# Patient Record
Sex: FEMALE | Race: WHITE | HISPANIC OR LATINO | Employment: OTHER | URBAN - METROPOLITAN AREA
[De-identification: names, ages, dates, MRNs, and addresses within clinical notes are randomized per-mention and may not be internally consistent; named-entity substitution may affect disease eponyms.]

---

## 2017-03-22 ENCOUNTER — ALLSCRIPTS OFFICE VISIT (OUTPATIENT)
Dept: OTHER | Facility: OTHER | Age: 67
End: 2017-03-22

## 2017-04-03 ENCOUNTER — ALLSCRIPTS OFFICE VISIT (OUTPATIENT)
Dept: OTHER | Facility: OTHER | Age: 67
End: 2017-04-03

## 2017-05-18 ENCOUNTER — ALLSCRIPTS OFFICE VISIT (OUTPATIENT)
Dept: OTHER | Facility: OTHER | Age: 67
End: 2017-05-18

## 2017-05-18 DIAGNOSIS — E03.9 HYPOTHYROIDISM: ICD-10-CM

## 2017-05-18 DIAGNOSIS — E78.5 HYPERLIPIDEMIA: ICD-10-CM

## 2017-05-18 DIAGNOSIS — I10 ESSENTIAL (PRIMARY) HYPERTENSION: ICD-10-CM

## 2017-05-31 ENCOUNTER — LAB CONVERSION - ENCOUNTER (OUTPATIENT)
Dept: OTHER | Facility: OTHER | Age: 67
End: 2017-05-31

## 2017-05-31 LAB
A/G RATIO (HISTORICAL): 1.4 (CALC) (ref 1–2.5)
ALBUMIN SERPL BCP-MCNC: 3.9 G/DL (ref 3.6–5.1)
ALP SERPL-CCNC: 75 U/L (ref 33–130)
ALT SERPL W P-5'-P-CCNC: 8 U/L (ref 6–29)
AST SERPL W P-5'-P-CCNC: 15 U/L (ref 10–35)
BILIRUB SERPL-MCNC: 0.4 MG/DL (ref 0.2–1.2)
BUN SERPL-MCNC: 13 MG/DL (ref 7–25)
BUN/CREA RATIO (HISTORICAL): ABNORMAL (CALC) (ref 6–22)
CALCIUM SERPL-MCNC: 9.1 MG/DL (ref 8.6–10.4)
CHLORIDE SERPL-SCNC: 103 MMOL/L (ref 98–110)
CHOLEST SERPL-MCNC: 168 MG/DL (ref 125–200)
CHOLEST/HDLC SERPL: 3.1 (CALC)
CO2 SERPL-SCNC: 29 MMOL/L (ref 20–31)
CREAT SERPL-MCNC: 0.55 MG/DL (ref 0.5–0.99)
EGFR AFRICAN AMERICAN (HISTORICAL): 113 ML/MIN/1.73M2
EGFR-AMERICAN CALC (HISTORICAL): 98 ML/MIN/1.73M2
GAMMA GLOBULIN (HISTORICAL): 2.7 G/DL (CALC) (ref 1.9–3.7)
GLUCOSE (HISTORICAL): 106 MG/DL (ref 65–99)
HDLC SERPL-MCNC: 55 MG/DL
LDL CHOLESTEROL (HISTORICAL): 88 MG/DL (CALC)
NON-HDL-CHOL (CHOL-HDL) (HISTORICAL): 113 MG/DL (CALC)
POTASSIUM SERPL-SCNC: 4.3 MMOL/L (ref 3.5–5.3)
SODIUM SERPL-SCNC: 139 MMOL/L (ref 135–146)
TOTAL PROTEIN (HISTORICAL): 6.6 G/DL (ref 6.1–8.1)
TRIGL SERPL-MCNC: 124 MG/DL

## 2017-06-01 ENCOUNTER — LAB CONVERSION - ENCOUNTER (OUTPATIENT)
Dept: OTHER | Facility: OTHER | Age: 67
End: 2017-06-01

## 2017-06-01 LAB
BASOPHILS # BLD AUTO: 0.5 %
BASOPHILS # BLD AUTO: 41 CELLS/UL (ref 0–200)
DEPRECATED RDW RBC AUTO: 15.7 % (ref 11–15)
EOSINOPHIL # BLD AUTO: 1 %
EOSINOPHIL # BLD AUTO: 81 CELLS/UL (ref 15–500)
HCT VFR BLD AUTO: 41.9 % (ref 35–45)
HGB BLD-MCNC: 13.3 G/DL (ref 11.7–15.5)
LYMPHOCYTES # BLD AUTO: 2867 CELLS/UL (ref 850–3900)
LYMPHOCYTES # BLD AUTO: 35.4 %
MCH RBC QN AUTO: 27.9 PG (ref 27–33)
MCHC RBC AUTO-ENTMCNC: 31.9 G/DL (ref 32–36)
MCV RBC AUTO: 87.5 FL (ref 80–100)
MONOCYTES # BLD AUTO: 616 CELLS/UL (ref 200–950)
MONOCYTES (HISTORICAL): 7.6 %
NEUTROPHILS # BLD AUTO: 4496 CELLS/UL (ref 1500–7800)
NEUTROPHILS # BLD AUTO: 55.5 %
PLATELET # BLD AUTO: 326 THOUSAND/UL (ref 140–400)
PMV BLD AUTO: 9.3 FL (ref 7.5–12.5)
RBC # BLD AUTO: 4.79 MILLION/UL (ref 3.8–5.1)
TSH SERPL DL<=0.05 MIU/L-ACNC: 0.3 MIU/L (ref 0.4–4.5)
WBC # BLD AUTO: 8.1 THOUSAND/UL (ref 3.8–10.8)

## 2017-07-20 ENCOUNTER — HOSPITAL ENCOUNTER (OUTPATIENT)
Dept: RADIOLOGY | Facility: HOSPITAL | Age: 67
Discharge: HOME | End: 2017-07-20
Attending: FAMILY MEDICINE
Payer: COMMERCIAL

## 2017-07-20 DIAGNOSIS — Z12.31 ENCOUNTER FOR SCREENING MAMMOGRAM FOR MALIGNANT NEOPLASM OF BREAST: ICD-10-CM

## 2017-07-20 PROCEDURE — G0202 SCR MAMMO BI INCL CAD: HCPCS

## 2017-07-21 ENCOUNTER — GENERIC CONVERSION - ENCOUNTER (OUTPATIENT)
Dept: OTHER | Facility: OTHER | Age: 67
End: 2017-07-21

## 2017-08-31 ENCOUNTER — ALLSCRIPTS OFFICE VISIT (OUTPATIENT)
Dept: OTHER | Facility: OTHER | Age: 67
End: 2017-08-31

## 2017-09-27 ENCOUNTER — ALLSCRIPTS OFFICE VISIT (OUTPATIENT)
Dept: OTHER | Facility: OTHER | Age: 67
End: 2017-09-27

## 2017-10-28 ENCOUNTER — LAB CONVERSION - ENCOUNTER (OUTPATIENT)
Dept: OTHER | Facility: OTHER | Age: 67
End: 2017-10-28

## 2017-10-28 LAB — TSH SERPL DL<=0.05 MIU/L-ACNC: 2.51 MIU/L (ref 0.4–4.5)

## 2017-11-02 ENCOUNTER — GENERIC CONVERSION - ENCOUNTER (OUTPATIENT)
Dept: OTHER | Facility: OTHER | Age: 67
End: 2017-11-02

## 2018-01-12 VITALS
HEART RATE: 88 BPM | SYSTOLIC BLOOD PRESSURE: 130 MMHG | HEIGHT: 56 IN | BODY MASS INDEX: 34.3 KG/M2 | RESPIRATION RATE: 20 BRPM | DIASTOLIC BLOOD PRESSURE: 88 MMHG | TEMPERATURE: 97.4 F | WEIGHT: 152.5 LBS

## 2018-01-12 VITALS
HEIGHT: 56 IN | RESPIRATION RATE: 20 BRPM | DIASTOLIC BLOOD PRESSURE: 84 MMHG | WEIGHT: 152.25 LBS | SYSTOLIC BLOOD PRESSURE: 136 MMHG | TEMPERATURE: 97.9 F | HEART RATE: 78 BPM | BODY MASS INDEX: 34.25 KG/M2

## 2018-01-13 VITALS
RESPIRATION RATE: 18 BRPM | HEIGHT: 56 IN | SYSTOLIC BLOOD PRESSURE: 140 MMHG | DIASTOLIC BLOOD PRESSURE: 82 MMHG | BODY MASS INDEX: 35.54 KG/M2 | WEIGHT: 158 LBS | TEMPERATURE: 96.5 F | HEART RATE: 76 BPM

## 2018-01-13 NOTE — RESULT NOTES
Verified Results  (Q) TSH, 3RD GENERATION [id number] 08:46AM Jarocho Lee   REPORT COMMENT:  FASTING:YES     Test Name Result Flag Reference   TSH 0 30 mIU/L L 0 40-4 50

## 2018-01-14 NOTE — RESULT NOTES
Verified Results  (1) VITAMIN B12 [id number] 11:05AM Itz Beena Order Number: [id number]  TW Order Number: [id number] Order Number: [id number] Order Number: [id number] Order Number: [id number]     Test Name Result Flag Reference   VITAMIN B12 605 pg/mL  100-900     (1) FOLATE [id number] 11:05AM Itz Beena Order Number: [id number]  TW Order Number: [id number] Order Number: [id number] Order Number: [id number] Order Number: [id number]     Test Name Result Flag Reference   FOLATE 17 8 ng/mL H 3 1-17 5     (1) BASIC METABOLIC PROFILE [id number] 11:05AM Itz Hargrove Order Number: [id number]  TW Order Number: [id number] Order Number: [id number] Order Number: [id number] Order Number: [id number]     Test Name Result Flag Reference   GLUCOSE,RANDM 94 mg/dL     If the patient is fasting, the ADA then defines impaired fasting glucose as > 100 mg/dL and diabetes as > or equal to 123 mg/dL  SODIUM 140 mmol/L  136-145   POTASSIUM 3 6 mmol/L  3 5-5 3   CHLORIDE 106 mmol/L  100-108   CARBON DIOXIDE 30 mmol/L  21-32   ANION GAP (CALC) 4 mmol/L  4-13   BLOOD UREA NITROGEN 17 mg/dL  5-25   CREATININE 0 62 mg/dL  0 60-1 30   Standardized to IDMS reference method   CALCIUM 8 9 mg/dL  8 3-10 1   eGFR Non-African American      >60 0 ml/min/1 73[address] [facility] Disease Education Program recommendations are as follows:  GFR calculation is accurate only with a steady state creatinine  Chronic Kidney disease less than 60 ml/min/1 73 sq  meters  Kidney failure less than 15 ml/min/1 73 sq  meters       (1) TSH WITH FT4 REFLEX 22Jun2016 11:05AM Itz Hargrove Order Number: [id number]  TW Order Number: [id number] Order Number: [id number] Order Number: [id number] Order Number: [id number] Test Name Result Flag Reference   TSH 0 360 uIU/mL  0 358-3 740   The recommended reference ranges for TSH during pregnancy are as follows:  First trimester 0 1 to 2 5 uIU/mL  Second trimester  0 2 to 3 0 uIU/mL  Third trimester 0 3 to 3 0 uIU/m

## 2018-01-14 NOTE — PROGRESS NOTES
Discussion/Summary  Social Work-Discussion Summary [facility]: Patient is being seen for an ongoing assessment/follow up  Patient is a breast cancer survivor for whom a gauntlet and arm sleeve has been ordered through [employer]  The bill was sent to this writer as patient cannot afford to pay for this DME  [employer] will pay for these items totaling $107 76 out of Fund A of that overall fund  A check requisition will be delivered to [facility] in the next few days        Signatures   Electronically signed by : Oj Spears; Feb 10 2016  3:20PM EST                       (Author)

## 2018-01-15 NOTE — RESULT NOTES
Verified Results  MAMMO SCREENING RIGHT W CAD [id number] 02:22PM Lasha Trotter Order Number: [id number]    - Patient Instructions: To schedule this appointment, please contact Central Scheduling at [phone]  Do not wear any perfume, powder, lotion or deodorant on breast or underarm area  Please bring your doctors order, referral (if needed) and insurance information with you on the day of the test  Failure to bring this information may result in this test being rescheduled  Arrive 15 minutes prior to your appointment time to register  On the day of your test, please bring any prior mammogram or breast studies with you that were not performed at a [facility]  Failure to bring prior exams may result in your test needing to be rescheduled  Test Name Result Flag Reference   MAMMO SCREENING RIGHT W CAD (Report)     Patient History:   Patient is postmenopausal and has history of breast cancer at age   40  Family history of breast cancer at age 67 in mother, breast    cancer at age [de-identified] in maternal aunt, colorectal cancer under age 48   in maternal uncle, colorectal cancer at age 80 in mother  Benign excisional biopsy of the right breast, 1997  Malignant    mastectomy of the left breast, 1996  Patient has never smoked  Patient's BMI is 31 4      Reason for exam: screening, asymptomatic  Mammo Screening Right W CAD: July 20, 2017 - Check In #: [de-identified]   CC, MLO, and XCCL view(s) were taken of the right breast      Technologist: RT Shyanne(ROWENA)(M)   Prior study comparison: July 15, 2016, mammo screening right W    CAD performed at [address] [place] [address]  May 26, 2015,    right breast DIGITAL UNILATERAL SCREENING MAMMOGRAM WITH CAD    performed at [address] [place] [address]  May 6, 2014, right    breast DIGITAL UNILATERAL SCREENING MAMMOGRAM WITH CAD performed    at [address] [place] [address]  There are scattered fibroglandular densities     The parenchymal pattern appears stable  No dominant soft tissue    mass or suspicious calcifications are noted  Stable nodule is    seen in the medial right breast  This is stable small group of    calcifications in the outer right breast  The skin and nipple    contours are within normal limits  No mammographic evidence of malignancy  No    significant changes when compared with prior studies  ACR BI-RADSï¾® Assessments: BiRad:2 - Benign     Recommendation:   Routine screening mammogram in 1 year  Analyzed by CAD     The patient is scheduled in a reminder system  8-10% of cancers will be missed on mammography  Management of a    palpable abnormality must be based on clinical grounds  Patients   will be notified of their results via letter from our facility  Accredited by [employer] of Radiology and FDA  Transcription Location:  [address]: [id number]     Risk Value(s):   Myriad Table: 4 7%, MRS : Based on personal and/or    family history, consideration of hereditary risk assessment may    be warranted  Signed by:   Sharyle Ben, MD   7/21/17       Plan  Breast cancer screening    · Digital Unilateral Screening Mammogram With CAD ; every 1 year;  Next [id number];  Status:Active

## 2018-01-16 NOTE — RESULT NOTES
Verified Results  (1) COMPREHENSIVE METABOLIC PANEL [id number] 62:59BN Nivia Salazar   REPORT COMMENT:  FASTING:YES     Test Name Result Flag Reference   GLUCOSE 92 mg/dL  65-99   Fasting reference interval   UREA NITROGEN (BUN) 17 mg/dL  7-25   CREATININE 0 64 mg/dL  0 50-0 99   For patients >52years of age, the reference limit  for Creatinine is approximately 13% higher for people  identified as -American  eGFR NON-AFR   AMERICAN 94 mL/min/1 73m2  > OR = 60   eGFR AFRICAN AMERICAN 109 mL/min/1 73m2  > OR = 60   BUN/CREATININE RATIO   6-31   NOT APPLICABLE (calc)   SODIUM 139 mmol/L  135-146   POTASSIUM 4 4 mmol/L  3 5-5 3   CHLORIDE 102 mmol/L     CARBON DIOXIDE 29 mmol/L  19-30   CALCIUM 9 4 mg/dL  8 6-10 4   PROTEIN, TOTAL 6 6 g/dL  6 1-8 1   ALBUMIN 4 0 g/dL  3 6-5 1   GLOBULIN 2 6 g/dL (calc)  1 9-3 7   ALBUMIN/GLOBULIN RATIO 1 5 (calc)  1 0-2 5   BILIRUBIN, TOTAL 0 4 mg/dL  0 2-1 2   ALKALINE PHOSPHATASE 62 U/L     AST 17 U/L  10-35   ALT 9 U/L  6-29     (1) CBC/PLT/DIFF [id number] 08:54AM Ariel Moore   REPORT COMMENT:  FASTING:YES     Test Name Result Flag Reference   WHITE BLOOD CELL COUNT 8 4 Thousand/uL  3 8-10 8   RED BLOOD CELL COUNT 4 85 Million/uL  3 80-5 10   HEMOGLOBIN 13 6 g/dL  11 7-15 5   HEMATOCRIT 43 5 %  35 0-45 0   MCV 89 5 fL  80 0-100 0   MCH 28 0 pg  27 0-33 0   MCHC 31 2 g/dL L 32 0-36 0   RDW 15 5 % H 11 0-15 0   PLATELET COUNT 149 Thousand/uL  140-400   MPV 9 1 fL  7 5-11 5   ABSOLUTE NEUTROPHILS 5090 cells/uL  5254-6303   ABSOLUTE LYMPHOCYTES 2621 cells/uL  850-3900   ABSOLUTE MONOCYTES 563 cells/uL  200-950   ABSOLUTE EOSINOPHILS 84 cells/uL     ABSOLUTE BASOPHILS 42 cells/uL  0-200   NEUTROPHILS 60 6 %     LYMPHOCYTES 31 2 %     MONOCYTES 6 7 %     EOSINOPHILS 1 0 %     BASOPHILS 0 5 %       (Q) LIPID PANEL WITH REFLEX TO DIRECT LDL [id number] 08:54JENNIFER Salazar     Test Name Result Flag Reference   CHOLESTEROL, TOTAL 174 mg/dL  125-200   HDL CHOLESTEROL 53 mg/dL  > OR = 46   TRIGLICERIDES 86 mg/dL  <813   LDL-CHOLESTEROL 104 mg/dL (calc)  <130   Desirable range <100 mg/dL for patients with CHD or  diabetes and <70 mg/dL for diabetic patients with  known heart disease  CHOL/HDLC RATIO 3 3 (calc)  < OR = 5 0   NON HDL CHOLESTEROL 121 mg/dL (calc)     Target for non-HDL cholesterol is 30 mg/dL higher than   LDL cholesterol target       (Q) TSH, 3RD GENERATION [id number] 08:54AM Kenny Chamorro   REPORT COMMENT:  FASTING:YES     Test Name Result Flag Reference   TSH 0 47 mIU/L  0 40-4 50

## 2018-01-17 NOTE — RESULT NOTES
Verified Results  (1) COMPREHENSIVE METABOLIC PANEL [id number] 58:55SW [employer]   REPORT COMMENT:  FASTING:YES     Test Name Result Flag Reference   GLUCOSE 105 mg/dL H 65-99   Fasting reference interval   UREA NITROGEN (BUN) 17 mg/dL  7-25   CREATININE 0 70 mg/dL  0 50-0 99   For patients >52years of age, the reference limit  for Creatinine is approximately 13% higher for people  identified as -American  eGFR NON-AFR   AMERICAN 91 mL/min/1 73m2  > OR = 60   eGFR AFRICAN AMERICAN 105 mL/min/1 73m2  > OR = 60   BUN/CREATININE RATIO   3-77   NOT APPLICABLE (calc)   SODIUM 139 mmol/L  135-146   POTASSIUM 4 3 mmol/L  3 5-5 3   CHLORIDE 102 mmol/L     CARBON DIOXIDE 30 mmol/L  20-31   CALCIUM 10 0 mg/dL  8 6-10 4   PROTEIN, TOTAL 7 0 g/dL  6 1-8 1   ALBUMIN 4 1 g/dL  3 6-5 1   GLOBULIN 2 9 g/dL (calc)  1 9-3 7   ALBUMIN/GLOBULIN RATIO 1 4 (calc)  1 0-2 5   BILIRUBIN, TOTAL 0 5 mg/dL  0 2-1 2   ALKALINE PHOSPHATASE 70 U/L     AST 21 U/L  10-35   ALT 14 U/L  6-29     (1) VITAMIN B12 24Oct2016 10:06AM [employer]   REPORT COMMENT:  FASTING:YES     Test Name Result Flag Reference   VITAMIN B12 689 pg/mL  200-1100     (1) FOLATE 24Oct2016 10:06AM Naaman Mast     Test Name Result Flag Reference   FOLATE, SERUM 14 2 ng/mL     Reference Range                             Low:           <3 4                             Borderline:    3 4-5 4                             Normal:        >5 4     (1) CBC/PLT/DIFF 24Oct2016 10:06AM [employer]   REPORT COMMENT:  FASTING:YES     Test Name Result Flag Reference   WHITE BLOOD CELL COUNT 8 8 Thousand/uL  3 8-10 8   RED BLOOD CELL COUNT 4 89 Million/uL  3 80-5 10   HEMOGLOBIN 13 9 g/dL  11 7-15 5   HEMATOCRIT 42 1 %  35 0-45 0   MCV 86 1 fL  80 0-100 0   MCH 28 5 pg  27 0-33 0   MCHC 33 1 g/dL  32 0-36 0   RDW 15 0 %  11 0-15 0   PLATELET COUNT 933 Thousand/uL  140-400   MPV 9 0 fL  7 5-11 5   ABSOLUTE NEUTROPHILS 5342 cells/uL  3842-4660   ABSOLUTE LYMPHOCYTES 2719 cells/uL  850-3900   ABSOLUTE MONOCYTES 607 cells/uL  200-950   ABSOLUTE EOSINOPHILS 97 cells/uL     ABSOLUTE BASOPHILS 35 cells/uL  0-200   NEUTROPHILS 60 7 %     LYMPHOCYTES 30 9 %     MONOCYTES 6 9 %     EOSINOPHILS 1 1 %     BASOPHILS 0 4 %

## 2018-01-18 ENCOUNTER — GENERIC CONVERSION - ENCOUNTER (OUTPATIENT)
Dept: OTHER | Facility: OTHER | Age: 68
End: 2018-01-18

## 2018-01-18 DIAGNOSIS — N63.0 MASS OF BREAST: ICD-10-CM

## 2018-01-22 VITALS
RESPIRATION RATE: 16 BRPM | BODY MASS INDEX: 36.78 KG/M2 | DIASTOLIC BLOOD PRESSURE: 80 MMHG | WEIGHT: 163.5 LBS | HEART RATE: 76 BPM | HEIGHT: 56 IN | SYSTOLIC BLOOD PRESSURE: 130 MMHG | TEMPERATURE: 96.9 F

## 2018-01-23 ENCOUNTER — GENERIC CONVERSION - ENCOUNTER (OUTPATIENT)
Dept: OTHER | Facility: OTHER | Age: 68
End: 2018-01-23

## 2018-01-24 VITALS
HEART RATE: 82 BPM | TEMPERATURE: 96.2 F | WEIGHT: 165 LBS | RESPIRATION RATE: 16 BRPM | DIASTOLIC BLOOD PRESSURE: 84 MMHG | HEIGHT: 56 IN | SYSTOLIC BLOOD PRESSURE: 126 MMHG | BODY MASS INDEX: 37.12 KG/M2

## 2018-01-24 NOTE — MISCELLANEOUS
Reason For Visit  Reason For Visit Free Text Note Form: Received request to contact pt  regarding obtaining a new home blood pressure monitor  Call to pt  and left message and received return call today  Contact number for [employer] [facility] [employer] [facility] given to pt  ([phone])  Informed pt  that they may provide her with any pharmacy needs including a blood pressure monitor  Pt  verbalizes understanding and states she is familiar with that process  She denies further needs at this time  Will continue to be available to provide support  Active Problems    1  Benign essential hypertension (401 1) (I10)   2  Breast lump or mass (611 72) (N63 0)   3  Cancer of breast, female (174 9) (C50 919)   4  Depression (311) (F32 9)   5  Hyperlipidemia (272 4) (E78 5)   6  Hypothyroidism (244 9) (E03 9)   7  Lymphedema (457 1) (I89 0)   8  Osteoarthritis (715 90) (M19 90)   9  Osteoporosis (733 00) (M81 0)   10  Peripheral neuropathy (356 9) (G62 9)   11  Primary insomnia (307 42) (F51 01)   12  Psoriasis (696 1) (L40 9)   13  Rapid fatigue of gait (781 2) (R26 89)    Current Meds   1  AmLODIPine Besylate 10 MG Oral Tablet; Take 1 tablet daily; Therapy: 79LEC4560 to (Arabella Donaldson)  Requested for: 03KVI6990; Last   Rx:02Nov2017 Ordered   2  Fluocinonide 0 05 % External Solution; APPLY SPARINGLY TO SCALP TWICE DAILY; Therapy: 82DSW9661 to (Evaluate:14Nov2017)  Requested for: 99FAS5673; Last   Rx:03Zgh0535 Ordered   3  Levothyroxine Sodium 50 MCG Oral Tablet; Take 1 tablet daily; Therapy: 63VMY2485 to (Arabella Donaldson)  Requested for: 33ZBW0824; Last   Rx:02Nov2017 Ordered   4  Lisinopril-Hydrochlorothiazide 20-12 5 MG Oral Tablet; take 1 tablet by mouth once daily; Therapy: 86JCJ5021 to (Arabella Donaldson)  Requested for: 56AUZ8794; Last   Rx:02Nov2017 Ordered   5  Mirtazapine 15 MG Oral Tablet; TAKE 1 TABLET AT BEDTIME;    Therapy: 04MTP0077 to (Arabella Anika)  Requested for: 40AID4316; Last   Rx:02Nov2017 Ordered   6  Mometasone Furoate 0 1 % External Cream;   Therapy: 89ZLT0930 to Recorded   7  Mometasone Furoate 0 1 % External Ointment; apply sparingly to affected area(s) twice   daily; Therapy: 84JVX1549 to (Evaluate:85Nve6662)  Requested for: 35VDM5238; Last   Rx:52Pur9590 Ordered   8  Otezla 30 MG Oral Tablet; Take one tablet twice a day; Therapy: (Recorded:03Apr2017) to Recorded   9  Simvastatin 20 MG Oral Tablet; Take 1 tablet by mouth at bedtime; Therapy: 06BBE0684 to (Evaluate:20Jqh5065)  Requested for: 77MJH7204; Last   Rx:02Nov2017 Ordered   10  TraMADol HCl - 50 MG Oral Tablet; TAKE 1 TABLET Twice daily PRN back pain; Therapy: 96PMM8457 to (Evaluate:16Apr2016); Last Rx:17Mar2016 Ordered   11  Zostavax 33815 UNT/0 65ML Subcutaneous Solution Reconstituted (Zostavax 09002    UNT/0 65ML Subcutaneous Solution Reconstituted); 0 65 mL SC X 1;     Therapy: 21EUB7129 to (Last Rx:03Oct2016) Ordered    Signatures   Electronically signed by : PATY Vergara; Jan 23 2018  1:16PM EST                       (Author)

## 2018-01-30 ENCOUNTER — HOSPITAL ENCOUNTER (OUTPATIENT)
Dept: ULTRASOUND IMAGING | Facility: CLINIC | Age: 68
Discharge: HOME | End: 2018-01-30
Payer: COMMERCIAL

## 2018-01-30 ENCOUNTER — HOSPITAL ENCOUNTER (OUTPATIENT)
Dept: MAMMOGRAPHY | Facility: CLINIC | Age: 68
Discharge: HOME | End: 2018-01-30
Payer: COMMERCIAL

## 2018-01-30 DIAGNOSIS — N63.0 MASS OF BREAST: ICD-10-CM

## 2018-01-30 PROCEDURE — 76642 ULTRASOUND BREAST LIMITED: CPT

## 2018-01-30 PROCEDURE — 77065 DX MAMMO INCL CAD UNI: CPT

## 2018-02-13 DIAGNOSIS — F51.01 PRIMARY INSOMNIA: Primary | ICD-10-CM

## 2018-02-13 RX ORDER — MIRTAZAPINE 15 MG/1
TABLET, FILM COATED ORAL
Qty: 90 TABLET | Refills: 1 | Status: SHIPPED | OUTPATIENT
Start: 2018-02-13 | End: 2018-05-30 | Stop reason: SDUPTHER

## 2018-03-28 ENCOUNTER — OFFICE VISIT (OUTPATIENT)
Dept: MULTI SPECIALTY CLINIC | Facility: CLINIC | Age: 68
End: 2018-03-28
Payer: COMMERCIAL

## 2018-03-28 DIAGNOSIS — L40.9 PSORIASIS: Primary | ICD-10-CM

## 2018-03-28 PROCEDURE — 99213 OFFICE O/P EST LOW 20 MIN: CPT | Performed by: SPECIALIST

## 2018-03-29 ENCOUNTER — OFFICE VISIT (OUTPATIENT)
Dept: FAMILY MEDICINE CLINIC | Facility: CLINIC | Age: 68
End: 2018-03-29
Payer: COMMERCIAL

## 2018-03-29 VITALS
TEMPERATURE: 97.1 F | HEIGHT: 57 IN | SYSTOLIC BLOOD PRESSURE: 140 MMHG | RESPIRATION RATE: 16 BRPM | HEART RATE: 76 BPM | BODY MASS INDEX: 36.85 KG/M2 | DIASTOLIC BLOOD PRESSURE: 90 MMHG | WEIGHT: 170.8 LBS

## 2018-03-29 DIAGNOSIS — I10 BENIGN ESSENTIAL HYPERTENSION: ICD-10-CM

## 2018-03-29 DIAGNOSIS — E03.9 ACQUIRED HYPOTHYROIDISM: Primary | ICD-10-CM

## 2018-03-29 DIAGNOSIS — E78.5 HYPERLIPIDEMIA, UNSPECIFIED HYPERLIPIDEMIA TYPE: ICD-10-CM

## 2018-03-29 PROCEDURE — 3725F SCREEN DEPRESSION PERFORMED: CPT | Performed by: FAMILY MEDICINE

## 2018-03-29 PROCEDURE — 99214 OFFICE O/P EST MOD 30 MIN: CPT | Performed by: FAMILY MEDICINE

## 2018-03-29 PROCEDURE — 1101F PT FALLS ASSESS-DOCD LE1/YR: CPT | Performed by: FAMILY MEDICINE

## 2018-03-29 RX ORDER — LISINOPRIL AND HYDROCHLOROTHIAZIDE 20; 12.5 MG/1; MG/1
TABLET ORAL
Refills: 0 | COMMUNITY
Start: 2018-02-20 | End: 2018-05-30 | Stop reason: SDUPTHER

## 2018-03-29 RX ORDER — APREMILAST 30 MG/1
30 TABLET, FILM COATED ORAL 2 TIMES DAILY
Refills: 0 | COMMUNITY
Start: 2018-03-19

## 2018-03-29 RX ORDER — ZOSTER VACCINE LIVE 19400 [PFU]/.65ML
INJECTION, POWDER, LYOPHILIZED, FOR SUSPENSION SUBCUTANEOUS
COMMUNITY
Start: 2016-10-03 | End: 2019-12-17

## 2018-03-29 RX ORDER — LEVOTHYROXINE SODIUM 0.05 MG/1
TABLET ORAL
Refills: 0 | COMMUNITY
Start: 2018-02-06 | End: 2018-05-30 | Stop reason: SDUPTHER

## 2018-03-29 RX ORDER — AMLODIPINE BESYLATE 10 MG/1
TABLET ORAL
Refills: 0 | COMMUNITY
Start: 2018-02-06 | End: 2018-05-30 | Stop reason: SDUPTHER

## 2018-03-29 RX ORDER — MOMETASONE FUROATE 1 MG/G
50 CREAM TOPICAL 2 TIMES DAILY
COMMUNITY
Start: 2017-07-14

## 2018-03-29 RX ORDER — SIMVASTATIN 20 MG
TABLET ORAL
Refills: 0 | COMMUNITY
Start: 2018-02-13 | End: 2018-05-30 | Stop reason: SDUPTHER

## 2018-03-29 RX ORDER — FLUOCINONIDE TOPICAL SOLUTION USP, 0.05% 0.5 MG/ML
SOLUTION TOPICAL
Refills: 0 | COMMUNITY
Start: 2018-03-05

## 2018-03-29 RX ORDER — TRAMADOL HYDROCHLORIDE 50 MG/1
1 TABLET ORAL 2 TIMES DAILY PRN
COMMUNITY
Start: 2016-03-17 | End: 2018-09-07 | Stop reason: ALTCHOICE

## 2018-03-29 NOTE — PROGRESS NOTES
Assessment/Plan:    1  HTN: Continue amlodipine, lisinopril/hctz 20/12 5 mg daily  Take medication every day at night  BP log  TLC  2  Dyslipidemia: TLC, simvastatin  3  Hypothyroidism: Continue current dose of levothyroxine  f/u 2 months with Dr Colletta Needle  Will get CBC, CMP, TSH  Subjective:      Patient ID: Amaris Harris is a 78 y o  female  1  HTN: No CP/SOB/KOROMA/HA/edema  Prescribed amlodipine, lisinopril, hctz; unsure if she is taking all of her medication  Home -150/70-90     2  Dyslipidemia: On simvastatin  No GI upset/myalgias  LDL 88 (2017)  3  Hypothyroidism: On levothyroxine; dose was recently reduced due to suppressed TSH  No sx of hyper-/hypothyroidism  TSH now 2  Did not get labs done yet  Current Outpatient Prescriptions:     amLODIPine (NORVASC) 10 mg tablet, , Disp: , Rfl: 0    fluocinonide (LIDEX) 0 05 % external solution, , Disp: , Rfl: 0    levothyroxine 50 mcg tablet, , Disp: , Rfl: 0    lisinopril-hydrochlorothiazide (PRINZIDE,ZESTORETIC) 20-12 5 MG per tablet, , Disp: , Rfl: 0    mirtazapine (REMERON) 15 mg tablet, take 1 tablet by mouth at bedtime, Disp: 90 tablet, Rfl: 1    mometasone (ELOCON) 0 1 % cream, Apply topically, Disp: , Rfl:     OTEZLA 30 MG TABS, , Disp: , Rfl: 0    simvastatin (ZOCOR) 20 mg tablet, , Disp: , Rfl: 0    traMADol (ULTRAM) 50 mg tablet, Take 1 tablet by mouth 2 (two) times a day as needed, Disp: , Rfl:     Zoster Vaccine Live (ZOSTAVAX) 22934 UNT/0 65ML SUSR, Inject under the skin, Disp: , Rfl:       Review of Systems   Respiratory: Negative for cough, chest tightness, shortness of breath and wheezing  Cardiovascular: Negative for chest pain, palpitations and leg swelling  Gastrointestinal: Negative for abdominal pain, constipation, diarrhea, nausea and vomiting     Neurological: Negative for dizziness, tremors, seizures, syncope, facial asymmetry, speech difficulty, weakness, light-headedness, numbness and headaches  Hematological: Does not bruise/bleed easily  Objective:      /90   Pulse 76   Temp (!) 97 1 °F (36 2 °C)   Resp 16   Ht 4' 8 8" (1 443 m)   Wt 77 5 kg (170 lb 12 8 oz)   BMI 37 22 kg/m²          Physical Exam   Constitutional: She is oriented to person, place, and time  She appears well-developed and well-nourished  HENT:   Head: Normocephalic  Neck: Normal range of motion  Neck supple  Cardiovascular: Normal rate, regular rhythm, normal heart sounds and intact distal pulses  Pulmonary/Chest: Effort normal and breath sounds normal    Musculoskeletal: Normal range of motion  Neurological: She is alert and oriented to person, place, and time  She has normal reflexes  Skin: Skin is warm and dry  Psychiatric: She has a normal mood and affect  Her behavior is normal  Judgment and thought content normal    Nursing note and vitals reviewed

## 2018-04-14 LAB
ALBUMIN SERPL-MCNC: 4.4 G/DL (ref 3.6–5.1)
ALBUMIN/GLOB SERPL: 1.7 (CALC) (ref 1–2.5)
ALP SERPL-CCNC: 75 U/L (ref 33–130)
ALT SERPL-CCNC: 9 U/L (ref 6–29)
AST SERPL-CCNC: 18 U/L (ref 10–35)
BASOPHILS # BLD AUTO: 48 CELLS/UL (ref 0–200)
BASOPHILS NFR BLD AUTO: 0.7 %
BILIRUB SERPL-MCNC: 0.4 MG/DL (ref 0.2–1.2)
BUN SERPL-MCNC: 13 MG/DL (ref 7–25)
BUN/CREAT SERPL: ABNORMAL (CALC) (ref 6–22)
CALCIUM SERPL-MCNC: 9.6 MG/DL (ref 8.6–10.4)
CHLORIDE SERPL-SCNC: 105 MMOL/L (ref 98–110)
CHOLEST SERPL-MCNC: 170 MG/DL
CHOLEST/HDLC SERPL: 2.9 (CALC)
CO2 SERPL-SCNC: 27 MMOL/L (ref 20–31)
CREAT SERPL-MCNC: 0.73 MG/DL (ref 0.5–0.99)
EOSINOPHIL # BLD AUTO: 68 CELLS/UL (ref 15–500)
EOSINOPHIL NFR BLD AUTO: 1 %
ERYTHROCYTE [DISTWIDTH] IN BLOOD BY AUTOMATED COUNT: 13.3 % (ref 11–15)
GLOBULIN SER CALC-MCNC: 2.6 G/DL (CALC) (ref 1.9–3.7)
GLUCOSE SERPL-MCNC: 110 MG/DL (ref 65–99)
HCT VFR BLD AUTO: 43.3 % (ref 35–45)
HDLC SERPL-MCNC: 59 MG/DL
HGB BLD-MCNC: 13.7 G/DL (ref 11.7–15.5)
LDLC SERPL CALC-MCNC: 92 MG/DL (CALC)
LYMPHOCYTES # BLD AUTO: 2468 CELLS/UL (ref 850–3900)
LYMPHOCYTES NFR BLD AUTO: 36.3 %
MCH RBC QN AUTO: 27.9 PG (ref 27–33)
MCHC RBC AUTO-ENTMCNC: 31.6 G/DL (ref 32–36)
MCV RBC AUTO: 88.2 FL (ref 80–100)
MONOCYTES # BLD AUTO: 592 CELLS/UL (ref 200–950)
MONOCYTES NFR BLD AUTO: 8.7 %
NEUTROPHILS # BLD AUTO: 3624 CELLS/UL (ref 1500–7800)
NEUTROPHILS NFR BLD AUTO: 53.3 %
NONHDLC SERPL-MCNC: 111 MG/DL (CALC)
PLATELET # BLD AUTO: 359 THOUSAND/UL (ref 140–400)
PMV BLD REES-ECKER: 10.5 FL (ref 7.5–12.5)
POTASSIUM SERPL-SCNC: 4.2 MMOL/L (ref 3.5–5.3)
PROT SERPL-MCNC: 7 G/DL (ref 6.1–8.1)
RBC # BLD AUTO: 4.91 MILLION/UL (ref 3.8–5.1)
SL AMB EGFR AFRICAN AMERICAN: 99 ML/MIN/1.73M2
SL AMB EGFR NON AFRICAN AMERICAN: 86 ML/MIN/1.73M2
SODIUM SERPL-SCNC: 141 MMOL/L (ref 135–146)
TRIGL SERPL-MCNC: 92 MG/DL
TSH SERPL-ACNC: 2.55 MIU/L (ref 0.4–4.5)
WBC # BLD AUTO: 6.8 THOUSAND/UL (ref 3.8–10.8)

## 2018-04-18 DIAGNOSIS — E78.5 HYPERLIPIDEMIA: ICD-10-CM

## 2018-04-18 DIAGNOSIS — E03.9 HYPOTHYROIDISM: ICD-10-CM

## 2018-04-18 DIAGNOSIS — I10 ESSENTIAL (PRIMARY) HYPERTENSION: ICD-10-CM

## 2018-05-30 ENCOUNTER — OFFICE VISIT (OUTPATIENT)
Dept: FAMILY MEDICINE CLINIC | Facility: CLINIC | Age: 68
End: 2018-05-30
Payer: COMMERCIAL

## 2018-05-30 VITALS
SYSTOLIC BLOOD PRESSURE: 120 MMHG | RESPIRATION RATE: 18 BRPM | BODY MASS INDEX: 36.22 KG/M2 | HEART RATE: 74 BPM | DIASTOLIC BLOOD PRESSURE: 70 MMHG | HEIGHT: 56 IN | WEIGHT: 161 LBS | TEMPERATURE: 98.4 F

## 2018-05-30 DIAGNOSIS — F51.01 PRIMARY INSOMNIA: ICD-10-CM

## 2018-05-30 DIAGNOSIS — I10 BENIGN ESSENTIAL HYPERTENSION: ICD-10-CM

## 2018-05-30 DIAGNOSIS — E78.5 HYPERLIPIDEMIA, UNSPECIFIED HYPERLIPIDEMIA TYPE: ICD-10-CM

## 2018-05-30 DIAGNOSIS — R73.01 IFG (IMPAIRED FASTING GLUCOSE): ICD-10-CM

## 2018-05-30 DIAGNOSIS — E03.9 ACQUIRED HYPOTHYROIDISM: Primary | ICD-10-CM

## 2018-05-30 DIAGNOSIS — Z23 NEED FOR TETANUS, DIPHTHERIA, AND ACELLULAR PERTUSSIS (TDAP) VACCINE: ICD-10-CM

## 2018-05-30 LAB — SL AMB POCT HEMOGLOBIN AIC: 6.2

## 2018-05-30 PROCEDURE — 99213 OFFICE O/P EST LOW 20 MIN: CPT | Performed by: FAMILY MEDICINE

## 2018-05-30 PROCEDURE — 83036 HEMOGLOBIN GLYCOSYLATED A1C: CPT | Performed by: FAMILY MEDICINE

## 2018-05-30 PROCEDURE — 3078F DIAST BP <80 MM HG: CPT | Performed by: FAMILY MEDICINE

## 2018-05-30 PROCEDURE — 1036F TOBACCO NON-USER: CPT | Performed by: FAMILY MEDICINE

## 2018-05-30 PROCEDURE — 3074F SYST BP LT 130 MM HG: CPT | Performed by: FAMILY MEDICINE

## 2018-05-30 PROCEDURE — 90715 TDAP VACCINE 7 YRS/> IM: CPT | Performed by: FAMILY MEDICINE

## 2018-05-30 PROCEDURE — 90471 IMMUNIZATION ADMIN: CPT | Performed by: FAMILY MEDICINE

## 2018-05-30 RX ORDER — MIRTAZAPINE 15 MG/1
15 TABLET, FILM COATED ORAL
Qty: 30 TABLET | Refills: 2 | Status: SHIPPED | OUTPATIENT
Start: 2018-05-30 | End: 2019-01-17 | Stop reason: SDUPTHER

## 2018-05-30 RX ORDER — LISINOPRIL AND HYDROCHLOROTHIAZIDE 20; 12.5 MG/1; MG/1
1 TABLET ORAL DAILY
Qty: 30 TABLET | Refills: 2 | Status: SHIPPED | OUTPATIENT
Start: 2018-05-30 | End: 2018-08-21 | Stop reason: SDUPTHER

## 2018-05-30 RX ORDER — AMLODIPINE BESYLATE 10 MG/1
10 TABLET ORAL DAILY
Qty: 30 TABLET | Refills: 2 | Status: SHIPPED | OUTPATIENT
Start: 2018-05-30 | End: 2018-08-21 | Stop reason: SDUPTHER

## 2018-05-30 RX ORDER — LEVOTHYROXINE SODIUM 0.05 MG/1
50 TABLET ORAL DAILY
Qty: 30 TABLET | Refills: 2 | Status: SHIPPED | OUTPATIENT
Start: 2018-05-30 | End: 2018-08-21 | Stop reason: SDUPTHER

## 2018-05-30 RX ORDER — SIMVASTATIN 20 MG
20 TABLET ORAL
Qty: 30 TABLET | Refills: 0 | Status: SHIPPED | OUTPATIENT
Start: 2018-05-30 | End: 2018-06-26 | Stop reason: SDUPTHER

## 2018-05-30 NOTE — ASSESSMENT & PLAN NOTE
Fasting glucose elevated at 110 on CMP 04/2018  Last A1C:  Lab Results   Component Value Date    HGBA1C 6 3 06/18/2016   POCT A1C obtained in office today 6 2%  Patient counseled on continued lifestyle modifications, including healthy diabetic diet, and exercise  RTC in 3 months

## 2018-05-30 NOTE — ASSESSMENT & PLAN NOTE
Currently on Simvastatin 20 mg tabs  Compliant, denies any msk or gi adverse affects  Continue current regimen  Refill provided  ASCVD risk score 7 6%   (Last lipid panel 05/2018 unchanged from 05/2017)  Lab Results   Component Value Date    CHOL 168 05/31/2017    CHOL 213 (H) 06/18/2016    CHOL 174 03/23/2016     Lab Results   Component Value Date    HDL 55 05/31/2017    HDL 53 03/23/2016    HDL 58 09/16/2015     Lab Results   Component Value Date    LDLCALC 88 09/30/2013     Lab Results   Component Value Date    TRIG 92 04/13/2018    TRIG 124 05/31/2017    TRIG 86 03/23/2016     No components found for: CHOLHDL

## 2018-05-30 NOTE — ASSESSMENT & PLAN NOTE
Last TSH wnl  Lab Results   Component Value Date    TSH 2 55 04/13/2018   Reports compliance  Denies adverse affects  Continue current regimen Levothryroxine 50 mcg daily,  Refill provided  RTC in 3 months

## 2018-05-30 NOTE — ASSESSMENT & PLAN NOTE
Well controlled  Continue current meds, amlodipine 10 mg daily, lisinopril-HCTZ (20-12 5) daily  BP Readings from Last 3 Encounters:   05/30/18 120/70   03/29/18 140/90   01/18/18 126/84   Last CMP wnl, including BUN/Cr  (with exception of fasting glucose 119 consistent with impaired fasting glucose)  RTC 3 months

## 2018-05-30 NOTE — PROGRESS NOTES
Assessment/Plan: Felecia Perez is a 80 y o  female with:   Problem List Items Addressed This Visit        Endocrine    Hypothyroidism - Primary     Last TSH wnl  Lab Results   Component Value Date    TSH 2 55 04/13/2018   Reports compliance  Denies adverse affects  Continue current regimen Levothryroxine 50 mcg daily,  Refill provided  RTC in 3 months  Relevant Medications    levothyroxine 50 mcg tablet    IFG (impaired fasting glucose)     Fasting glucose elevated at 110 on CMP 04/2018  Last A1C:  Lab Results   Component Value Date    HGBA1C 6 3 06/18/2016   POCT A1C obtained in office today 6 2%  Patient counseled on continued lifestyle modifications, including healthy diabetic diet, and exercise  RTC in 3 months  Relevant Orders    POCT hemoglobin A1c (Completed)       Cardiovascular and Mediastinum    Benign essential hypertension     Well controlled  Continue current meds, amlodipine 10 mg daily, lisinopril-HCTZ (20-12 5) daily  BP Readings from Last 3 Encounters:   05/30/18 120/70   03/29/18 140/90   01/18/18 126/84   Last CMP wnl, including BUN/Cr  (with exception of fasting glucose 119 consistent with impaired fasting glucose)  RTC 3 months  Relevant Medications    amLODIPine (NORVASC) 10 mg tablet    lisinopril-hydrochlorothiazide (PRINZIDE,ZESTORETIC) 20-12 5 MG per tablet       Other    Hyperlipidemia     Currently on Simvastatin 20 mg tabs  Compliant, denies any msk or gi adverse affects  Continue current regimen  Refill provided  ASCVD risk score 7 6%   (Last lipid panel 05/2018 unchanged from 05/2017)  Lab Results   Component Value Date    CHOL 168 05/31/2017    CHOL 213 (H) 06/18/2016    CHOL 174 03/23/2016     Lab Results   Component Value Date    HDL 55 05/31/2017    HDL 53 03/23/2016    HDL 58 09/16/2015     Lab Results   Component Value Date    LDLCALC 88 09/30/2013     Lab Results   Component Value Date    TRIG 92 04/13/2018    TRIG 124 05/31/2017    TRIG 86 03/23/2016     No components found for: CHOLHDL         Relevant Medications    simvastatin (ZOCOR) 20 mg tablet    Primary insomnia     Well controlled on Mirtazepine  15 mg PO HS  Continue current regimen  Refill provided  Relevant Medications    mirtazapine (REMERON) 15 mg tablet      Other Visit Diagnoses     Need for tetanus, diphtheria, and acellular pertussis (Tdap) vaccine        Relevant Orders    TDAP VACCINE GREATER THAN OR EQUAL TO 6YO IM (Completed)          Chief Complaint:  Chief Complaint   Patient presents with    Hypertension    Hypothyroidism       HPI:   Dwaine Landa is a 80 y o  female who presents today for follow up of labs tests, hypertension, insomnia, hypothyroidism, hyperlipidemia  She reports compliance with all meds and denies any adverse medication reactions, including  lightheadedness, headaches, chest pain, palpitations, sob, n/v, diarrhea, constipation, abdominal pain, any  complaints  Patient also has a history of breast cancer and denies any recent unintentional weight changes, and also has a history of impaired fasting glucose and denies any polyuria polydipsia, or recent changes in vision (uses glasses at baseline for reading), however reports intermittent numbness an tingling in right heel (currently not present)  No acute complaints        History:  Current Outpatient Prescriptions   Medication Sig Dispense Refill    amLODIPine (NORVASC) 10 mg tablet Take 1 tablet (10 mg total) by mouth daily 30 tablet 2    fluocinonide (LIDEX) 0 05 % external solution   0    levothyroxine 50 mcg tablet Take 1 tablet (50 mcg total) by mouth daily 30 tablet 2    lisinopril-hydrochlorothiazide (PRINZIDE,ZESTORETIC) 20-12 5 MG per tablet Take 1 tablet by mouth daily 30 tablet 2    mirtazapine (REMERON) 15 mg tablet Take 1 tablet (15 mg total) by mouth daily at bedtime 30 tablet 2    mometasone (ELOCON) 0 1 % cream Apply topically      OTEZLA 30 MG TABS   0    simvastatin (ZOCOR) 20 mg tablet Take 1 tablet (20 mg total) by mouth daily at bedtime 30 tablet 0    traMADol (ULTRAM) 50 mg tablet Take 1 tablet by mouth 2 (two) times a day as needed      Zoster Vaccine Live (ZOSTAVAX) 87836 UNT/0 65ML SUSR Inject under the skin       No current facility-administered medications for this visit  Past Medical History:   Diagnosis Date    Breast cancer ([facility] [address] )     Hemorrhoids      Social History   Substance Use Topics    Smoking status: Never Smoker    Smokeless tobacco: Never Used    Alcohol use No     Patient Active Problem List   Diagnosis    Benign essential hypertension    Depression    Hyperlipidemia    Hypothyroidism    Lymphedema    Primary insomnia    IFG (impaired fasting glucose)     Review of Systems  As Per HPI    Physical Exam:  /70   Pulse 74   Temp 98 4 °F (36 9 °C)   Resp 18   Ht 4' 8" (1 422 m)   Wt 73 kg (161 lb)   BMI 36 10 kg/m²   Physical Exam   Constitutional: She is oriented to person, place, and time  She appears well-developed  No distress  HENT:   Head: Normocephalic and atraumatic  Right Ear: External ear normal    Left Ear: External ear normal    Eyes: Conjunctivae and EOM are normal  Pupils are equal, round, and reactive to light  No scleral icterus  Neck: Neck supple  No JVD present  No thyromegaly present  Cardiovascular: Normal rate, regular rhythm, normal heart sounds and intact distal pulses  No murmur heard  Pulmonary/Chest: Effort normal and breath sounds normal  No respiratory distress  She has no wheezes  Abdominal: Soft  Normal appearance and bowel sounds are normal  She exhibits no distension  There is no tenderness  Musculoskeletal: She exhibits no edema or tenderness  Sensation and proprioception of bilateral feet and hand intact and symmetrical  Microfilament test negative  Neurological: She is alert and oriented to person, place, and time  Skin: Skin is warm and dry   She is not diaphoretic  Psychiatric: She has a normal mood and affect  Vitals reviewed        Future Appointments  Date Time Provider Gita Miracle   8/31/2018 9:30 AM Samina Pollock MD [facility]   9/26/2018 12:45 PM Teresa CHRISTINE [facility]

## 2018-06-26 DIAGNOSIS — E78.5 HYPERLIPIDEMIA, UNSPECIFIED HYPERLIPIDEMIA TYPE: ICD-10-CM

## 2018-06-26 RX ORDER — SIMVASTATIN 20 MG
TABLET ORAL
Qty: 30 TABLET | Refills: 0 | Status: SHIPPED | OUTPATIENT
Start: 2018-06-26 | End: 2018-07-24 | Stop reason: SDUPTHER

## 2018-07-24 DIAGNOSIS — E78.5 HYPERLIPIDEMIA, UNSPECIFIED HYPERLIPIDEMIA TYPE: ICD-10-CM

## 2018-07-24 RX ORDER — SIMVASTATIN 20 MG
TABLET ORAL
Qty: 30 TABLET | Refills: 0 | Status: SHIPPED | OUTPATIENT
Start: 2018-07-24 | End: 2018-08-21 | Stop reason: SDUPTHER

## 2018-08-21 DIAGNOSIS — I10 BENIGN ESSENTIAL HYPERTENSION: ICD-10-CM

## 2018-08-21 DIAGNOSIS — E78.5 HYPERLIPIDEMIA, UNSPECIFIED HYPERLIPIDEMIA TYPE: ICD-10-CM

## 2018-08-21 DIAGNOSIS — E03.9 ACQUIRED HYPOTHYROIDISM: ICD-10-CM

## 2018-08-21 RX ORDER — LEVOTHYROXINE SODIUM 0.05 MG/1
TABLET ORAL
Qty: 30 TABLET | Refills: 2 | Status: SHIPPED | OUTPATIENT
Start: 2018-08-21 | End: 2018-11-26 | Stop reason: SDUPTHER

## 2018-08-21 RX ORDER — SIMVASTATIN 20 MG
TABLET ORAL
Qty: 30 TABLET | Refills: 0 | Status: SHIPPED | OUTPATIENT
Start: 2018-08-21 | End: 2018-10-05 | Stop reason: SDUPTHER

## 2018-08-21 RX ORDER — AMLODIPINE BESYLATE 10 MG/1
TABLET ORAL
Qty: 30 TABLET | Refills: 2 | Status: SHIPPED | OUTPATIENT
Start: 2018-08-21 | End: 2018-11-26 | Stop reason: SDUPTHER

## 2018-08-21 RX ORDER — LISINOPRIL AND HYDROCHLOROTHIAZIDE 20; 12.5 MG/1; MG/1
TABLET ORAL
Qty: 30 TABLET | Refills: 2 | Status: SHIPPED | OUTPATIENT
Start: 2018-08-21 | End: 2018-11-26 | Stop reason: SDUPTHER

## 2018-09-07 ENCOUNTER — OFFICE VISIT (OUTPATIENT)
Dept: FAMILY MEDICINE CLINIC | Facility: CLINIC | Age: 68
End: 2018-09-07
Payer: COMMERCIAL

## 2018-09-07 VITALS
DIASTOLIC BLOOD PRESSURE: 80 MMHG | HEIGHT: 56 IN | RESPIRATION RATE: 16 BRPM | SYSTOLIC BLOOD PRESSURE: 130 MMHG | WEIGHT: 155.4 LBS | BODY MASS INDEX: 34.96 KG/M2 | HEART RATE: 78 BPM | TEMPERATURE: 96.9 F

## 2018-09-07 DIAGNOSIS — H02.845 EDEMA OF LEFT LOWER EYELID: ICD-10-CM

## 2018-09-07 DIAGNOSIS — M81.0 OSTEOPOROSIS, UNSPECIFIED OSTEOPOROSIS TYPE, UNSPECIFIED PATHOLOGICAL FRACTURE PRESENCE: Primary | ICD-10-CM

## 2018-09-07 PROCEDURE — 1160F RVW MEDS BY RX/DR IN RCRD: CPT | Performed by: FAMILY MEDICINE

## 2018-09-07 PROCEDURE — 3008F BODY MASS INDEX DOCD: CPT | Performed by: FAMILY MEDICINE

## 2018-09-07 PROCEDURE — 99213 OFFICE O/P EST LOW 20 MIN: CPT | Performed by: FAMILY MEDICINE

## 2018-09-07 NOTE — ASSESSMENT & PLAN NOTE
Last DXA 2013 with Tscore of -2 9  Patient initially on alendronate for Osteoporosis but discontinued by PCP more than 5 years ago  C/o of  Chronic low back pain progressively worsening, Afebrile on exam, non-specific point tenderness along lumbar spine, no deformities noted  Pain improves with Tylenol PRN at home  Will repeat DXA scan today, and consider restarting pharmacotherapy after based on results    RTC in 4 weeks for results review and Annual Physical

## 2018-09-07 NOTE — ASSESSMENT & PLAN NOTE
Patient c/o of b/l lower eyelid swelling, mildly tender on left that has improved in the last 2-3 days  Denies any changes in color, involvment of eye, changes in vision, fevers, chills or other associated s/s  She believes it could be due to eye glasses frame which she wears occasionally, denies trauma  Exam benign, except mild tenderness to palpation on R maxillary prominance, without overlying edema or erythema, no pain on EOM, conjunctiva wnl  Likely due to mild trauma on R cheek, and unrelated physiological edema b/l (no other s/s of fluid retention)  Will monitor for resolution, patient does not require analgesic, since pain not constant  Patient was counseled and strict return precautions provided    RTC PRN

## 2018-09-07 NOTE — PROGRESS NOTES
Assessment/Plan: Heidi Odonnell is a 80 y o  female with:   Problem List Items Addressed This Visit        Musculoskeletal and Integument    Osteoporosis - Primary     Last DXA 2013 with Tscore of -2 9  Patient initially on alendronate for Osteoporosis but discontinued by PCP more than 5 years ago  C/o of  Chronic low back pain progressively worsening, Afebrile on exam, non-specific point tenderness along lumbar spine, no deformities noted  Pain improves with Tylenol PRN at home  Will repeat DXA scan today, and consider restarting pharmacotherapy after based on results  RTC in 4 weeks for results review and Annual Physical         Relevant Orders    DXA bone density spine hip and pelvis       Other    Edema of left lower eyelid     Patient c/o of b/l lower eyelid swelling, mildly tender on left that has improved in the last 2-3 days  Denies any changes in color, involvment of eye, changes in vision, fevers, chills or other associated s/s  She believes it could be due to eye glasses frame which she wears occasionally, denies trauma  Exam benign, except mild tenderness to palpation on R maxillary prominance, without overlying edema or erythema, no pain on EOM, conjunctiva wnl  Likely due to mild trauma on R cheek, and unrelated physiological edema b/l (no other s/s of fluid retention)  Will monitor for resolution, patient does not require analgesic, since pain not constant  Patient was counseled and strict return precautions provided  RTC PRN             RTC in 4 weeks for Annual Physical Exam, follow up of DXA    Chief Complaint:  Chief Complaint   Patient presents with    Follow-up     HPI:   Heidi Odonnell is a 80 y o  female with PMH of osteoporosis and breast cancer who presents today for evaluation of progressively worsening chronic low back pain  Patient discontinued Alendronate per her previous PCP for unclear reason >5 years ago   Denies any recent trauma, neurological deficits, radiating pain, fevers, chills  Patient able to ambulate without walker at baseline and get exercise daily  Patient also c/o intermittent swelling of b/l lower eyelids with some tenderness on maxillary prominence of R cheek, which she believes may be related to frame of her eyeglasses which she wears intermittently during the day  Swelling improved last 2-3 days, denies skin changes, involvement of eye, changes in vision, sinus pain/pressure, rhinorrhea, cough, or systemic s/s  She reports compliance with all meds and denies any adverse medication reactions, including  lightheadedness, headaches, chest pain, palpitations, sob, n/v, diarrhea, constipation, abdominal pain, any  complaints  Denies any recent unintentional weight changes  Current Outpatient Prescriptions   Medication Sig Dispense Refill    amLODIPine (NORVASC) 10 mg tablet take 1 tablet by mouth once daily 30 tablet 2    fluocinonide (LIDEX) 0 05 % external solution   0    levothyroxine 50 mcg tablet take 1 tablet by mouth once daily 30 tablet 2    lisinopril-hydrochlorothiazide (PRINZIDE,ZESTORETIC) 20-12 5 MG per tablet take 1 tablet by mouth once daily 30 tablet 2    mirtazapine (REMERON) 15 mg tablet Take 1 tablet (15 mg total) by mouth daily at bedtime 30 tablet 2    mometasone (ELOCON) 0 1 % cream Apply topically      OTEZLA 30 MG TABS   0    simvastatin (ZOCOR) 20 mg tablet take 1 tablet by mouth at bedtime 30 tablet 0    Zoster Vaccine Live (ZOSTAVAX) 71669 UNT/0 65ML SUSR Inject under the skin       No current facility-administered medications for this visit        Past Medical History:   Diagnosis Date    Breast cancer ([facility] [address] )     Hemorrhoids      Social History   Substance Use Topics    Smoking status: Never Smoker    Smokeless tobacco: Never Used    Alcohol use No     Patient Active Problem List   Diagnosis    Benign essential hypertension    Depression    Hyperlipidemia    Hypothyroidism    Lymphedema    Primary insomnia  IFG (impaired fasting glucose)    Cancer of breast, female ([address] )    Osteoarthritis    Osteoporosis    Peripheral neuropathy    Psoriasis    Edema of left lower eyelid       ROS:  As Per HPI    Physical Exam:  /80   Pulse 78   Temp (!) 96 9 °F (36 1 °C)   Resp 16   Ht 4' 8" (1 422 m)   Wt 70 5 kg (155 lb 6 4 oz)   BMI 34 84 kg/m²   Physical Exam   Constitutional: She appears well-developed and well-nourished  No distress  HENT:   Head: Normocephalic and atraumatic  Right Ear: External ear normal    Left Ear: External ear normal    Nose: Nose normal    Mouth/Throat: Oropharynx is clear and moist  No oropharyngeal exudate  Eyes: Conjunctivae and EOM are normal  Pupils are equal, round, and reactive to light  Right eye exhibits no discharge  Left eye exhibits no discharge  No scleral icterus  Cardiovascular: Normal rate, regular rhythm and normal heart sounds  No murmur heard  Pulmonary/Chest: Effort normal and breath sounds normal  No respiratory distress  She has no wheezes  Abdominal: Soft  Normal appearance and bowel sounds are normal  There is no tenderness  Musculoskeletal: She exhibits no tenderness  Chronic impairment of gait  Strength/Sensation/DTR intact b/l LE  No sign of fluid retention   Lymphadenopathy:     She has no cervical adenopathy  Neurological: She is alert  Skin: She is not diaphoretic  Psychiatric: She has a normal mood and affect  PHQ9 score 7   Vitals reviewed          Future Appointments  Date Time Provider Gita Rausch   9/26/2018 12:45 PM [address] [facility]   10/5/2018 1:00 PM MD Toby Toussaint MD

## 2018-09-17 ENCOUNTER — TELEPHONE (OUTPATIENT)
Dept: INTERNAL MEDICINE CLINIC | Facility: CLINIC | Age: 68
End: 2018-09-17

## 2018-09-17 DIAGNOSIS — L40.9 PSORIASIS: Primary | ICD-10-CM

## 2018-09-27 DIAGNOSIS — E78.5 HYPERLIPIDEMIA, UNSPECIFIED HYPERLIPIDEMIA TYPE: ICD-10-CM

## 2018-09-27 RX ORDER — SIMVASTATIN 20 MG
TABLET ORAL
Qty: 30 TABLET | Refills: 0 | OUTPATIENT
Start: 2018-09-27

## 2018-09-29 DIAGNOSIS — E78.5 HYPERLIPIDEMIA, UNSPECIFIED HYPERLIPIDEMIA TYPE: ICD-10-CM

## 2018-09-30 RX ORDER — SIMVASTATIN 20 MG
TABLET ORAL
Qty: 30 TABLET | Refills: 0 | OUTPATIENT
Start: 2018-09-30

## 2018-09-30 NOTE — TELEPHONE ENCOUNTER
Discontinued at last visit, please inform pharmacy because this is a repeat request for a refill  Thank you!

## 2018-10-03 NOTE — TELEPHONE ENCOUNTER
Called [facility] told pharmacist that the pts Simvastatin was discontinued per Dr Fracisco Hood , she mentioned that they have been giving her emergency supplies of it , so now they will put that in the system

## 2018-10-05 ENCOUNTER — OFFICE VISIT (OUTPATIENT)
Dept: FAMILY MEDICINE CLINIC | Facility: CLINIC | Age: 68
End: 2018-10-05
Payer: COMMERCIAL

## 2018-10-05 VITALS
BODY MASS INDEX: 34.1 KG/M2 | HEART RATE: 78 BPM | DIASTOLIC BLOOD PRESSURE: 80 MMHG | HEIGHT: 56 IN | TEMPERATURE: 96.9 F | WEIGHT: 151.6 LBS | SYSTOLIC BLOOD PRESSURE: 120 MMHG | RESPIRATION RATE: 16 BRPM

## 2018-10-05 DIAGNOSIS — H02.845 EDEMA OF LEFT LOWER EYELID: Primary | ICD-10-CM

## 2018-10-05 DIAGNOSIS — M81.0 OSTEOPOROSIS, UNSPECIFIED OSTEOPOROSIS TYPE, UNSPECIFIED PATHOLOGICAL FRACTURE PRESENCE: ICD-10-CM

## 2018-10-05 DIAGNOSIS — E78.5 HYPERLIPIDEMIA, UNSPECIFIED HYPERLIPIDEMIA TYPE: ICD-10-CM

## 2018-10-05 DIAGNOSIS — M54.50 LOW BACK PAIN: ICD-10-CM

## 2018-10-05 PROCEDURE — 99213 OFFICE O/P EST LOW 20 MIN: CPT | Performed by: FAMILY MEDICINE

## 2018-10-05 PROCEDURE — 4040F PNEUMOC VAC/ADMIN/RCVD: CPT | Performed by: FAMILY MEDICINE

## 2018-10-05 RX ORDER — SIMVASTATIN 20 MG
20 TABLET ORAL
Qty: 90 TABLET | Refills: 2 | Status: SHIPPED | OUTPATIENT
Start: 2018-10-05 | End: 2019-07-11 | Stop reason: SDUPTHER

## 2018-10-05 NOTE — PROGRESS NOTES
Assessment/Plan: Jorge Vaz is a 80 y o  female with:   Problem List Items Addressed This Visit        Musculoskeletal and Integument    Osteoporosis     Patient states she called the scheduling department who told them they will call them back, but never received a call back  I called the scheduling department during office visit today, and helped facilitate making an appointment at [facility] for Tuesday Oct  30 th at 11:30am   She was amenable  Patient counseled on compliance with treatment regimen  Last DXA 2013 with Tscore of -2 9  Patient initially on alendronate for Osteoporosis but discontinued by PCP more than 5 years ago for unclear reasons  Will consider restarting therapy based on appropriate guidelines and DXA results at next visit  Other    Edema of left lower eyelid - Primary     Patient reports significant improvement of lower eyelid edema bilaterally, consistent with physical exam, and denies any pain  Likely etiology was related to wearing large frame glasses, no facial edema or other s/s of fluid retention noted  No other associated s/s  Will continue to monitor  Low back pain     Low back pain at baseline, no changes since previous exam  No acute worsening  Physical exam also unchanged  No acute concerns for fractures  Responsive to NSAIDs  Continue current regimen  Return precautions provided  Including taking steps to minimize risk of fall, including gradual standing up from sitting position or laysing position  Always using walker, and avoid ambulating by holding onto furniture etc   RTC PRN               Patient expresses understanding and agrees with plan of care  Chief Complaint:  Chief Complaint   Patient presents with    Physical Exam       HPI:   Jorge Vaz is a 80 y o  female with PMH of osteoporosis and breast cancer who presents today for follow up of chronic low back pain     Patient discontinued Alendronate per her previous PCP for unclear reason >5 years ago  Denies any recent trauma, neurological deficits, radiating pain, fevers, chills  Patient able to ambulate without walker at baseline and get exercise daily  She reports compliance with all meds and denies any adverse medication reactions, including  lightheadedness, headaches, chest pain, palpitations, sob, n/v, diarrhea, constipation, abdominal pain, any  complaints  Denies any recent unintentional weight changes  She denies recent falls, but does report occasional stumbling when she first wake up if she gets up too soon  Current Outpatient Prescriptions   Medication Sig Dispense Refill    amLODIPine (NORVASC) 10 mg tablet take 1 tablet by mouth once daily 30 tablet 2    fluocinonide (LIDEX) 0 05 % external solution   0    levothyroxine 50 mcg tablet take 1 tablet by mouth once daily 30 tablet 2    lisinopril-hydrochlorothiazide (PRINZIDE,ZESTORETIC) 20-12 5 MG per tablet take 1 tablet by mouth once daily 30 tablet 2    mirtazapine (REMERON) 15 mg tablet Take 1 tablet (15 mg total) by mouth daily at bedtime 30 tablet 2    mometasone (ELOCON) 0 1 % cream Apply topically      OTEZLA 30 MG TABS   0    simvastatin (ZOCOR) 20 mg tablet take 1 tablet by mouth at bedtime 30 tablet 0    Zoster Vaccine Live (ZOSTAVAX) 82701 UNT/0 65ML SUSR Inject under the skin       No current facility-administered medications for this visit        Past Medical History:   Diagnosis Date    Breast cancer ([place] [address] )     Hemorrhoids      Social History   Substance Use Topics    Smoking status: Never Smoker    Smokeless tobacco: Never Used    Alcohol use No     Patient Active Problem List   Diagnosis    Benign essential hypertension    Depression    Hyperlipidemia    Hypothyroidism    Lymphedema    Primary insomnia    IFG (impaired fasting glucose)    [facility] )    Osteoarthritis    Osteoporosis    Peripheral neuropathy    Psoriasis    Edema of left lower eyelid    Low back pain       ROS:  As Per HPI    Physical Exam:  /80   Pulse 78   Temp (!) 96 9 °F (36 1 °C)   Resp 16   Ht 4' 8" (1 422 m)   Wt 68 8 kg (151 lb 9 6 oz)   BMI 33 99 kg/m²   Physical Exam   Constitutional: She appears well-developed and well-nourished  No distress  HENT:   Head: Normocephalic and atraumatic  Cardiovascular: Normal rate and regular rhythm  Murmur heard  Pulmonary/Chest: Effort normal and breath sounds normal  No respiratory distress  She has no wheezes  Abdominal: Soft  Normal appearance and bowel sounds are normal  There is no tenderness  Musculoskeletal: She exhibits no edema or tenderness  No point tenderness or deformities noted along lumbosacral spine  Patient passes TUG test without walker  Neurological: She is alert  Skin: She is not diaphoretic  Psychiatric: Thought content normal    Vitals reviewed      Future Appointments  Date Time Provider Gita Rausch   10/30/2018 11:30 AM [facility]   4/10/2019 1:45 PM Jocelynn Ledbetter MD

## 2018-10-05 NOTE — ASSESSMENT & PLAN NOTE
Patient reports significant improvement of lower eyelid edema bilaterally, consistent with physical exam, and denies any pain  Likely etiology was related to wearing large frame glasses, no facial edema or other s/s of fluid retention noted  No other associated s/s  Will continue to monitor

## 2018-10-05 NOTE — ASSESSMENT & PLAN NOTE
Low back pain at baseline, no changes since previous exam  No acute worsening  Physical exam also unchanged  No acute concerns for fractures  Responsive to NSAIDs  Continue current regimen  Return precautions provided  Including taking steps to minimize risk of fall, including gradual standing up from sitting position or laysing position   Always using walker, and avoid ambulating by holding onto furniture etc   RTC PRN

## 2018-10-05 NOTE — ASSESSMENT & PLAN NOTE
Patient states she called the scheduling department who told them they will call them back, but never received a call back  I called the scheduling department during office visit today, and helped facilitate making an appointment at [place] [address] for Tuesday Oct  30 th at 11:30am   She was amenable  Patient counseled on compliance with treatment regimen  Last DXA 2013 with Tscore of -2 9  Patient initially on alendronate for Osteoporosis but discontinued by PCP more than 5 years ago for unclear reasons  Will consider restarting therapy based on appropriate guidelines and DXA results at next visit

## 2018-10-30 ENCOUNTER — HOSPITAL ENCOUNTER (OUTPATIENT)
Dept: RADIOLOGY | Age: 68
Discharge: HOME | End: 2018-10-30
Payer: COMMERCIAL

## 2018-10-30 DIAGNOSIS — M81.0 OSTEOPOROSIS, UNSPECIFIED OSTEOPOROSIS TYPE, UNSPECIFIED PATHOLOGICAL FRACTURE PRESENCE: ICD-10-CM

## 2018-10-30 PROCEDURE — 77080 DXA BONE DENSITY AXIAL: CPT

## 2018-11-26 DIAGNOSIS — I10 BENIGN ESSENTIAL HYPERTENSION: ICD-10-CM

## 2018-11-26 DIAGNOSIS — E03.9 ACQUIRED HYPOTHYROIDISM: ICD-10-CM

## 2018-11-26 RX ORDER — LISINOPRIL AND HYDROCHLOROTHIAZIDE 20; 12.5 MG/1; MG/1
TABLET ORAL
Qty: 30 TABLET | Refills: 2 | Status: SHIPPED | OUTPATIENT
Start: 2018-11-26 | End: 2019-02-25 | Stop reason: SDUPTHER

## 2018-11-26 RX ORDER — AMLODIPINE BESYLATE 10 MG/1
TABLET ORAL
Qty: 30 TABLET | Refills: 2 | Status: SHIPPED | OUTPATIENT
Start: 2018-11-26 | End: 2019-05-07 | Stop reason: SDUPTHER

## 2018-11-26 RX ORDER — LEVOTHYROXINE SODIUM 0.05 MG/1
TABLET ORAL
Qty: 30 TABLET | Refills: 2 | Status: SHIPPED | OUTPATIENT
Start: 2018-11-26 | End: 2019-02-25 | Stop reason: SDUPTHER

## 2019-01-07 ENCOUNTER — OFFICE VISIT (OUTPATIENT)
Dept: FAMILY MEDICINE CLINIC | Facility: CLINIC | Age: 69
End: 2019-01-07

## 2019-01-07 ENCOUNTER — TRANSCRIBE ORDERS (OUTPATIENT)
Dept: ADMINISTRATIVE | Facility: HOSPITAL | Age: 69
End: 2019-01-07

## 2019-01-07 VITALS
BODY MASS INDEX: 34.06 KG/M2 | HEIGHT: 56 IN | DIASTOLIC BLOOD PRESSURE: 85 MMHG | SYSTOLIC BLOOD PRESSURE: 145 MMHG | RESPIRATION RATE: 16 BRPM | HEART RATE: 80 BPM | WEIGHT: 151.4 LBS | TEMPERATURE: 97.8 F

## 2019-01-07 DIAGNOSIS — Z12.39 SCREENING BREAST EXAMINATION: Primary | ICD-10-CM

## 2019-01-07 DIAGNOSIS — M81.0 AGE-RELATED OSTEOPOROSIS WITHOUT CURRENT PATHOLOGICAL FRACTURE: Primary | ICD-10-CM

## 2019-01-07 DIAGNOSIS — Z12.39 SCREENING FOR BREAST CANCER: ICD-10-CM

## 2019-01-07 PROCEDURE — 99213 OFFICE O/P EST LOW 20 MIN: CPT | Performed by: FAMILY MEDICINE

## 2019-01-07 RX ORDER — B-COMPLEX WITH VITAMIN C
2 TABLET ORAL
Qty: 120 TABLET | Refills: 3 | Status: SHIPPED | OUTPATIENT
Start: 2019-01-07 | End: 2020-09-22 | Stop reason: SDUPTHER

## 2019-01-07 RX ORDER — ALENDRONATE SODIUM 10 MG/1
10 TABLET ORAL
Qty: 90 TABLET | Refills: 3 | Status: CANCELLED | OUTPATIENT
Start: 2019-01-07

## 2019-01-07 NOTE — PROGRESS NOTES
Assessment/Plan: Stu Medrano is a 80 y o  female with:   Problem List Items Addressed This Visit        Musculoskeletal and Integument    Age-related osteoporosis without current pathological fracture - Primary     Previously osteoporotic, but based on last DXA 10/30/2018 patient has gain significant central BMD (T-score of -2 4) now in Osteopenic range  DXA next due in 2 year  No history of pathological fractures  Previously on Alendronate daily (stopped more than 10 years ago after 5  Years) will monitor off Alendronate for now  Advised Ca-VitD daily at maintenance, Check Vit D levels now  Patient counseled on safety/fall prevention  RTC PRN         Relevant Medications    calcium carbonate-vitamin D (OSCAL-D) 500 mg-200 units per tablet    Other Relevant Orders    Vitamin D 25 hydroxy       Other    Screening for breast cancer     Last Mammogram was 01/2018  Mammogram ordered today  Relevant Orders    Mammo screening bilateral w 3d & cad        -Patient continues to refuse flu vaccine since bad reaction previously  Chief Complaint:  Chief Complaint   Patient presents with    Follow-up     HPI:   Stu Medrano is a 80 y o  Female PMH of osteoporosis and breast cancer who presents today for follow up of Osteoporosis  Patient reports she's been at baseline state of health sine last visit  Pain is well controlled  Continues with healthy diet and exercise "almost daily" mainly walking ROM and stretching  Patient able to ambulate without walker at baseline and get exercise daily  She reports compliance with all meds and denies any adverse medication reactions  Denies any recent unintentional weight changes, reports good appetite, and good sleep, stable mood        History:   Current Outpatient Prescriptions   Medication Sig Dispense Refill    amLODIPine (NORVASC) 10 mg tablet take 1 tablet by mouth once daily 30 tablet 2    calcium carbonate-vitamin D (OSCAL-D) 500 mg-200 units per tablet Take 2 tablets by mouth daily with breakfast 120 tablet 3    fluocinonide (LIDEX) 0 05 % external solution   0    levothyroxine 50 mcg tablet take 1 tablet by mouth once daily 30 tablet 2    lisinopril-hydrochlorothiazide (PRINZIDE,ZESTORETIC) 20-12 5 MG per tablet take 1 tablet by mouth once daily 30 tablet 2    mirtazapine (REMERON) 15 mg tablet Take 1 tablet (15 mg total) by mouth daily at bedtime 30 tablet 2    mometasone (ELOCON) 0 1 % cream Apply topically      OTEZLA 30 MG TABS   0    simvastatin (ZOCOR) 20 mg tablet Take 1 tablet (20 mg total) by mouth daily at bedtime for 90 days 90 tablet 2    Zoster Vaccine Live (ZOSTAVAX) 43887 UNT/0 65ML SUSR Inject under the skin       No current facility-administered medications for this visit  Past Medical History:   Diagnosis Date    Breast cancer ([place] [address] )     Hemorrhoids      Social History   Substance Use Topics    Smoking status: Never Smoker    Smokeless tobacco: Never Used    Alcohol use No     Patient Active Problem List   Diagnosis    Benign essential hypertension    Depression    Hyperlipidemia    Hypothyroidism    Lymphedema    Primary insomnia    IFG (impaired fasting glucose)    [facility] of breast, female [facility] )    Osteoarthritis    Age-related osteoporosis without current pathological fracture    Peripheral neuropathy    Psoriasis    Edema of left lower eyelid    Low back pain    Screening for breast cancer       ROS:  As Per HPI    Physical Exam:  /85 (BP Location: Right arm)   Pulse 80   Temp 97 8 °F (36 6 °C)   Resp 16   Ht 4' 8" (1 422 m)   Wt 68 7 kg (151 lb 6 4 oz)   BMI 33 94 kg/m²   Physical Exam   Constitutional: She is oriented to person, place, and time  She appears well-developed and well-nourished  No distress  HENT:   Head: Normocephalic and atraumatic  Eyes: Conjunctivae are normal    Neck: No thyromegaly present  Cardiovascular: Normal rate and regular rhythm  Murmur heard    2/6 murmur   Pulmonary/Chest: Effort normal and breath sounds normal  No respiratory distress  She has no wheezes  Abdominal: Soft  Normal appearance and bowel sounds are normal  There is no tenderness  Musculoskeletal: She exhibits no edema  Neurological: She is alert and oriented to person, place, and time  Skin: She is not diaphoretic  Psychiatric: She has a normal mood and affect  Vitals reviewed        Future Appointments  Date Time Provider Gita Rausch   4/10/2019 1:45 PM [facility]   4/12/2019 10:40 AM Carlee Bray MD [facility] Salvador Santamaria MD

## 2019-01-07 NOTE — ASSESSMENT & PLAN NOTE
Previously osteoporotic, but based on last DXA 10/30/2018 patient has gain significant central BMD (T-score of -2 4) now in Osteopenic range  DXA next due in 2 year  No history of pathological fractures  Previously on Alendronate daily (stopped more than 10 years ago after 5  Years) will monitor off Alendronate for now  Advised Ca-VitD daily at maintenance, Check Vit D levels now  Patient counseled on safety/fall prevention    RTC PRN

## 2019-01-11 DIAGNOSIS — F51.01 PRIMARY INSOMNIA: ICD-10-CM

## 2019-01-15 RX ORDER — MIRTAZAPINE 15 MG/1
TABLET, FILM COATED ORAL
Qty: 90 TABLET | Refills: 1 | OUTPATIENT
Start: 2019-01-15

## 2019-01-17 RX ORDER — MIRTAZAPINE 15 MG/1
15 TABLET, FILM COATED ORAL
Qty: 30 TABLET | Refills: 2 | Status: SHIPPED | OUTPATIENT
Start: 2019-01-17 | End: 2019-04-12 | Stop reason: SDUPTHER

## 2019-01-22 ENCOUNTER — HOSPITAL ENCOUNTER (OUTPATIENT)
Dept: RADIOLOGY | Age: 69
Discharge: HOME | End: 2019-01-22
Payer: COMMERCIAL

## 2019-01-22 VITALS — HEIGHT: 56 IN | WEIGHT: 151 LBS | BODY MASS INDEX: 33.97 KG/M2

## 2019-01-22 DIAGNOSIS — Z12.39 SCREENING BREAST EXAMINATION: ICD-10-CM

## 2019-01-22 PROCEDURE — 77063 BREAST TOMOSYNTHESIS BI: CPT

## 2019-01-22 PROCEDURE — 77067 SCR MAMMO BI INCL CAD: CPT

## 2019-01-24 LAB — 25(OH)D3 SERPL-MCNC: 21 NG/ML (ref 30–100)

## 2019-02-25 DIAGNOSIS — I10 BENIGN ESSENTIAL HYPERTENSION: ICD-10-CM

## 2019-02-25 DIAGNOSIS — E03.9 ACQUIRED HYPOTHYROIDISM: ICD-10-CM

## 2019-02-25 RX ORDER — LISINOPRIL AND HYDROCHLOROTHIAZIDE 20; 12.5 MG/1; MG/1
TABLET ORAL
Qty: 90 TABLET | Refills: 1 | Status: SHIPPED | OUTPATIENT
Start: 2019-02-25 | End: 2019-09-10 | Stop reason: SDUPTHER

## 2019-02-25 RX ORDER — LEVOTHYROXINE SODIUM 0.05 MG/1
TABLET ORAL
Qty: 90 TABLET | Refills: 1 | Status: SHIPPED | OUTPATIENT
Start: 2019-02-25 | End: 2019-09-30 | Stop reason: SDUPTHER

## 2019-04-12 ENCOUNTER — OFFICE VISIT (OUTPATIENT)
Dept: FAMILY MEDICINE CLINIC | Facility: CLINIC | Age: 69
End: 2019-04-12

## 2019-04-12 VITALS
DIASTOLIC BLOOD PRESSURE: 80 MMHG | RESPIRATION RATE: 16 BRPM | HEART RATE: 80 BPM | WEIGHT: 145.6 LBS | TEMPERATURE: 96.6 F | SYSTOLIC BLOOD PRESSURE: 122 MMHG | HEIGHT: 56 IN | BODY MASS INDEX: 32.75 KG/M2

## 2019-04-12 DIAGNOSIS — I10 BENIGN ESSENTIAL HYPERTENSION: Primary | ICD-10-CM

## 2019-04-12 DIAGNOSIS — M81.0 AGE-RELATED OSTEOPOROSIS WITHOUT CURRENT PATHOLOGICAL FRACTURE: ICD-10-CM

## 2019-04-12 DIAGNOSIS — E78.5 HYPERLIPIDEMIA, UNSPECIFIED HYPERLIPIDEMIA TYPE: ICD-10-CM

## 2019-04-12 DIAGNOSIS — F51.01 PRIMARY INSOMNIA: ICD-10-CM

## 2019-04-12 DIAGNOSIS — E03.9 ACQUIRED HYPOTHYROIDISM: ICD-10-CM

## 2019-04-12 PROCEDURE — 99213 OFFICE O/P EST LOW 20 MIN: CPT | Performed by: FAMILY MEDICINE

## 2019-04-12 PROCEDURE — 3074F SYST BP LT 130 MM HG: CPT | Performed by: FAMILY MEDICINE

## 2019-04-12 PROCEDURE — 3079F DIAST BP 80-89 MM HG: CPT | Performed by: FAMILY MEDICINE

## 2019-04-12 RX ORDER — MIRTAZAPINE 15 MG/1
15 TABLET, FILM COATED ORAL
Qty: 30 TABLET | Refills: 2 | Status: ON HOLD | OUTPATIENT
Start: 2019-04-12 | End: 2022-04-08 | Stop reason: SDUPTHER

## 2019-05-07 DIAGNOSIS — I10 BENIGN ESSENTIAL HYPERTENSION: ICD-10-CM

## 2019-05-07 RX ORDER — AMLODIPINE BESYLATE 10 MG/1
TABLET ORAL
Qty: 30 TABLET | Refills: 2 | Status: SHIPPED | OUTPATIENT
Start: 2019-05-07 | End: 2019-08-12 | Stop reason: SDUPTHER

## 2019-05-18 LAB
BUN SERPL-MCNC: 17 MG/DL (ref 7–25)
BUN/CREAT SERPL: ABNORMAL (CALC) (ref 6–22)
CALCIUM SERPL-MCNC: 9.5 MG/DL (ref 8.6–10.4)
CHLORIDE SERPL-SCNC: 102 MMOL/L (ref 98–110)
CHOLEST SERPL-MCNC: 184 MG/DL
CHOLEST/HDLC SERPL: 3.5 (CALC)
CO2 SERPL-SCNC: 27 MMOL/L (ref 20–32)
CREAT SERPL-MCNC: 0.61 MG/DL (ref 0.5–0.99)
GLUCOSE SERPL-MCNC: 110 MG/DL (ref 65–99)
HDLC SERPL-MCNC: 53 MG/DL
LDLC SERPL CALC-MCNC: 109 MG/DL (CALC)
NONHDLC SERPL-MCNC: 131 MG/DL (CALC)
POTASSIUM SERPL-SCNC: 4.4 MMOL/L (ref 3.5–5.3)
SL AMB EGFR AFRICAN AMERICAN: 108 ML/MIN/1.73M2
SL AMB EGFR NON AFRICAN AMERICAN: 93 ML/MIN/1.73M2
SODIUM SERPL-SCNC: 138 MMOL/L (ref 135–146)
TRIGL SERPL-MCNC: 111 MG/DL
TSH SERPL-ACNC: 2.92 MIU/L (ref 0.4–4.5)

## 2019-05-24 ENCOUNTER — OFFICE VISIT (OUTPATIENT)
Dept: FAMILY MEDICINE CLINIC | Facility: CLINIC | Age: 69
End: 2019-05-24

## 2019-05-24 VITALS
HEIGHT: 56 IN | SYSTOLIC BLOOD PRESSURE: 138 MMHG | DIASTOLIC BLOOD PRESSURE: 80 MMHG | WEIGHT: 143 LBS | TEMPERATURE: 98.4 F | HEART RATE: 78 BPM | BODY MASS INDEX: 32.17 KG/M2 | RESPIRATION RATE: 18 BRPM

## 2019-05-24 DIAGNOSIS — E13.22 OTHER SPECIFIED DIABETES MELLITUS WITH STAGE 2 CHRONIC KIDNEY DISEASE, WITHOUT LONG-TERM CURRENT USE OF INSULIN (HCC): ICD-10-CM

## 2019-05-24 DIAGNOSIS — N18.2 OTHER SPECIFIED DIABETES MELLITUS WITH STAGE 2 CHRONIC KIDNEY DISEASE, WITHOUT LONG-TERM CURRENT USE OF INSULIN (HCC): ICD-10-CM

## 2019-05-24 DIAGNOSIS — Z00.00 MEDICARE ANNUAL WELLNESS VISIT, SUBSEQUENT: Primary | ICD-10-CM

## 2019-05-24 LAB — SL AMB POCT HEMOGLOBIN AIC: 6.1 (ref ?–6.5)

## 2019-05-24 PROCEDURE — 3725F SCREEN DEPRESSION PERFORMED: CPT | Performed by: FAMILY MEDICINE

## 2019-05-24 PROCEDURE — 1125F AMNT PAIN NOTED PAIN PRSNT: CPT | Performed by: FAMILY MEDICINE

## 2019-05-24 PROCEDURE — G0439 PPPS, SUBSEQ VISIT: HCPCS | Performed by: FAMILY MEDICINE

## 2019-05-24 PROCEDURE — 1160F RVW MEDS BY RX/DR IN RCRD: CPT | Performed by: FAMILY MEDICINE

## 2019-05-24 PROCEDURE — 1036F TOBACCO NON-USER: CPT | Performed by: FAMILY MEDICINE

## 2019-05-24 PROCEDURE — 83036 HEMOGLOBIN GLYCOSYLATED A1C: CPT | Performed by: FAMILY MEDICINE

## 2019-05-24 PROCEDURE — 1101F PT FALLS ASSESS-DOCD LE1/YR: CPT | Performed by: FAMILY MEDICINE

## 2019-05-24 PROCEDURE — 1170F FXNL STATUS ASSESSED: CPT | Performed by: FAMILY MEDICINE

## 2019-07-11 DIAGNOSIS — E78.5 HYPERLIPIDEMIA, UNSPECIFIED HYPERLIPIDEMIA TYPE: ICD-10-CM

## 2019-07-11 RX ORDER — SIMVASTATIN 20 MG
TABLET ORAL
Qty: 90 TABLET | Refills: 3 | Status: SHIPPED | OUTPATIENT
Start: 2019-07-11 | End: 2020-07-08

## 2019-08-12 DIAGNOSIS — I10 BENIGN ESSENTIAL HYPERTENSION: ICD-10-CM

## 2019-08-12 RX ORDER — AMLODIPINE BESYLATE 10 MG/1
TABLET ORAL
Qty: 30 TABLET | Refills: 2 | Status: SHIPPED | OUTPATIENT
Start: 2019-08-12 | End: 2019-11-08 | Stop reason: SDUPTHER

## 2019-08-23 ENCOUNTER — OFFICE VISIT (OUTPATIENT)
Dept: FAMILY MEDICINE CLINIC | Facility: CLINIC | Age: 69
End: 2019-08-23

## 2019-08-23 VITALS
HEIGHT: 57 IN | DIASTOLIC BLOOD PRESSURE: 80 MMHG | SYSTOLIC BLOOD PRESSURE: 144 MMHG | HEART RATE: 78 BPM | WEIGHT: 138 LBS | TEMPERATURE: 97.8 F | BODY MASS INDEX: 29.77 KG/M2 | RESPIRATION RATE: 16 BRPM

## 2019-08-23 DIAGNOSIS — I10 BENIGN ESSENTIAL HYPERTENSION: Primary | ICD-10-CM

## 2019-08-23 PROCEDURE — 99213 OFFICE O/P EST LOW 20 MIN: CPT | Performed by: FAMILY MEDICINE

## 2019-08-23 RX ORDER — CLINDAMYCIN PHOSPHATE 10 UG/ML
LOTION TOPICAL
Refills: 0 | COMMUNITY
Start: 2019-07-22

## 2019-08-23 NOTE — ASSESSMENT & PLAN NOTE
BP Readings from Last 3 Encounters:   08/23/19 144/80   05/24/19 138/80   04/12/19 122/80     Well controlled on Amlodipine 10 mg PO daily and Lisinopril-HCTZ 20-12 5 1 tab daily  However patient reports recent Ambulatory logs have been 160-180/low 100's  Patient advised to continue current regimen and bring BP cuff for calibration at next visit  Patient counseled on continued healthy lifestyle, diet and exercise, and compliance with meds (since she occasionally skips medications)  Return/ER precautions reviewed    Patient states she is going to [facility] and can RTC end of Sept/Oct 2019

## 2019-08-23 NOTE — PROGRESS NOTES
Assessment/Plan: Hood Rose is a 77 y o  female with:   Problem List Items Addressed This Visit        Cardiovascular and Mediastinum    Benign essential hypertension - Primary     BP Readings from Last 3 Encounters:   08/23/19 144/80   05/24/19 138/80   04/12/19 122/80     Well controlled on Amlodipine 10 mg PO daily and Lisinopril-HCTZ 20-12 5 1 tab daily  However patient reports recent Ambulatory logs have been 160-180/low 100's  Patient advised to continue current regimen and bring BP cuff for calibration at next visit  Patient counseled on continued healthy lifestyle, diet and exercise, and compliance with meds (since she occasionally skips medications)  Return/ER precautions reviewed  Patient states she is going to [facility] and can RTC end of Sept/Oct 2019             - Forms for insurance completed  Patient is unsure what needs to be done with forms  Instructions on form do not require it to be faxed or mailed to anyone specific  Forms placed in my triage bin will address with [facility] in 1-2 weeks  Chief Complaint:  Chief Complaint   Patient presents with    Hypertension     follow up     HPI:   Hood Rose is a 77 y o  female is here for evaluation of HTN - Compliant with Amlodipine 10 mg PO daily, lisinopril/HCTZ 20/12 5 1 tab daily, diet and exercise  Patient reports 6 pounds weight loss  Home BP logs available  Patient denies headaches, dizziness, lightheadedness, chest pain, palpitations, sob, nausea, calf pain or lower extremity edema      History:  Current Outpatient Medications   Medication Sig Dispense Refill    amLODIPine (NORVASC) 10 mg tablet take 1 tablet by mouth once daily 30 tablet 2    calcium carbonate-vitamin D (OSCAL-D) 500 mg-200 units per tablet Take 2 tablets by mouth daily with breakfast 120 tablet 3    fluocinonide (LIDEX) 0 05 % external solution   0    levothyroxine 50 mcg tablet take 1 tablet by mouth once daily 90 tablet 1    lisinopril-hydrochlorothiazide (PRINZIDE,ZESTORETIC) 20-12 5 MG per tablet take 1 tablet by mouth once daily 90 tablet 1    mirtazapine (REMERON) 15 mg tablet Take 1 tablet (15 mg total) by mouth daily at bedtime 30 tablet 2    mometasone (ELOCON) 0 1 % cream Apply 50 application topically 2 (two) times a day       OTEZLA 30 MG TABS 30 mg 2 (two) times a day   0    simvastatin (ZOCOR) 20 mg tablet take 1 tablet by mouth at bedtime 90 tablet 3    Zoster Vaccine Live (ZOSTAVAX) 82444 UNT/0 65ML SUSR Inject under the skin      clindamycin (CLEOCIN T) 1 % lotion   0     No current facility-administered medications for this visit  PMH reviewed  ROS:  As Per HPI    Physical Exam:  /80   Pulse 78   Temp 97 8 °F (36 6 °C)   Resp 16   Ht 4' 8 8" (1 443 m)   Wt 62 6 kg (138 lb)   BMI 30 07 kg/m²   Physical Exam   Constitutional: No distress  HENT:   Head: Normocephalic and atraumatic  Right Ear: External ear normal    Left Ear: External ear normal    Nose: Nose normal    Mouth/Throat: No oropharyngeal exudate  Eyes: Pupils are equal, round, and reactive to light  Conjunctivae are normal  Right eye exhibits no discharge  Left eye exhibits no discharge  No scleral icterus  Neck: Neck supple  No JVD present  No thyromegaly present  Cardiovascular: Normal rate, regular rhythm and normal heart sounds  Exam reveals no gallop and no friction rub  No murmur heard  Pulmonary/Chest: Effort normal and breath sounds normal  No respiratory distress  She has no wheezes  She has no rales  Abdominal: Soft  Normal appearance and bowel sounds are normal  There is no tenderness  Musculoskeletal: She exhibits no edema  Neurological: She is alert  She exhibits normal muscle tone  Coordination normal    Skin: Skin is dry  Capillary refill takes less than 2 seconds  She is not diaphoretic  Psychiatric: She has a normal mood and affect  Thought content normal    Vitals reviewed          Future Appointments   Date Time Provider Gita Rausch   10/9/2019  1:15 PM Jaquan Rivera MD

## 2019-09-10 DIAGNOSIS — I10 BENIGN ESSENTIAL HYPERTENSION: ICD-10-CM

## 2019-09-10 RX ORDER — LISINOPRIL AND HYDROCHLOROTHIAZIDE 20; 12.5 MG/1; MG/1
TABLET ORAL
Qty: 90 TABLET | Refills: 1 | Status: SHIPPED | OUTPATIENT
Start: 2019-09-10 | End: 2020-06-22 | Stop reason: SDUPTHER

## 2019-09-13 ENCOUNTER — TELEPHONE (OUTPATIENT)
Dept: OTHER | Facility: HOSPITAL | Age: 69
End: 2019-09-13

## 2019-09-24 ENCOUNTER — OFFICE VISIT (OUTPATIENT)
Dept: FAMILY MEDICINE CLINIC | Facility: CLINIC | Age: 69
End: 2019-09-24

## 2019-09-24 VITALS
TEMPERATURE: 99.2 F | HEART RATE: 78 BPM | RESPIRATION RATE: 16 BRPM | SYSTOLIC BLOOD PRESSURE: 130 MMHG | HEIGHT: 57 IN | BODY MASS INDEX: 29.08 KG/M2 | DIASTOLIC BLOOD PRESSURE: 90 MMHG | WEIGHT: 134.8 LBS

## 2019-09-24 DIAGNOSIS — I10 BENIGN ESSENTIAL HYPERTENSION: Primary | ICD-10-CM

## 2019-09-24 DIAGNOSIS — J02.9 SORE THROAT: ICD-10-CM

## 2019-09-24 PROCEDURE — 99213 OFFICE O/P EST LOW 20 MIN: CPT | Performed by: FAMILY MEDICINE

## 2019-09-24 PROCEDURE — 3008F BODY MASS INDEX DOCD: CPT | Performed by: FAMILY MEDICINE

## 2019-09-24 RX ORDER — AMOXICILLIN AND CLAVULANATE POTASSIUM 875; 125 MG/1; MG/1
1 TABLET, FILM COATED ORAL EVERY 12 HOURS SCHEDULED
Qty: 14 TABLET | Refills: 0 | Status: SHIPPED | OUTPATIENT
Start: 2019-09-24 | End: 2019-10-01

## 2019-09-24 NOTE — ASSESSMENT & PLAN NOTE
Centor score 1  Likely viral URI, patient given printed Rx for Abx and advised to start taking Abx if no improvement within the next 4-5 days or worsening of symptoms  Patient counseled  Supportive symptomatic therapy reviewed    -Start taking Mucinex DM 1200mg twice a day  -Drink at least 10 glasses of water per day  -Use intranasal saline  -Honey has been shown to help with coughs  -You can use Tylenol as needed for fevers  -Practice good hygiene and cover your mouth if you cough  -Call us back if you have new or worsening fevers and other symptoms

## 2019-09-24 NOTE — ASSESSMENT & PLAN NOTE
BP Readings from Last 3 Encounters:   09/24/19 130/90   08/23/19 144/80   05/24/19 138/80     Adequately controlled on Amlodipine 10 mg PO daily and Lisinopril-HCTZ 20-12 5 1 tab daily  Due to elevated home BP logs (BP [id number]/low 100's) patient's brought BP cuff for calibration here today  Electronic BP cuff "SmartHeart" was checked against our mannual BP cuffs, home cuff SBP readings are up to 175 mmgHg compared to normotensive range on our clinic BP cuffs  Patient advised to return/replace BP cuff  Patient counseled on continued healthy lifestyle, diet and exercise, and compliance with meds  Continue current regimen  Return precautions reviewed

## 2019-09-24 NOTE — PROGRESS NOTES
Assessment/Plan: Renato Saleem is a 77 y o  female with:    Problem List Items Addressed This Visit        Cardiovascular and Mediastinum    Benign essential hypertension - Primary     BP Readings from Last 3 Encounters:   09/24/19 130/90   08/23/19 144/80   05/24/19 138/80     Adequately controlled on Amlodipine 10 mg PO daily and Lisinopril-HCTZ 20-12 5 1 tab daily  Due to elevated home BP logs (BP [id number]/low 100's) patient's brought BP cuff for calibration here today  Electronic BP cuff "SmartHeart" was checked against our mannual BP cuffs, home cuff SBP readings are up to 175 mmgHg compared to normotensive range on our clinic BP cuffs  Patient advised to return/replace BP cuff  Patient counseled on continued healthy lifestyle, diet and exercise, and compliance with meds  Continue current regimen  Return precautions reviewed  Other    Sore throat     4 days of URI s/s  Centor score 1  Likely viral URI, patient given printed Rx for Abx and advised to start taking Abx if no improvement within the next 4-5 days or worsening of symptoms  Patient counseled  Supportive symptomatic therapy reviewed  -Start taking Mucinex DM 1200mg twice a day  -Drink at least 10 glasses of water per day  -Use intranasal saline  -Honey has been shown to help with coughs  -You can use Tylenol as needed for fevers  -Practice good hygiene and cover your mouth if you cough  -Call us back if you have new or worsening fevers and other symptoms           Relevant Medications    amoxicillin-clavulanate (AUGMENTIN) 875-125 mg per tablet          Chief Complaint:  Chief Complaint   Patient presents with    Cough    Hypertension       HPI:   Renato Saleem is a 77 y o  female is here for evaluation of HTN - Compliant with Amlodipine 10 mg PO daily, Lisinopril/HCTZ 20/12 5 1 tab daily, diet and exercise   Patient denies headaches, dizziness, lightheadedness, chest pain, palpitations, sob, nausea, calf pain or lower extremity edema  Sore throat:  Sx of sinus pressure, nasal congestion, and itchy throat began on sat night (9/21) when pt got off the plane after returning from a week-long trip to [facility]  On Sunday, pt reports her throat felt sore and cough began  Characterizes cough as dry with associated HA and diffuse CP after prolonged coughing  Cough is increasing in frequency since onset and wakes her up at night  Last night, patient experienced subjective fever with chills  Pt has tried tramaine tea, Robitussin, and alkaseltzer starting Sunday, which has brought her moderate symptomatic relief  Denies sick contacts or recent illness  Denies N/V/D, hemoptysis, and abdominal pain  PMH is negative for asthma, COPD, and smoking  History:  Current Outpatient Medications   Medication Sig Dispense Refill    amLODIPine (NORVASC) 10 mg tablet take 1 tablet by mouth once daily 30 tablet 2    calcium carbonate-vitamin D (OSCAL-D) 500 mg-200 units per tablet Take 2 tablets by mouth daily with breakfast 120 tablet 3    clindamycin (CLEOCIN T) 1 % lotion   0    fluocinonide (LIDEX) 0 05 % external solution   0    levothyroxine 50 mcg tablet take 1 tablet by mouth once daily 90 tablet 1    lisinopril-hydrochlorothiazide (PRINZIDE,ZESTORETIC) 20-12 5 MG per tablet take 1 tablet by mouth once daily 90 tablet 1    mirtazapine (REMERON) 15 mg tablet Take 1 tablet (15 mg total) by mouth daily at bedtime 30 tablet 2    mometasone (ELOCON) 0 1 % cream Apply 50 application topically 2 (two) times a day       OTEZLA 30 MG TABS 30 mg 2 (two) times a day   0    simvastatin (ZOCOR) 20 mg tablet take 1 tablet by mouth at bedtime 90 tablet 3    Zoster Vaccine Live (ZOSTAVAX) 12180 UNT/0 65ML SUSR Inject under the skin      amoxicillin-clavulanate (AUGMENTIN) 875-125 mg per tablet Take 1 tablet by mouth every 12 (twelve) hours for 7 days 14 tablet 0     No current facility-administered medications for this visit  PMH reviewed  ROS:  As Per HPI    Physical Exam:  /90   Pulse 78   Temp 99 2 °F (37 3 °C)   Resp 16   Ht 4' 8 7" (1 44 m)   Wt 61 1 kg (134 lb 12 8 oz)   BMI 29 48 kg/m²   Physical Exam   Constitutional: No distress  HENT:   Head: Normocephalic and atraumatic  Right Ear: External ear normal    Left Ear: External ear normal    Negative sinus tenderness to palpation  +Tonsillar erythema; no swelling or exudates  Mild bilateral conjunctival injection  TM normal bilaterally   Eyes: Right eye exhibits no discharge  Left eye exhibits no discharge  Neck: Normal range of motion  Neck supple  Cardiovascular: Normal rate, regular rhythm and normal heart sounds  Exam reveals no gallop and no friction rub  No murmur heard  Pulmonary/Chest: Effort normal and breath sounds normal  No stridor  No respiratory distress  She has no wheezes  She has no rales  Negative for egophony   Abdominal: Soft  Normal appearance and bowel sounds are normal  There is no tenderness  There is no guarding  Lymphadenopathy:     She has cervical adenopathy (tender)  Neurological: She is alert  She exhibits normal muscle tone  Coordination normal    Skin: Skin is warm and dry  She is not diaphoretic  Psychiatric: She has a normal mood and affect  Thought content normal    Vitals reviewed          Future Appointments   Date Time Provider Gita Rausch   10/9/2019  1:15  [facility] Syd Peratla, MS-3

## 2019-09-27 ENCOUNTER — TELEPHONE (OUTPATIENT)
Dept: FAMILY MEDICINE CLINIC | Facility: CLINIC | Age: 69
End: 2019-09-27

## 2019-09-27 NOTE — TELEPHONE ENCOUNTER
[facility] form, requesting additional information  In your bin for review

## 2019-09-30 DIAGNOSIS — E03.9 ACQUIRED HYPOTHYROIDISM: ICD-10-CM

## 2019-09-30 RX ORDER — LEVOTHYROXINE SODIUM 0.05 MG/1
TABLET ORAL
Qty: 90 TABLET | Refills: 1 | Status: SHIPPED | OUTPATIENT
Start: 2019-09-30 | End: 2020-04-10 | Stop reason: SDUPTHER

## 2019-10-04 ENCOUNTER — TRANSCRIBE ORDERS (OUTPATIENT)
Dept: INTERNAL MEDICINE CLINIC | Facility: CLINIC | Age: 69
End: 2019-10-04

## 2019-10-04 DIAGNOSIS — L40.9 PSORIASIS, UNSPECIFIED: Primary | ICD-10-CM

## 2019-11-08 DIAGNOSIS — I10 BENIGN ESSENTIAL HYPERTENSION: ICD-10-CM

## 2019-11-08 RX ORDER — AMLODIPINE BESYLATE 10 MG/1
TABLET ORAL
Qty: 30 TABLET | Refills: 8 | Status: SHIPPED | OUTPATIENT
Start: 2019-11-08 | End: 2020-08-05

## 2019-12-17 ENCOUNTER — OFFICE VISIT (OUTPATIENT)
Dept: FAMILY MEDICINE CLINIC | Facility: CLINIC | Age: 69
End: 2019-12-17

## 2019-12-17 VITALS
DIASTOLIC BLOOD PRESSURE: 62 MMHG | HEART RATE: 76 BPM | SYSTOLIC BLOOD PRESSURE: 132 MMHG | HEIGHT: 56 IN | BODY MASS INDEX: 30.23 KG/M2 | WEIGHT: 134.4 LBS | TEMPERATURE: 97.6 F | RESPIRATION RATE: 22 BRPM

## 2019-12-17 DIAGNOSIS — I10 BENIGN ESSENTIAL HYPERTENSION: Primary | ICD-10-CM

## 2019-12-17 DIAGNOSIS — E78.5 HYPERLIPIDEMIA, UNSPECIFIED HYPERLIPIDEMIA TYPE: ICD-10-CM

## 2019-12-17 DIAGNOSIS — F51.01 PRIMARY INSOMNIA: ICD-10-CM

## 2019-12-17 DIAGNOSIS — R73.01 IFG (IMPAIRED FASTING GLUCOSE): ICD-10-CM

## 2019-12-17 DIAGNOSIS — E03.9 HYPOTHYROIDISM, UNSPECIFIED TYPE: ICD-10-CM

## 2019-12-17 PROBLEM — J02.9 SORE THROAT: Status: RESOLVED | Noted: 2019-09-24 | Resolved: 2019-12-17

## 2019-12-17 LAB — SL AMB POCT HEMOGLOBIN AIC: 6 (ref ?–6.5)

## 2019-12-17 PROCEDURE — 1160F RVW MEDS BY RX/DR IN RCRD: CPT | Performed by: FAMILY MEDICINE

## 2019-12-17 PROCEDURE — 3008F BODY MASS INDEX DOCD: CPT | Performed by: FAMILY MEDICINE

## 2019-12-17 PROCEDURE — 3044F HG A1C LEVEL LT 7.0%: CPT | Performed by: FAMILY MEDICINE

## 2019-12-17 PROCEDURE — 99213 OFFICE O/P EST LOW 20 MIN: CPT | Performed by: FAMILY MEDICINE

## 2019-12-17 PROCEDURE — 3078F DIAST BP <80 MM HG: CPT | Performed by: FAMILY MEDICINE

## 2019-12-17 PROCEDURE — 1036F TOBACCO NON-USER: CPT | Performed by: FAMILY MEDICINE

## 2019-12-17 PROCEDURE — 3075F SYST BP GE 130 - 139MM HG: CPT | Performed by: FAMILY MEDICINE

## 2019-12-17 PROCEDURE — 83036 HEMOGLOBIN GLYCOSYLATED A1C: CPT | Performed by: FAMILY MEDICINE

## 2019-12-17 NOTE — PROGRESS NOTES
Assessment/Plan: Toby Nichols is a 77 y o  female with:     Hypertension:  BP Readings from Last 3 Encounters:   12/17/19 132/62   09/24/19 130/90   08/23/19 144/80     Asymptomatic  Blood pressure at goal of <150/90  Continue current regimen of Amlodipine 10 mg PO daily, Lisinopril-HCTZ 20-12 5 1 tab PO daily  -Medication refilled per patient request at this visit: None  -Discussed importance of effective BP control to avoid complications including Cardiovascular disease, Cerebrovascular disease, Nephropathy   -Reviewed s/s of hypotension & severe HTN with patient   -Encouraged adherence to treatment regimen, including medications, stress reduction, limiting salt & caffeine intake  -Encouraged diet (DASH), exercise 150 min/week and weight loss  -DASH diet reviewed  -HTN check list completed  RTC 6 months    Other problems addressed during this visit:       Endocrine    Hypothyroidism     Lab Results   Component Value Date    [id number] 2 51 10/27/2017    TSH 2 92 05/17/2019     Well controlled  Continue Levothyroxine 50 mcg PO daily         IFG (impaired fasting glucose)     Lab Results   Component Value Date    HGBA1C 6 1 05/24/2019     [id number] in office today 6 0%  Diet controlled  Patient counseled on continued lifestyle modification diet and exercise  Will obtain Micr  Alb/Cr ratio with next wet of labs early 2020 at next visit  RTC 6 months  Hyperlipidemia     Last ASCVD risk score 7 6% based on Lipid panel 05/2018  Patient counseled on continued lifestyle modification diet and exercise  Continue Simvastatin 20 mg PO qHS         Primary insomnia     Well controlled on Mirtazepine 15 mg PO HS  Continue current regimen              -Counseling regarding flu vaccine provided  Patient declines because " I don't like the flushot  Since I stopped taking it for a few years I haven't been sick"      Chief Complaint:  Chief Complaint   Patient presents with    Hypertension     Follow up HPI:   Renee Pelaez is a 77 y o  female who is here for     HTN - Adherent with meds as prescribed, diet and exercise  Patient denies headaches, dizziness, chest pain, lightheadedness, palpitations, or lower extremity edema  IFG - Adherent with diabetic diet  Denies hyper or hypoglycemic symptoms, numbness/tingling of extremities, recent changes in vision  Patient follows with Ophthalmology for chronic vision problems  Hypothyroidism - Adherent with meds  Denies any adverse effects  Hyperlipidemia - Adherent with Zocor 20 mg PO daily  Denies any adverse effects  Insomnia - Adherent with Remeron 15 mg PO qHS  States it's helping her sleep well  Denies adverse effects (has intentionally lost 4 pounds in the past 6 months)  History:  Current Outpatient Medications   Medication Sig Dispense Refill    amLODIPine (NORVASC) 10 mg tablet take 1 tablet by mouth once daily 30 tablet 8    calcium carbonate-vitamin D (OSCAL-D) 500 mg-200 units per tablet Take 2 tablets by mouth daily with breakfast 120 tablet 3    clindamycin (CLEOCIN T) 1 % lotion   0    fluocinonide (LIDEX) 0 05 % external solution   0    levothyroxine 50 mcg tablet take 1 tablet by mouth once daily 90 tablet 1    lisinopril-hydrochlorothiazide (PRINZIDE,ZESTORETIC) 20-12 5 MG per tablet take 1 tablet by mouth once daily 90 tablet 1    mirtazapine (REMERON) 15 mg tablet Take 1 tablet (15 mg total) by mouth daily at bedtime 30 tablet 2    mometasone (ELOCON) 0 1 % cream Apply 50 application topically 2 (two) times a day       OTEZLA 30 MG TABS 30 mg 2 (two) times a day   0    simvastatin (ZOCOR) 20 mg tablet take 1 tablet by mouth at bedtime 90 tablet 3     No current facility-administered medications for this visit  PMH reviewed      ROS:  As Per HPI    Physical Exam:  /62 (BP Location: Right arm, Patient Position: Sitting, Cuff Size: Large)   Pulse 76   Temp 97 6 °F (36 4 °C) (Tympanic)   Resp 22   Ht 4' 8 3" (1 43 m)   Wt 61 kg (134 lb 6 4 oz)   BMI 29 81 kg/m²   Physical Exam   Constitutional: No distress  HENT:   Head: Normocephalic and atraumatic  Right Ear: External ear normal    Left Ear: External ear normal    Nose: Nose normal    Eyes: Conjunctivae are normal  Right eye exhibits no discharge  Left eye exhibits no discharge  Neck: Neck supple  Cardiovascular: Normal rate, regular rhythm and normal heart sounds  No murmur heard  Pulmonary/Chest: Effort normal and breath sounds normal  No respiratory distress  She has no wheezes  Abdominal: Soft  Normal appearance and bowel sounds are normal  There is no tenderness  Musculoskeletal: She exhibits no edema or tenderness  Neurological: She is alert  She exhibits normal muscle tone  Coordination normal    Skin: Skin is warm and dry  She is not diaphoretic  Psychiatric: She has a normal mood and affect  Thought content normal    Vitals reviewed          Future Appointments   Date Time Provider Department Center   4/22/2020  1:15 PM Kellen Orellana MD

## 2019-12-17 NOTE — ASSESSMENT & PLAN NOTE
Lab Results   Component Value Date    [id number] 2 51 10/27/2017    TSH 2 92 05/17/2019     Well controlled    Continue Levothyroxine 50 mcg PO daily

## 2019-12-17 NOTE — ASSESSMENT & PLAN NOTE
Hypertension:  BP Readings from Last 3 Encounters:   12/17/19 132/62   09/24/19 130/90   08/23/19 144/80     Asymptomatic  Blood pressure at goal of <150/90  Continue current regimen of Amlodipine 10 mg PO daily, Lisinopril-HCTZ 20-12 5 1 tab PO daily  -Medication refilled per patient request at this visit: None  -Discussed importance of effective BP control to avoid complications including Cardiovascular disease, Cerebrovascular disease, Nephropathy   -Reviewed s/s of hypotension & severe HTN with patient   -Encouraged adherence to treatment regimen, including medications, stress reduction, limiting salt & caffeine intake  -Encouraged diet (DASH), exercise 150 min/week and weight loss    -DASH diet reviewed  -HTN check list completed  RTC 6 months

## 2019-12-17 NOTE — ASSESSMENT & PLAN NOTE
Last ASCVD risk score 7 6% based on Lipid panel 05/2018  Patient counseled on continued lifestyle modification diet and exercise    Continue Simvastatin 20 mg PO qHS

## 2019-12-17 NOTE — ASSESSMENT & PLAN NOTE
Lab Results   Component Value Date    HGBA1C 6 1 05/24/2019     [id number] in office today **  Diet controlled  Patient counseled on continued lifestyle modification diet and exercise  Will obtain Micr  Alb/Cr ratio with next wet of labs early 2020 at next visit  RTC 6 months

## 2020-04-10 ENCOUNTER — TELEPHONE (OUTPATIENT)
Dept: INTERNAL MEDICINE CLINIC | Facility: CLINIC | Age: 70
End: 2020-04-10

## 2020-04-10 DIAGNOSIS — E03.9 ACQUIRED HYPOTHYROIDISM: ICD-10-CM

## 2020-04-10 RX ORDER — LEVOTHYROXINE SODIUM 0.05 MG/1
50 TABLET ORAL DAILY
Qty: 90 TABLET | Refills: 1 | Status: SHIPPED | OUTPATIENT
Start: 2020-04-10 | End: 2020-10-09 | Stop reason: SDUPTHER

## 2020-06-22 ENCOUNTER — OFFICE VISIT (OUTPATIENT)
Dept: FAMILY MEDICINE CLINIC | Facility: CLINIC | Age: 70
End: 2020-06-22

## 2020-06-22 VITALS
RESPIRATION RATE: 14 BRPM | WEIGHT: 140 LBS | HEIGHT: 56 IN | SYSTOLIC BLOOD PRESSURE: 150 MMHG | BODY MASS INDEX: 31.49 KG/M2 | TEMPERATURE: 99.4 F | HEART RATE: 78 BPM | DIASTOLIC BLOOD PRESSURE: 80 MMHG

## 2020-06-22 DIAGNOSIS — M81.0 AGE-RELATED OSTEOPOROSIS WITHOUT CURRENT PATHOLOGICAL FRACTURE: ICD-10-CM

## 2020-06-22 DIAGNOSIS — E03.9 HYPOTHYROIDISM, UNSPECIFIED TYPE: ICD-10-CM

## 2020-06-22 DIAGNOSIS — Z12.39 SCREENING FOR BREAST CANCER: ICD-10-CM

## 2020-06-22 DIAGNOSIS — I10 BENIGN ESSENTIAL HYPERTENSION: Primary | ICD-10-CM

## 2020-06-22 DIAGNOSIS — R73.01 IFG (IMPAIRED FASTING GLUCOSE): ICD-10-CM

## 2020-06-22 DIAGNOSIS — Z85.3 HX OF BREAST CANCER: ICD-10-CM

## 2020-06-22 DIAGNOSIS — M81.0 OSTEOPOROSIS, UNSPECIFIED OSTEOPOROSIS TYPE, UNSPECIFIED PATHOLOGICAL FRACTURE PRESENCE: ICD-10-CM

## 2020-06-22 DIAGNOSIS — Z11.59 ENCOUNTER FOR HEPATITIS C SCREENING TEST FOR LOW RISK PATIENT: ICD-10-CM

## 2020-06-22 PROCEDURE — 4040F PNEUMOC VAC/ADMIN/RCVD: CPT | Performed by: FAMILY MEDICINE

## 2020-06-22 PROCEDURE — 4010F ACE/ARB THERAPY RXD/TAKEN: CPT | Performed by: FAMILY MEDICINE

## 2020-06-22 PROCEDURE — 3079F DIAST BP 80-89 MM HG: CPT | Performed by: FAMILY MEDICINE

## 2020-06-22 PROCEDURE — 3288F FALL RISK ASSESSMENT DOCD: CPT | Performed by: FAMILY MEDICINE

## 2020-06-22 PROCEDURE — 1036F TOBACCO NON-USER: CPT | Performed by: FAMILY MEDICINE

## 2020-06-22 PROCEDURE — 1101F PT FALLS ASSESS-DOCD LE1/YR: CPT | Performed by: FAMILY MEDICINE

## 2020-06-22 PROCEDURE — 3008F BODY MASS INDEX DOCD: CPT | Performed by: FAMILY MEDICINE

## 2020-06-22 PROCEDURE — 3077F SYST BP >= 140 MM HG: CPT | Performed by: FAMILY MEDICINE

## 2020-06-22 PROCEDURE — 1160F RVW MEDS BY RX/DR IN RCRD: CPT | Performed by: FAMILY MEDICINE

## 2020-06-22 PROCEDURE — 99213 OFFICE O/P EST LOW 20 MIN: CPT | Performed by: FAMILY MEDICINE

## 2020-06-22 RX ORDER — LISINOPRIL AND HYDROCHLOROTHIAZIDE 20; 12.5 MG/1; MG/1
1 TABLET ORAL DAILY
Qty: 90 TABLET | Refills: 1 | Status: SHIPPED | OUTPATIENT
Start: 2020-06-22 | End: 2020-12-22 | Stop reason: SDUPTHER

## 2020-07-07 LAB
25(OH)D3 SERPL-MCNC: 21 NG/ML (ref 30–100)
BUN SERPL-MCNC: 13 MG/DL (ref 7–25)
BUN/CREAT SERPL: ABNORMAL (CALC) (ref 6–22)
CALCIUM SERPL-MCNC: 9.4 MG/DL (ref 8.6–10.4)
CHLORIDE SERPL-SCNC: 105 MMOL/L (ref 98–110)
CO2 SERPL-SCNC: 28 MMOL/L (ref 20–32)
CREAT SERPL-MCNC: 0.58 MG/DL (ref 0.5–0.99)
GLUCOSE SERPL-MCNC: 110 MG/DL (ref 65–99)
POTASSIUM SERPL-SCNC: 4 MMOL/L (ref 3.5–5.3)
SL AMB EGFR AFRICAN AMERICAN: 109 ML/MIN/1.73M2
SL AMB EGFR NON AFRICAN AMERICAN: 94 ML/MIN/1.73M2
SODIUM SERPL-SCNC: 141 MMOL/L (ref 135–146)
TSH SERPL-ACNC: 2.6 MIU/L (ref 0.4–4.5)

## 2020-07-08 DIAGNOSIS — E78.5 HYPERLIPIDEMIA, UNSPECIFIED HYPERLIPIDEMIA TYPE: ICD-10-CM

## 2020-07-08 RX ORDER — SIMVASTATIN 20 MG
TABLET ORAL
Qty: 90 TABLET | Refills: 0 | Status: SHIPPED | OUTPATIENT
Start: 2020-07-08 | End: 2020-10-09 | Stop reason: SDUPTHER

## 2020-07-15 ENCOUNTER — TELEPHONE (OUTPATIENT)
Dept: INTERNAL MEDICINE CLINIC | Facility: CLINIC | Age: 70
End: 2020-07-15

## 2020-07-15 NOTE — TELEPHONE ENCOUNTER
Name of medication, dose, quantity and frequency  otezla  30mg    Number of refills left:0    Amount of medication left:0    Pharmacy verified and updated [facility] [address]     Additional information:  patient of lizzette

## 2020-07-17 NOTE — TELEPHONE ENCOUNTER
Dr Ca is not back until August   As per [facility] she can attempt to try and get in touch with his office to verify if he can refill medication

## 2020-08-05 DIAGNOSIS — I10 BENIGN ESSENTIAL HYPERTENSION: ICD-10-CM

## 2020-08-05 RX ORDER — AMLODIPINE BESYLATE 10 MG/1
TABLET ORAL
Qty: 30 TABLET | Refills: 2 | Status: SHIPPED | OUTPATIENT
Start: 2020-08-05 | End: 2020-11-17

## 2020-08-11 ENCOUNTER — HOSPITAL ENCOUNTER (OUTPATIENT)
Dept: MAMMOGRAPHY | Facility: CLINIC | Age: 70
Discharge: HOME | End: 2020-08-11
Payer: COMMERCIAL

## 2020-08-11 VITALS — WEIGHT: 137 LBS | BODY MASS INDEX: 30.82 KG/M2 | HEIGHT: 56 IN

## 2020-08-11 DIAGNOSIS — Z12.31 VISIT FOR SCREENING MAMMOGRAM: ICD-10-CM

## 2020-08-11 PROCEDURE — 77067 SCR MAMMO BI INCL CAD: CPT

## 2020-08-11 PROCEDURE — 77063 BREAST TOMOSYNTHESIS BI: CPT

## 2020-08-13 NOTE — TELEPHONE ENCOUNTER
Patient seen in office 8/12/2020 with Dr Willie Castano and he provided her with additional refills

## 2020-09-17 NOTE — PROGRESS NOTES
BMI Counseling: Body mass index is 31 41 kg/m²  The BMI is above normal  Nutrition recommendations include reducing portion sizes, decreasing overall calorie intake, 3-5 servings of fruits/vegetables daily and reducing fast food intake  Exercise recommendations include moderate aerobic physical activity for 150 minutes/week  Assessment/Plan:    Age-related osteoporosis without current pathological fracture  Patient's last DEXA scan in 10/2018 showed osteopenia  T-score was - 2 4  Which is right on the border of osteopenia and osteoporosis  She does have a history of osteoporosis from previous study than the one  in 2018  S he has no history of falls or pathological fractures  She admits noncompliance with vitamin-D and calcium supplementation  Since she has not been taking her vitamins is possible that be borderline osteo porosis has not progressed to osteoporosis  Encouraged daily vitamin D and calcium supplementation at this time  Will hold off on repeat DEXA scan   As it will not   If sheis compliant with her supplementation consider repeating DEXA scan in 12 months  Refills of Oscal sent to pharmacy  Chronic bilateral low back pain without sciatica  Acute on chronic bilateral low back pain without sciatica  Denies any falls or trauma  She does have osteoporosis based on DEXA scan in 2018 and has not been taking her vitamin-D or calcium  Counseled patient on the risks of fracture if she is not cooperative with osteoporosis treatment  Physical exam unremarkable she has full range of motion is no spinal tenderness  No concerns for fracture at this time  Refilled vitamin-D and calcium for her and recommended Tylenol and over-the-counter Lidoderm patch for pain  Offered physical therapy however patient declined  Demonstrated stretches during our visit also encouraged weight-bearing exercises           Problem List Items Addressed This Visit        Musculoskeletal and Integument Age-related osteoporosis without current pathological fracture     Patient's last DEXA scan in 10/2018 showed osteopenia  T-score was - 2 4  Which is right on the border of osteopenia and osteoporosis  She does have a history of osteoporosis from previous study than the one  in 2018  S he has no history of falls or pathological fractures  She admits noncompliance with vitamin-D and calcium supplementation  Since she has not been taking her vitamins is possible that be borderline osteo porosis has not progressed to osteoporosis  Encouraged daily vitamin D and calcium supplementation at this time  Will hold off on repeat DEXA scan   As it will not   If sheis compliant with her supplementation consider repeating DEXA scan in 12 months  Refills of Oscal sent to pharmacy  Relevant Medications    calcium carbonate-vitamin D (OSCAL-D) 500 mg-200 units per tablet       Other    Chronic bilateral low back pain without sciatica - Primary     Acute on chronic bilateral low back pain without sciatica  Denies any falls or trauma  She does have osteoporosis based on DEXA scan in 2018 and has not been taking her vitamin-D or calcium  Counseled patient on the risks of fracture if she is not cooperative with osteoporosis treatment  Physical exam unremarkable she has full range of motion is no spinal tenderness  No concerns for fracture at this time  Refilled vitamin-D and calcium for her and recommended Tylenol and over-the-counter Lidoderm patch for pain  Offered physical therapy however patient declined  Demonstrated stretches during our visit also encouraged weight-bearing exercises  Other Visit Diagnoses     Encounter for hepatitis C screening test for low risk patient        Relevant Orders    Hepatitis C antibody            Subjective:      Patient ID: Monica Fagan is a 72 y o  female  Ben Harris is a 67-year-old presents to clinic for back pain     Her back pain started but years ago and comes and goes  She has significant history for osteoporosis which improved to osteo tenia based on DEXA scan in 2018  She does not take any vitamin-D or calcium  Pain is dull and achy located on both sides of her lower back  Tylenol makes her feel little better  She denies any radiation down the legs no weakness bladder bowel incontinence fevers saddle anesthesia or trouble walking  The following portions of the patient's history were reviewed and updated as appropriate: allergies, current medications, past family history, past medical history, past social history, past surgical history and problem list     Review of Systems      Objective:      /80 (BP Location: Left arm, Patient Position: Sitting, Cuff Size: Standard)   Pulse 76   Temp (!) 97 3 °F (36 3 °C) (Tympanic)   Resp 14   Ht 4' 7 98" (1 422 m)   Wt 63 5 kg (140 lb)   BMI 31 41 kg/m²          Physical Exam  Vitals signs and nursing note reviewed  Constitutional:       Appearance: Normal appearance  Eyes:      Extraocular Movements: Extraocular movements intact  Conjunctiva/sclera: Conjunctivae normal    Neck:      Musculoskeletal: Normal range of motion and neck supple  No neck rigidity  Cardiovascular:      Rate and Rhythm: Normal rate and regular rhythm  Pulmonary:      Effort: Pulmonary effort is normal       Breath sounds: Normal breath sounds  Musculoskeletal:      Comments: Full range of motion of back  Straight leg test negative  No spinal tenderness  Vertebra smooth no signs of breakage or misalignment/step-off   Skin:     Capillary Refill: Capillary refill takes less than 2 seconds  Neurological:      General: No focal deficit present  Mental Status: She is alert and oriented to person, place, and time     Psychiatric:         Mood and Affect: Mood normal

## 2020-09-22 ENCOUNTER — OFFICE VISIT (OUTPATIENT)
Dept: FAMILY MEDICINE CLINIC | Facility: CLINIC | Age: 70
End: 2020-09-22

## 2020-09-22 VITALS
BODY MASS INDEX: 31.49 KG/M2 | RESPIRATION RATE: 14 BRPM | WEIGHT: 140 LBS | HEART RATE: 76 BPM | DIASTOLIC BLOOD PRESSURE: 80 MMHG | SYSTOLIC BLOOD PRESSURE: 130 MMHG | HEIGHT: 56 IN | TEMPERATURE: 97.3 F

## 2020-09-22 DIAGNOSIS — M54.50 CHRONIC BILATERAL LOW BACK PAIN WITHOUT SCIATICA: Primary | ICD-10-CM

## 2020-09-22 DIAGNOSIS — G89.29 CHRONIC BILATERAL LOW BACK PAIN WITHOUT SCIATICA: Primary | ICD-10-CM

## 2020-09-22 DIAGNOSIS — M81.0 AGE-RELATED OSTEOPOROSIS WITHOUT CURRENT PATHOLOGICAL FRACTURE: ICD-10-CM

## 2020-09-22 DIAGNOSIS — Z11.59 ENCOUNTER FOR HEPATITIS C SCREENING TEST FOR LOW RISK PATIENT: ICD-10-CM

## 2020-09-22 PROCEDURE — 99213 OFFICE O/P EST LOW 20 MIN: CPT | Performed by: FAMILY MEDICINE

## 2020-09-22 RX ORDER — B-COMPLEX WITH VITAMIN C
2 TABLET ORAL
Qty: 120 TABLET | Refills: 3 | Status: SHIPPED | OUTPATIENT
Start: 2020-09-22

## 2020-09-22 NOTE — ASSESSMENT & PLAN NOTE
Acute on chronic bilateral low back pain without sciatica  Denies any falls or trauma  She does have osteoporosis based on DEXA scan in 2018 and has not been taking her vitamin-D or calcium  Counseled patient on the risks of fracture if she is not cooperative with osteoporosis treatment  Physical exam unremarkable she has full range of motion is no spinal tenderness  No concerns for fracture at this time  Refilled vitamin-D and calcium for her and recommended Tylenol and over-the-counter Lidoderm patch for pain  Offered physical therapy however patient declined  Demonstrated stretches during our visit also encouraged weight-bearing exercises

## 2020-09-22 NOTE — ASSESSMENT & PLAN NOTE
Patient's last DEXA scan in 10/2018 showed osteopenia  T-score was - 2 4  Which is right on the border of osteopenia and osteoporosis  She does have a history of osteoporosis from previous study than the one  in 2018  S he has no history of falls or pathological fractures  She admits noncompliance with vitamin-D and calcium supplementation  Since she has not been taking her vitamins is possible that be borderline osteo porosis has not progressed to osteoporosis  Encouraged daily vitamin D and calcium supplementation at this time  Will hold off on repeat DEXA scan   As it will not   If sheis compliant with her supplementation consider repeating DEXA scan in 12 months  Refills of Oscal sent to pharmacy

## 2020-10-08 DIAGNOSIS — E78.5 HYPERLIPIDEMIA, UNSPECIFIED HYPERLIPIDEMIA TYPE: ICD-10-CM

## 2020-10-08 DIAGNOSIS — E03.9 ACQUIRED HYPOTHYROIDISM: ICD-10-CM

## 2020-10-09 RX ORDER — SIMVASTATIN 20 MG
TABLET ORAL
Qty: 90 TABLET | Refills: 0 | Status: SHIPPED | OUTPATIENT
Start: 2020-10-09 | End: 2021-01-16

## 2020-10-09 RX ORDER — LEVOTHYROXINE SODIUM 0.05 MG/1
TABLET ORAL
Qty: 90 TABLET | Refills: 1 | Status: SHIPPED | OUTPATIENT
Start: 2020-10-09 | End: 2021-03-16

## 2020-11-16 DIAGNOSIS — I10 BENIGN ESSENTIAL HYPERTENSION: ICD-10-CM

## 2020-11-17 RX ORDER — AMLODIPINE BESYLATE 10 MG/1
TABLET ORAL
Qty: 30 TABLET | Refills: 2 | Status: SHIPPED | OUTPATIENT
Start: 2020-11-17 | End: 2021-02-20

## 2020-12-22 ENCOUNTER — OFFICE VISIT (OUTPATIENT)
Dept: FAMILY MEDICINE CLINIC | Facility: CLINIC | Age: 70
End: 2020-12-22

## 2020-12-22 VITALS
TEMPERATURE: 96.6 F | SYSTOLIC BLOOD PRESSURE: 130 MMHG | OXYGEN SATURATION: 99 % | BODY MASS INDEX: 31.18 KG/M2 | HEIGHT: 56 IN | HEART RATE: 82 BPM | DIASTOLIC BLOOD PRESSURE: 82 MMHG | RESPIRATION RATE: 18 BRPM | WEIGHT: 138.6 LBS

## 2020-12-22 DIAGNOSIS — Z00.00 MEDICARE ANNUAL WELLNESS VISIT, SUBSEQUENT: Primary | ICD-10-CM

## 2020-12-22 DIAGNOSIS — Z13.220 SCREENING FOR HYPERLIPIDEMIA: ICD-10-CM

## 2020-12-22 DIAGNOSIS — Z13.1 SCREENING FOR DIABETES MELLITUS: ICD-10-CM

## 2020-12-22 DIAGNOSIS — I10 BENIGN ESSENTIAL HYPERTENSION: ICD-10-CM

## 2020-12-22 DIAGNOSIS — Z23 ENCOUNTER FOR IMMUNIZATION: ICD-10-CM

## 2020-12-22 PROCEDURE — 90662 IIV NO PRSV INCREASED AG IM: CPT

## 2020-12-22 PROCEDURE — 99397 PER PM REEVAL EST PAT 65+ YR: CPT | Performed by: FAMILY MEDICINE

## 2020-12-22 PROCEDURE — G0008 ADMIN INFLUENZA VIRUS VAC: HCPCS

## 2020-12-22 RX ORDER — LISINOPRIL AND HYDROCHLOROTHIAZIDE 20; 12.5 MG/1; MG/1
1 TABLET ORAL DAILY
Qty: 90 TABLET | Refills: 1 | Status: SHIPPED | OUTPATIENT
Start: 2020-12-22 | End: 2021-05-04 | Stop reason: SDUPTHER

## 2020-12-29 LAB
ALBUMIN SERPL-MCNC: 4.2 G/DL (ref 3.6–5.1)
ALBUMIN/GLOB SERPL: 1.6 (CALC) (ref 1–2.5)
ALP SERPL-CCNC: 66 U/L (ref 37–153)
ALT SERPL-CCNC: 8 U/L (ref 6–29)
AST SERPL-CCNC: 16 U/L (ref 10–35)
BILIRUB SERPL-MCNC: 0.4 MG/DL (ref 0.2–1.2)
BUN SERPL-MCNC: 17 MG/DL (ref 7–25)
BUN/CREAT SERPL: ABNORMAL (CALC) (ref 6–22)
CALCIUM SERPL-MCNC: 9.7 MG/DL (ref 8.6–10.4)
CHLORIDE SERPL-SCNC: 104 MMOL/L (ref 98–110)
CHOLEST SERPL-MCNC: 187 MG/DL
CHOLEST/HDLC SERPL: 3.3 (CALC)
CO2 SERPL-SCNC: 30 MMOL/L (ref 20–32)
CREAT SERPL-MCNC: 0.59 MG/DL (ref 0.5–0.99)
GLOBULIN SER CALC-MCNC: 2.7 G/DL (CALC) (ref 1.9–3.7)
GLUCOSE SERPL-MCNC: 104 MG/DL (ref 65–99)
HDLC SERPL-MCNC: 57 MG/DL
LDLC SERPL CALC-MCNC: 108 MG/DL (CALC)
NONHDLC SERPL-MCNC: 130 MG/DL (CALC)
POTASSIUM SERPL-SCNC: 4.3 MMOL/L (ref 3.5–5.3)
PROT SERPL-MCNC: 6.9 G/DL (ref 6.1–8.1)
SL AMB EGFR AFRICAN AMERICAN: 108 ML/MIN/1.73M2
SL AMB EGFR NON AFRICAN AMERICAN: 94 ML/MIN/1.73M2
SODIUM SERPL-SCNC: 141 MMOL/L (ref 135–146)
TRIGL SERPL-MCNC: 117 MG/DL

## 2021-01-12 ENCOUNTER — ANESTHESIA EVENT (OUTPATIENT)
Dept: GASTROENTEROLOGY | Facility: HOSPITAL | Age: 71
End: 2021-01-12

## 2021-01-12 PROBLEM — E66.9 OBESITY (BMI 30.0-34.9): Status: ACTIVE | Noted: 2021-01-12

## 2021-01-12 PROBLEM — E66.811 OBESITY (BMI 30.0-34.9): Status: ACTIVE | Noted: 2021-01-12

## 2021-01-12 RX ORDER — SODIUM CHLORIDE 9 MG/ML
100 INJECTION, SOLUTION INTRAVENOUS CONTINUOUS
Status: CANCELLED | OUTPATIENT
Start: 2021-01-12

## 2021-01-13 ENCOUNTER — ANESTHESIA (OUTPATIENT)
Dept: GASTROENTEROLOGY | Facility: HOSPITAL | Age: 71
End: 2021-01-13

## 2021-01-13 ENCOUNTER — HOSPITAL ENCOUNTER (OUTPATIENT)
Dept: GASTROENTEROLOGY | Facility: HOSPITAL | Age: 71
Setting detail: OUTPATIENT SURGERY
Discharge: HOME | End: 2021-01-13
Attending: INTERNAL MEDICINE | Admitting: INTERNAL MEDICINE
Payer: COMMERCIAL

## 2021-01-13 VITALS
OXYGEN SATURATION: 97 % | WEIGHT: 138 LBS | BODY MASS INDEX: 31.05 KG/M2 | DIASTOLIC BLOOD PRESSURE: 56 MMHG | TEMPERATURE: 97.2 F | SYSTOLIC BLOOD PRESSURE: 129 MMHG | HEIGHT: 56 IN | RESPIRATION RATE: 16 BRPM | HEART RATE: 55 BPM

## 2021-01-13 VITALS — HEART RATE: 62 BPM

## 2021-01-13 DIAGNOSIS — Z12.11 ENCOUNTER FOR SCREENING FOR MALIGNANT NEOPLASM OF COLON: ICD-10-CM

## 2021-01-13 PROCEDURE — 88305 TISSUE EXAM BY PATHOLOGIST: CPT | Performed by: PATHOLOGY

## 2021-01-13 RX ORDER — LIDOCAINE HYDROCHLORIDE 10 MG/ML
INJECTION, SOLUTION EPIDURAL; INFILTRATION; INTRACAUDAL; PERINEURAL AS NEEDED
Status: DISCONTINUED | OUTPATIENT
Start: 2021-01-13 | End: 2021-01-13

## 2021-01-13 RX ORDER — SODIUM CHLORIDE 9 MG/ML
INJECTION, SOLUTION INTRAVENOUS CONTINUOUS PRN
Status: DISCONTINUED | OUTPATIENT
Start: 2021-01-13 | End: 2021-01-13

## 2021-01-13 RX ORDER — PROPOFOL 10 MG/ML
INJECTION, EMULSION INTRAVENOUS AS NEEDED
Status: DISCONTINUED | OUTPATIENT
Start: 2021-01-13 | End: 2021-01-13

## 2021-01-13 RX ADMIN — PROPOFOL 100 MG: 10 INJECTION, EMULSION INTRAVENOUS at 13:51

## 2021-01-13 RX ADMIN — LIDOCAINE HYDROCHLORIDE 50 MG: 10 INJECTION, SOLUTION EPIDURAL; INFILTRATION; INTRACAUDAL; PERINEURAL at 13:51

## 2021-01-13 RX ADMIN — PROPOFOL 50 MG: 10 INJECTION, EMULSION INTRAVENOUS at 14:04

## 2021-01-13 RX ADMIN — PROPOFOL 50 MG: 10 INJECTION, EMULSION INTRAVENOUS at 13:53

## 2021-01-13 RX ADMIN — PROPOFOL 50 MG: 10 INJECTION, EMULSION INTRAVENOUS at 14:15

## 2021-01-13 RX ADMIN — PROPOFOL 50 MG: 10 INJECTION, EMULSION INTRAVENOUS at 13:57

## 2021-01-13 RX ADMIN — SODIUM CHLORIDE: 9 INJECTION, SOLUTION INTRAVENOUS at 13:47

## 2021-01-13 NOTE — ANESTHESIA POSTPROCEDURE EVALUATION
Post-Op Assessment Note    CV Status:  Stable  Pain Score: 0    Pain management: adequate     Mental Status:  Arousable   Hydration Status:  Stable   PONV Controlled:  None   Airway Patency:  Patent      Post Op Vitals Reviewed: Yes      Staff: Anesthesiologist, CRNA         No complications documented      /55 (01/13/21 1424)    Temp      Pulse 65 (01/13/21 1424)   Resp 16 (01/13/21 1424)    SpO2 100 % (01/13/21 1424)

## 2021-01-13 NOTE — ANESTHESIA PREPROCEDURE EVALUATION
Procedure:  COLONOSCOPY    Relevant Problems   ANESTHESIA (within normal limits)   (-) History of anesthesia complications      CARDIO   (+) Benign essential hypertension   (+) Hyperlipidemia      ENDO   (+) Hypothyroidism      GI/HEPATIC (within normal limits)      /RENAL (within normal limits)      GYN   (+) Cancer of breast, female ([facility] [address] ) (s/p left mastectomy)      HEMATOLOGY (within normal limits)      MUSCULOSKELETAL   (+) Chronic bilateral low back pain without sciatica   (+) Osteoarthritis      NEURO/PSYCH (within normal limits)      PULMONARY (within normal limits)      Other   (+) IFG (impaired fasting glucose)   (+) Obesity (BMI 30 0-34 9)   (+) Peripheral neuropathy   (+) Psoriasis        Physical Exam    Airway    Mallampati score: I  TM Distance: >3 FB  Neck ROM: full     Dental   lower dentures and upper dentures,     Cardiovascular  Rhythm: regular, Rate: normal, Cardiovascular exam normal    Pulmonary  Pulmonary exam normal Breath sounds clear to auscultation,     Other Findings        Anesthesia Plan  ASA Score- 2     Anesthesia Type- IV sedation with anesthesia with ASA Monitors  Additional Monitors:   Airway Plan:           Plan Factors-    Chart reviewed  Imaging results reviewed  Existing labs reviewed  Patient summary reviewed  Patient is not a current smoker  Patient did not smoke on day of surgery  Induction- intravenous  Postoperative Plan-     Informed Consent- Anesthetic plan and risks discussed with patient  I personally reviewed this patient with the CRNA  Discussed and agreed on the Anesthesia Plan with the CRNA             NPO verified  NKDA  Patient took levothyroxine and lisinopril-HCTZ this AM, 1/13/21  Plan:  IV sedation/MAC; GA backup    Benefits and risks of sedation were discussed with the patient including possibility of recall under sedation and the potential for conversion to general anesthesia if necessary  All questions were answered    Anesthesia consent was obtained from the patient

## 2021-01-13 NOTE — H&P
History and Physical - [facility]  Bubba Treadwell 72 y o  female MRN: [id number]                  HPI: Bubba Treadwell is a 72y o  year old female who presents for screening colonoscopy  REVIEW OF SYSTEMS: Per the HPI, and otherwise unremarkable      Historical Information   Past Medical History:   Diagnosis Date    Breast cancer ([address] )      lt mastectomy    Disease of thyroid gland     Hemorrhoids     Hyperlipidemia     Hypertension     Psoriasis      Past Surgical History:   Procedure Laterality Date    BREAST CYST EXCISION Right 1997     SECTION      COLONOSCOPY      Complete    MASTECTOMY Left     Onset: 1996    SALPINGOOPHORECTOMY Bilateral     age 40    TOTAL ABDOMINAL HYSTERECTOMY      age 40     Social History   Social History     Substance and Sexual Activity   Alcohol Use No     Social History     Substance and Sexual Activity   Drug Use No     Social History     Tobacco Use   Smoking Status Never Smoker   Smokeless Tobacco Never Used     Family History   Problem Relation Age of Onset    Arthritis Mother     Diabetes Mother     Breast cancer Mother 67    Colon cancer Mother     Hypertension Mother     Diabetes Father     Glaucoma Father     Prostate cancer Father     Asthma Brother     Hypertension Brother     Colon cancer Maternal Uncle 80    Breast cancer Maternal Aunt [de-identified]    No Known Problems Maternal Grandmother     No Known Problems Paternal Grandmother     No Known Problems Maternal Aunt     No Known Problems Maternal Aunt     No Known Problems Paternal Aunt     No Known Problems Paternal Aunt        Meds/Allergies       Current Outpatient Medications:     amLODIPine (NORVASC) 10 mg tablet    calcium carbonate-vitamin D (OSCAL-D) 500 mg-200 units per tablet    levothyroxine 50 mcg tablet    lisinopril-hydrochlorothiazide (PRINZIDE,ZESTORETIC) 20-12 5 MG per tablet    mirtazapine (REMERON) 15 mg tablet   simvastatin (ZOCOR) 20 mg tablet    clindamycin (CLEOCIN T) 1 % lotion    fluocinonide (LIDEX) 0 05 % external solution    mometasone (ELOCON) 0 1 % cream    OTEZLA 30 MG TABS    No Known Allergies    Objective     /70   Pulse 68   Temp 98 6 °F (37 °C) (Tympanic)   Resp 18   Ht 4' 8" (1 422 m)   Wt 62 6 kg (138 lb)   SpO2 99%   BMI 30 94 kg/m²       PHYSICAL EXAM    Gen: NAD  CV: RRR  CHEST: Clear  ABD: soft, NT/ND  EXT: no edema      ASSESSMENT/PLAN:  This is a 72y o  year old female here for colonoscopy, and she is stable and optimized for her procedure

## 2021-01-15 DIAGNOSIS — E78.5 HYPERLIPIDEMIA, UNSPECIFIED HYPERLIPIDEMIA TYPE: ICD-10-CM

## 2021-01-16 RX ORDER — SIMVASTATIN 20 MG
TABLET ORAL
Qty: 90 TABLET | Refills: 0 | Status: SHIPPED | OUTPATIENT
Start: 2021-01-16 | End: 2021-03-16

## 2021-02-08 ENCOUNTER — TELEPHONE (OUTPATIENT)
Dept: FAMILY MEDICINE CLINIC | Facility: CLINIC | Age: 71
End: 2021-02-08

## 2021-02-08 DIAGNOSIS — L40.9 PSORIASIS: Primary | ICD-10-CM

## 2021-02-08 NOTE — TELEPHONE ENCOUNTER
Pt has appt at [facility] on Wednesday 2/10 needs updated order put into Epic

## 2021-02-18 DIAGNOSIS — I10 BENIGN ESSENTIAL HYPERTENSION: ICD-10-CM

## 2021-02-20 RX ORDER — AMLODIPINE BESYLATE 10 MG/1
TABLET ORAL
Qty: 30 TABLET | Refills: 2 | Status: SHIPPED | OUTPATIENT
Start: 2021-02-20 | End: 2021-05-24

## 2021-03-16 DIAGNOSIS — E03.9 ACQUIRED HYPOTHYROIDISM: ICD-10-CM

## 2021-03-16 DIAGNOSIS — E78.5 HYPERLIPIDEMIA, UNSPECIFIED HYPERLIPIDEMIA TYPE: ICD-10-CM

## 2021-03-16 RX ORDER — LEVOTHYROXINE SODIUM 0.05 MG/1
TABLET ORAL
Qty: 90 TABLET | Refills: 1 | Status: SHIPPED | OUTPATIENT
Start: 2021-03-16 | End: 2021-11-01 | Stop reason: SDUPTHER

## 2021-03-16 RX ORDER — SIMVASTATIN 20 MG
TABLET ORAL
Qty: 90 TABLET | Refills: 0 | Status: SHIPPED | OUTPATIENT
Start: 2021-03-16 | End: 2021-07-25

## 2021-04-21 DIAGNOSIS — I10 BENIGN ESSENTIAL HYPERTENSION: ICD-10-CM

## 2021-05-04 RX ORDER — LISINOPRIL AND HYDROCHLOROTHIAZIDE 20; 12.5 MG/1; MG/1
TABLET ORAL
Qty: 90 TABLET | Refills: 1 | Status: SHIPPED | OUTPATIENT
Start: 2021-05-04 | End: 2022-01-20

## 2021-05-24 DIAGNOSIS — I10 BENIGN ESSENTIAL HYPERTENSION: ICD-10-CM

## 2021-05-24 RX ORDER — AMLODIPINE BESYLATE 10 MG/1
TABLET ORAL
Qty: 30 TABLET | Refills: 2 | Status: SHIPPED | OUTPATIENT
Start: 2021-05-24 | End: 2021-08-24 | Stop reason: SDUPTHER

## 2021-06-22 ENCOUNTER — OFFICE VISIT (OUTPATIENT)
Dept: FAMILY MEDICINE CLINIC | Facility: CLINIC | Age: 71
End: 2021-06-22

## 2021-06-22 VITALS
WEIGHT: 142.4 LBS | DIASTOLIC BLOOD PRESSURE: 80 MMHG | BODY MASS INDEX: 32.03 KG/M2 | HEIGHT: 56 IN | TEMPERATURE: 97.9 F | SYSTOLIC BLOOD PRESSURE: 132 MMHG | HEART RATE: 68 BPM | RESPIRATION RATE: 16 BRPM

## 2021-06-22 DIAGNOSIS — Z00.00 MEDICARE ANNUAL WELLNESS VISIT, SUBSEQUENT: Primary | ICD-10-CM

## 2021-06-22 DIAGNOSIS — Z12.31 ENCOUNTER FOR SCREENING MAMMOGRAM FOR MALIGNANT NEOPLASM OF BREAST: ICD-10-CM

## 2021-06-22 DIAGNOSIS — K63.5 POLYP OF COLON, UNSPECIFIED PART OF COLON, UNSPECIFIED TYPE: ICD-10-CM

## 2021-06-22 PROCEDURE — 3725F SCREEN DEPRESSION PERFORMED: CPT | Performed by: FAMILY MEDICINE

## 2021-06-22 PROCEDURE — 1170F FXNL STATUS ASSESSED: CPT | Performed by: FAMILY MEDICINE

## 2021-06-22 PROCEDURE — G0439 PPPS, SUBSEQ VISIT: HCPCS | Performed by: FAMILY MEDICINE

## 2021-06-22 PROCEDURE — 1125F AMNT PAIN NOTED PAIN PRSNT: CPT | Performed by: FAMILY MEDICINE

## 2021-06-22 PROCEDURE — 1036F TOBACCO NON-USER: CPT | Performed by: FAMILY MEDICINE

## 2021-06-22 PROCEDURE — 3288F FALL RISK ASSESSMENT DOCD: CPT | Performed by: FAMILY MEDICINE

## 2021-06-22 PROCEDURE — 3008F BODY MASS INDEX DOCD: CPT | Performed by: FAMILY MEDICINE

## 2021-06-22 PROCEDURE — 1160F RVW MEDS BY RX/DR IN RCRD: CPT | Performed by: FAMILY MEDICINE

## 2021-06-22 NOTE — PATIENT INSTRUCTIONS
Medicare Preventive Visit Patient Instructions  Thank you for completing your Welcome to Medicare Visit or Medicare Annual Wellness Visit today  Your next wellness visit will be due in one year (6/23/2022)  The screening/preventive services that you may require over the next 5-10 years are detailed below  Some tests may not apply to you based off risk factors and/or age  Screening tests ordered at today's visit but not completed yet may show as past due  Also, please note that scanned in results may not display below  Preventive Screenings:  Service Recommendations Previous Testing/Comments   Colorectal Cancer Screening  * Colonoscopy    * Fecal Occult Blood Test (FOBT)/Fecal Immunochemical Test (FIT)  * Fecal DNA/Cologuard Test  * Flexible Sigmoidoscopy Age: 54-65 years old   Colonoscopy: every 10 years (may be performed more frequently if at higher risk)  OR  FOBT/FIT: every 1 year  OR  Cologuard: every 3 years  OR  Sigmoidoscopy: every 5 years  Screening may be recommended earlier than age 48 if at higher risk for colorectal cancer  Also, an individualized decision between you and your healthcare provider will decide whether screening between the ages of 74-80 would be appropriate  Colonoscopy: 01/13/2021  FOBT/FIT: Not on file  Cologuard: Not on file  Sigmoidoscopy: Not on file    Screening Current     Breast Cancer Screening Age: 36 years old  Frequency: every 1-2 years  Not required if history of left and right mastectomy Mammogram: 08/11/2020    History Breast Cancer   Cervical Cancer Screening Between the ages of 21-29, pap smear recommended once every 3 years  Between the ages of 33-67, can perform pap smear with HPV co-testing every 5 years     Recommendations may differ for women with a history of total hysterectomy, cervical cancer, or abnormal pap smears in past  Pap Smear: Not on file    Screening Not Indicated   Hepatitis C Screening Once for adults born between 1945 and 1965  More frequently in patients at high risk for Hepatitis C Hep C Antibody: Not on file        Diabetes Screening 1-2 times per year if you're at risk for diabetes or have pre-diabetes Fasting glucose: No results in last 5 years   A1C: 6 0    Screening Current   Cholesterol Screening Once every 5 years if you don't have a lipid disorder  May order more often based on risk factors  Lipid panel: 12/28/2020    Screening Not Indicated  History Lipid Disorder     Other Preventive Screenings Covered by Medicare:  1  Abdominal Aortic Aneurysm (AAA) Screening: covered once if your at risk  You're considered to be at risk if you have a family history of AAA  2  Lung Cancer Screening: covers low dose CT scan once per year if you meet all of the following conditions: (1) Age 50-69; (2) No signs or symptoms of lung cancer; (3) Current smoker or have quit smoking within the last 15 years; (4) You have a tobacco smoking history of at least 30 pack years (packs per day multiplied by number of years you smoked); (5) You get a written order from a healthcare provider  3  Glaucoma Screening: covered annually if you're considered high risk: (1) You have diabetes OR (2) Family history of glaucoma OR (3)  aged 48 and older OR (3)  American aged 72 and older  3  Osteoporosis Screening: covered every 2 years if you meet one of the following conditions: (1) You're estrogen deficient and at risk for osteoporosis based off medical history and other findings; (2) Have a vertebral abnormality; (3) On glucocorticoid therapy for more than 3 months; (4) Have primary hyperparathyroidism; (5) On osteoporosis medications and need to assess response to drug therapy  · Last bone density test (DXA Scan): 10/30/2018  5  HIV Screening: covered annually if you're between the age of 12-76  Also covered annually if you are younger than 13 and older than 72 with risk factors for HIV infection   For pregnant patients, it is covered up to 3 times per pregnancy  Immunizations:  Immunization Recommendations   Influenza Vaccine Annual influenza vaccination during flu season is recommended for all persons aged >= 6 months who do not have contraindications   Pneumococcal Vaccine (Prevnar and Pneumovax)  * Prevnar = PCV13  * Pneumovax = PPSV23   Adults 25-60 years old: 1-3 doses may be recommended based on certain risk factors  Adults 72 years old: Prevnar (PCV13) vaccine recommended followed by Pneumovax (PPSV23) vaccine  If already received PPSV23 since turning 65, then PCV13 recommended at least one year after PPSV23 dose  Hepatitis B Vaccine 3 dose series if at intermediate or high risk (ex: diabetes, end stage renal disease, liver disease)   Tetanus (Td) Vaccine - COST NOT COVERED BY MEDICARE PART B Following completion of primary series, a booster dose should be given every 10 years to maintain immunity against tetanus  Td may also be given as tetanus wound prophylaxis  Tdap Vaccine - COST NOT COVERED BY MEDICARE PART B Recommended at least once for all adults  For pregnant patients, recommended with each pregnancy  Shingles Vaccine (Shingrix) - COST NOT COVERED BY MEDICARE PART B  2 shot series recommended in those aged 48 and above     Health Maintenance Due:      Topic Date Due    Hepatitis C Screening  Never done    MAMMOGRAM  08/11/2021    Colorectal Cancer Screening  01/13/2024     Immunizations Due:      Topic Date Due    COVID-19 Vaccine (1) Never done     Advance Directives   What are advance directives? Advance directives are legal documents that state your wishes and plans for medical care  These plans are made ahead of time in case you lose your ability to make decisions for yourself  Advance directives can apply to any medical decision, such as the treatments you want, and if you want to donate organs  What are the types of advance directives?   There are many types of advance directives, and each state has rules about how to use them  You may choose a combination of any of the following:  · Living will: This is a written record of the treatment you want  You can also choose which treatments you do not want, which to limit, and which to stop at a certain time  This includes surgery, medicine, IV fluid, and tube feedings  · Durable power of  for healthcare [facility]): This is a written record that states who you want to make healthcare choices for you when you are unable to make them for yourself  This person, called a proxy, is usually a family member or a friend  You may choose more than 1 proxy  · Do not resuscitate (DNR) order:  A DNR order is used in case your heart stops beating or you stop breathing  It is a request not to have certain forms of treatment, such as CPR  A DNR order may be included in other types of advance directives  · Medical directive: This covers the care that you want if you are in a coma, near death, or unable to make decisions for yourself  You can list the treatments you want for each condition  Treatment may include pain medicine, surgery, blood transfusions, dialysis, IV or tube feedings, and a ventilator (breathing machine)  · Values history: This document has questions about your views, beliefs, and how you feel and think about life  This information can help others choose the care that you would choose  Why are advance directives important? An advance directive helps you control your care  Although spoken wishes may be used, it is better to have your wishes written down  Spoken wishes can be misunderstood, or not followed  Treatments may be given even if you do not want them  An advance directive may make it easier for your family to make difficult choices about your care  Fall Prevention    Fall prevention  includes ways to make your home and other areas safer  It also includes ways you can move more carefully to prevent a fall   Health conditions that cause changes in your blood pressure, vision, or muscle strength and coordination may increase your risk for falls  Medicines may also increase your risk for falls if they make you dizzy, weak, or sleepy  Fall prevention tips:   · Stand or sit up slowly  · Use assistive devices as directed  · Wear shoes that fit well and have soles that   · Wear a personal alarm  · Stay active  · Manage your medical conditions  Home Safety Tips:  · Add items to prevent falls in the bathroom  · Keep paths clear  · Install bright lights in your home  · Keep items you use often on shelves within reach  · Paint or place reflective tape on the edges of your stairs  Weight Management   Why it is important to manage your weight:  Being overweight increases your risk of health conditions such as heart disease, high blood pressure, type 2 diabetes, and certain types of cancer  It can also increase your risk for osteoarthritis, sleep apnea, and other respiratory problems  Aim for a slow, steady weight loss  Even a small amount of weight loss can lower your risk of health problems  How to lose weight safely:  A safe and healthy way to lose weight is to eat fewer calories and get regular exercise  You can lose up about 1 pound a week by decreasing the number of calories you eat by 500 calories each day  Healthy meal plan for weight management:  A healthy meal plan includes a variety of foods, contains fewer calories, and helps you stay healthy  A healthy meal plan includes the following:  · Eat whole-grain foods more often  A healthy meal plan should contain fiber  Fiber is the part of grains, fruits, and vegetables that is not broken down by your body  Whole-grain foods are healthy and provide extra fiber in your diet  Some examples of whole-grain foods are whole-wheat breads and pastas, oatmeal, brown rice, and bulgur  · Eat a variety of vegetables every day    Include dark, leafy greens such as spinach, kale, jenniffer greens, and mustard greens  Eat yellow and orange vegetables such as carrots, sweet potatoes, and winter squash  · Eat a variety of fruits every day  Choose fresh or canned fruit (canned in its own juice or light syrup) instead of juice  Fruit juice has very little or no fiber  · Eat low-fat dairy foods  Drink fat-free (skim) milk or 1% milk  Eat fat-free yogurt and low-fat cottage cheese  Try low-fat cheeses such as mozzarella and other reduced-fat cheeses  · Choose meat and other protein foods that are low in fat  Choose beans or other legumes such as split peas or lentils  Choose fish, skinless poultry (chicken or turkey), or lean cuts of red meat (beef or pork)  Before you cook meat or poultry, cut off any visible fat  · Use less fat and oil  Try baking foods instead of frying them  Add less fat, such as margarine, sour cream, regular salad dressing and mayonnaise to foods  Eat fewer high-fat foods  Some examples of high-fat foods include french fries, doughnuts, ice cream, and cakes  · Eat fewer sweets  Limit foods and drinks that are high in sugar  This includes candy, cookies, regular soda, and sweetened drinks  Exercise:  Exercise at least 30 minutes per day on most days of the week  Some examples of exercise include walking, biking, dancing, and swimming  You can also fit in more physical activity by taking the stairs instead of the elevator or parking farther away from stores  Ask your healthcare provider about the best exercise plan for you  © Copyright [address] 2018 Information is for End User's use only and may not be sold, redistributed or otherwise used for commercial purposes   All illustrations and images included in CareNotes® are the copyrighted property of [employer]  or [address] [place] [address] [facility]

## 2021-06-22 NOTE — PROGRESS NOTES
Assessment and Plan:     Problem List Items Addressed This Visit        Digestive    Polyp of colon     Last colonoscopy 2021 by GI   Multiple large Polyps found  Needs colonoscopy in 3 years  2024                 Other    Medicare annual wellness visit, subsequent - Primary   Breast cancer screening - Mammogram ordered today      BMI Counseling: Body mass index is 31 93 kg/m²  The BMI is above normal  Nutrition recommendations include decreasing portion sizes, encouraging healthy choices of fruits and vegetables, decreasing fast food intake, consuming healthier snacks, limiting drinks that contain sugar, moderation in carbohydrate intake, increasing intake of lean protein, reducing intake of saturated and trans fat and reducing intake of cholesterol  Exercise recommendations include moderate physical activity 150 minutes/week  No pharmacotherapy was ordered  Falls Plan of Care: balance, strength, and gait training instructions were provided  Preventive health issues were discussed with patient, and age appropriate screening tests were ordered as noted in patient's After Visit Summary  Personalized health advice and appropriate referrals for health education or preventive services given if needed, as noted in patient's After Visit Summary       History of Present Illness:     Patient presents for Medicare Annual Wellness visit    Patient Care Team:  Leann Ch MD as PCP - General (Family Medicine)  Melody Bowles as PCP - [address] [place], [place] [id number] [facility]  MD Army Bruce Cabrera MD     Problem List:     Patient Active Problem List   Diagnosis    Benign essential hypertension    Hyperlipidemia    Hypothyroidism    Lymphedema    Primary insomnia    IFG (impaired fasting glucose)    Cancer of breast, female ([address] )    Osteoarthritis    Age-related osteoporosis without current pathological fracture    Peripheral neuropathy    Psoriasis    Edema of left lower eyelid    Chronic bilateral low back pain without sciatica    Medicare annual wellness visit, subsequent    Obesity (BMI 30 0-34  9)    Polyp of colon      Past Medical and Surgical History:     Past Medical History:   Diagnosis Date    Breast cancer ([address] )      lt mastectomy    Disease of thyroid gland     Hemorrhoids     Hyperlipidemia     Hypertension     Psoriasis      Past Surgical History:   Procedure Laterality Date    BREAST CYST EXCISION Right 1997     SECTION      COLONOSCOPY      Complete    MASTECTOMY Left     Onset: 1996    SALPINGOOPHORECTOMY Bilateral     age 40    TOTAL ABDOMINAL HYSTERECTOMY      age 40      Family History:     Family History   Problem Relation Age of Onset    Arthritis Mother     Diabetes Mother    [employer] Breast cancer Mother 67    Colon cancer Mother     Hypertension Mother     Diabetes Father     Glaucoma Father     Prostate cancer Father     Asthma Brother     Hypertension Brother     Colon cancer Maternal Uncle 80    Breast cancer Maternal Aunt [de-identified]    No Known Problems Maternal Grandmother     No Known Problems Paternal Grandmother     No Known Problems Maternal Aunt     No Known Problems Maternal Aunt     No Known Problems Paternal Aunt     No Known Problems Paternal Aunt       Social History:     Social History     Socioeconomic History    Marital status:      Spouse name: None    Number of children: None    Years of education: None    Highest education level: None   Occupational History    None   Tobacco Use    Smoking status: Never Smoker    Smokeless tobacco: Never Used   Vaping Use    Vaping Use: Never used   Substance and Sexual Activity    Alcohol use: No    Drug use: No    Sexual activity: Not Currently     Partners: Male   Other Topics Concern    None   Social History Narrative    None     Social Determinants of Health     Financial Resource Strain: Medium Risk    Difficulty of Paying Living Expenses: Somewhat hard   Food Insecurity: No Food Insecurity    Worried About Running Out of Food in the Last Year: Never true    Ran Out of Food in the Last Year: Never true   Transportation Needs: No Transportation Needs    Lack of Transportation (Medical): No    Lack of Transportation (Non-Medical): No   Physical Activity:     Days of Exercise per Week:     Minutes of Exercise per Session:    Stress:     Feeling of Stress :    Social Connections:     Frequency of Communication with Friends and Family:     Frequency of Social Gatherings with Friends and Family:     Attends [employer] witness Services:     Active Member of Clubs or Organizations:     Attends Club or Organization Meetings:     Marital Status:    Intimate Partner Violence:     Fear of Current or Ex-Partner:     Emotionally Abused:     Physically Abused:     Sexually Abused:       Medications and Allergies:     Current Outpatient Medications   Medication Sig Dispense Refill    amLODIPine (NORVASC) 10 mg tablet take 1 tablet by mouth once daily 30 tablet 2    calcium carbonate-vitamin D (OSCAL-D) 500 mg-200 units per tablet Take 2 tablets by mouth daily with breakfast 120 tablet 3    clindamycin (CLEOCIN T) 1 % lotion   0    fluocinonide (LIDEX) 0 05 % external solution   0    levothyroxine 50 mcg tablet take 1 tablet by mouth once daily 90 tablet 1    lisinopril-hydrochlorothiazide (PRINZIDE,ZESTORETIC) 20-12 5 MG per tablet take 1 tablet by mouth once daily 90 tablet 1    mirtazapine (REMERON) 15 mg tablet Take 1 tablet (15 mg total) by mouth daily at bedtime 30 tablet 2    mometasone (ELOCON) 0 1 % cream Apply 50 application topically 2 (two) times a day       OTEZLA 30 MG TABS 30 mg 2 (two) times a day   0    simvastatin (ZOCOR) 20 mg tablet take 1 tablet by mouth at bedtime 90 tablet 0     No current facility-administered medications for this visit       No Known Allergies   Immunizations:     Immunization History   Administered Date(s) Administered   [place] Mckeon Influenza, high dose seasonal 0 7 mL 12/22/2020    Influenza, seasonal, injectable 10/27/2011, 10/25/2012, 09/26/2013, 12/14/2016    Pneumococcal Conjugate 13-Valent 10/03/2016    Pneumococcal Polysaccharide PPV23 10/27/2011    Tdap 05/30/2018      Health Maintenance:         Topic Date Due    Hepatitis C Screening  Never done    MAMMOGRAM  08/11/2021    Colorectal Cancer Screening  01/13/2024         Topic Date Due    COVID-19 Vaccine (1) Never done      Medicare Health Risk Assessment:     /80 (BP Location: Right arm, Patient Position: Sitting, Cuff Size: Large)   Pulse 68   Temp 97 9 °F (36 6 °C) (Temporal)   Resp 16   Ht 4' 8" (1 422 m)   Wt 64 6 kg (142 lb 6 4 oz)   BMI 31 93 kg/m²      Suhail Poe is here for her Subsequent Wellness visit  Last Medicare Wellness visit information reviewed, patient interviewed, no change since last AWV  Health Risk Assessment:   Patient rates overall health as good  Patient feels that their physical health rating is much better  Patient is satisfied with their life  Eyesight was rated as slightly worse  Hearing was rated as same  Patient feels that their emotional and mental health rating is same  Patients states they are sometimes angry  Patient states they are sometimes unusually tired/fatigued  Pain experienced in the last 7 days has been some  Patient's pain rating has been 8/10  Patient states that she has experienced weight loss or gain in last 6 months  Depression Screening:   PHQ-2 Score: 1      Fall Risk Screening: In the past year, patient has experienced: history of falling in past year    Number of falls: 2 or more  Injured during fall?: No    Feels unsteady when standing or walking?: Yes    Worried about falling?: Yes      Urinary Incontinence Screening:   Patient has not leaked urine accidently in the last six months  Home Safety:  Patient does not have trouble with stairs inside or outside of their home   Patient has working smoke alarms and has working carbon monoxide detector  Home safety hazards include: none  Nutrition:   Current diet is Regular  Medications:   Patient is not currently taking any over-the-counter supplements  Patient is able to manage medications  Activities of Daily Living (ADLs)/Instrumental Activities of Daily Living (IADLs):   Walk and transfer into and out of bed and chair?: Yes  Dress and groom yourself?: Yes    Bathe or shower yourself?: Yes    Feed yourself? Yes  Do your laundry/housekeeping?: Yes  Manage your money, pay your bills and track your expenses?: Yes  Make your own meals?: Yes    Do your own shopping?: Yes    Previous Hospitalizations:   Any hospitalizations or ED visits within the last 12 months?: No      Advance Care Planning:   Living will: No    Durable POA for healthcare: Yes    Advanced directive: No    Advanced directive counseling given: No    Five wishes given: No    End of Life Decisions reviewed with patient: Yes      Cognitive Screening:   Provider or family/friend/caregiver concerned regarding cognition?: No    PREVENTIVE SCREENINGS      Cardiovascular Screening:    General: Screening Not Indicated and History Lipid Disorder      Diabetes Screening:     General: Screening Current      Colorectal Cancer Screening:     General: Screening Current      Breast Cancer Screening:     General: History Breast Cancer    Due for: Mammogram        Cervical Cancer Screening:    General: Screening Not Indicated      Osteoporosis Screening:    General: Screening Not Indicated and History Osteoporosis      Abdominal Aortic Aneurysm (AAA) Screening:        General: Screening Not Indicated      Lung Cancer Screening:     General: Screening Not Indicated    Screening, Brief Intervention, and Referral to Treatment (SBIRT)    Screening      Single Item Drug Screening:  How often have you used an illegal drug (including marijuana) or a prescription medication for non-medical reasons in the past year? never    Single Item Drug Screen Score: 0  Interpretation: Negative screen for possible drug use disorder    Other Counseling Topics:   Car/seat belt/driving safety, skin self-exam, sunscreen and calcium and vitamin D intake and regular weightbearing exercise  2 falls this year  No significant  Injury  offered PT but patient declines         Mary Kirkland MD

## 2021-07-20 DIAGNOSIS — E78.5 HYPERLIPIDEMIA, UNSPECIFIED HYPERLIPIDEMIA TYPE: ICD-10-CM

## 2021-07-25 RX ORDER — SIMVASTATIN 20 MG
TABLET ORAL
Qty: 90 TABLET | Refills: 0 | Status: SHIPPED | OUTPATIENT
Start: 2021-07-25 | End: 2021-10-26

## 2021-09-24 ENCOUNTER — OFFICE VISIT (OUTPATIENT)
Dept: FAMILY MEDICINE CLINIC | Facility: CLINIC | Age: 71
End: 2021-09-24

## 2021-09-24 VITALS
WEIGHT: 143.2 LBS | DIASTOLIC BLOOD PRESSURE: 80 MMHG | HEART RATE: 72 BPM | HEIGHT: 56 IN | SYSTOLIC BLOOD PRESSURE: 142 MMHG | BODY MASS INDEX: 32.21 KG/M2 | RESPIRATION RATE: 14 BRPM | TEMPERATURE: 97.6 F

## 2021-09-24 DIAGNOSIS — Z23 ENCOUNTER FOR IMMUNIZATION: ICD-10-CM

## 2021-09-24 DIAGNOSIS — B37.2 YEAST INFECTION OF THE SKIN: Primary | ICD-10-CM

## 2021-09-24 PROCEDURE — 90662 IIV NO PRSV INCREASED AG IM: CPT | Performed by: FAMILY MEDICINE

## 2021-09-24 PROCEDURE — 1036F TOBACCO NON-USER: CPT | Performed by: FAMILY MEDICINE

## 2021-09-24 PROCEDURE — 1160F RVW MEDS BY RX/DR IN RCRD: CPT | Performed by: FAMILY MEDICINE

## 2021-09-24 PROCEDURE — 99213 OFFICE O/P EST LOW 20 MIN: CPT | Performed by: FAMILY MEDICINE

## 2021-09-24 PROCEDURE — G0008 ADMIN INFLUENZA VIRUS VAC: HCPCS | Performed by: FAMILY MEDICINE

## 2021-09-24 RX ORDER — NYSTATIN 100000 [USP'U]/G
POWDER TOPICAL 4 TIMES DAILY
Qty: 30 G | Refills: 1 | Status: SHIPPED | OUTPATIENT
Start: 2021-09-24

## 2021-09-24 NOTE — ASSESSMENT & PLAN NOTE
One-month history of yeast infection underneath right breast   Beefy red appearance with satellite lesions  Erythematous  -patient has not tried any creams or treatment for it  She states it is mildly itchy   -prescribed nystatin powder to be used 4 times a day for 1 week  -follow-up in 1 week, picture taken during this visit to see progression

## 2021-09-24 NOTE — PROGRESS NOTES
Assessment/Plan:    Encounter for immunization  Patient received high-dose flu shot today due to being 80years old  Yeast infection of the skin  One-month history of yeast infection underneath right breast   Beefy red appearance with satellite lesions  Erythematous  -patient has not tried any creams or treatment for it  She states it is mildly itchy   -prescribed nystatin powder to be used 4 times a day for 1 week  -follow-up in 1 week, picture taken during this visit to see progression  Problem List Items Addressed This Visit        Musculoskeletal and Integument    Yeast infection of the skin - Primary     One-month history of yeast infection underneath right breast   Beefy red appearance with satellite lesions  Erythematous  -patient has not tried any creams or treatment for it  She states it is mildly itchy   -prescribed nystatin powder to be used 4 times a day for 1 week  -follow-up in 1 week, picture taken during this visit to see progression  Relevant Medications    nystatin (MYCOSTATIN) powder       Other    Encounter for immunization     Patient received high-dose flu shot today due to being 80years old  Relevant Orders    influenza vaccine, high-dose, PF 0 7 mL (FLUZONE HIGH-DOSE) (Completed)            Subjective:      Patient ID: Lora Oates is a 80 y o  female  Patient is a 28-year-old woman presenting to the clinic for rash she has been having for the past month  Patient states this rash appeared out of nowhere and mentions some itching with it  She states it has gotten worse since initial onset  It is localized underneath her right breast   She does not have a left breast due to history of breast cancer and eventual mastectomy  She denies any sick contacts, recent illnesses or travel        The following portions of the patient's history were reviewed and updated as appropriate:   She  has a past medical history of Breast cancer ([address] ), Disease of thyroid gland, Hemorrhoids, Hyperlipidemia, Hypertension, and Psoriasis  She   Patient Active Problem List    Diagnosis Date Noted    Encounter for immunization 2021    Yeast infection of the skin 2021    Polyp of colon 2021    Obesity (BMI 30 0-34 9) 2021    Medicare annual wellness visit, subsequent 2019    Chronic bilateral low back pain without sciatica 10/05/2018    Edema of left lower eyelid 2018    IFG (impaired fasting glucose) 2018    Primary insomnia 10/03/2016    Peripheral neuropathy 2016    Lymphedema 10/01/2015    Cancer of breast, female [facility] ) 2013    Hyperlipidemia 2013    Osteoarthritis 2012    Age-related osteoporosis without current pathological fracture 2012    Hypothyroidism 10/25/2012    Psoriasis 2012    Benign essential hypertension 2012     She  has a past surgical history that includes  section; Colonoscopy; Salpingoophorectomy (Bilateral); Total abdominal hysterectomy; Breast cyst excision (Right, ); and Mastectomy (Left)  Her family history includes Arthritis in her mother; Asthma in her brother; Breast cancer (age of onset: 67) in her mother; Breast cancer (age of onset: [de-identified]) in her maternal aunt; Colon cancer in her mother; Colon cancer (age of onset: 80) in her maternal uncle; Diabetes in her father and mother; Glaucoma in her father; Hypertension in her brother and mother; No Known Problems in her maternal aunt, maternal aunt, maternal grandmother, paternal aunt, paternal aunt, and paternal grandmother; Prostate cancer in her father  She  reports that she has never smoked  She has never used smokeless tobacco  She reports that she does not drink alcohol and does not use drugs    Current Outpatient Medications   Medication Sig Dispense Refill    amLODIPine (NORVASC) 10 mg tablet Take 1 tablet (10 mg total) by mouth daily 30 tablet 3    calcium carbonate-vitamin D (OSCAL-D) 500 mg-200 units per tablet Take 2 tablets by mouth daily with breakfast 120 tablet 3    clindamycin (CLEOCIN T) 1 % lotion   0    fluocinonide (LIDEX) 0 05 % external solution   0    levothyroxine 50 mcg tablet take 1 tablet by mouth once daily 90 tablet 1    lisinopril-hydrochlorothiazide (PRINZIDE,ZESTORETIC) 20-12 5 MG per tablet take 1 tablet by mouth once daily 90 tablet 1    mirtazapine (REMERON) 15 mg tablet Take 1 tablet (15 mg total) by mouth daily at bedtime 30 tablet 2    mometasone (ELOCON) 0 1 % cream Apply 50 application topically 2 (two) times a day       OTEZLA 30 MG TABS 30 mg 2 (two) times a day   0    simvastatin (ZOCOR) 20 mg tablet take 1 tablet by mouth at bedtime 90 tablet 0    nystatin (MYCOSTATIN) powder Apply topically 4 (four) times a day 30 g 1     No current facility-administered medications for this visit  Current Outpatient Medications on File Prior to Visit   Medication Sig    amLODIPine (NORVASC) 10 mg tablet Take 1 tablet (10 mg total) by mouth daily    calcium carbonate-vitamin D (OSCAL-D) 500 mg-200 units per tablet Take 2 tablets by mouth daily with breakfast    clindamycin (CLEOCIN T) 1 % lotion     fluocinonide (LIDEX) 0 05 % external solution     levothyroxine 50 mcg tablet take 1 tablet by mouth once daily    lisinopril-hydrochlorothiazide (PRINZIDE,ZESTORETIC) 20-12 5 MG per tablet take 1 tablet by mouth once daily    mirtazapine (REMERON) 15 mg tablet Take 1 tablet (15 mg total) by mouth daily at bedtime    mometasone (ELOCON) 0 1 % cream Apply 50 application topically 2 (two) times a day     OTEZLA 30 MG TABS 30 mg 2 (two) times a day     simvastatin (ZOCOR) 20 mg tablet take 1 tablet by mouth at bedtime    [DISCONTINUED] lisinopril-hydrochlorothiazide (PRINZIDE,ZESTORETIC) 20-12 5 MG per tablet Take 1 tablet by mouth daily     No current facility-administered medications on file prior to visit  She has No Known Allergies       Review of Systems   Constitutional: Negative for chills and fever  HENT: Negative for ear pain and sore throat  Eyes: Negative for pain and visual disturbance  Respiratory: Negative for cough and shortness of breath  Cardiovascular: Negative for chest pain and palpitations  Gastrointestinal: Negative for abdominal pain and vomiting  Genitourinary: Negative for dysuria and hematuria  Musculoskeletal: Negative for arthralgias and back pain  Skin: Positive for rash  Negative for color change  Neurological: Negative for seizures and syncope  All other systems reviewed and are negative  Objective:      /80 (BP Location: Right arm, Patient Position: Sitting, Cuff Size: Large)   Pulse 72   Temp 97 6 °F (36 4 °C) (Temporal)   Resp 14   Ht 4' 8" (1 422 m)   Wt 65 kg (143 lb 3 2 oz)   BMI 32 10 kg/m²          Physical Exam  Vitals reviewed  Constitutional:       General: She is not in acute distress  Appearance: Normal appearance  She is obese  She is not ill-appearing or diaphoretic  HENT:      Head: Normocephalic and atraumatic  Nose: Nose normal  No congestion or rhinorrhea  Mouth/Throat:      Mouth: Mucous membranes are moist       Pharynx: Oropharynx is clear  No oropharyngeal exudate  Eyes:      General: No scleral icterus  Conjunctiva/sclera: Conjunctivae normal       Pupils: Pupils are equal, round, and reactive to light  Cardiovascular:      Rate and Rhythm: Normal rate and regular rhythm  Pulses: Normal pulses  Heart sounds: No murmur heard  Pulmonary:      Effort: Pulmonary effort is normal       Breath sounds: Normal breath sounds  No wheezing  Chest:      Chest wall: No tenderness  Abdominal:      General: Bowel sounds are normal       Palpations: Abdomen is soft  There is no mass  Tenderness: There is no abdominal tenderness  Musculoskeletal:         General: Normal range of motion  Cervical back: Normal range of motion  Skin:     General: Skin is warm  Capillary Refill: Capillary refill takes less than 2 seconds  Findings: Erythema and rash present  No bruising  Comments: Erythematous rash underneath left breast   Beefy red appearance  Some satellite lesions around the area  See picture below  Neurological:      Mental Status: She is alert and oriented to person, place, and time     Psychiatric:         Mood and Affect: Mood normal          Behavior: Behavior normal              Ector Saldana DO  Family Medicine Resident, PGY2  1:50 PM

## 2021-09-29 ENCOUNTER — OFFICE VISIT (OUTPATIENT)
Dept: FAMILY MEDICINE CLINIC | Facility: CLINIC | Age: 71
End: 2021-09-29

## 2021-09-29 VITALS
SYSTOLIC BLOOD PRESSURE: 130 MMHG | BODY MASS INDEX: 32.39 KG/M2 | RESPIRATION RATE: 18 BRPM | OXYGEN SATURATION: 98 % | HEART RATE: 87 BPM | TEMPERATURE: 98.4 F | HEIGHT: 56 IN | DIASTOLIC BLOOD PRESSURE: 70 MMHG | WEIGHT: 144 LBS

## 2021-09-29 DIAGNOSIS — B37.2 YEAST INFECTION OF THE SKIN: Primary | ICD-10-CM

## 2021-09-29 DIAGNOSIS — R07.89 MUSCULAR CHEST PAIN: ICD-10-CM

## 2021-09-29 PROCEDURE — 99214 OFFICE O/P EST MOD 30 MIN: CPT | Performed by: FAMILY MEDICINE

## 2021-09-29 PROCEDURE — 1160F RVW MEDS BY RX/DR IN RCRD: CPT | Performed by: FAMILY MEDICINE

## 2021-09-29 PROCEDURE — 3008F BODY MASS INDEX DOCD: CPT | Performed by: FAMILY MEDICINE

## 2021-09-29 RX ORDER — LIDOCAINE HYDROCHLORIDE 20 MG/ML
JELLY TOPICAL AS NEEDED
Qty: 30 ML | Refills: 0 | Status: SHIPPED | OUTPATIENT
Start: 2021-09-29

## 2021-09-29 NOTE — PROGRESS NOTES
Assessment/Plan:    Yeast infection of the skin  - ongoing for 1 month, started on nystatin last week, pt here for recheck and rash has improved but still present, see media for picture  - continue nysatin powder 4 x daily for the next 2 weeks  - f/u 2 weeks with Dr Laura Sol    Muscular chest pain  - based on pt's report of sxs, her new numbness/tingling and tenderness to touch over L chest area appears more neuropathic vs muscular in nature, will monitor  - pt has a hx of well controlled HTN, currently on amlodopine 10 mg and lisinopril-HCTZ 20/12 5 mg and HLD, currently on simvastatin 20 mg -no previous cardiac hx;  low suspicion of it being cardiac related at this time  - rx lidocaine 2% ointment for symptom relief  - asked patient to continue to monitor sxs and if it worsens to follow up here or present immediately to ED  - f/u in 2 weeks to reassess rash and these sxs with Dr Laura Sol         Problem List Items Addressed This Visit        Musculoskeletal and Integument    Yeast infection of the skin - Primary     - ongoing for 1 month, started on nystatin last week, pt here for recheck and rash has improved but still present, see media for picture  - continue nysatin powder 4 x daily for the next 2 weeks  - f/u 2 weeks with Dr Laura Sol         Relevant Medications    lidocaine (XYLOCAINE) 2 % topical gel       Other    Muscular chest pain     - based on pt's report of sxs, her new numbness/tingling and tenderness to touch over L chest area appears more neuropathic vs muscular in nature, will monitor  - pt has a hx of well controlled HTN, currently on amlodopine 10 mg and lisinopril-HCTZ 20/12 5 mg and HLD, currently on simvastatin 20 mg -no previous cardiac hx;  low suspicion of it being cardiac related at this time  - rx lidocaine 2% ointment for symptom relief  - asked patient to continue to monitor sxs and if it worsens to follow up here or present immediately to ED  - f/u in 2 weeks to reassess rash and these sxs with Dr Blackwell Antis         Relevant Medications    lidocaine (XYLOCAINE) 2 % topical gel            Subjective:      Patient ID: Estelle Oppenheim is a 80 y o  female  81 y/o F presenting for follow-up evaluation of yeast infection underneath the right breast  Patient was started on nystatin powder 4 times daily last week  She has noticed improvement but states that is still a little bit itchy  Denies any pain to the area  Patient also complains of numbness and a pin pricking sensation on the left side of her chest inferior to the incision from her previous mastectomy that began about 1 week ago  States that she can have this pain when she moves or L arm and the area is tender to touch  She also states that she feels like there is increased fluid collection in the area but unsure  Denies CP, SOB or palpitations  Denies any pain traveling from chest down her arm or up her neck  Pt has no significant cardiac history  The following portions of the patient's history were reviewed and updated as appropriate: allergies, current medications, past family history, past medical history, past social history, past surgical history and problem list     Review of Systems   Respiratory: Negative for cough and shortness of breath  Cardiovascular: Negative for chest pain and palpitations  Gastrointestinal: Negative for abdominal pain, nausea and vomiting  Skin: Positive for rash (under R breast)  Neurological: Positive for numbness (L side of chest)  All other systems reviewed and are negative  Objective:      /70 (BP Location: Left arm, Patient Position: Sitting, Cuff Size: Standard)   Pulse 87   Temp 98 4 °F (36 9 °C) (Temporal)   Resp 18   Ht 4' 8" (1 422 m)   Wt 65 3 kg (144 lb)   SpO2 98%   BMI 32 28 kg/m²          Physical Exam  Vitals reviewed  Constitutional:       General: She is not in acute distress  Appearance: Normal appearance     HENT:      Head: Normocephalic and atraumatic  Right Ear: External ear normal       Left Ear: External ear normal       Nose: Nose normal       Mouth/Throat:      Pharynx: Oropharynx is clear  Eyes:      Conjunctiva/sclera: Conjunctivae normal    Cardiovascular:      Rate and Rhythm: Normal rate  Pulses: Normal pulses  Pulmonary:      Effort: Pulmonary effort is normal    Musculoskeletal:      Cervical back: Neck supple  Comments: TTP over incision of L masectomy and directly inferior and lateral to area   Skin:     General: Skin is warm  Capillary Refill: Capillary refill takes less than 2 seconds  Findings: Rash (under R breast; erythematous but improved from before; see picture below) present  Neurological:      Mental Status: She is alert and oriented to person, place, and time  Psychiatric:         Mood and Affect: Mood normal          Behavior: Behavior normal          Thought Content:  Thought content normal          Judgment: Judgment normal

## 2021-09-29 NOTE — ASSESSMENT & PLAN NOTE
- based on pt's report of sxs, her new numbness/tingling and tenderness to touch over L chest area appears more neuropathic vs muscular in nature, will monitor  - pt has a hx of well controlled HTN, currently on amlodopine 10 mg and lisinopril-HCTZ 20/12 5 mg and HLD, currently on simvastatin 20 mg -no previous cardiac hx;  low suspicion of it being cardiac related at this time  - rx lidocaine 2% ointment for symptom relief  - asked patient to continue to monitor sxs and if it worsens to follow up here or present immediately to ED  - f/u in 2 weeks to reassess rash and these sxs with Dr Jaramillo Din

## 2021-09-29 NOTE — ASSESSMENT & PLAN NOTE
- ongoing for 1 month, started on nystatin last week, pt here for recheck and rash has improved but still present, see media for picture  - continue nysatin powder 4 x daily for the next 2 weeks  - f/u 2 weeks with Dr Dannielle Grande

## 2021-10-12 ENCOUNTER — PATIENT OUTREACH (OUTPATIENT)
Dept: FAMILY MEDICINE CLINIC | Facility: CLINIC | Age: 71
End: 2021-10-12

## 2021-10-12 DIAGNOSIS — Z76.89 ENCOUNTER FOR SOCIAL WORK INTERVENTION: Primary | ICD-10-CM

## 2021-10-16 ENCOUNTER — HOSPITAL ENCOUNTER (OUTPATIENT)
Dept: MAMMOGRAPHY | Facility: CLINIC | Age: 71
Discharge: HOME | End: 2021-10-16
Payer: COMMERCIAL

## 2021-10-16 VITALS — BODY MASS INDEX: 32.39 KG/M2 | HEIGHT: 56 IN | WEIGHT: 144 LBS

## 2021-10-16 DIAGNOSIS — Z12.31 ENCOUNTER FOR SCREENING MAMMOGRAM FOR MALIGNANT NEOPLASM OF BREAST: ICD-10-CM

## 2021-10-16 PROCEDURE — 77063 BREAST TOMOSYNTHESIS BI: CPT

## 2021-10-16 PROCEDURE — 77067 SCR MAMMO BI INCL CAD: CPT

## 2021-10-18 ENCOUNTER — OFFICE VISIT (OUTPATIENT)
Dept: FAMILY MEDICINE CLINIC | Facility: CLINIC | Age: 71
End: 2021-10-18

## 2021-10-18 VITALS
HEART RATE: 65 BPM | OXYGEN SATURATION: 99 % | TEMPERATURE: 99.4 F | DIASTOLIC BLOOD PRESSURE: 80 MMHG | SYSTOLIC BLOOD PRESSURE: 140 MMHG | BODY MASS INDEX: 31.85 KG/M2 | WEIGHT: 141.6 LBS | RESPIRATION RATE: 18 BRPM | HEIGHT: 56 IN

## 2021-10-18 DIAGNOSIS — R07.89 MUSCULAR CHEST PAIN: Primary | ICD-10-CM

## 2021-10-18 DIAGNOSIS — B37.2 YEAST INFECTION OF THE SKIN: ICD-10-CM

## 2021-10-18 PROCEDURE — 99213 OFFICE O/P EST LOW 20 MIN: CPT | Performed by: FAMILY MEDICINE

## 2021-10-18 PROCEDURE — 3008F BODY MASS INDEX DOCD: CPT | Performed by: FAMILY MEDICINE

## 2021-10-25 DIAGNOSIS — E78.5 HYPERLIPIDEMIA, UNSPECIFIED HYPERLIPIDEMIA TYPE: ICD-10-CM

## 2021-10-26 RX ORDER — SIMVASTATIN 20 MG
TABLET ORAL
Qty: 90 TABLET | Refills: 0 | Status: SHIPPED | OUTPATIENT
Start: 2021-10-26 | End: 2021-12-28

## 2021-11-01 DIAGNOSIS — E03.9 ACQUIRED HYPOTHYROIDISM: ICD-10-CM

## 2021-11-01 RX ORDER — LEVOTHYROXINE SODIUM 0.05 MG/1
50 TABLET ORAL DAILY
Qty: 90 TABLET | Refills: 1 | Status: ON HOLD | OUTPATIENT
Start: 2021-11-01 | End: 2022-04-21

## 2021-11-08 ENCOUNTER — EVALUATION (OUTPATIENT)
Dept: PHYSICAL THERAPY | Facility: CLINIC | Age: 71
End: 2021-11-08
Payer: COMMERCIAL

## 2021-11-08 DIAGNOSIS — M54.12 CERVICAL RADICULOPATHY: Primary | ICD-10-CM

## 2021-11-08 DIAGNOSIS — R07.89 MUSCULAR CHEST PAIN: ICD-10-CM

## 2021-11-08 PROCEDURE — 97110 THERAPEUTIC EXERCISES: CPT | Performed by: PHYSICAL THERAPIST

## 2021-11-08 PROCEDURE — 97161 PT EVAL LOW COMPLEX 20 MIN: CPT | Performed by: PHYSICAL THERAPIST

## 2021-11-08 PROCEDURE — 97140 MANUAL THERAPY 1/> REGIONS: CPT | Performed by: PHYSICAL THERAPIST

## 2021-11-08 PROCEDURE — 97112 NEUROMUSCULAR REEDUCATION: CPT | Performed by: PHYSICAL THERAPIST

## 2021-11-16 ENCOUNTER — OFFICE VISIT (OUTPATIENT)
Dept: PHYSICAL THERAPY | Facility: CLINIC | Age: 71
End: 2021-11-16
Payer: COMMERCIAL

## 2021-11-16 DIAGNOSIS — M54.12 CERVICAL RADICULOPATHY: Primary | ICD-10-CM

## 2021-11-16 DIAGNOSIS — R07.89 MUSCULAR CHEST PAIN: ICD-10-CM

## 2021-11-16 PROCEDURE — 97140 MANUAL THERAPY 1/> REGIONS: CPT | Performed by: PHYSICAL THERAPIST

## 2021-11-16 PROCEDURE — 97112 NEUROMUSCULAR REEDUCATION: CPT

## 2021-11-16 PROCEDURE — 97110 THERAPEUTIC EXERCISES: CPT

## 2021-11-19 ENCOUNTER — OFFICE VISIT (OUTPATIENT)
Dept: PHYSICAL THERAPY | Facility: CLINIC | Age: 71
End: 2021-11-19
Payer: COMMERCIAL

## 2021-11-19 DIAGNOSIS — R07.89 MUSCULAR CHEST PAIN: ICD-10-CM

## 2021-11-19 DIAGNOSIS — M54.12 CERVICAL RADICULOPATHY: Primary | ICD-10-CM

## 2021-11-19 PROCEDURE — 97110 THERAPEUTIC EXERCISES: CPT

## 2021-11-24 ENCOUNTER — OFFICE VISIT (OUTPATIENT)
Dept: PHYSICAL THERAPY | Facility: CLINIC | Age: 71
End: 2021-11-24
Payer: COMMERCIAL

## 2021-11-24 DIAGNOSIS — R07.89 MUSCULAR CHEST PAIN: ICD-10-CM

## 2021-11-24 DIAGNOSIS — M54.12 CERVICAL RADICULOPATHY: Primary | ICD-10-CM

## 2021-11-24 PROCEDURE — 97110 THERAPEUTIC EXERCISES: CPT

## 2021-11-26 ENCOUNTER — OFFICE VISIT (OUTPATIENT)
Dept: PHYSICAL THERAPY | Facility: CLINIC | Age: 71
End: 2021-11-26
Payer: COMMERCIAL

## 2021-11-26 DIAGNOSIS — M54.12 CERVICAL RADICULOPATHY: Primary | ICD-10-CM

## 2021-11-26 DIAGNOSIS — R07.89 MUSCULAR CHEST PAIN: ICD-10-CM

## 2021-11-26 PROCEDURE — 97110 THERAPEUTIC EXERCISES: CPT | Performed by: PHYSICAL THERAPIST

## 2021-11-26 PROCEDURE — 97140 MANUAL THERAPY 1/> REGIONS: CPT | Performed by: PHYSICAL THERAPIST

## 2021-11-29 ENCOUNTER — OFFICE VISIT (OUTPATIENT)
Dept: PHYSICAL THERAPY | Facility: CLINIC | Age: 71
End: 2021-11-29
Payer: COMMERCIAL

## 2021-11-29 DIAGNOSIS — R07.89 MUSCULAR CHEST PAIN: ICD-10-CM

## 2021-11-29 DIAGNOSIS — M54.12 CERVICAL RADICULOPATHY: Primary | ICD-10-CM

## 2021-11-29 PROCEDURE — 97110 THERAPEUTIC EXERCISES: CPT

## 2021-11-29 PROCEDURE — 97112 NEUROMUSCULAR REEDUCATION: CPT

## 2021-12-01 ENCOUNTER — OFFICE VISIT (OUTPATIENT)
Dept: PHYSICAL THERAPY | Facility: CLINIC | Age: 71
End: 2021-12-01
Payer: COMMERCIAL

## 2021-12-01 DIAGNOSIS — M54.12 CERVICAL RADICULOPATHY: Primary | ICD-10-CM

## 2021-12-01 DIAGNOSIS — R07.89 MUSCULAR CHEST PAIN: ICD-10-CM

## 2021-12-01 PROCEDURE — 97110 THERAPEUTIC EXERCISES: CPT

## 2021-12-01 PROCEDURE — 97112 NEUROMUSCULAR REEDUCATION: CPT

## 2021-12-06 ENCOUNTER — EVALUATION (OUTPATIENT)
Dept: PHYSICAL THERAPY | Facility: CLINIC | Age: 71
End: 2021-12-06
Payer: COMMERCIAL

## 2021-12-06 DIAGNOSIS — R07.89 MUSCULAR CHEST PAIN: ICD-10-CM

## 2021-12-06 DIAGNOSIS — M54.12 CERVICAL RADICULOPATHY: Primary | ICD-10-CM

## 2021-12-06 PROCEDURE — 97110 THERAPEUTIC EXERCISES: CPT

## 2021-12-06 PROCEDURE — 97112 NEUROMUSCULAR REEDUCATION: CPT

## 2021-12-08 ENCOUNTER — OFFICE VISIT (OUTPATIENT)
Dept: PHYSICAL THERAPY | Facility: CLINIC | Age: 71
End: 2021-12-08
Payer: COMMERCIAL

## 2021-12-08 DIAGNOSIS — R07.89 MUSCULAR CHEST PAIN: ICD-10-CM

## 2021-12-08 DIAGNOSIS — M54.12 CERVICAL RADICULOPATHY: Primary | ICD-10-CM

## 2021-12-08 PROCEDURE — 97110 THERAPEUTIC EXERCISES: CPT

## 2021-12-08 PROCEDURE — 97112 NEUROMUSCULAR REEDUCATION: CPT

## 2021-12-13 ENCOUNTER — EVALUATION (OUTPATIENT)
Dept: PHYSICAL THERAPY | Facility: CLINIC | Age: 71
End: 2021-12-13
Payer: COMMERCIAL

## 2021-12-13 DIAGNOSIS — M54.12 CERVICAL RADICULOPATHY: Primary | ICD-10-CM

## 2021-12-13 DIAGNOSIS — R07.89 MUSCULAR CHEST PAIN: ICD-10-CM

## 2021-12-13 PROCEDURE — 97140 MANUAL THERAPY 1/> REGIONS: CPT | Performed by: PHYSICAL THERAPIST

## 2021-12-13 PROCEDURE — 97110 THERAPEUTIC EXERCISES: CPT | Performed by: PHYSICAL THERAPIST

## 2021-12-13 PROCEDURE — 97112 NEUROMUSCULAR REEDUCATION: CPT | Performed by: PHYSICAL THERAPIST

## 2021-12-15 ENCOUNTER — OFFICE VISIT (OUTPATIENT)
Dept: PHYSICAL THERAPY | Facility: CLINIC | Age: 71
End: 2021-12-15
Payer: COMMERCIAL

## 2021-12-15 DIAGNOSIS — R07.89 MUSCULAR CHEST PAIN: ICD-10-CM

## 2021-12-15 DIAGNOSIS — M54.12 CERVICAL RADICULOPATHY: Primary | ICD-10-CM

## 2021-12-15 PROCEDURE — 97112 NEUROMUSCULAR REEDUCATION: CPT

## 2021-12-15 PROCEDURE — 97110 THERAPEUTIC EXERCISES: CPT

## 2021-12-20 ENCOUNTER — OFFICE VISIT (OUTPATIENT)
Dept: PHYSICAL THERAPY | Facility: CLINIC | Age: 71
End: 2021-12-20
Payer: COMMERCIAL

## 2021-12-20 DIAGNOSIS — R07.89 MUSCULAR CHEST PAIN: ICD-10-CM

## 2021-12-20 DIAGNOSIS — I10 BENIGN ESSENTIAL HYPERTENSION: ICD-10-CM

## 2021-12-20 DIAGNOSIS — M54.12 CERVICAL RADICULOPATHY: Primary | ICD-10-CM

## 2021-12-20 PROCEDURE — 97112 NEUROMUSCULAR REEDUCATION: CPT

## 2021-12-20 PROCEDURE — 97110 THERAPEUTIC EXERCISES: CPT

## 2021-12-20 RX ORDER — AMLODIPINE BESYLATE 10 MG/1
TABLET ORAL
Qty: 30 TABLET | Refills: 3 | Status: SHIPPED | OUTPATIENT
Start: 2021-12-20 | End: 2022-01-20 | Stop reason: SDUPTHER

## 2021-12-22 ENCOUNTER — OFFICE VISIT (OUTPATIENT)
Dept: PHYSICAL THERAPY | Facility: CLINIC | Age: 71
End: 2021-12-22
Payer: COMMERCIAL

## 2021-12-22 DIAGNOSIS — R07.89 MUSCULAR CHEST PAIN: ICD-10-CM

## 2021-12-22 DIAGNOSIS — M54.12 CERVICAL RADICULOPATHY: Primary | ICD-10-CM

## 2021-12-22 PROCEDURE — 97140 MANUAL THERAPY 1/> REGIONS: CPT | Performed by: PHYSICAL THERAPIST

## 2021-12-22 PROCEDURE — 97112 NEUROMUSCULAR REEDUCATION: CPT | Performed by: PHYSICAL THERAPIST

## 2021-12-22 PROCEDURE — 97110 THERAPEUTIC EXERCISES: CPT | Performed by: PHYSICAL THERAPIST

## 2021-12-27 ENCOUNTER — EVALUATION (OUTPATIENT)
Dept: PHYSICAL THERAPY | Facility: CLINIC | Age: 71
End: 2021-12-27
Payer: COMMERCIAL

## 2021-12-27 DIAGNOSIS — R07.89 MUSCULAR CHEST PAIN: ICD-10-CM

## 2021-12-27 DIAGNOSIS — M54.12 CERVICAL RADICULOPATHY: Primary | ICD-10-CM

## 2021-12-27 PROCEDURE — 97140 MANUAL THERAPY 1/> REGIONS: CPT | Performed by: PHYSICAL THERAPIST

## 2021-12-27 PROCEDURE — 97112 NEUROMUSCULAR REEDUCATION: CPT | Performed by: PHYSICAL THERAPIST

## 2021-12-27 PROCEDURE — 97110 THERAPEUTIC EXERCISES: CPT | Performed by: PHYSICAL THERAPIST

## 2021-12-28 DIAGNOSIS — E78.5 HYPERLIPIDEMIA, UNSPECIFIED HYPERLIPIDEMIA TYPE: ICD-10-CM

## 2021-12-28 RX ORDER — SIMVASTATIN 20 MG
TABLET ORAL
Qty: 90 TABLET | Refills: 0 | Status: SHIPPED | OUTPATIENT
Start: 2021-12-28 | End: 2022-03-21 | Stop reason: SDUPTHER

## 2021-12-30 ENCOUNTER — OFFICE VISIT (OUTPATIENT)
Dept: PHYSICAL THERAPY | Facility: CLINIC | Age: 71
End: 2021-12-30
Payer: COMMERCIAL

## 2021-12-30 DIAGNOSIS — M54.12 CERVICAL RADICULOPATHY: Primary | ICD-10-CM

## 2021-12-30 DIAGNOSIS — R07.89 MUSCULAR CHEST PAIN: ICD-10-CM

## 2021-12-30 PROCEDURE — 97112 NEUROMUSCULAR REEDUCATION: CPT

## 2021-12-30 PROCEDURE — 97110 THERAPEUTIC EXERCISES: CPT

## 2022-01-06 ENCOUNTER — OFFICE VISIT (OUTPATIENT)
Dept: PHYSICAL THERAPY | Facility: CLINIC | Age: 72
End: 2022-01-06
Payer: COMMERCIAL

## 2022-01-06 DIAGNOSIS — R07.89 MUSCULAR CHEST PAIN: ICD-10-CM

## 2022-01-06 DIAGNOSIS — M54.12 CERVICAL RADICULOPATHY: Primary | ICD-10-CM

## 2022-01-06 PROCEDURE — 97112 NEUROMUSCULAR REEDUCATION: CPT

## 2022-01-06 PROCEDURE — 97110 THERAPEUTIC EXERCISES: CPT

## 2022-01-06 NOTE — PROGRESS NOTES
Daily Note     Today's date: 2022  Patient name: Martinez Rodgers  : 1950  MRN: [id number]  Referring provider: Judge Phillip DO  Dx:   Encounter Diagnosis     ICD-10-CM    1  Cervical radiculopathy  M54 12    2  Muscular chest pain  R07 89                   Subjective: patient stated that she's feeling ok today, she's getting a little better ea day  Objective: See treatment diary below      Assessment: Tolerated treatment well  neural tension noted with glides, diminishes with reps  She fatigues quickly with PRE, more so with OH tasks  Continues to respond well to manuals  Continued PT would be beneficial to improve function           Plan: Continue per plan of care  Precautions: Falls, Hypothyroidism, HTN, Osteoporosis, Hx of BC, L Mastectomy   Manuals 12/20 12/22 12/27 12/30 1/6  12/01 12/06 12/08 12/15   CS distract/retract/ext TS 8 min 8 min MASOOD RH  WA MASOOD TS TS                                          Neuro Re-Ed             Postural Ed   5 min 5 min          TB row / ext  GTB 2x10 GTB 3 x 10 Aayush 2 x 10 Blue 2x10  Blue 2x10  GTB 2x10 GTB  3x10  GTB 3x10 GTB 3x10   Supine retractions             SA punches 1# 2x10 2# 2 x 10 2 x 10 3# x 10 2x10 3# 2x10 3#  2x10 2x10 2x10 1# 2x10 1#                                          Ther Ex             CS retract 2x5 2 x 5 2x5 2x5 2x5  2x5 2x5 2x5 2x5   CS retract/Ext 2x5  2 x 5 2x5  2x5 2x5  2x5 2x5 2x5 2x5    CS retract/ext/Pt OP 2x5  2 x 5 2x5 2x5 2x5  2x5 2x5  2x5 2x5    progression?              L Ulnar N glide 1"x10  1" x 10 1"x10 1"x10  10x1"  1"x10 1"x10 1"x10 1"x10   Scaption  2# 2 x 10 3# x10 3# 10 3# 10  2x10  3x10  3x10  1# 2x10   Scap Add 10x10"  10" x 10 10x10" 10x10: 10x10"  10x10" 10x10"  10x10" 10x10"   Wall Corner ST L 20"x4 20" x 4 20"x4 20"x4 20"x4  20"x4 20"x4 20"x4 20"x4   Ther Activity             Cabinet lifts  1# 20  2# 2 x 10 3#  x 10 4# 10x  4# 10x   1# 10x 1# 12x 1# 15 1# 20                 Gait Training Modalities             HP to CS 10 min  10 min 10 min 10 min 10 min

## 2022-01-13 ENCOUNTER — OFFICE VISIT (OUTPATIENT)
Dept: PHYSICAL THERAPY | Facility: CLINIC | Age: 72
End: 2022-01-13
Payer: COMMERCIAL

## 2022-01-13 DIAGNOSIS — M54.12 CERVICAL RADICULOPATHY: Primary | ICD-10-CM

## 2022-01-13 DIAGNOSIS — R07.89 MUSCULAR CHEST PAIN: ICD-10-CM

## 2022-01-13 PROCEDURE — 97110 THERAPEUTIC EXERCISES: CPT

## 2022-01-13 PROCEDURE — 97112 NEUROMUSCULAR REEDUCATION: CPT

## 2022-01-13 NOTE — PROGRESS NOTES
Daily Note     Today's date: 2022  Patient name: Rhett Ambriz  : 1950  MRN: [id number]  Referring provider: Daivd Engel DO  Dx:   Encounter Diagnosis     ICD-10-CM    1  Cervical radiculopathy  M54 12    2  Muscular chest pain  R07 89        Start Time: 1400  Stop Time: 1435  Total time in clinic (min): 35 minutes    Subjective: Pt reports that she has no pain or radicular symptoms  She has been able to enjoy her sewing hobby without pain  Objective: See treatment diary below      Assessment: Tolerated treatment well  Pt challenged with resistance  She remains asymptomatic throughout session  Plan: Continue per plan of care  Precautions: Falls, Hypothyroidism, HTN, Osteoporosis, Hx of BC, L Mastectomy   Manuals 12/20 12/22 12/27 12/30 1/6 01/03  12/06 12/08 12/15   CS distract/retract/ext TS 8 min 8 min MASOOD RH TS  MASOOD TS TS                                          Neuro Re-Ed             Postural Ed   5 min 5 min          TB row / ext  GTB 2x10 GTB 3 x 10 Aayush 2 x 10 Blue 2x10  Blue 2x10 Blue 2x10   GTB  3x10  GTB 3x10 GTB 3x10   Supine retractions             SA punches 1# 2x10 2# 2 x 10 2 x 10 3# x 10 2x10 3# 2x10 3# 2x10 3#   2x10 2x10 1# 2x10 1#                                          Ther Ex             CS retract 2x5 2 x 5 2x5 2x5 2x5 2x5  2x5 2x5 2x5   CS retract/Ext 2x5  2 x 5 2x5  2x5 2x5 2x5  2x5 2x5 2x5    CS retract/ext/Pt OP 2x5  2 x 5 2x5 2x5 2x5 2x5  2x5  2x5 2x5    progression?              L Ulnar N glide 1"x10  1" x 10 1"x10 1"x10  10x1" 1"x10  1"x10 1"x10 1"x10   Scaption  2# 2 x 10 3# x10 3# 10 3# 10 3# 10  3x10  3x10  1# 2x10   Scap Add 10x10"  10" x 10 10x10" 10x10: 10x10" 10x10"  10x10"  10x10" 10x10"   Wall Corner ST L 20"x4 20" x 4 20"x4 20"x4 20"x4 20"x4  20"x4 20"x4 20"x4   Ther Activity             Cabinet lifts  1# 20  2# 2 x 10 3#  x 10 4# 10x  4# 10x  4# 10x   1# 12x 1# 15 1# 20                 Gait Training Modalities             HP to CS 10 min  10 min 10 min 10 min 10 min 10 min

## 2022-01-17 ENCOUNTER — APPOINTMENT (OUTPATIENT)
Dept: PHYSICAL THERAPY | Facility: CLINIC | Age: 72
End: 2022-01-17
Payer: COMMERCIAL

## 2022-01-19 ENCOUNTER — OFFICE VISIT (OUTPATIENT)
Dept: PHYSICAL THERAPY | Facility: CLINIC | Age: 72
End: 2022-01-19
Payer: COMMERCIAL

## 2022-01-19 DIAGNOSIS — M54.12 CERVICAL RADICULOPATHY: Primary | ICD-10-CM

## 2022-01-19 DIAGNOSIS — R07.89 MUSCULAR CHEST PAIN: ICD-10-CM

## 2022-01-19 PROCEDURE — 97140 MANUAL THERAPY 1/> REGIONS: CPT | Performed by: PHYSICAL THERAPIST

## 2022-01-19 PROCEDURE — 97112 NEUROMUSCULAR REEDUCATION: CPT | Performed by: PHYSICAL THERAPIST

## 2022-01-19 PROCEDURE — 97110 THERAPEUTIC EXERCISES: CPT | Performed by: PHYSICAL THERAPIST

## 2022-01-19 NOTE — PROGRESS NOTES
Daily Note     Today's date: 2022  Patient name: Monica Fagan  : 1950  MRN: [id number]  Referring provider: Susan Sutton DO  Dx:   Encounter Diagnosis     ICD-10-CM    1  Cervical radiculopathy  M54 12    2  Muscular chest pain  R07 89                   Subjective: Pt presents today stating that she has been feeling very well  She denies significant pain, she has been progressing with further autonomy with her HEP  Objective: See treatment diary below      Assessment:  Reviewed her HEP and maintenance of activities within her home to keep symptom presentation at what it is  Able to enhance resistance without pain, mild fatigue RE n v        Plan: Continue per plan of care  Precautions: Falls, Hypothyroidism, HTN, Osteoporosis, Hx of BC, L Mastectomy   Manuals 12/20 12/22 12/27 12/30 1/6 01/03 1/19 12/06 12/08 12/15   CS distract/retract/ext TS 8 min 8 min MASOOD RH TS 8 min MASOOD TS TS                                          Neuro Re-Ed             Postural Ed   5 min 5 min          TB row / ext  GTB 2x10 GTB 3 x 10 Aayush 2 x 10 Blue 2x10  Blue 2x10 Blue 2x10  Aayush 3 x 10 GTB  3x10  GTB 3x10 GTB 3x10   Supine retractions             SA punches 1# 2x10 2# 2 x 10 2 x 10 3# x 10 2x10 3# 2x10 3# 2x10 3#  2 x 10 3# 2x10 2x10 1# 2x10 1#                                          Ther Ex             CS retract 2x5 2 x 5 2x5 2x5 2x5 2x5 2 x 5 2x5 2x5 2x5   CS retract/Ext 2x5  2 x 5 2x5  2x5 2x5 2x5 2 x 5 2x5 2x5 2x5    CS retract/ext/Pt OP 2x5  2 x 5 2x5 2x5 2x5 2x5 2 x 5 2x5  2x5 2x5    progression?              L Ulnar N glide 1"x10  1" x 10 1"x10 1"x10  10x1" 1"x10 1" x 10 1"x10 1"x10 1"x10   Scaption  2# 2 x 10 3# x10 3# 10 3# 10 3# 10 3# 3 x 5 3x10  3x10  1# 2x10   Scap Add 10x10"  10" x 10 10x10" 10x10: 10x10" 10x10" 10" x 10 10x10"  10x10" 10x10"   Wall Corner ST L 20"x4 20" x 4 20"x4 20"x4 20"x4 20"x4 20" x 4 20"x4 20"x4 20"x4   Ther Activity             Cabinet lifts  1# 20  2# 2 x 10 3#  x 10 4# 10x  4# 10x  4# 10x  4# 10x 1# 12x 1# 15 1# 20                 Gait Training                                       Modalities             HP to CS 10 min  10 min 10 min 10 min 10 min 10 min 10 min

## 2022-01-20 ENCOUNTER — OFFICE VISIT (OUTPATIENT)
Dept: FAMILY MEDICINE CLINIC | Facility: CLINIC | Age: 72
End: 2022-01-20

## 2022-01-20 VITALS
SYSTOLIC BLOOD PRESSURE: 142 MMHG | OXYGEN SATURATION: 99 % | RESPIRATION RATE: 18 BRPM | BODY MASS INDEX: 33.43 KG/M2 | TEMPERATURE: 98.2 F | DIASTOLIC BLOOD PRESSURE: 86 MMHG | HEIGHT: 56 IN | HEART RATE: 76 BPM | WEIGHT: 148.6 LBS

## 2022-01-20 DIAGNOSIS — Z11.59 ENCOUNTER FOR HEPATITIS C SCREENING TEST FOR LOW RISK PATIENT: ICD-10-CM

## 2022-01-20 DIAGNOSIS — I10 BENIGN ESSENTIAL HYPERTENSION: Primary | ICD-10-CM

## 2022-01-20 PROCEDURE — 99213 OFFICE O/P EST LOW 20 MIN: CPT | Performed by: FAMILY MEDICINE

## 2022-01-20 RX ORDER — AMLODIPINE BESYLATE 10 MG/1
10 TABLET ORAL DAILY
Qty: 30 TABLET | Refills: 3 | Status: SHIPPED | OUTPATIENT
Start: 2022-01-20 | End: 2022-07-14 | Stop reason: SDUPTHER

## 2022-01-20 RX ORDER — LISINOPRIL AND HYDROCHLOROTHIAZIDE 25; 20 MG/1; MG/1
1 TABLET ORAL DAILY
Qty: 90 TABLET | Refills: 3 | Status: SHIPPED | OUTPATIENT
Start: 2022-01-20 | End: 2022-02-03

## 2022-01-20 NOTE — ASSESSMENT & PLAN NOTE
Uncontrolled  Blood pressure initially was 160/86 mmHg  Repeat blood pressure was 142/86 mmHg  Patient has been taking amlodipine 10 mg daily along with lisinopril/HCTZ 20-12 5 mg   -increased HCTZ component of the combination pill to 25 mg   -BMP and microalbumin/creatinine ratio was ordered to be reviewed prior to next visit  -patient is asymptomatic at this time, not complain of headaches, chest pain, flushing, nausea or lightheadedness  She states that she does have a history of hypertensive urgency/emergency which required hospitalization for better blood pressure control  -TSH was reviewed which was normal in the previous lab  -follow-up in 2 weeks after labs are done and blood pressure logs are taken 3 times a week  ED precautions discussed related to hypertensive emergency/urgency

## 2022-01-20 NOTE — PROGRESS NOTES
Assessment/Plan:    Benign essential hypertension  Uncontrolled  Blood pressure initially was 160/86 mmHg  Repeat blood pressure was 142/86 mmHg  Patient has been taking amlodipine 10 mg daily along with lisinopril/HCTZ 20-12 5 mg   -increased HCTZ component of the combination pill to 25 mg   -BMP and microalbumin/creatinine ratio was ordered to be reviewed prior to next visit  -patient is asymptomatic at this time, not complain of headaches, chest pain, flushing, nausea or lightheadedness  She states that she does have a history of hypertensive urgency/emergency which required hospitalization for better blood pressure control  -TSH was reviewed which was normal in the previous lab  -follow-up in 2 weeks after labs are done and blood pressure logs are taken 3 times a week  ED precautions discussed related to hypertensive emergency/urgency  Encounter for hepatitis C screening test for low risk patient  Ordered hep C antibody test for routine screening         Problem List Items Addressed This Visit        Cardiovascular and Mediastinum    Benign essential hypertension - Primary     Uncontrolled  Blood pressure initially was 160/86 mmHg  Repeat blood pressure was 142/86 mmHg  Patient has been taking amlodipine 10 mg daily along with lisinopril/HCTZ 20-12 5 mg   -increased HCTZ component of the combination pill to 25 mg   -BMP and microalbumin/creatinine ratio was ordered to be reviewed prior to next visit  -patient is asymptomatic at this time, not complain of headaches, chest pain, flushing, nausea or lightheadedness  She states that she does have a history of hypertensive urgency/emergency which required hospitalization for better blood pressure control  -TSH was reviewed which was normal in the previous lab  -follow-up in 2 weeks after labs are done and blood pressure logs are taken 3 times a week  ED precautions discussed related to hypertensive emergency/urgency           Relevant Medications amLODIPine (NORVASC) 10 mg tablet    lisinopril-hydrochlorothiazide (PRINZIDE,ZESTORETIC) 20-25 MG per tablet    Other Relevant Orders    Basic metabolic panel    Microalbumin / creatinine urine ratio       Other    Encounter for hepatitis C screening test for low risk patient     Ordered hep C antibody test for routine screening         Relevant Orders    Hepatitis C antibody            Subjective:      Patient ID: Margie Sullivan is a 80 y o  female  Hypertension  This is a chronic problem  The current episode started more than 1 year ago  The problem has been gradually worsening since onset  The problem is uncontrolled  Pertinent negatives include no anxiety, blurred vision, chest pain, headaches, malaise/fatigue, palpitations or shortness of breath  There are no associated agents to hypertension  Risk factors for coronary artery disease include sedentary lifestyle, dyslipidemia, family history and post-menopausal state  Past treatments include ACE inhibitors, diuretics, calcium channel blockers and lifestyle changes  The current treatment provides moderate improvement  Compliance problems include exercise and diet  There is no history of angina, kidney disease or CAD/MI  There is no history of chronic renal disease  Patient is a 73-year-old female with past medical history of hypertension on amlodipine, lisinopril/HCTZ combo  She is here for hypertension follow-up  No associated complaints  The following portions of the patient's history were reviewed and updated as appropriate:   She  has a past medical history of Breast cancer ([facility] [address] ) (1996), Disease of thyroid gland, Hemorrhoids, Hyperlipidemia, Hypertension, and Psoriasis    She   Patient Active Problem List    Diagnosis Date Noted    Encounter for hepatitis C screening test for low risk patient 01/20/2022    Muscular chest pain 09/29/2021    Encounter for immunization 09/24/2021    Yeast infection of the skin 09/24/2021    Polyp of colon 2021    Obesity (BMI 30 0-34 9) 2021    Medicare annual wellness visit, subsequent 2019    Chronic bilateral low back pain without sciatica 10/05/2018    Edema of left lower eyelid 2018    IFG (impaired fasting glucose) 2018    Primary insomnia 10/03/2016    Peripheral neuropathy 2016    Lymphedema 10/01/2015    Cancer of breast, female ([facility] [address] ) 2013    Hyperlipidemia 2013    Osteoarthritis 2012    Age-related osteoporosis without current pathological fracture 2012    Hypothyroidism 10/25/2012    Psoriasis 2012    Benign essential hypertension 2012     She  has a past surgical history that includes  section; Colonoscopy; Salpingoophorectomy (Bilateral); Total abdominal hysterectomy; Mastectomy (Left, ); Breast lumpectomy (Left, ); and Breast cyst excision (Right, )  Her family history includes Arthritis in her mother; Asthma in her brother; Breast cancer in her cousin; Breast cancer (age of onset: 67) in her mother; Breast cancer (age of onset: [de-identified]) in her maternal aunt; Colon cancer in her mother; Colon cancer (age of onset: 80) in her maternal uncle; Diabetes in her father and mother; Glaucoma in her father; Hypertension in her brother and mother; No Known Problems in her maternal aunt, maternal aunt, maternal grandmother, paternal aunt, paternal aunt, paternal grandfather, and paternal grandmother; Prostate cancer in her father  She  reports that she has never smoked  She has never used smokeless tobacco  She reports that she does not drink alcohol and does not use drugs    Current Outpatient Medications   Medication Sig Dispense Refill    amLODIPine (NORVASC) 10 mg tablet Take 1 tablet (10 mg total) by mouth daily 30 tablet 3    calcium carbonate-vitamin D (OSCAL-D) 500 mg-200 units per tablet Take 2 tablets by mouth daily with breakfast 120 tablet 3    clindamycin (CLEOCIN T) 1 % lotion   0    fluocinonide (LIDEX) 0 05 % external solution   0    levothyroxine 50 mcg tablet Take 1 tablet (50 mcg total) by mouth daily 90 tablet 1    lidocaine (XYLOCAINE) 2 % topical gel Apply topically as needed for mild pain 30 mL 0    lisinopril-hydrochlorothiazide (PRINZIDE,ZESTORETIC) 20-25 MG per tablet Take 1 tablet by mouth daily 90 tablet 3    mirtazapine (REMERON) 15 mg tablet Take 1 tablet (15 mg total) by mouth daily at bedtime 30 tablet 2    mometasone (ELOCON) 0 1 % cream Apply 50 application topically 2 (two) times a day       nystatin (MYCOSTATIN) powder Apply topically 4 (four) times a day 30 g 1    OTEZLA 30 MG TABS 30 mg 2 (two) times a day   0    simvastatin (ZOCOR) 20 mg tablet take 1 tablet by mouth at bedtime 90 tablet 0     No current facility-administered medications for this visit  Current Outpatient Medications on File Prior to Visit   Medication Sig    calcium carbonate-vitamin D (OSCAL-D) 500 mg-200 units per tablet Take 2 tablets by mouth daily with breakfast    clindamycin (CLEOCIN T) 1 % lotion     fluocinonide (LIDEX) 0 05 % external solution     levothyroxine 50 mcg tablet Take 1 tablet (50 mcg total) by mouth daily    lidocaine (XYLOCAINE) 2 % topical gel Apply topically as needed for mild pain    mirtazapine (REMERON) 15 mg tablet Take 1 tablet (15 mg total) by mouth daily at bedtime    mometasone (ELOCON) 0 1 % cream Apply 50 application topically 2 (two) times a day     nystatin (MYCOSTATIN) powder Apply topically 4 (four) times a day    OTEZLA 30 MG TABS 30 mg 2 (two) times a day     simvastatin (ZOCOR) 20 mg tablet take 1 tablet by mouth at bedtime    [DISCONTINUED] amLODIPine (NORVASC) 10 mg tablet take 1 tablet by mouth once daily    [DISCONTINUED] lisinopril-hydrochlorothiazide (PRINZIDE,ZESTORETIC) 20-12 5 MG per tablet take 1 tablet by mouth once daily     No current facility-administered medications on file prior to visit       She has No Known Allergies       Review of Systems   Constitutional: Negative for chills, fever and malaise/fatigue  HENT: Negative for ear pain and sore throat  Eyes: Negative for blurred vision, pain and visual disturbance  Respiratory: Negative for cough and shortness of breath  Cardiovascular: Negative for chest pain and palpitations  Gastrointestinal: Negative for abdominal pain and vomiting  Genitourinary: Negative for dysuria and hematuria  Musculoskeletal: Negative for arthralgias and back pain  Skin: Negative for color change and rash  Neurological: Negative for seizures, syncope and headaches  All other systems reviewed and are negative  Objective:      /86   Pulse 76   Temp 98 2 °F (36 8 °C) (Temporal)   Resp 18   Ht 4' 8" (1 422 m)   Wt 67 4 kg (148 lb 9 6 oz)   SpO2 99%   BMI 33 32 kg/m²          Physical Exam  Vitals reviewed  Constitutional:       General: She is not in acute distress  Appearance: Normal appearance  She is not ill-appearing or diaphoretic  HENT:      Head: Normocephalic and atraumatic  Nose: Nose normal  No congestion or rhinorrhea  Mouth/Throat:      Mouth: Mucous membranes are moist       Pharynx: Oropharynx is clear  No oropharyngeal exudate  Eyes:      General: No scleral icterus  Conjunctiva/sclera: Conjunctivae normal       Pupils: Pupils are equal, round, and reactive to light  Cardiovascular:      Rate and Rhythm: Normal rate and regular rhythm  Pulses: Normal pulses  Heart sounds: No murmur heard  Pulmonary:      Effort: Pulmonary effort is normal       Breath sounds: Normal breath sounds  No wheezing  Chest:      Chest wall: No tenderness  Abdominal:      General: Bowel sounds are normal       Palpations: Abdomen is soft  There is no mass  Tenderness: There is no abdominal tenderness  Musculoskeletal:         General: Normal range of motion  Cervical back: Normal range of motion        Right lower leg: No edema  Left lower leg: No edema  Skin:     General: Skin is warm  Capillary Refill: Capillary refill takes less than 2 seconds  Findings: No bruising or rash  Neurological:      Mental Status: She is alert and oriented to person, place, and time     Psychiatric:         Mood and Affect: Mood normal          Behavior: Behavior normal          Leonor Tadeo DO  Family Medicine Resident, PGY2  1:53 PM

## 2022-01-24 ENCOUNTER — EVALUATION (OUTPATIENT)
Dept: PHYSICAL THERAPY | Facility: CLINIC | Age: 72
End: 2022-01-24
Payer: COMMERCIAL

## 2022-01-24 DIAGNOSIS — R07.89 MUSCULAR CHEST PAIN: ICD-10-CM

## 2022-01-24 DIAGNOSIS — M54.12 CERVICAL RADICULOPATHY: Primary | ICD-10-CM

## 2022-01-24 PROCEDURE — 97140 MANUAL THERAPY 1/> REGIONS: CPT | Performed by: PHYSICAL THERAPIST

## 2022-01-24 PROCEDURE — 97110 THERAPEUTIC EXERCISES: CPT | Performed by: PHYSICAL THERAPIST

## 2022-01-24 PROCEDURE — 97112 NEUROMUSCULAR REEDUCATION: CPT | Performed by: PHYSICAL THERAPIST

## 2022-01-24 NOTE — PROGRESS NOTES
PT Evaluation     Today's date: 2022  Patient name: Lala Lora  : 1950  MRN: [id number]  Referring provider: Shavon Nguyen DO  Dx:   Encounter Diagnosis     ICD-10-CM    1  Cervical radiculopathy  M54 12    2  Muscular chest pain  R07 89                   Assessment  Assessment details: Pt at this time demonstrates excellent improvement in pain control, range, strength, flexibility as well as tolerance to activity  She demonstrates achievement of all goals sought out for her as well as by her and if she is to have a decline in any form she is welcome to return as needed  Thank you very much for this kind and motivated referral         Impairments: abnormal or restricted ROM, activity intolerance, impaired physical strength, lacks appropriate home exercise program, pain with function, poor posture  and poor body mechanics  Understanding of Dx/Px/POC: good   Prognosis: good    Goals  STG 4 Weeks:  Decrease pain at worst to 6 -met  Improve range to improve all planes to mod -met  Improve strength to DNF 20", shoulder to 4- -met  Independent with HEP -met  LTG 8 Weeks:  Decrease pain at worst to 3 -met  Improve range to improve all planes to min -met  Improve strength to DNF 30", shoulder to 4 -met      Able to perform all desired activities with minimal to nil symptom exacerbation  -met    Plan  Planned modality interventions: cryotherapy, TENS and thermotherapy: hydrocollator packs  Planned therapy interventions: abdominal trunk stabilization, joint mobilization, manual therapy, neuromuscular re-education, patient education, postural training, stretching, therapeutic activities, strengthening, therapeutic exercise, therapeutic training, body mechanics training, activity modification, flexibility, functional ROM exercises, gait training, graded activity, graded exercise, home exercise program and transfer training  Duration in weeks: 5  Treatment plan discussed with: patient        Subjective Evaluation    History of Present Illness  Date of onset: 2021  Mechanism of injury: Pt presents today stating that her shoulder is feeling well  She reports that she has not had pain in the last few days or any over a little while  She is not having any challenge with her sleeping ability  She states that she is performing a lot of sewing, cleaning, cooking, and is without challenge with her every day activities  Pt reports that at this time she is feeling safe to DC to HEP  Quality of life: good    Pain  Current pain ratin  At best pain ratin  At worst pain ratin  Quality: dull ache, needle-like and pressure  Relieving factors: change in position  Aggravating factors: lifting and overhead activity  Progression: worsening      Diagnostic Tests  No diagnostic tests performed        Objective     Active Range of Motion   Cervical/Thoracic Spine       Cervical  Subcranial protraction:  WFL   Subcranial retraction:  WFL   Flexion:  WFL  Extension:  WFL  Left lateral flexion:  WFL  Right lateral flexion:  WFL  Left rotation:  [facility]  Right rotation:  [facility]    Thoracic    Flexion:  Restriction level: minimal  Extension:  Restriction level: minimal  Left lateral flexion:  Restriction level: minimal  Right lateral flexion:  Restriction level: minimal    Additional Active Range of Motion Details  Forward head, rounded shoulders  B/L Sensation intact to light touch (C3,4,(5 same),6,7,8,T1,T2 ) L < R  Postural correction  L chest and L arm to Wrist    UE Screen Range AROM Flex 150, abd 145, ER/IR WFL  UE Screen Strength  L Flex 5 Abd 4 ER 4 IR 5  R Flex 5 Abd 4  ER 4 IR 5   L strong and painless R strong and painless  Repeated motion   Flex  Retraction  L arm and Chest  Same  Retract + Ext (max cues) L arm same, Neck worse  L arm same, neck better  L arm same, neck better  Pt OP same  Pt OP Same  PT OP supine L arm better  Better   (series of 3) better    CS range, and L UE use improved as did strength  (remains)   Ext  SB R  SB L  Joint Mobility  NP  Palpation NP  STs  DNF 30"  + (-) NLTT L median   Precautions: Falls, Hypothyroidism, HTN, Osteoporosis, Hx of BC, L Mastectomy 1995  Manuals 12/20 12/22 12/27 12/30 1/6 01/03 1/19 1/24 12/08 12/15   CS distract/retract/ext TS 8 min 8 min MASOOD RH TS 8 min 8 TS TS                                          Neuro Re-Ed             Postural Ed   5 min 5 min          TB row / ext  GTB 2x10 GTB 3 x 10 Aayush 2 x 10 Blue 2x10  Blue 2x10 Blue 2x10  Aayush 3 x 10 Blue  3x10  GTB 3x10 GTB 3x10   Supine retractions             SA punches 1# 2x10 2# 2 x 10 2 x 10 3# x 10 2x10 3# 2x10 3# 2x10 3#  2 x 10 3# 2x10 3# 2x10 1# 2x10 1#                                          Ther Ex             CS retract 2x5 2 x 5 2x5 2x5 2x5 2x5 2 x 5 2x5 2x5 2x5   CS retract/Ext 2x5  2 x 5 2x5  2x5 2x5 2x5 2 x 5 2x5 2x5 2x5    CS retract/ext/Pt OP 2x5  2 x 5 2x5 2x5 2x5 2x5 2 x 5 2x5  2x5 2x5    progression?              L Ulnar N glide 1"x10  1" x 10 1"x10 1"x10  10x1" 1"x10 1" x 10 1"x10 1"x10 1"x10   Scaption  2# 2 x 10 3# x10 3# 10 3# 10 3# 10 3# 3 x 5 3x10  3x10  1# 2x10   Scap Add 10x10"  10" x 10 10x10" 10x10: 10x10" 10x10" 10" x 10 10x10"  10x10" 10x10"   Wall Corner ST L 20"x4 20" x 4 20"x4 20"x4 20"x4 20"x4 20" x 4 20"x4 20"x4 20"x4   Ther Activity             Cabinet lifts  1# 20  2# 2 x 10 3#  x 10 4# 10x  4# 10x  4# 10x  4# 10x 4# 12x 1# 15 1# 20                 Gait Training                                       Modalities             HP to CS 10 min  10 min 10 min 10 min 10 min 10 min 10 min 10 min

## 2022-01-31 ENCOUNTER — APPOINTMENT (OUTPATIENT)
Dept: PHYSICAL THERAPY | Facility: CLINIC | Age: 72
End: 2022-01-31
Payer: COMMERCIAL

## 2022-02-01 LAB
ALBUMIN/CREAT UR: 11 MCG/MG CREAT
BUN SERPL-MCNC: 21 MG/DL (ref 7–25)
BUN/CREAT SERPL: ABNORMAL (CALC) (ref 6–22)
CALCIUM SERPL-MCNC: 9.5 MG/DL (ref 8.6–10.4)
CHLORIDE SERPL-SCNC: 103 MMOL/L (ref 98–110)
CO2 SERPL-SCNC: 28 MMOL/L (ref 20–32)
CREAT SERPL-MCNC: 0.66 MG/DL (ref 0.6–0.93)
CREAT UR-MCNC: 219 MG/DL (ref 20–275)
GLUCOSE SERPL-MCNC: 143 MG/DL (ref 65–99)
MICROALBUMIN UR-MCNC: 2.5 MG/DL
POTASSIUM SERPL-SCNC: 4.3 MMOL/L (ref 3.5–5.3)
SL AMB EGFR AFRICAN AMERICAN: 104 ML/MIN/1.73M2
SL AMB EGFR NON AFRICAN AMERICAN: 89 ML/MIN/1.73M2
SODIUM SERPL-SCNC: 140 MMOL/L (ref 135–146)

## 2022-02-03 ENCOUNTER — OFFICE VISIT (OUTPATIENT)
Dept: FAMILY MEDICINE CLINIC | Facility: CLINIC | Age: 72
End: 2022-02-03

## 2022-02-03 VITALS
OXYGEN SATURATION: 98 % | WEIGHT: 149.1 LBS | TEMPERATURE: 98.1 F | HEART RATE: 71 BPM | HEIGHT: 56 IN | BODY MASS INDEX: 33.54 KG/M2 | DIASTOLIC BLOOD PRESSURE: 98 MMHG | RESPIRATION RATE: 18 BRPM | SYSTOLIC BLOOD PRESSURE: 164 MMHG

## 2022-02-03 DIAGNOSIS — I10 BENIGN ESSENTIAL HYPERTENSION: Primary | ICD-10-CM

## 2022-02-03 DIAGNOSIS — R73.01 IFG (IMPAIRED FASTING GLUCOSE): ICD-10-CM

## 2022-02-03 PROBLEM — R73.9 HYPERGLYCEMIA: Status: ACTIVE | Noted: 2022-02-03

## 2022-02-03 PROCEDURE — 99213 OFFICE O/P EST LOW 20 MIN: CPT | Performed by: FAMILY MEDICINE

## 2022-02-03 RX ORDER — LISINOPRIL 20 MG/1
20 TABLET ORAL DAILY
Qty: 90 TABLET | Refills: 0 | Status: SHIPPED | OUTPATIENT
Start: 2022-02-03 | End: 2022-04-28

## 2022-02-03 RX ORDER — HYDROCHLOROTHIAZIDE 25 MG/1
25 TABLET ORAL DAILY
Qty: 90 TABLET | Refills: 0 | Status: SHIPPED | OUTPATIENT
Start: 2022-02-03 | End: 2022-04-28

## 2022-02-03 NOTE — PROGRESS NOTES
Assessment/Plan:    Problem List Items Addressed This Visit        Endocrine    IFG (impaired fasting glucose)     - BMP showed elevated fasting glucose of 146   - Patien'ts most recent HgbA1c was 6 1 from 12/2019, will consider rechecking A1c at next visit            Cardiovascular and Mediastinum    Benign essential hypertension - Primary     Uncontrolled  Blood pressure initially was 170/76 mmHg  Repeat blood pressure was 164/98 mmHg  Patient has been taking amlodipine 10 mg daily along with lisinopril/HCTZ 20-12 5 mg as she had a problem getting the increased dose from the pharmacy  - Advised to take lisinopril 20mg at night, HCTZ 25 mg and amlodipine 10mg in the morning  -BMP and microalbumin/creatinine ratio normal and reviewed with patient   -Patient is symptomatic at this time, endorses dizziness and R-sided blurry cvision beginning last night  Denies headaches, chest pain, flushing, nausea or lightheadedness  - She states that she does have a history of hypertensive urgency/emergency which required hospitalization for better blood pressure control  -TSH was reviewed which was normal in the previous lab  -follow-up in 2 weeks after labs are done and blood pressure logs are taken 3 times a week  ED precautions discussed related to hypertensive emergency/urgency  Relevant Medications    lisinopril (ZESTRIL) 20 mg tablet    hydrochlorothiazide (HYDRODIURIL) 25 mg tablet                  Subjective:      Patient ID: Stu Medrano is a 80 y o  female  Hypertension  This is a chronic problem  The current episode started more than 1 year ago  The problem is unchanged  The problem is uncontrolled  Associated symptoms include blurred vision  Pertinent negatives include no chest pain, headaches, palpitations, peripheral edema or shortness of breath  There are no associated agents to hypertension   Risk factors for coronary artery disease include sedentary lifestyle, dyslipidemia, family history and post-menopausal state  Compliance problems: Patient has not been taking the 20-25mg dose adjusted at last visit due to problems at pharmacy  She had been continuing to take the 20-12 5 mg  There is no history of kidney disease  Patient began having dizziness with blurry vision in right eye last night  Blurriness persistent this morning  Patient denies headache, n/v, chest pain, short of breath, changes in speech, weakness in limbs, falls  Her home blood pressure this morning was 129/58, has been in the 130/140s  Patient endorses compliance with taking her medications, but has been unable to  the adjusted dose of her medication  The following portions of the patient's history were reviewed and updated as appropriate: allergies, current medications, past family history, past medical history, past social history, past surgical history and problem list     Review of Systems   Constitutional: Negative for chills and fever  HENT: Negative for ear pain and sore throat  Eyes: Positive for blurred vision and visual disturbance  Negative for pain  Blurry vision in R eye since waking this morning   Respiratory: Negative for cough and shortness of breath  Cardiovascular: Negative for chest pain, palpitations and leg swelling  Gastrointestinal: Negative for abdominal pain and vomiting  Genitourinary: Negative for dysuria and hematuria  Musculoskeletal: Negative for arthralgias and back pain  Skin: Negative for color change and rash  Neurological: Negative for dizziness, seizures, syncope, speech difficulty and headaches  All other systems reviewed and are negative  Objective:      /98 (BP Location: Right arm, Patient Position: Sitting, Cuff Size: Standard)   Pulse 71   Temp 98 1 °F (36 7 °C) (Temporal)   Resp 18   Ht 4' 8" (1 422 m)   Wt 67 6 kg (149 lb 1 6 oz)   SpO2 98%   BMI 33 43 kg/m²          Physical Exam  Vitals reviewed     Constitutional: General: She is not in acute distress  Appearance: Normal appearance  She is obese  She is not ill-appearing or diaphoretic  HENT:      Head: Normocephalic and atraumatic  Nose: Nose normal  No congestion or rhinorrhea  Mouth/Throat:      Mouth: Mucous membranes are moist       Pharynx: Oropharynx is clear  No oropharyngeal exudate  Eyes:      General: No scleral icterus  Conjunctiva/sclera: Conjunctivae normal       Pupils: Pupils are equal, round, and reactive to light  Cardiovascular:      Rate and Rhythm: Normal rate and regular rhythm  Pulses: Normal pulses  Heart sounds: Normal heart sounds  No murmur heard  No friction rub  No gallop  Pulmonary:      Effort: Pulmonary effort is normal       Breath sounds: Normal breath sounds  No wheezing, rhonchi or rales  Chest:      Chest wall: No tenderness  Abdominal:      General: Bowel sounds are normal       Palpations: Abdomen is soft  There is no mass  Tenderness: There is no abdominal tenderness  Musculoskeletal:         General: No swelling or tenderness  Normal range of motion  Cervical back: Normal range of motion  Right lower leg: No edema  Left lower leg: No edema  Skin:     General: Skin is warm and dry  Capillary Refill: Capillary refill takes less than 2 seconds  Findings: No bruising or rash  Neurological:      Mental Status: She is alert and oriented to person, place, and time  Mental status is at baseline  Psychiatric:         Mood and Affect: Mood normal          Behavior: Behavior normal          Thought Content:  Thought content normal            Tom Miguel DO  Family Medicine Resident, PGY2  2:56 PM

## 2022-02-03 NOTE — ASSESSMENT & PLAN NOTE
- BMP showed elevated fasting glucose of 146   - Patien'ts most recent HgbA1c was 6 1 from 12/2019, will consider rechecking A1c at next visit

## 2022-02-03 NOTE — ASSESSMENT & PLAN NOTE
Uncontrolled  Blood pressure initially was 170/76 mmHg  Repeat blood pressure was 164/98 mmHg  Patient has been taking amlodipine 10 mg daily along with lisinopril/HCTZ 20-12 5 mg as she had a problem getting the increased dose from the pharmacy  - Advised to take lisinopril 20mg at night, HCTZ 25 mg and amlodipine 10mg in the morning  -BMP and microalbumin/creatinine ratio normal and reviewed with patient   -Patient is symptomatic at this time, endorses dizziness and R-sided blurry cvision beginning last night  Denies headaches, chest pain, flushing, nausea or lightheadedness  - She states that she does have a history of hypertensive urgency/emergency which required hospitalization for better blood pressure control  -TSH was reviewed which was normal in the previous lab  -follow-up in 2 weeks after labs are done and blood pressure logs are taken 3 times a week  ED precautions discussed related to hypertensive emergency/urgency

## 2022-02-18 NOTE — PROGRESS NOTES
PT Discharge    Today's date: 2022  Patient name: Stan Trevino  : 1950  MRN: [id number]  Referring provider: Steph Taylor DO  Dx:   Encounter Diagnosis     ICD-10-CM    1  Cervical radiculopathy  M54 12 PT plan of care cert/re-cert   2  Muscular chest pain  R07 89 PT plan of care cert/re-cert       Start Time: 1400  Stop Time: 1440  Total time in clinic (min): 40 minutes    Assessment/Plan  Pt has not been present since 2022  Pt's chart will be DC in compliance of facility policy as all Charts are DC within 30 days of last scheduled visit          Subjective    Objective

## 2022-02-21 ENCOUNTER — OFFICE VISIT (OUTPATIENT)
Dept: FAMILY MEDICINE CLINIC | Facility: CLINIC | Age: 72
End: 2022-02-21

## 2022-02-21 VITALS
OXYGEN SATURATION: 98 % | RESPIRATION RATE: 18 BRPM | SYSTOLIC BLOOD PRESSURE: 166 MMHG | HEART RATE: 72 BPM | BODY MASS INDEX: 34.73 KG/M2 | DIASTOLIC BLOOD PRESSURE: 82 MMHG | HEIGHT: 56 IN | WEIGHT: 154.4 LBS | TEMPERATURE: 98.3 F

## 2022-02-21 DIAGNOSIS — L40.9 PSORIASIS: ICD-10-CM

## 2022-02-21 DIAGNOSIS — I10 BENIGN ESSENTIAL HYPERTENSION: Primary | ICD-10-CM

## 2022-02-21 DIAGNOSIS — S13.9XXA ACUTE NECK SPRAIN, INITIAL ENCOUNTER: ICD-10-CM

## 2022-02-21 PROCEDURE — 99213 OFFICE O/P EST LOW 20 MIN: CPT | Performed by: FAMILY MEDICINE

## 2022-02-21 NOTE — ASSESSMENT & PLAN NOTE
1 day Hx of acute right sided cervical pain after awakening in morning  DDX includes: torticollis, acute cervical strain or muscle spasm    - continue stretches  - prescribed voltaren gel 1% to be used QID PRN  - f/u prn for this problem

## 2022-02-21 NOTE — ASSESSMENT & PLAN NOTE
Uncontrolled   Blood pressure initially was 166/82 mmHg  Patient has been taking amlodipine 10 mg daily along with lisinopril/HCTZ 20-12 5 mg as she had a problem getting the increased dose from the pharmacy  - Advised to take lisinopril 20mg at night, HCTZ 25 mg and amlodipine 10mg in the morning  -BMP and microalbumin/creatinine ratio normal and reviewed with patient   -Patient is asymptomatic at this time, endorses dizziness  Denies headaches, chest pain, flushing, nausea or lightheadedness     - She states that she does have a history of hypertensive urgency/emergency which required hospitalization for better blood pressure control  -TSH was reviewed which was normal in the previous lab  -Followup 3 months   ED precautions discussed related to hypertensive emergency/urgency

## 2022-02-21 NOTE — PROGRESS NOTES
Assessment/Plan:    Benign essential hypertension  Uncontrolled   Blood pressure initially was 166/82 mmHg  Patient has been taking amlodipine 10 mg daily along with lisinopril/HCTZ 20-12 5 mg as she had a problem getting the increased dose from the pharmacy  - Advised to take lisinopril 20mg at night, HCTZ 25 mg and amlodipine 10mg in the morning  -BMP and microalbumin/creatinine ratio normal and reviewed with patient   -Patient is asymptomatic at this time, endorses dizziness  Denies headaches, chest pain, flushing, nausea or lightheadedness     - She states that she does have a history of hypertensive urgency/emergency which required hospitalization for better blood pressure control  -TSH was reviewed which was normal in the previous lab  -Followup 3 months   ED precautions discussed related to hypertensive emergency/urgency  Psoriasis  Flareup  Controlled with otezla  -continue Otezala 30mg BID    Acute neck sprain, initial encounter  1 day Hx of acute right sided cervical pain after awakening in morning  DDX includes: torticollis, acute cervical strain or muscle spasm  - continue stretches  - prescribed voltaren gel 1% to be used QID PRN  - f/u prn for this problem         Problem List Items Addressed This Visit        Cardiovascular and Mediastinum    Benign essential hypertension - Primary     Uncontrolled   Blood pressure initially was 166/82 mmHg  Patient has been taking amlodipine 10 mg daily along with lisinopril/HCTZ 20-12 5 mg as she had a problem getting the increased dose from the pharmacy  - Advised to take lisinopril 20mg at night, HCTZ 25 mg and amlodipine 10mg in the morning  -BMP and microalbumin/creatinine ratio normal and reviewed with patient   -Patient is asymptomatic at this time, endorses dizziness   Denies headaches, chest pain, flushing, nausea or lightheadedness     - She states that she does have a history of hypertensive urgency/emergency which required hospitalization for better blood pressure control  -TSH was reviewed which was normal in the previous lab  -Followup 3 months   ED precautions discussed related to hypertensive emergency/urgency  Musculoskeletal and Integument    Psoriasis     Flareup  Controlled with otezla  -continue Otezala 30mg BID         Relevant Medications    Diclofenac Sodium (VOLTAREN) 1 %    Acute neck sprain, initial encounter     1 day Hx of acute right sided cervical pain after awakening in morning  DDX includes: torticollis, acute cervical strain or muscle spasm  - continue stretches  - prescribed voltaren gel 1% to be used QID PRN  - f/u prn for this problem         Relevant Medications    Diclofenac Sodium (VOLTAREN) 1 %            Subjective:      Patient ID: Mikala Solomon is a 80 y o  female  Patient is a 80year old  female here for 2 week follow-up regarding HTN  Better controlled, home systolics [id number], diastolics 78-35Y  Hypertension  This is a chronic problem  The current episode started more than 1 year ago  The problem has been gradually improving since onset  Associated symptoms include neck pain  Pertinent negatives include no anxiety, chest pain, palpitations, peripheral edema or shortness of breath  There are no associated agents to hypertension  Risk factors for coronary artery disease include sedentary lifestyle, dyslipidemia and family history  Past treatments include ACE inhibitors, calcium channel blockers, diuretics and lifestyle changes  The current treatment provides moderate improvement  Compliance problems include diet  There is no history of kidney disease, CAD/MI, heart failure or left ventricular hypertrophy  There is no history of chronic renal disease         The following portions of the patient's history were reviewed and updated as appropriate:   She  has a past medical history of Breast cancer ([facility] [address] ) (1996), Disease of thyroid gland, Hemorrhoids, Hyperlipidemia, Hypertension, and Psoriasis  She   Patient Active Problem List    Diagnosis Date Noted    Benign essential hypertension 2012    IFG (impaired fasting glucose) 2018    Acute neck sprain, initial encounter 2022    Hyperglycemia 2022    Encounter for hepatitis C screening test for low risk patient 2022    Muscular chest pain 2021    Encounter for immunization 2021    Yeast infection of the skin 2021    Polyp of colon 2021    Obesity (BMI 30 0-34 9) 2021    Medicare annual wellness visit, subsequent 2019    Chronic bilateral low back pain without sciatica 10/05/2018    Edema of left lower eyelid 2018    Primary insomnia 10/03/2016    Peripheral neuropathy 2016    Lymphedema 10/01/2015    [facility] of breast, female ([facility] [address] ) 2013    Hyperlipidemia 2013    Osteoarthritis 2012    Age-related osteoporosis without current pathological fracture 2012    Hypothyroidism 10/25/2012    Psoriasis 2012     She  has a past surgical history that includes  section; Colonoscopy; Salpingoophorectomy (Bilateral); Total abdominal hysterectomy; Mastectomy (Left, ); Breast lumpectomy (Left, ); and Breast cyst excision (Right, )  Her family history includes Arthritis in her mother; Asthma in her brother; Breast cancer in her cousin; Breast cancer (age of onset: 67) in her mother; Breast cancer (age of onset: [de-identified]) in her maternal aunt; Colon cancer in her mother; Colon cancer (age of onset: 80) in her maternal uncle; Diabetes in her father and mother; Glaucoma in her father; Hypertension in her brother and mother; No Known Problems in her maternal aunt, maternal aunt, maternal grandmother, paternal aunt, paternal aunt, paternal grandfather, and paternal grandmother; Prostate cancer in her father  She  reports that she has never smoked   She has never used smokeless tobacco  She reports that she does not drink alcohol and does not use drugs  Current Outpatient Medications   Medication Sig Dispense Refill    amLODIPine (NORVASC) 10 mg tablet Take 1 tablet (10 mg total) by mouth daily 30 tablet 3    calcium carbonate-vitamin D (OSCAL-D) 500 mg-200 units per tablet Take 2 tablets by mouth daily with breakfast 120 tablet 3    clindamycin (CLEOCIN T) 1 % lotion   0    fluocinonide (LIDEX) 0 05 % external solution   0    hydrochlorothiazide (HYDRODIURIL) 25 mg tablet Take 1 tablet (25 mg total) by mouth daily 90 tablet 0    levothyroxine 50 mcg tablet Take 1 tablet (50 mcg total) by mouth daily 90 tablet 1    lidocaine (XYLOCAINE) 2 % topical gel Apply topically as needed for mild pain 30 mL 0    lisinopril (ZESTRIL) 20 mg tablet Take 1 tablet (20 mg total) by mouth daily 90 tablet 0    mirtazapine (REMERON) 15 mg tablet Take 1 tablet (15 mg total) by mouth daily at bedtime 30 tablet 2    mometasone (ELOCON) 0 1 % cream Apply 50 application topically 2 (two) times a day       nystatin (MYCOSTATIN) powder Apply topically 4 (four) times a day 30 g 1    OTEZLA 30 MG TABS 30 mg 2 (two) times a day   0    simvastatin (ZOCOR) 20 mg tablet take 1 tablet by mouth at bedtime 90 tablet 0    Diclofenac Sodium (VOLTAREN) 1 % Apply 4 g topically 4 (four) times a day 100 g 0     No current facility-administered medications for this visit       Current Outpatient Medications on File Prior to Visit   Medication Sig    amLODIPine (NORVASC) 10 mg tablet Take 1 tablet (10 mg total) by mouth daily    calcium carbonate-vitamin D (OSCAL-D) 500 mg-200 units per tablet Take 2 tablets by mouth daily with breakfast    clindamycin (CLEOCIN T) 1 % lotion     fluocinonide (LIDEX) 0 05 % external solution     hydrochlorothiazide (HYDRODIURIL) 25 mg tablet Take 1 tablet (25 mg total) by mouth daily    levothyroxine 50 mcg tablet Take 1 tablet (50 mcg total) by mouth daily    lidocaine (XYLOCAINE) 2 % topical gel Apply topically as needed for mild pain    lisinopril (ZESTRIL) 20 mg tablet Take 1 tablet (20 mg total) by mouth daily    mirtazapine (REMERON) 15 mg tablet Take 1 tablet (15 mg total) by mouth daily at bedtime    mometasone (ELOCON) 0 1 % cream Apply 50 application topically 2 (two) times a day     nystatin (MYCOSTATIN) powder Apply topically 4 (four) times a day    OTEZLA 30 MG TABS 30 mg 2 (two) times a day     simvastatin (ZOCOR) 20 mg tablet take 1 tablet by mouth at bedtime     No current facility-administered medications on file prior to visit  She has No Known Allergies       Review of Systems   Constitutional: Negative for chills and fever  HENT: Negative for ear pain and sore throat  Eyes: Negative for pain and visual disturbance  Respiratory: Negative for cough and shortness of breath  Cardiovascular: Negative for chest pain and palpitations  Gastrointestinal: Negative for abdominal pain and vomiting  Genitourinary: Negative for dysuria and hematuria  Musculoskeletal: Positive for neck pain  Negative for arthralgias and back pain  Skin: Negative for color change and rash  Neurological: Negative for seizures and syncope  All other systems reviewed and are negative  Objective:      /82 (BP Location: Right arm, Patient Position: Sitting, Cuff Size: Standard)   Pulse 72   Temp 98 3 °F (36 8 °C) (Temporal)   Resp 18   Ht 4' 8" (1 422 m)   Wt 70 kg (154 lb 6 4 oz)   SpO2 98%   BMI 34 62 kg/m²          Physical Exam  Vitals reviewed  Constitutional:       General: She is not in acute distress  Appearance: Normal appearance  She is obese  She is not ill-appearing or diaphoretic  HENT:      Head: Normocephalic and atraumatic  Nose: Nose normal  No congestion or rhinorrhea  Mouth/Throat:      Mouth: Mucous membranes are moist       Pharynx: Oropharynx is clear  No oropharyngeal exudate  Eyes:      General: No scleral icterus       Conjunctiva/sclera: Conjunctivae normal  Pupils: Pupils are equal, round, and reactive to light  Cardiovascular:      Rate and Rhythm: Normal rate and regular rhythm  Pulses: Normal pulses  Heart sounds: No murmur heard  Pulmonary:      Effort: Pulmonary effort is normal       Breath sounds: Normal breath sounds  No wheezing  Chest:      Chest wall: No tenderness  Abdominal:      General: Bowel sounds are normal       Palpations: Abdomen is soft  There is no mass  Tenderness: There is no abdominal tenderness  Musculoskeletal:         General: No tenderness  Normal range of motion  Cervical back: Normal range of motion  Rigidity present  Right lower leg: No edema  Left lower leg: No edema  Skin:     General: Skin is warm  Capillary Refill: Capillary refill takes less than 2 seconds  Findings: No bruising or rash  Neurological:      Mental Status: She is alert and oriented to person, place, and time  Motor: No weakness        Gait: Gait normal    Psychiatric:         Mood and Affect: Mood normal          Behavior: Behavior normal          Pete Palafox DO  Family Medicine Resident, PGY2  3:29 PM

## 2022-03-21 DIAGNOSIS — E78.5 HYPERLIPIDEMIA, UNSPECIFIED HYPERLIPIDEMIA TYPE: ICD-10-CM

## 2022-03-21 RX ORDER — SIMVASTATIN 20 MG
20 TABLET ORAL
Qty: 90 TABLET | Refills: 1 | Status: SHIPPED | OUTPATIENT
Start: 2022-03-21

## 2022-04-06 ENCOUNTER — APPOINTMENT (EMERGENCY)
Dept: CT IMAGING | Facility: HOSPITAL | Age: 72
DRG: 917 | End: 2022-04-06
Payer: COMMERCIAL

## 2022-04-06 ENCOUNTER — HOSPITAL ENCOUNTER (INPATIENT)
Facility: HOSPITAL | Age: 72
LOS: 2 days | Discharge: HOME | DRG: 917 | End: 2022-04-08
Attending: EMERGENCY MEDICINE | Admitting: INTERNAL MEDICINE
Payer: COMMERCIAL

## 2022-04-06 DIAGNOSIS — R41.0 DISORIENTATION: Primary | ICD-10-CM

## 2022-04-06 DIAGNOSIS — R44.1 VISUAL HALLUCINATIONS: ICD-10-CM

## 2022-04-06 DIAGNOSIS — R41.82 ALTERED MENTAL STATUS: ICD-10-CM

## 2022-04-06 DIAGNOSIS — F51.01 PRIMARY INSOMNIA: ICD-10-CM

## 2022-04-06 LAB
ALBUMIN SERPL BCP-MCNC: 3.1 G/DL (ref 3.5–5)
ALP SERPL-CCNC: 77 U/L (ref 46–116)
ALT SERPL W P-5'-P-CCNC: 16 U/L (ref 12–78)
AMPHETAMINES SERPL QL SCN: NEGATIVE
ANION GAP SERPL CALCULATED.3IONS-SCNC: 8 MMOL/L (ref 4–13)
APAP SERPL-MCNC: <2 UG/ML (ref 10–20)
APTT PPP: 32 SECONDS (ref 23–37)
AST SERPL W P-5'-P-CCNC: 23 U/L (ref 5–45)
BACTERIA UR QL AUTO: ABNORMAL /HPF
BARBITURATES UR QL: NEGATIVE
BASOPHILS # BLD AUTO: 0.07 THOUSANDS/ΜL (ref 0–0.1)
BASOPHILS NFR BLD AUTO: 1 % (ref 0–1)
BENZODIAZ UR QL: NEGATIVE
BILIRUB SERPL-MCNC: 0.3 MG/DL (ref 0.2–1)
BILIRUB UR QL STRIP: NEGATIVE
BUN SERPL-MCNC: 26 MG/DL (ref 5–25)
CALCIUM ALBUM COR SERPL-MCNC: 9.1 MG/DL (ref 8.3–10.1)
CALCIUM SERPL-MCNC: 8.4 MG/DL (ref 8.3–10.1)
CHLORIDE SERPL-SCNC: 106 MMOL/L (ref 100–108)
CLARITY UR: CLEAR
CO2 SERPL-SCNC: 30 MMOL/L (ref 21–32)
COCAINE UR QL: NEGATIVE
COLOR UR: YELLOW
CREAT SERPL-MCNC: 0.64 MG/DL (ref 0.6–1.3)
EOSINOPHIL # BLD AUTO: 0.1 THOUSAND/ΜL (ref 0–0.61)
EOSINOPHIL NFR BLD AUTO: 1 % (ref 0–6)
ERYTHROCYTE [DISTWIDTH] IN BLOOD BY AUTOMATED COUNT: 14.3 % (ref 11.6–15.1)
ETHANOL SERPL-MCNC: <3 MG/DL (ref 0–3)
GFR SERPL CREATININE-BSD FRML MDRD: 90 ML/MIN/1.73SQ M
GLUCOSE SERPL-MCNC: 173 MG/DL (ref 65–140)
GLUCOSE UR STRIP-MCNC: NEGATIVE MG/DL
HCT VFR BLD AUTO: 44.3 % (ref 34.8–46.1)
HGB BLD-MCNC: 14.1 G/DL (ref 11.5–15.4)
HGB UR QL STRIP.AUTO: ABNORMAL
IMM GRANULOCYTES # BLD AUTO: 0.03 THOUSAND/UL (ref 0–0.2)
IMM GRANULOCYTES NFR BLD AUTO: 0 % (ref 0–2)
INR PPP: 1.1 (ref 0.84–1.19)
KETONES UR STRIP-MCNC: NEGATIVE MG/DL
LEUKOCYTE ESTERASE UR QL STRIP: NEGATIVE
LYMPHOCYTES # BLD AUTO: 3.1 THOUSANDS/ΜL (ref 0.6–4.47)
LYMPHOCYTES NFR BLD AUTO: 33 % (ref 14–44)
MCH RBC QN AUTO: 28.2 PG (ref 26.8–34.3)
MCHC RBC AUTO-ENTMCNC: 31.8 G/DL (ref 31.4–37.4)
MCV RBC AUTO: 89 FL (ref 82–98)
METHADONE UR QL: NEGATIVE
MONOCYTES # BLD AUTO: 0.9 THOUSAND/ΜL (ref 0.17–1.22)
MONOCYTES NFR BLD AUTO: 10 % (ref 4–12)
MUCOUS THREADS UR QL AUTO: ABNORMAL
NEUTROPHILS # BLD AUTO: 5.07 THOUSANDS/ΜL (ref 1.85–7.62)
NEUTS SEG NFR BLD AUTO: 55 % (ref 43–75)
NITRITE UR QL STRIP: NEGATIVE
NON-SQ EPI CELLS URNS QL MICRO: ABNORMAL /HPF
NRBC BLD AUTO-RTO: 0 /100 WBCS
OPIATES UR QL SCN: NEGATIVE
OXYCODONE+OXYMORPHONE UR QL SCN: NEGATIVE
PCP UR QL: NEGATIVE
PH UR STRIP.AUTO: 6 [PH] (ref 4.5–8)
PLATELET # BLD AUTO: 380 THOUSANDS/UL (ref 149–390)
PMV BLD AUTO: 10.5 FL (ref 8.9–12.7)
POTASSIUM SERPL-SCNC: 4.2 MMOL/L (ref 3.5–5.3)
PROT SERPL-MCNC: 6.4 G/DL (ref 6.4–8.2)
PROT UR STRIP-MCNC: NEGATIVE MG/DL
PROTHROMBIN TIME: 14.2 SECONDS (ref 11.6–14.5)
RBC # BLD AUTO: 5 MILLION/UL (ref 3.81–5.12)
RBC #/AREA URNS AUTO: ABNORMAL /HPF
SALICYLATES SERPL-MCNC: <3 MG/DL (ref 3–20)
SODIUM SERPL-SCNC: 144 MMOL/L (ref 136–145)
SP GR UR STRIP.AUTO: >=1.03 (ref 1–1.03)
THC UR QL: NEGATIVE
TSH SERPL DL<=0.05 MIU/L-ACNC: 2.65 UIU/ML (ref 0.45–4.5)
UROBILINOGEN UR QL STRIP.AUTO: 0.2 E.U./DL
WBC # BLD AUTO: 9.27 THOUSAND/UL (ref 4.31–10.16)
WBC #/AREA URNS AUTO: ABNORMAL /HPF

## 2022-04-06 PROCEDURE — 80179 DRUG ASSAY SALICYLATE: CPT | Performed by: EMERGENCY MEDICINE

## 2022-04-06 PROCEDURE — 80307 DRUG TEST PRSMV CHEM ANLYZR: CPT | Performed by: EMERGENCY MEDICINE

## 2022-04-06 PROCEDURE — 70450 CT HEAD/BRAIN W/O DYE: CPT

## 2022-04-06 PROCEDURE — 81001 URINALYSIS AUTO W/SCOPE: CPT

## 2022-04-06 PROCEDURE — 99285 EMERGENCY DEPT VISIT HI MDM: CPT | Performed by: EMERGENCY MEDICINE

## 2022-04-06 PROCEDURE — 85610 PROTHROMBIN TIME: CPT | Performed by: EMERGENCY MEDICINE

## 2022-04-06 PROCEDURE — 93005 ELECTROCARDIOGRAM TRACING: CPT

## 2022-04-06 PROCEDURE — 36415 COLL VENOUS BLD VENIPUNCTURE: CPT | Performed by: EMERGENCY MEDICINE

## 2022-04-06 PROCEDURE — 84443 ASSAY THYROID STIM HORMONE: CPT | Performed by: EMERGENCY MEDICINE

## 2022-04-06 PROCEDURE — 99285 EMERGENCY DEPT VISIT HI MDM: CPT

## 2022-04-06 PROCEDURE — 82077 ASSAY SPEC XCP UR&BREATH IA: CPT | Performed by: EMERGENCY MEDICINE

## 2022-04-06 PROCEDURE — 80143 DRUG ASSAY ACETAMINOPHEN: CPT | Performed by: EMERGENCY MEDICINE

## 2022-04-06 PROCEDURE — 85730 THROMBOPLASTIN TIME PARTIAL: CPT | Performed by: EMERGENCY MEDICINE

## 2022-04-06 PROCEDURE — 85025 COMPLETE CBC W/AUTO DIFF WBC: CPT | Performed by: EMERGENCY MEDICINE

## 2022-04-06 PROCEDURE — G1004 CDSM NDSC: HCPCS

## 2022-04-06 PROCEDURE — 80053 COMPREHEN METABOLIC PANEL: CPT | Performed by: EMERGENCY MEDICINE

## 2022-04-06 RX ORDER — HALOPERIDOL 1 MG/1
2 TABLET ORAL ONCE
Status: COMPLETED | OUTPATIENT
Start: 2022-04-06 | End: 2022-04-06

## 2022-04-06 RX ADMIN — HALOPERIDOL 2 MG: 1 TABLET ORAL at 22:11

## 2022-04-06 NOTE — ED PROVIDER NOTES
History  Chief Complaint   Patient presents with    Altered Mental Status     per pt caretaker, pt has been confused which is not like her  per caretaker, earlier today pt was normal, but neighbor reported that pt has been confuse for about four days  denies hx of dementia  pt caretaker reports pt taking about 4 pills, does not know of what       History provided by:  Patient and caregiver   used: No     81-year-old female brought in by caretaker for evaluation of mental status change possibly over the last week  Tonight she was having visual hallucinations noting that she is seeing angels in the form of her  parents  Caretaker reports that they told her that her daughter was going to have twins so the patient when out and bought stuffed bears, also bought a large suitcase with plans to travel to [facility] to see her daughter whom she has not seen in years  Daughter not currently pregnant  Caretaker also witnessed the patient take 10 tablets of an unknown medication  Was not a suicide attempt  Apparently she thought it was something that she just needed to take  Patient takes medications for psoriasis, hypertension  Will need EKG, labs, frequent vital signs  Patient has been disoriented to time, place  Plan CT head, UA  Prior to Admission Medications   Prescriptions Last Dose Informant Patient Reported? Taking?    Diclofenac Sodium (VOLTAREN) 1 % Unknown at Unknown time Self No No   Sig: Apply 4 g topically 4 (four) times a day   OTEZLA 30 MG TABS  Self Yes No   Si mg 2 (two) times a day    amLODIPine (NORVASC) 10 mg tablet 2022 at Unknown time Self No Yes   Sig: Take 1 tablet (10 mg total) by mouth daily   calcium carbonate-vitamin D (OSCAL-D) 500 mg-200 units per tablet 2022 at Unknown time Self No Yes   Sig: Take 2 tablets by mouth daily with breakfast   clindamycin (CLEOCIN T) 1 % lotion Unknown at Unknown time Self Yes No   fluocinonide (LIDEX) 0 05 % external solution Unknown at Unknown time Self Yes No   lidocaine (XYLOCAINE) 2 % topical gel Unknown at Unknown time Self No No   Sig: Apply topically as needed for mild pain   mirtazapine (REMERON) 15 mg tablet  Self No No   Sig: Take 1 tablet (15 mg total) by mouth daily at bedtime   mometasone (ELOCON) 0 1 % cream  Self Yes No   Sig: Apply 50 application topically 2 (two) times a day    nystatin (MYCOSTATIN) powder  Self No No   Sig: Apply topically 4 (four) times a day   simvastatin (ZOCOR) 20 mg tablet  Self No No   Sig: Take 1 tablet (20 mg total) by mouth daily at bedtime      Facility-Administered Medications: None       Past Medical History:   Diagnosis Date    Breast cancer [facility]) 1996 lt mastectomy    Disease of thyroid gland     Hemorrhoids     Hyperlipidemia     Hypertension     Psoriasis        Past Surgical History:   Procedure Laterality Date    BREAST CYST EXCISION Right     benign    BREAST LUMPECTOMY Left      SECTION      COLONOSCOPY      Complete    MASTECTOMY Left     Onset: 1996    SALPINGOOPHORECTOMY Bilateral     age 40    TOTAL ABDOMINAL HYSTERECTOMY      age 40       Family History   Problem Relation Age of Onset    Arthritis Mother     Diabetes Mother     Breast cancer Mother 67    Colon cancer Mother     Hypertension Mother     Diabetes Father     Glaucoma Father     Prostate cancer Father     Asthma Brother     Hypertension Brother     Colon cancer Maternal Uncle 80    Breast cancer Maternal Aunt [de-identified]    No Known Problems Maternal Grandmother     No Known Problems Paternal Grandmother     No Known Problems Maternal Aunt     No Known Problems Maternal Aunt     No Known Problems Paternal Aunt     No Known Problems Paternal Aunt     No Known Problems Paternal Grandfather     Breast cancer Cousin      I have reviewed and agree with the history as documented      E-Cigarette/Vaping    E-Cigarette Use Never User E-Cigarette/Vaping Substances    Nicotine No     THC No     CBD No     Flavoring No     Other No     Unknown No      Social History     Tobacco Use    Smoking status: Never Smoker    Smokeless tobacco: Never Used   Vaping Use    Vaping Use: Never used   Substance Use Topics    Alcohol use: No    Drug use: No       Review of Systems   Constitutional: Negative for activity change, appetite change, diaphoresis, fatigue and fever  Respiratory: Negative for chest tightness and shortness of breath  Musculoskeletal: Negative for back pain and neck pain  Skin: Negative for color change and rash  Neurological: Negative for dizziness, weakness and headaches  Psychiatric/Behavioral: Positive for dysphoric mood and hallucinations  The patient is nervous/anxious  All other systems reviewed and are negative  Physical Exam  Physical Exam  Vitals and nursing note reviewed  Constitutional:       Appearance: She is well-developed  HENT:      Head: Normocephalic and atraumatic  Eyes:      General: No visual field deficit  Cardiovascular:      Rate and Rhythm: Normal rate and regular rhythm  Heart sounds: Normal heart sounds  No murmur heard  Pulmonary:      Effort: Pulmonary effort is normal       Breath sounds: Normal breath sounds  Abdominal:      General: Bowel sounds are normal    Musculoskeletal:      Cervical back: Normal range of motion and neck supple  Skin:     General: Skin is warm  Neurological:      Mental Status: She is alert  Cranial Nerves: No facial asymmetry  Sensory: No sensory deficit  Motor: No weakness     Psychiatric:         Mood and Affect: Mood normal          Vital Signs  ED Triage Vitals   Temperature Pulse Respirations Blood Pressure SpO2   04/06/22 1948 04/06/22 1948 04/06/22 1948 04/06/22 1948 04/06/22 1948   98 °F (36 7 °C) 70 18 167/72 100 %      Temp Source Heart Rate Source Patient Position - Orthostatic VS BP Location FiO2 (%) 04/06/22 1948 04/06/22 1948 04/06/22 1948 04/06/22 1948 --   Oral Monitor Sitting Right arm       Pain Score       04/07/22 0018       No Pain           Vitals:    04/08/22 1233 04/08/22 1236 04/08/22 1241 04/08/22 1607   BP: 167/68 156/85 142/71 145/70   Pulse: 71 85 79 76   Patient Position - Orthostatic VS: Lying - Orthostatic VS Sitting - Orthostatic VS Standing - Orthostatic VS Lying         Visual Acuity  Visual Acuity      Most Recent Value   L Pupil Size (mm) 3   R Pupil Size (mm) 3   L Pupil Shape Round   R Pupil Shape Round          ED Medications  Medications   haloperidol (HALDOL) tablet 2 mg (2 mg Oral Given 4/6/22 2211)   hydrOXYzine HCL (ATARAX) tablet 25 mg (25 mg Oral Given 4/7/22 0304)   Labetalol HCl (NORMODYNE) injection 10 mg (10 mg Intravenous Given 4/8/22 1032)   ibuprofen (MOTRIN) tablet 600 mg (600 mg Oral Given 4/8/22 1309)       Diagnostic Studies  Results Reviewed     Procedure Component Value Units Date/Time    Rapid drug screen, urine [[id number]]  (Normal) Collected: 04/06/22 2145    Lab Status: Final result Specimen: Urine Updated: 04/06/22 2159     Amph/Meth UR Negative     Barbiturate Ur Negative     Benzodiazepine Urine Negative     Cocaine Urine Negative     Methadone Urine Negative     Opiate Urine Negative     PCP Ur Negative     THC Urine Negative     Oxycodone Urine Negative    Narrative:      FOR MEDICAL PURPOSES ONLY  IF CONFIRMATION NEEDED PLEASE CONTACT THE LAB WITHIN 5 DAYS      Drug Screen Cutoff Levels:  AMPHETAMINE/METHAMPHETAMINES  1000 ng/mL  BARBITURATES     200 ng/mL  BENZODIAZEPINES     200 ng/mL  COCAINE      300 ng/mL  METHADONE      300 ng/mL  OPIATES      300 ng/mL  PHENCYCLIDINE     25 ng/mL  THC       50 ng/mL  OXYCODONE      100 ng/mL    TSH [[id number]]  (Normal) Collected: 04/06/22 2049    Lab Status: Final result Specimen: Blood from Arm, Right Updated: 04/06/22 2125     TSH 3RD GENERATON 2 645 uIU/mL     Narrative:      Patients undergoing fluorescein dye angiography may retain small amounts of fluorescein in the body for 48-72 hours post procedure  Samples containing fluorescein can produce falsely depressed TSH values  If the patient had this procedure,a specimen should be resubmitted post fluorescein clearance  Salicylate level [[id number]]  (Abnormal) Collected: 04/06/22 2049    Lab Status: Final result Specimen: Blood from Arm, Right Updated: 13/16/45 2112     Salicylate Lvl <3 mg/dL     Acetaminophen level-If concentration is detectable, please discuss with medical  on call   [[id number]]  (Abnormal) Collected: 04/06/22 2049    Lab Status: Final result Specimen: Blood from Arm, Right Updated: 04/06/22 2125     Acetaminophen Level <2 ug/mL     Ethanol [[id number]]  (Normal) Collected: 04/06/22 2049    Lab Status: Final result Specimen: Blood from Arm, Right Updated: 04/06/22 2123     Ethanol Lvl <3 mg/dL     Comprehensive metabolic panel [[id number]]  (Abnormal) Collected: 04/06/22 2049    Lab Status: Final result Specimen: Blood from Arm, Right Updated: 04/06/22 2116     Sodium 144 mmol/L      Potassium 4 2 mmol/L      Chloride 106 mmol/L      CO2 30 mmol/L      ANION GAP 8 mmol/L      BUN 26 mg/dL      Creatinine 0 64 mg/dL      Glucose 173 mg/dL      Calcium 8 4 mg/dL      Corrected Calcium 9 1 mg/dL      AST 23 U/L      ALT 16 U/L      Alkaline Phosphatase 77 U/L      Total Protein 6 4 g/dL      Albumin 3 1 g/dL      Total Bilirubin 0 30 mg/dL      eGFR 90 ml/min/1 73sq m     Narrative:      [place] guidelines for Chronic Kidney Disease (CKD):     Stage 1 with normal or high GFR (GFR > 90 mL/min/1 73 square meters)    Stage 2 Mild CKD (GFR = 60-89 mL/min/1 73 square meters)    Stage 3A Moderate CKD (GFR = 45-59 mL/min/1 73 square meters)    Stage 3B Moderate CKD (GFR = 30-44 mL/min/1 73 square meters)    Stage 4 Severe CKD (GFR = 15-29 mL/min/1 73 square meters)    Stage 5 End Stage CKD (GFR <15 mL/min/1 73 square meters)  Note: GFR calculation is accurate only with a steady state creatinine    Protime-INR [[id number]]  (Normal) Collected: 04/06/22 2049    Lab Status: Final result Specimen: Blood from Arm, Right Updated: 04/06/22 2113     Protime 14 2 seconds      INR 1 10    APTT [[id number]]  (Normal) Collected: 04/06/22 2049    Lab Status: Final result Specimen: Blood from Arm, Right Updated: 04/06/22 2113     PTT 32 seconds     CBC and differential [[id number]] Collected: 04/06/22 2049    Lab Status: Final result Specimen: Blood from Arm, Right Updated: 04/06/22 2058     WBC 9 27 Thousand/uL      RBC 5 00 Million/uL      Hemoglobin 14 1 g/dL      Hematocrit 44 3 %      MCV 89 fL      MCH 28 2 pg      MCHC 31 8 g/dL      RDW 14 3 %      MPV 10 5 fL      Platelets 941 Thousands/uL      nRBC 0 /100 WBCs      Neutrophils Relative 55 %      Immat GRANS % 0 %      Lymphocytes Relative 33 %      Monocytes Relative 10 %      Eosinophils Relative 1 %      Basophils Relative 1 %      Neutrophils Absolute 5 07 Thousands/µL      Immature Grans Absolute 0 03 Thousand/uL      Lymphocytes Absolute 3 10 Thousands/µL      Monocytes Absolute 0 90 Thousand/µL      Eosinophils Absolute 0 10 Thousand/µL      Basophils Absolute 0 07 Thousands/µL     Urine Microscopic [[id number]]  (Abnormal) Collected: 04/06/22 1955    Lab Status: Final result Specimen: Urine, Clean Catch Updated: 04/06/22 2057     RBC, UA 2-4 /hpf      WBC, UA 0-1 /hpf      Epithelial Cells Occasional /hpf      Bacteria, UA None Seen /hpf      MUCUS THREADS Occasional    Urine Macroscopic, POC [[id number]]  (Abnormal) Collected: 04/06/22 1955    Lab Status: Final result Specimen: Urine Updated: 04/06/22 1957     Color, UA Yellow     Clarity, UA Clear     pH, UA 6 0     Leukocytes, UA Negative     Nitrite, UA Negative     Protein, UA Negative mg/dl      Glucose, UA Negative mg/dl      Ketones, UA Negative mg/dl      Urobilinogen, UA 0 2 E U /dl      Bilirubin, UA Negative Blood, UA Trace     Specific Gravity, UA >=1 030    Narrative:      CLINITEK RESULT                 CT head without contrast   Final Result by Gerardo Terrell MD (04/06 2234)      No acute intracranial abnormality  Workstation performed: [id number]                    Procedures  ECG 12 Lead Documentation Only    Date/Time: 4/6/2022 9:58 PM  Performed by: Jen Brasher MD  Authorized by: Jen Brasher MD     Indications / Diagnosis:  Altered  ECG reviewed by me, the ED Provider: yes    Patient location:  ED  Previous ECG:     Previous ECG:  Compared to current    Comparison ECG info:  3/12/07    Similarity:  No change  Rate:     ECG rate:  79  Rhythm:     Rhythm: sinus rhythm    Ectopy:     Ectopy: none    QRS:     QRS axis:  Normal  Conduction:     Conduction: normal    ST segments:     ST segments:  Normal  T waves:     T waves: normal               ED Course                               SBIRT 22yo+      Most Recent Value   SBIRT (24 yo +)    In order to provide better care to our patients, we are screening all of our patients for alcohol and drug use  Would it be okay to ask you these screening questions? Unable to answer at this time Filed at: 04/06/2022 2030                    MDM  Number of Diagnoses or Management Options  Disorientation: new and requires workup  Visual hallucinations: new and requires workup  Diagnosis management comments: 27-year-old female brought in by caretaker for evaluation of 2 week bizarre behavior including disorientation, visual hallucinations as well as ingesting 10 tablets of an unknown prescription medication this evening  She has also been having some delusions  She has no focal neurologic deficits on exam   Her head CT is unremarkable  Lab work normal limits    Admitted for continued monitoring, treatment and possible placement if symptoms persist        Amount and/or Complexity of Data Reviewed  Clinical lab tests: ordered and reviewed  Tests in the radiology section of CPT®: reviewed and ordered  Independent visualization of images, tracings, or specimens: yes    Patient Progress  Patient progress: stable      Disposition  Final diagnoses:   Disorientation   Visual hallucinations     Time reflects when diagnosis was documented in both MDM as applicable and the Disposition within this note     Time User Action Codes Description Comment    4/6/2022 11:08 PM Ayla Oswald Add [R41 0] Disorientation     4/6/2022 11:08 PM Ruff Adelaide TUCKER Add [R44 1] Visual hallucinations     4/8/2022  2:31 PM Papa Guadalajara Add [F51 01] Primary insomnia     4/8/2022  2:31 PM McKinley Guadalajara Modify [F51 01] Primary insomnia     4/8/2022  2:34 PM Papa Guadalajara Modify [F51 01] Primary insomnia     4/8/2022  2:49 PM McKinley Guadalajara Add [R41 82] Altered mental status       ED Disposition     ED Disposition Condition Date/Time Comment    Admit Stable Wed Apr 6, 2022 11:09 PM Case was discussed with [facility] resident and the patient's admission status was agreed to be Admission Status: inpatient status to the service of Dr Vivek Mitchell          Follow-up Information     Follow up With Specialties Details Why Contact Info Additional Al  [facility] Follow up  [place] [id number]       [facility] Follow up  [address] [place] [id number] [phone] [facility], [address] [place] [address] [id number] [place] [address], [address] [facility]  Call in 1 week(s) to inquire about Partial Hospitalization Program [address] [facility] [id number]           Discharge Medication List as of 4/8/2022  4:29 PM      CONTINUE these medications which have CHANGED    Details   mirtazapine (REMERON) 15 mg tablet Take 1 tablet (15 mg total) by mouth daily at bedtime Take 15mg (one tablet) at night for three (3) days and then start on 30mg (two tablets) at night onwards  , Starting Fri 4/8/2022, Until Fri 6/10/2022, Normal         CONTINUE these medications which have NOT CHANGED    Details   amLODIPine (NORVASC) 10 mg tablet Take 1 tablet (10 mg total) by mouth daily, Starting Thu 1/20/2022, Normal      calcium carbonate-vitamin D (OSCAL-D) 500 mg-200 units per tablet Take 2 tablets by mouth daily with breakfast, Starting Tue 9/22/2020, Normal      hydrochlorothiazide (HYDRODIURIL) 25 mg tablet Take 1 tablet (25 mg total) by mouth daily, Starting Thu 2/3/2022, Normal      levothyroxine 50 mcg tablet Take 1 tablet (50 mcg total) by mouth daily, Starting Mon 11/1/2021, Normal      clindamycin (CLEOCIN T) 1 % lotion Starting Mon 7/22/2019, Historical Med      Diclofenac Sodium (VOLTAREN) 1 % Apply 4 g topically 4 (four) times a day, Starting Mon 2/21/2022, Normal      fluocinonide (LIDEX) 0 05 % external solution Starting Mon 3/5/2018, Historical Med      lidocaine (XYLOCAINE) 2 % topical gel Apply topically as needed for mild pain, Starting Wed 9/29/2021, Normal      mometasone (ELOCON) 0 1 % cream Apply 50 application topically 2 (two) times a day , Starting Fri 7/14/2017, Historical Med      nystatin (MYCOSTATIN) powder Apply topically 4 (four) times a day, Starting Fri 9/24/2021, Normal      OTEZLA 30 MG TABS 30 mg 2 (two) times a day , Starting Mon 3/19/2018, Historical Med      simvastatin (ZOCOR) 20 mg tablet Take 1 tablet (20 mg total) by mouth daily at bedtime, Starting Mon 3/21/2022, Normal      lisinopril (ZESTRIL) 20 mg tablet Take 1 tablet (20 mg total) by mouth daily, Starting Thu 2/3/2022, Normal             Outpatient Discharge Orders   Ambulatory Referral to Psychiatry   Standing Status: Future Standing Exp  Date: 04/08/23      Ambulatory Referral to [facility] or Partial Psych Program   Standing Status: Future Standing Exp   Date: 04/08/23      Activity as tolerated     Call provider for:  persistent nausea or vomiting     Call provider for:  severe uncontrolled pain     Call provider for:  redness, tenderness, or signs of infection (pain, swelling, redness, odor or green/yellow discharge around incision site)     Call provider for: active or persistent bleeding     Call provider for:  difficulty breathing, headache or visual disturbances     Call provider for:  hives     Call provider for:  persistent dizziness or light-headedness     Call provider for:  extreme fatigue     Call provider for:       PDMP Review       Value Time User    PDMP Reviewed  Yes 4/6/2022 11:23 PM Garland Paget, MD          ED Provider  Electronically Signed by           Alessandra Pelayo MD  05/02/22 0020

## 2022-04-07 PROBLEM — R41.82 ALTERED MENTAL STATUS: Status: ACTIVE | Noted: 2022-04-07

## 2022-04-07 LAB
ALBUMIN SERPL BCP-MCNC: 3 G/DL (ref 3.5–5)
ALP SERPL-CCNC: 81 U/L (ref 46–116)
ALT SERPL W P-5'-P-CCNC: 15 U/L (ref 12–78)
ANION GAP SERPL CALCULATED.3IONS-SCNC: 7 MMOL/L (ref 4–13)
APTT PPP: 33 SECONDS (ref 23–37)
AST SERPL W P-5'-P-CCNC: 17 U/L (ref 5–45)
BASOPHILS # BLD AUTO: 0.08 THOUSANDS/ΜL (ref 0–0.1)
BASOPHILS NFR BLD AUTO: 1 % (ref 0–1)
BILIRUB SERPL-MCNC: 0.25 MG/DL (ref 0.2–1)
BUN SERPL-MCNC: 20 MG/DL (ref 5–25)
CALCIUM ALBUM COR SERPL-MCNC: 9.3 MG/DL (ref 8.3–10.1)
CALCIUM SERPL-MCNC: 8.5 MG/DL (ref 8.3–10.1)
CHLORIDE SERPL-SCNC: 107 MMOL/L (ref 100–108)
CHOLEST SERPL-MCNC: 171 MG/DL
CO2 SERPL-SCNC: 27 MMOL/L (ref 21–32)
CREAT SERPL-MCNC: 0.66 MG/DL (ref 0.6–1.3)
EOSINOPHIL # BLD AUTO: 0.13 THOUSAND/ΜL (ref 0–0.61)
EOSINOPHIL NFR BLD AUTO: 2 % (ref 0–6)
ERYTHROCYTE [DISTWIDTH] IN BLOOD BY AUTOMATED COUNT: 14.2 % (ref 11.6–15.1)
GFR SERPL CREATININE-BSD FRML MDRD: 89 ML/MIN/1.73SQ M
GLUCOSE SERPL-MCNC: 169 MG/DL (ref 65–140)
HCT VFR BLD AUTO: 44.4 % (ref 34.8–46.1)
HDLC SERPL-MCNC: 49 MG/DL
HGB BLD-MCNC: 13.8 G/DL (ref 11.5–15.4)
IMM GRANULOCYTES # BLD AUTO: 0.04 THOUSAND/UL (ref 0–0.2)
IMM GRANULOCYTES NFR BLD AUTO: 1 % (ref 0–2)
INR PPP: 1.05 (ref 0.84–1.19)
LDLC SERPL CALC-MCNC: 90 MG/DL (ref 0–100)
LYMPHOCYTES # BLD AUTO: 2.44 THOUSANDS/ΜL (ref 0.6–4.47)
LYMPHOCYTES NFR BLD AUTO: 30 % (ref 14–44)
MAGNESIUM SERPL-MCNC: 2 MG/DL (ref 1.6–2.6)
MCH RBC QN AUTO: 28.2 PG (ref 26.8–34.3)
MCHC RBC AUTO-ENTMCNC: 31.1 G/DL (ref 31.4–37.4)
MCV RBC AUTO: 91 FL (ref 82–98)
MONOCYTES # BLD AUTO: 0.81 THOUSAND/ΜL (ref 0.17–1.22)
MONOCYTES NFR BLD AUTO: 10 % (ref 4–12)
NEUTROPHILS # BLD AUTO: 4.59 THOUSANDS/ΜL (ref 1.85–7.62)
NEUTS SEG NFR BLD AUTO: 56 % (ref 43–75)
NRBC BLD AUTO-RTO: 0 /100 WBCS
PLATELET # BLD AUTO: 334 THOUSANDS/UL (ref 149–390)
PMV BLD AUTO: 10 FL (ref 8.9–12.7)
POTASSIUM SERPL-SCNC: 3.6 MMOL/L (ref 3.5–5.3)
PROT SERPL-MCNC: 6.7 G/DL (ref 6.4–8.2)
PROTHROMBIN TIME: 13.7 SECONDS (ref 11.6–14.5)
RBC # BLD AUTO: 4.9 MILLION/UL (ref 3.81–5.12)
SODIUM SERPL-SCNC: 141 MMOL/L (ref 136–145)
TRIGL SERPL-MCNC: 160 MG/DL
WBC # BLD AUTO: 8.09 THOUSAND/UL (ref 4.31–10.16)

## 2022-04-07 PROCEDURE — 80053 COMPREHEN METABOLIC PANEL: CPT

## 2022-04-07 PROCEDURE — 85025 COMPLETE CBC W/AUTO DIFF WBC: CPT

## 2022-04-07 PROCEDURE — 85610 PROTHROMBIN TIME: CPT

## 2022-04-07 PROCEDURE — 83735 ASSAY OF MAGNESIUM: CPT

## 2022-04-07 PROCEDURE — 97162 PT EVAL MOD COMPLEX 30 MIN: CPT

## 2022-04-07 PROCEDURE — 97129 THER IVNTJ 1ST 15 MIN: CPT

## 2022-04-07 PROCEDURE — 85730 THROMBOPLASTIN TIME PARTIAL: CPT

## 2022-04-07 PROCEDURE — 99223 1ST HOSP IP/OBS HIGH 75: CPT | Performed by: INTERNAL MEDICINE

## 2022-04-07 PROCEDURE — 80061 LIPID PANEL: CPT

## 2022-04-07 PROCEDURE — 97167 OT EVAL HIGH COMPLEX 60 MIN: CPT

## 2022-04-07 PROCEDURE — 99255 IP/OBS CONSLTJ NEW/EST HI 80: CPT | Performed by: INTERNAL MEDICINE

## 2022-04-07 PROCEDURE — 99255 IP/OBS CONSLTJ NEW/EST HI 80: CPT | Performed by: PSYCHIATRY & NEUROLOGY

## 2022-04-07 RX ORDER — HYDROCHLOROTHIAZIDE 25 MG/1
25 TABLET ORAL DAILY
Status: DISCONTINUED | OUTPATIENT
Start: 2022-04-07 | End: 2022-04-08 | Stop reason: HOSPADM

## 2022-04-07 RX ORDER — LISINOPRIL 20 MG/1
20 TABLET ORAL DAILY
Status: DISCONTINUED | OUTPATIENT
Start: 2022-04-07 | End: 2022-04-08 | Stop reason: HOSPADM

## 2022-04-07 RX ORDER — AMLODIPINE BESYLATE 10 MG/1
10 TABLET ORAL DAILY
Status: DISCONTINUED | OUTPATIENT
Start: 2022-04-07 | End: 2022-04-08 | Stop reason: HOSPADM

## 2022-04-07 RX ORDER — DOCUSATE SODIUM 100 MG/1
100 CAPSULE, LIQUID FILLED ORAL 2 TIMES DAILY
Status: DISCONTINUED | OUTPATIENT
Start: 2022-04-07 | End: 2022-04-08 | Stop reason: HOSPADM

## 2022-04-07 RX ORDER — ONDANSETRON 2 MG/ML
4 INJECTION INTRAMUSCULAR; INTRAVENOUS EVERY 6 HOURS PRN
Status: DISCONTINUED | OUTPATIENT
Start: 2022-04-07 | End: 2022-04-08 | Stop reason: HOSPADM

## 2022-04-07 RX ORDER — CALCIUM CARBONATE 200(500)MG
1000 TABLET,CHEWABLE ORAL DAILY PRN
Status: DISCONTINUED | OUTPATIENT
Start: 2022-04-07 | End: 2022-04-08 | Stop reason: HOSPADM

## 2022-04-07 RX ORDER — HYDROXYZINE HYDROCHLORIDE 25 MG/1
25 TABLET, FILM COATED ORAL ONCE
Status: COMPLETED | OUTPATIENT
Start: 2022-04-07 | End: 2022-04-07

## 2022-04-07 RX ORDER — ACETAMINOPHEN 325 MG/1
650 TABLET ORAL EVERY 6 HOURS PRN
Status: DISCONTINUED | OUTPATIENT
Start: 2022-04-07 | End: 2022-04-08 | Stop reason: HOSPADM

## 2022-04-07 RX ORDER — TRIAMCINOLONE ACETONIDE 1 MG/G
CREAM TOPICAL 2 TIMES DAILY
Status: DISCONTINUED | OUTPATIENT
Start: 2022-04-07 | End: 2022-04-08 | Stop reason: HOSPADM

## 2022-04-07 RX ORDER — LANOLIN ALCOHOL/MO/W.PET/CERES
3 CREAM (GRAM) TOPICAL
Status: DISCONTINUED | OUTPATIENT
Start: 2022-04-07 | End: 2022-04-08 | Stop reason: HOSPADM

## 2022-04-07 RX ORDER — LEVOTHYROXINE SODIUM 0.05 MG/1
50 TABLET ORAL
Status: DISCONTINUED | OUTPATIENT
Start: 2022-04-07 | End: 2022-04-08 | Stop reason: HOSPADM

## 2022-04-07 RX ORDER — MIRTAZAPINE 15 MG/1
15 TABLET, FILM COATED ORAL
Status: DISCONTINUED | OUTPATIENT
Start: 2022-04-07 | End: 2022-04-08 | Stop reason: HOSPADM

## 2022-04-07 RX ADMIN — DOCUSATE SODIUM 100 MG: 100 CAPSULE, LIQUID FILLED ORAL at 08:42

## 2022-04-07 RX ADMIN — AMLODIPINE BESYLATE 10 MG: 10 TABLET ORAL at 08:42

## 2022-04-07 RX ADMIN — TRIAMCINOLONE ACETONIDE: 1 CREAM TOPICAL at 10:31

## 2022-04-07 RX ADMIN — LEVOTHYROXINE SODIUM 50 MCG: 50 TABLET ORAL at 05:55

## 2022-04-07 RX ADMIN — LISINOPRIL 20 MG: 20 TABLET ORAL at 08:42

## 2022-04-07 RX ADMIN — MIRTAZAPINE 15 MG: 15 TABLET, FILM COATED ORAL at 21:31

## 2022-04-07 RX ADMIN — HYDROXYZINE HYDROCHLORIDE 25 MG: 25 TABLET ORAL at 03:04

## 2022-04-07 RX ADMIN — HYDROCHLOROTHIAZIDE 25 MG: 25 TABLET ORAL at 08:42

## 2022-04-07 NOTE — PLAN OF CARE
Problem: Potential for Falls  Goal: Patient will remain free of falls  Description: INTERVENTIONS:  - Educate patient/family on patient safety including physical limitations  - Instruct patient to call for assistance with activity   - Consult OT/PT to assist with strengthening/mobility   - Keep Call bell within reach  - Keep bed low and locked with side rails adjusted as appropriate  - Keep care items and personal belongings within reach  - Initiate and maintain comfort rounds  - Make Fall Risk Sign visible to staff  - Offer Toileting every 2 Hours, in advance of need  - Initiate/Maintain bed alarm  - Apply yellow socks and bracelet for high fall risk patients  - Consider moving patient to room near nurses station  Outcome: Progressing     Problem: PAIN - ADULT  Goal: Verbalizes/displays adequate comfort level or baseline comfort level  Description: Interventions:  - Encourage patient to monitor pain and request assistance  - Assess pain using appropriate pain scale  - Administer analgesics based on type and severity of pain and evaluate response  - Implement non-pharmacological measures as appropriate and evaluate response  - Consider cultural and social influences on pain and pain management  - Notify physician/advanced practitioner if interventions unsuccessful or patient reports new pain  Outcome: Progressing     Problem: INFECTION - ADULT  Goal: Absence or prevention of progression during hospitalization  Description: INTERVENTIONS:  - Assess and monitor for signs and symptoms of infection  - Monitor lab/diagnostic results  - Monitor all insertion sites, i e  indwelling lines, tubes, and drains  - Monitor endotracheal if appropriate and nasal secretions for changes in amount and color  - [place] appropriate cooling/warming therapies per order  - Administer medications as ordered  - Instruct and encourage patient and family to use good hand hygiene technique  - Identify and instruct in appropriate isolation precautions for identified infection/condition  Outcome: Progressing     Problem: SAFETY ADULT  Goal: Patient will remain free of falls  Description: INTERVENTIONS:  - Educate patient/family on patient safety including physical limitations  - Instruct patient to call for assistance with activity   - Consult OT/PT to assist with strengthening/mobility   - Keep Call bell within reach  - Keep bed low and locked with side rails adjusted as appropriate  - Keep care items and personal belongings within reach  - Initiate and maintain comfort rounds  - Make Fall Risk Sign visible to staff  - Offer Toileting every 2 Hours, in advance of need  - Apply yellow socks and bracelet for high fall risk patients  - Consider moving patient to room near nurses station  Outcome: Progressing     Problem: DISCHARGE PLANNING  Goal: Discharge to home or other facility with appropriate resources  Description: INTERVENTIONS:  - Identify barriers to discharge w/patient and caregiver  - Arrange for needed discharge resources and transportation as appropriate  - Identify discharge learning needs (meds, wound care, etc )  - Arrange for interpretive services to assist at discharge as needed  - Refer to Case Management Department for coordinating discharge planning if the patient needs post-hospital services based on physician/advanced practitioner order or complex needs related to functional status, cognitive ability, or social support system  Outcome: Progressing     Problem: SKIN/TISSUE INTEGRITY - ADULT  Goal: Skin Integrity remains intact(Skin Breakdown Prevention)  Description: Assess:  -Perform Tello assessment every shift   -Clean and moisturize skin every shift   -Inspect skin when repositioning, toileting, and assisting with ADLS  -Assess extremities for adequate circulation and sensation     Bed Management:  -Have minimal linens on bed & keep smooth, unwrinkled  -Change linens as needed when moist or perspiring  -Avoid sitting or lying in one position for more than 2 hours while in bed  -Keep HOB at 30 degrees     Toileting:  -Offer bedside commode  -Assess for incontinence every 2 hours   -Use incontinent care products after each incontinent episode such as wipes and lotion     Activity:  -Mobilize patient 4 times a day  -Encourage activity and walks on unit  -Encourage or provide ROM exercises   -Turn and reposition patient every 2 Hours  -Use appropriate equipment to lift or move patient in bed  -Instruct/ Assist with weight shifting every 2 when out of bed in chair  -Consider limitation of chair time 4 hour intervals    Skin Care:  -Avoid use of baby powder, tape, friction and shearing, hot water or constrictive clothing  -Relieve pressure over bony prominences using pillows  -Do not massage red bony areas    Outcome: Progressing

## 2022-04-07 NOTE — PLAN OF CARE
Problem: Potential for Falls  Goal: Patient will remain free of falls  Description: INTERVENTIONS:  - Educate patient/family on patient safety including physical limitations  - Instruct patient to call for assistance with activity   - Consult OT/PT to assist with strengthening/mobility   - Keep Call bell within reach  - Keep bed low and locked with side rails adjusted as appropriate  - Keep care items and personal belongings within reach  - Initiate and maintain comfort rounds  - Make Fall Risk Sign visible to staff  - Offer Toileting every 2 Hours, in advance of need  - Initiate/Maintain bed alarm  - Obtain necessary fall risk management equipment   - Apply yellow socks and bracelet for high fall risk patients  - Consider moving patient to room near nurses station  Outcome: Progressing     Problem: PAIN - ADULT  Goal: Verbalizes/displays adequate comfort level or baseline comfort level  Description: Interventions:  - Encourage patient to monitor pain and request assistance  - Assess pain using appropriate pain scale  - Administer analgesics based on type and severity of pain and evaluate response  - Implement non-pharmacological measures as appropriate and evaluate response  - Consider cultural and social influences on pain and pain management  - Notify physician/advanced practitioner if interventions unsuccessful or patient reports new pain  Outcome: Progressing     Problem: INFECTION - ADULT  Goal: Absence or prevention of progression during hospitalization  Description: INTERVENTIONS:  - Assess and monitor for signs and symptoms of infection  - Monitor lab/diagnostic results  - Monitor all insertion sites, i e  indwelling lines, tubes, and drains  - Monitor endotracheal if appropriate and nasal secretions for changes in amount and color  - Gaffney appropriate cooling/warming therapies per order  - Administer medications as ordered  - Instruct and encourage patient and family to use good hand hygiene technique  - Identify and instruct in appropriate isolation precautions for identified infection/condition  Outcome: Progressing     Problem: SAFETY ADULT  Goal: Patient will remain free of falls  Description: INTERVENTIONS:  - Educate patient/family on patient safety including physical limitations  - Instruct patient to call for assistance with activity   - Consult OT/PT to assist with strengthening/mobility   - Keep Call bell within reach  - Keep bed low and locked with side rails adjusted as appropriate  - Keep care items and personal belongings within reach  - Initiate and maintain comfort rounds  - Make Fall Risk Sign visible to staff  - Offer Toileting every 2 Hours, in advance of need  - Initiate/Maintain bed alarm  - Obtain necessary fall risk management equipment  - Apply yellow socks and bracelet for high fall risk patients  - Consider moving patient to room near nurses station  Outcome: Progressing     Problem: DISCHARGE PLANNING  Goal: Discharge to home or other facility with appropriate resources  Description: INTERVENTIONS:  - Identify barriers to discharge w/patient and caregiver  - Arrange for needed discharge resources and transportation as appropriate  - Identify discharge learning needs (meds, wound care, etc )  - Arrange for interpretive services to assist at discharge as needed  - Refer to Case Management Department for coordinating discharge planning if the patient needs post-hospital services based on physician/advanced practitioner order or complex needs related to functional status, cognitive ability, or social support system  Outcome: Progressing     Problem: SKIN/TISSUE INTEGRITY - ADULT  Goal: Skin Integrity remains intact(Skin Breakdown Prevention)  Description: Assess:  -Perform Tello assessment every shift   -Clean and moisturize skin every shift and prn for soilage   -Inspect skin when repositioning, toileting, and assisting with ADLS  -Assess under medical devices   -Assess extremities for adequate circulation and sensation     Bed Management:  -Have minimal linens on bed & keep smooth, unwrinkled  -Change linens as needed when moist or perspiring  -Avoid sitting or lying in one position for more than 2 hours while in bed  -Keep HOB at 30-45 degrees     Toileting:  -Offer bedside commode  -Assess for incontinence every 2 hours   -Use incontinent care products after each incontinent episode     Activity:  -Mobilize patient 3 times a day  -Encourage activity and walks on unit  -Encourage or provide ROM exercises   -Turn and reposition patient every 2 Hours  -Use appropriate equipment to lift or move patient in bed  -Instruct/ Assist with weight shifting  when out of bed in chair  -Consider limitation of chair time   Skin Care:  -Avoid use of baby powder, tape, friction and shearing, hot water or constrictive clothing  -Relieve pressure over bony prominences using pillow support/foam wedges/dressings   -Do not massage red bony areas    Next Steps:  -Teach patient strategies to minimize risks such as frequent repositioning    -Consider consults to  interdisciplinary teams such as wound care  Outcome: Progressing

## 2022-04-07 NOTE — ASSESSMENT & PLAN NOTE
- Pt taking mirtazinpine/Remeron 15 mg     Plan:  - Hold in lieu of AMS with possible overdose   - Melatonin 3 mg

## 2022-04-07 NOTE — UTILIZATION REVIEW
Inpatient Admission Authorization Request   NOTIFICATION OF INPATIENT ADMISSION/INPATIENT AUTHORIZATION REQUEST   SERVICING FACILITY:   [facility]  Stacey CARLOS, [address] [place] [address]  Tax ID: [id number]  NPI: [id number]  Place of Service: Inpatient [address] [facility]  [address]  Place of Service Code: 24     ATTENDING PROVIDER:  Attending Name and NPI#: Everardodaniel NikoCelso bernardo [[id number]]  Address: Stacey VELÁSQUEZ, [address] [place] [address]  Phone: [phone]     UTILIZATION REVIEW CONTACT:  Kerry Portillo, Utilization   Network Utilization Review Department  Phone: [phone]  Fax: [phone]  Email: [email or web address]     PHYSICIAN ADVISORY SERVICES:  FOR [id number] REVIEW - MEDICAL NECESSITY DENIAL  Phone: [phone]  Fax: [phone]  Email: [email or web address]     TYPE OF REQUEST:  Inpatient Status     ADMISSION INFORMATION:  ADMISSION DATE/TIME: 4/6/22 11:09 PM  PATIENT DIAGNOSIS CODE/DESCRIPTION:  Visual hallucinations [R44 1]  Disorientation [R41 0]  AMS (altered mental status) [R41 82]  DISCHARGE DATE/TIME: No discharge date for patient encounter  IMPORTANT INFORMATION:  Please contact the Kerry Portillo directly with any questions or concerns regarding this request  Department voicemails are confidential     Send requests for admission clinical reviews, concurrent reviews, approvals, and administrative denials due to lack of clinical to fax [phone]

## 2022-04-07 NOTE — PHYSICAL THERAPY NOTE
PHYSICAL THERAPY NOTE  Patient Name: Toby Nichols  Today's Date: 4/7/2022 04/07/22 1402   PT Last Visit   PT Visit Date 04/07/22   Note Type   Note type Cancelled Session   Cancel Reasons Other  (Pt unavailable x 2 attempts with medical staff   Will follow)    King Blackwood, PT

## 2022-04-07 NOTE — OCCUPATIONAL THERAPY NOTE
Occupational Therapy Evaluation + Cognitive Evaluation     Patient Name: Agustin Virgen  Today's Date: 2022  Problem List  Principal Problem:    Altered mental status  Active Problems:    Benign essential hypertension    Hyperlipidemia    Hypothyroidism    Primary insomnia    Psoriasis    Past Medical History  Past Medical History:   Diagnosis Date    [facility]) 1996 lt mastectomy    Disease of thyroid gland     Hemorrhoids     Hyperlipidemia     Hypertension     Psoriasis      Past Surgical History  Past Surgical History:   Procedure Laterality Date    BREAST CYST EXCISION Right     benign    BREAST LUMPECTOMY Left      SECTION      COLONOSCOPY      Complete    MASTECTOMY Left     Onset: 1996    SALPINGOOPHORECTOMY Bilateral     age 40    TOTAL ABDOMINAL HYSTERECTOMY      age 40             22   OT Last Visit   OT Visit Date 22   Note Type   Note type Evaluation   Restrictions/Precautions   Other Precautions Cognitive; Fall Risk   Pain Assessment   Pain Assessment Tool 0-10   Pain Score No Pain   Home Living   Type of Home Apartment   Home Layout One level   Bathroom Shower/Tub Walk-in shower   Bathroom Toilet Standard   Bathroom Equipment Grab bars in shower;Built-in shower seat;Grab bars around toilet   Bathroom Accessibility Accessible   Additional Comments no use of AD At baseline   Prior Function   Lives With Alone   Receives Help From Friend(s)   ADL Assistance Independent   IADLs Independent   Falls in the last 6 months 0   Vocational Retired   Comments friends drive   Lifestyle   Autonomy PTA pt living alone in apartment, pt (I) with all ADLs and IADLs  (-)falls, (-)drives   Reciprocal Relationships supportive friend/"caregiver" checks in on her weekly   Service to Others retired   Intrinsic Gratification sewing, cooking   Subjective   Subjective "I feel better"   ADL   Eating Assistance 6  Modified independent   Grooming Assistance 6  Modified Independent   Grooming Deficit Increased time to complete;Wash/dry face; Supervision/safety   UB Bathing Assistance 5  Supervision/Setup   LB Bathing Assistance 5  Supervision/Setup   UB Dressing Assistance 5  Supervision/Setup   LB Dressing Assistance 5  Supervision/Setup   LB Dressing Deficit Increased time to complete; Don/doff R sock; Don/doff L sock   Toileting Assistance  5  Supervision/Setup   Bed Mobility   Supine to Sit 6  Modified independent   Sit to Supine 6  Modified independent   Transfers   Sit to Stand 5  Supervision   Additional items Increased time required   Stand to Sit 5  Supervision   Additional items Increased time required   Functional Mobility   Functional Mobility 5  Supervision   Additional Comments functional household distance, no use of AD   Balance   Static Sitting Good   Dynamic Sitting Fair +   Static Standing Fair   Dynamic Standing Fair -   Ambulatory Fair -   Activity Tolerance   Activity Tolerance Patient tolerated treatment well   Medical Staff Made Aware RN Amy Cintron, ELI Lopez   RUE Assessment   RUE Assessment WFL   LUE Assessment   LUE Assessment WFL   Hand Function   Gross Motor Coordination Functional   Fine Motor Coordination Functional   Cognition   Overall Cognitive Status Impaired   Arousal/Participation Alert; Cooperative   Attention Attends with cues to redirect   Orientation Level Oriented to person;Oriented to place;Oriented to time;Oriented to situation  (with multiple choices for time)   Memory Decreased short term memory;Decreased recall of recent events   Following Commands Follows one step commands with increased time or repetition   Comments pleasant and cooperative, limited safety awareness and problem solving   Cognition Assessment Tools ACLS   Score 4 0    Administered [facility] Cognitive Level Screen (ACLS)    Pt scored 4 0/6 0 indicating 24 hour supervision is recommended to remove dangerous objects and solve problems due to minor changes in routine activities  Behavior:  May be disoriented to day/date  Memorization of new tasks will be extremely slow  Long term repetitive training is required for all new tasks  Allow 2-3x average rate for completion of tasks  Recognizes only 1 way to complete a task  Asks for A but does not identify true problems  Grooming:  Initiates and completes a familiar sequence of actions when all necessary tools area available and within reach  May not initiate at the correct time of day consistently  May miss sides of head when comping or shaving  Expect cuts if using straight razor  Restrict access to dangerous objects  Check every few minutes if left alone  Dressing:  Initiates dressing at customary time of day  May select cloting but does not consider temperature, season or occasion  Dons items slowly and correctly but cannot alter rate of dressing  May have difficulty with fasteners  May fail to notice errors out of visual field (shirt not tucked in in back)  Bathing:  Recognizes need for a bath but may initiate at inappropriate times  Moves slowly through familiar sequence when items are available  May not ask for help with a problem  May not notice wet floors  Watch for floods/falls  Walking/Exercising:  May ask to be led to a new environment or may resist going to unfamiliar places  May not note changes in terrain or stairs and may trip  May be confused with changes in landmarks  May become anxious in high stimulus environments (airports, malls, casinos)  Eating:  Initiates coming to the table at routine times  Uses common utensils in customary fashion  Does not notice crumbs or spills  May be clumsy with utensils  Unable to eat and talk at same time but is aware at others at the table  May not check food temperature of remember restrictions  Monitor compliance with dietary restrictions  Toileting:  Initiate toileting activities  May need supervision to check results of wiping    May not adjust garments in the back  May use excessive toilet paper  May need to be escorted to public rest rooms  May take much longer than average to complete tasks  Medications:  Initiates taking familiar dose of medication at regular time of day  Recognizes prescriptions and may recognize a problem, but will not seek assistance  May not understand need or purpose of medications  Changes in routine will confuse the individual and disrupt compliance  Provide frequent supervision  Use of adaptive devices:   May accept adaptive devices that use familiar actions and sequences  May forget to use cane, walker  Housekeeping:  Initiates a well learned housekeeping routine when supplies are accessible  Asks for next step in unfamiliar tasks  Check quality of cleaning results  Food Preparation:  Does not plan for food  May make unreasonable, unhealthy requests  May not understand balanced diet  May prepare a simple cold meal like a sandwich  May cut or burn self when using hot or sharp equipment  Restrict access or supervise all electrical or gas appliances  Store undesirable equipment away from view  Spending money:  Completes a simple monetary transaction for a small purchase without assistance  Does not know price of most goods and services  Shopping:  May walk a short distance to a familiar shop to purchase items  Completes actions very slowly  Generally does not anticipate cost, compare prices or adhere to a budget  May not ask if items are not in usual location  Does not know prices of most goods and services  May resist assistance with shopping  May become highly anxious in shopping malls  May be able to tolerate short trips only  Laundry:  Initiates a request for clean clothing or places dirty clothes in a hamper  May not sort or measure soap  May  not differentiate between dry clean, hand or machine wash  Supervise use of washing machine    Traveling:  May initiate traveling to familiar place by familiar means (bus)  May become easily confused at environmental changes  May resist going to unfamiliar locations or altering familiar means of transportation  May not tolerate travel for more than 1 hour  Telephone:  May be able to use a public telephone to call a familiar number  If a problem occurs may not ask for help  May call emergency or familiar numbers excessively  May ignore time of day when calling  Driving: Should not operate a motor vehicle  Assessment   Limitation Decreased ADL status; Decreased UE strength;Decreased Safe judgement during ADL;Decreased cognition;Decreased endurance;Decreased self-care trans;Decreased high-level ADLs   Prognosis Good   Assessment Pt is a 80 y o  female seen for OT evaluation s/p admission to [address] [place] [address] on 4/6/2022 due to Altered mental status  Pt with no recent admissions in the last 2 months  Pt has a significant PMH impacting occupational performance including: HTN, HLD, insomnia  Pt with active OT evaluation and treatment orders and activity orders for Up and OOB as tolerated   PTA pt living alone in apartment, pt (I) with all ADLs and IADLs  (-)falls, (-)drives  Pt is motivated to return to home and PLOF  Personal and environmental factors supporting pt at time of IE include attitude towards recovery and accessible home environment  Personal and environmental factors inhibiting engagement in occupations include limited social support  During evaluation pt performed as is outlined above in flowsheet  Pt required VC for safety and VC for attention to task  Standardized assessments used to assist in identifying performance deficits include AMPAC 6-Clicks, Barthel ADL Index and ACLS   Performance deficits that affect the pts occupational performance during the initial evaluation include impaired balance, functional mobility, endurance, activity tolerance, forward functional reach, functional standing tolerance and overall strength, attention to task, safety awareness, insight into deficits and pacing of tasks  Based on pts functional performance and deficits the following occupations will be addressed in OT treatments in order to maximize pts independence and overall occupational performance: grooming, bathing/showering, toileting and toilet hygiene, dressing, functional mobility, meal preparation, medication management and household preparation  Goals are listed below  Upon discharge from acute care setting recommend d/c to environment with 24/7 care  This evaluation required an extensive review of medical and/or therapy records and additional review of physical, cognitive and psychosocial history related to functional performance  Based upon functional performance deficits and assessments, this evaluation has been identified as a high complexity evaluation  Goals   Patient Goals to go home   LTG Time Frame 10-14   Long Term Goal see goals listed below   Plan   Treatment Interventions ADL retraining;Functional transfer training; Compensatory technique education; Energy conservation; Activityengagement;Equipment evaluation/education;Cognitive reorientation;Patient/family training   Goal Expiration Date 04/17/22   OT Treatment Day 0   OT Frequency 2-3x/wk   Recommendation   OT Discharge Recommendation Home with outpatient rehabilitation  (24/7 care; + OP cognitive therapy)   Additional Comments  Per ACLS, recommend d/c to environment with 24/7 care   AM-PAC Daily Activity Inpatient   Lower Body Dressing 3   Bathing 3   Toileting 3   Upper Body Dressing 3   Grooming 3   Eating 4   Daily Activity Raw Score 19   Daily Activity Standardized Score (Calc for Raw Score >=11) 40 22   AM-PAC Applied Cognition Inpatient   Following a Speech/Presentation 1   Understanding Ordinary Conversation 2   Taking Medications 1   Remembering Where Things Are Placed or Put Away 1   Remembering List of 4-5 Errands 1   Taking Care of Complicated Tasks 1 Applied Cognition Raw Score 7   Applied Cognition Standardized Score 15 17   Barthel Index   Feeding 10   Bathing 0   Grooming Score 5   Dressing Score 10   Bladder Score 10   Bowels Score 10   Toilet Use Score 10   Transfers (Bed/Chair) Score 10   Mobility (Level Surface) Score 10   Stairs Score 0   Barthel Index Score 75        GOALS:   Goals established in order to promote pt's established goal of returning home     -Patient will perform grooming tasks standing at sink with overall Mod I    -Patient will be Mod I with UB ADLs using AE and AD as needed     -Patient will be Mod I with LB ADLs with use of AE and AD as needed    -Patient will complete toileting w/ Mod I w/ G hygiene/thoroughness    -Patient will demonstrate Mod I with bed mobility for ability to manage own comfort and initiate OOB tasks      -Patient will perform functional transfers with Mod I to/from all surfaces using DME as needed    -Patient will be Mod I with functional mobility to/from bathroom for increased independence with toileting tasks    -Patient will engage in ongoing cognitive assessment in order to assist with safe discharge planning/recommendations     -Patient will follow multi-step instructions with no VC in order to safely complete functional tasks     -Patient will be mod I with light housekeeping tasks with min VC for safety in order for safe return to least restrictive environment    -Patient will be (S)I with light meal prep activities in order for safe return to PLOF    -Patient will be (S) with medication management        The patient's raw score on the AM-PAC Daily Activity inpatient short form is 19, standardized score is 40 22, greater than 39 4  Patients at this level are likely to benefit from discharge to home  Please refer to the recommendation of the Occupational Therapist for safe discharge planning         At the end of the session, all needs met and pt supine in bed and call bell within [facility], OTR/L

## 2022-04-07 NOTE — ASSESSMENT & PLAN NOTE
POA 80 y o  female presenting with new onset AMS after possible drug overdose/intoxication with home medications  Reviewing pt's medication list shows Remeron/Mirtazipine which was missing from medical bag that friend brought in with pt in ED    UDS/EKG/CT unremarkable     Plan:  - Monitor VS  - Monitor mental status  - Inpatient consult to Geriatrics

## 2022-04-07 NOTE — ASSESSMENT & PLAN NOTE
Continue following medications:  - Continue amlodipine 10 mg  - Continue Lisinopril 20 mg   - Continue HCTZ 25 mg    - OP blood pressure monitoring and logging with a follow up appointment to PCP for medication adjustment in 2-3 days  Caretaker Rajani called and information relayed as well
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
## 2022-04-07 NOTE — H&P
H&P- Lecnho Stephenson 1950, 80 y o  female MRN: [id number]  Unit/Bed#: S -59 Encounter: [id number]  Primary Care Provider: Dena Collins DO   Date and time admitted to hospital: 4/6/2022  7:45 PM    * Altered mental status  Assessment & Plan  POA 80 y o  female presenting with new onset AMS after possible drug overdose/intoxication with home medications  Reviewing pt's medication list shows Remeron/Mirtazipine which was missing from medical bag that friend brought in with pt in ED  UDS/EKG/CT unremarkable     Plan:  - Monitor VS  - Monitor mental status  - Inpatient consult to Geriatrics    Hyperlipidemia  Assessment & Plan  Continue statin therapy daily     Benign essential hypertension  Assessment & Plan  BP stable    Plan:  - Continue amlodipine 10 mg  - Continue Lisinopril 20 mg   - Continue HCTZ 25 mg    Primary insomnia  Assessment & Plan  - Pt taking mirtazinpine/Remeron 15 mg     Plan:  - Hold in lieu of AMS with possible overdose   - Melatonin 3 mg     Hypothyroidism  Assessment & Plan  Continue levothyroxine 50 mcg daily     Psoriasis  Assessment & Plan  Pt exhibiting significant exacerbation of psoriasis on neck/back/face    Plan:  - Triamcinolone 0 1 % cream   - Consider restarting home medications of clindamycin/otezla         [address]     VTE Pharmacologic Prophylaxis: VTE Score: 1 Low Risk (Score 0-2) - Encourage Ambulation  Code Status: Level 1 - Full Code   Discussion with family: Patient declined call to   Anticipated Length of Stay: Patient will be admitted on an observation basis with an anticipated length of stay of less than 2 midnights secondary to AMS  Chief Complaint: AMS    History of Present Illness:  Lencho Stehpenson is a 80 y o  female with a PMH of breast cancer, hypothyroidism, depression, psoriasis, HTN, HL,  who presents with AMS  Hx of present illness was obtained by caregiver    Caregiver reports that patient started experiencing mental status changes over the weekend, for which the pt started to exhibit bizarre behavior of saying she was going to visit already  relatives including her mother, caregivers father, and that she was buying presents for "twins" and was packing a suitcase to travel to [facility] where her two older daughter live  Caregiver notes that the relationship between pt and her two older daughters is strained and hasn't talked to them in years  Pt also started to exhibit visual hallucinations, saying that she was seeing angels and the aforementioned  relatives  Caregiver also noted noticing the pt's house being disheveled for which the pt is known to be extremely clean  Lastly, pt was seen today engulfing multiple different home medications at once, and caregiver reports not knowing which or how much many of the home medications were taken  Pt was evaluated in ED without any significant findings but admitted under observations for altered mental status without any clear etiology of patient's current mental breakdown  Pt during encounter was silent and caregiver gave detailed hx of pt's mentation for which she notes seeing the patient daily and never witnessing pt exhibiting behaviors like this before  Caregiver did report that the patient has a hx of depression but is unsure if pt has currently become depressed and decided to overdose intentionally for suicide or if she accidentally took multiple medications  An attempt was made to ascertain a review of system from the pt, she denied all problems and was cooperative overall       Review of Systems:  Review of Systems   Unable to perform ROS: Psychiatric disorder       Past Medical and Surgical History:   Past Medical History:   Diagnosis Date    Breast cancer [facility]) 1996 lt mastectomy    Disease of thyroid gland     Hemorrhoids     Hyperlipidemia     Hypertension     Psoriasis        Past Surgical History:   Procedure Laterality Date    BREAST CYST EXCISION Right     benign    BREAST LUMPECTOMY Left      SECTION      COLONOSCOPY      Complete    MASTECTOMY Left     Onset: 1996    SALPINGOOPHORECTOMY Bilateral     age 40    TOTAL ABDOMINAL HYSTERECTOMY      age 40       Meds/Allergies:  Prior to Admission medications    Medication Sig Start Date End Date Taking?  Authorizing Provider   amLODIPine (NORVASC) 10 mg tablet Take 1 tablet (10 mg total) by mouth daily 22  Yes Jacqueline Franklin DO   calcium carbonate-vitamin D (OSCAL-D) 500 mg-200 units per tablet Take 2 tablets by mouth daily with breakfast 20  Yes Slim Pierson MD   hydrochlorothiazide (HYDRODIURIL) 25 mg tablet Take 1 tablet (25 mg total) by mouth daily 2/3/22  Yes Jacqueline Franklin DO   levothyroxine 50 mcg tablet Take 1 tablet (50 mcg total) by mouth daily 21  Yes Jacqueline Franklin DO   clindamycin (CLEOCIN T) 1 % lotion  19   Historical Provider, MD   Diclofenac Sodium (VOLTAREN) 1 % Apply 4 g topically 4 (four) times a day 22   Jacqueline Franklin DO   fluocinonide (LIDEX) 0 05 % external solution  3/5/18   Historical Provider, MD   lidocaine (XYLOCAINE) 2 % topical gel Apply topically as needed for mild pain 21   Sy Ventura DO   lisinopril (ZESTRIL) 20 mg tablet Take 1 tablet (20 mg total) by mouth daily 2/3/22   Jacqueline Franklin DO   mirtazapine (REMERON) 15 mg tablet Take 1 tablet (15 mg total) by mouth daily at bedtime 19   Cielo Thomas MD   mometasone (ELOCON) 0 1 % cream Apply 50 application topically 2 (two) times a day  17   Yuri Sweeney MD   nystatin (MYCOSTATIN) powder Apply topically 4 (four) times a day 21   Jacqueline Franklin DO   OTEZLA 30 MG TABS 30 mg 2 (two) times a day  3/19/18   Yuri Sweeney MD   simvastatin (ZOCOR) 20 mg tablet Take 1 tablet (20 mg total) by mouth daily at bedtime 3/21/22   Jacqueline Coppersmith, DO     I have been unable to obtain / verify an up to date medication list despite all reasonable attempts  Allergies: No Known Allergies    Social History:  Marital Status:    Occupation: Retired  Patient Pre-hospital Living Situation: Home  Patient Pre-hospital Level of Mobility: walks  Patient Pre-hospital Diet Restrictions: None  Substance Use History:   Social History     Substance and Sexual Activity   Alcohol Use No     Social History     Tobacco Use   Smoking Status Never Smoker   Smokeless Tobacco Never Used     Social History     Substance and Sexual Activity   Drug Use No       Family History:  Family History   Problem Relation Age of Onset    Arthritis Mother     Diabetes Mother     Breast cancer Mother 67    Colon cancer Mother     Hypertension Mother     Diabetes Father     Glaucoma Father     Prostate cancer Father     Asthma Brother     Hypertension Brother     Colon cancer Maternal Uncle 80    Breast cancer Maternal Aunt [de-identified]    No Known Problems Maternal Grandmother     No Known Problems Paternal Grandmother     No Known Problems Maternal Aunt     No Known Problems Maternal Aunt     No Known Problems Paternal Aunt     No Known Problems Paternal Aunt     No Known Problems Paternal Grandfather     Breast cancer Cousin        Physical Exam:     Vitals:   Blood Pressure: 143/80 (04/07/22 0018)  Pulse: 69 (04/07/22 0018)  Temperature: 98 3 °F (36 8 °C) (04/07/22 0018)  Temp Source: Oral (04/07/22 0018)  Respirations: 16 (04/07/22 0018)  Height: 4' 8" (142 2 cm) (04/07/22 0018)  Weight - Scale: 71 9 kg (158 lb 8 oz) (04/07/22 0539)  SpO2: 96 % (04/07/22 0018)    Physical Exam  Vitals reviewed  Constitutional:       General: She is not in acute distress  Appearance: Normal appearance  She is obese  She is not ill-appearing, toxic-appearing or diaphoretic  HENT:      Head: Normocephalic and atraumatic        Right Ear: External ear normal       Left Ear: External ear normal       Nose: Nose normal       Mouth/Throat:      Mouth: Mucous membranes are moist       Pharynx: Oropharynx is clear  Eyes:      General: No scleral icterus  Extraocular Movements: Extraocular movements intact  Conjunctiva/sclera: Conjunctivae normal    Neck:      Vascular: No carotid bruit  Cardiovascular:      Rate and Rhythm: Normal rate and regular rhythm  Pulses: Normal pulses  Heart sounds: Normal heart sounds  No murmur heard  No friction rub  No gallop  Pulmonary:      Effort: Pulmonary effort is normal       Breath sounds: Normal breath sounds  No wheezing, rhonchi or rales  Chest:      Chest wall: No tenderness  Abdominal:      General: Abdomen is flat  Bowel sounds are normal  There is no distension  Palpations: Abdomen is soft  Tenderness: There is no abdominal tenderness  There is no right CVA tenderness, left CVA tenderness, guarding or rebound  Hernia: No hernia is present  Musculoskeletal:      Cervical back: Normal range of motion and neck supple  No rigidity or tenderness  Right lower leg: No edema  Left lower leg: No edema  Lymphadenopathy:      Cervical: No cervical adenopathy  Skin:     General: Skin is warm and dry  Neurological:      General: No focal deficit present  Mental Status: She is alert        Comments: Pt able to follow commands, no overt deficit noted on Neurological exam   Psychiatric:         Mood and Affect: Mood normal           Additional Data:     Lab Results:  Results from last 7 days   Lab Units 04/07/22  0533   WBC Thousand/uL 8 09   HEMOGLOBIN g/dL 13 8   HEMATOCRIT % 44 4   PLATELETS Thousands/uL 334   NEUTROS PCT % 56   LYMPHS PCT % 30   MONOS PCT % 10   EOS PCT % 2     Results from last 7 days   Lab Units 04/06/22  2049   SODIUM mmol/L 144   POTASSIUM mmol/L 4 2   CHLORIDE mmol/L 106   CO2 mmol/L 30   BUN mg/dL 26*   CREATININE mg/dL 0 64   ANION GAP mmol/L 8   CALCIUM mg/dL 8 4   ALBUMIN g/dL 3 1*   TOTAL BILIRUBIN mg/dL 0 30   ALK PHOS U/L 77   ALT U/L 16 AST U/L 23   GLUCOSE RANDOM mg/dL 173*     Results from last 7 days   Lab Units 04/07/22  0533   INR  1 05                   Imaging: Reviewed radiology reports from this admission including: CT head  CT head without contrast   Final Result by Ivis Cruz MD (04/06 2234)      No acute intracranial abnormality  Workstation performed: [id number]             EKG and Other Studies Reviewed on Admission:   · EKG: NSR  HR 79     ** Please Note: This note has been constructed using a voice recognition system   **
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
## 2022-04-07 NOTE — PHYSICAL THERAPY NOTE
PHYSICAL THERAPY EVALUATION  NAME:  Mariela Ruffin  DATE: 04/07/22    AGE:   80 y o  Mrn:   [id number]  ADMIT DX:  Visual hallucinations [R44 1]  Disorientation [R41 0]  AMS (altered mental status) [R41 82]  Problem List:   Patient Active Problem List   Diagnosis    Benign essential hypertension    Hyperlipidemia    Hypothyroidism    Lymphedema    Primary insomnia    IFG (impaired fasting glucose)    Cancer of breast, female ([address] )    Osteoarthritis    Age-related osteoporosis without current pathological fracture    Peripheral neuropathy    Psoriasis    Edema of left lower eyelid    Chronic bilateral low back pain without sciatica    Medicare annual wellness visit, subsequent    Obesity (BMI 30 0-34  9)    Polyp of colon    Encounter for immunization    Yeast infection of the skin    Muscular chest pain    Encounter for hepatitis C screening test for low risk patient    Hyperglycemia    Acute neck sprain, initial encounter    Altered mental status     Vitals:    04/07/22 0018 04/07/22 0539 04/07/22 0724 04/07/22 1612   BP: 143/80  161/71 121/56   BP Location: Right arm   Right arm   Pulse: 69  59 75   Resp: 16   18   Temp: 98 3 °F (36 8 °C)  98 4 °F (36 9 °C) 98 4 °F (36 9 °C)   TempSrc: Oral   Oral   SpO2: 96%  98% 93%   Weight:  71 9 kg (158 lb 8 oz)     Height: 4' 8" (1 422 m)          Length Of Stay: 1  Performed at least 2 patient identifiers during session: Name and Birthday  PHYSICAL THERAPY EVALUATION :    04/07/22 1635   PT Last Visit   PT Visit Date 04/07/22   Note Type   Note type Evaluation   Pain Assessment   Pain Assessment Tool 0-10   Pain Score No Pain   Restrictions/Precautions   Other Precautions Bed Alarm; Chair Alarm;Cognitive;Pain; Fall Risk;Impulsive   Home Living   Type of [address] One level   Additional Comments no A device prior to admit   Prior Function   Level of [place] Independent with ADLs and functional mobility   Lives With Alone Receives Help From Friend(s)   ADL Assistance Independent   IADLs   (rode bus for transportation per friend)   Falls in the last 6 months 0   Vocational Retired   General   Family/Caregiver Present Yes  (friend)   Cognition   Overall Cognitive Status Impaired   Arousal/Participation Alert   Attention Attends with cues to redirect   Orientation Level Oriented to person   Memory Decreased short term memory   Following Commands Follows one step commands with increased time or repetition  (during MMT)   Subjective   Subjective Patient needs encouragement to participate in out of bed mobility, frequently attempts to try to get back to bed prior to the training in halls   Strength RLE   R Hip Flexion 4+/5   R Knee Extension 4+/5   R Ankle Dorsiflexion 4+/5   Strength LLE   L Hip Flexion 4+/5   L Knee Extension 4+/5   L Ankle Dorsiflexion 4+/5   Vision-Basic Assessment   Current Vision Wears glasses only for reading   Coordination   Movements are Fluid and Coordinated 1   Light Touch   RLE Light Touch Grossly intact   LLE Light Touch Grossly intact   Bed Mobility   Supine to Sit 6  Modified independent   Additional items HOB elevated   Sit to Supine 6  Modified independent   Additional items HOB elevated   Transfers   Sit to Stand 6  Modified independent   Additional items Increased time required   Stand to Sit 6  Modified independent   Additional items Increased time required   Ambulation/Elevation   Gait pattern Inconsistent adrianne; Wide SHYLA; [place]   Gait Assistance 5  Supervision   Assistive Device None   Distance 250'  (Comfortable gait speed of  58 m/sec)   Stair Management Assistance Not tested  (Patient denies stairs inside or outside apartment)   Balance   Static Sitting Normal   Static Standing Fair +   Ambulatory Fair +   Endurance Deficit   Endurance Deficit No; Casi's Bariatric body type PEAR ADDuction   Activity Tolerance   Activity Tolerance Patient limited by fatigue   Medical Staff Made Aware [place]oke [place] [facility] OT   Nurse Made Aware Spoke to [facility]    Assessment   Prognosis Good   Problem List Decreased mobility;Obesity; Decreased cognition  (Gait deviations)   Assessment Pt is a 80 y o  female seen for PT evaluation s/p admit to [facility] on 4/6/2022 w/ Altered mental status  Order placed for PT  Prior to admission: Pt lived alone in a 1 floor apartment, no DME  Upon evaluation: Pt did not require any physical assistance for bed mobility, transfers, ambulation; but did display gait deviations during mobility outside room  Pt's clinical presentation is currently evolving given the functional mobility deficits above, especially (but not limited to) gait deviations and decreased cognition, coupled with fall risks including impaired balance, and obesity, and combined with medical complications of Recent reports of hallucinations  Although patient does display gait deviations and slower gait speed during the session, she reports this is her baseline mobility level  Patient declined trials of single-point cane here and offers for cane upon discharge  Recommend patient be placed on walking program while in the hospital   No further IP PT indicated at this time as patient is close to her mobility baseline       Barriers to Discharge Inaccessible home environment   Goals   Patient Goals To go home tomorrow   PT Treatment Day 0   Plan   Treatment/Interventions Spoke to nursing   PT Frequency Other (Comment)  (DC from IP PT)   Recommendation   PT Discharge Recommendation No rehabilitation needs  (Recommend continued 1 floor setup upon discharge)   Equipment Recommended Cane  (Offered cane, patient declined)   AM-PAC Basic Mobility Inpatient   Turning in Bed Without Bedrails 4   Lying on Back to Sitting on Edge of Flat Bed 4   Moving Bed to Chair 4   Standing Up From Chair 4   Walk in Room 3   Climb 3-5 Stairs 2   Basic Mobility Inpatient Raw Score 21   Basic Mobility Standardized Score 45 55   Highest Level Of Mobility   JH-HLM Goal 6: Walk 10 steps or more   JH-HLM Highest Level of Mobility 8: Walk 250 feet ot more   JH-HLM Goal Achieved Yes   Dynamic Gait Index   Gait level surface  2   Change in gait speed 3   Gait with horizontal head turns  3   Gait with vertical head turns  3   End of Consult   Patient Position at End of Consult Supine; All needs within reach;Bed/Chair alarm activated   (Please find full objective findings from PT assessment regarding body systems outlined above)  Pt to benefit from continued skilled PT tx while in hospital and upon DC to address deficits as defined above and maximize level of functional mobility  Personal factors affecting pt at time of IE include: limited home support, advanced age and past experience    The patient's [facility] Basic Mobility Inpatient Short Form Raw Score is 21, Standardized Score is 45 55  A standardized score greater than 40 78 or Raw Score of 16 suggests the patient may benefit from discharge to home, which DOES coincide with CURRENT above PT recommendations  However please refer to therapist recommendation for discharge planning given other factors that may influence destination  Adapted from Darren Natarajan [facility] of AM-PAC 6-Clicks Basic Mobility and Daily Activity Scores With Discharge Destination  Physical Therapy, 2021;101:1-9  DOI: 10 [id number]      Portions of the record may have been created with voice recognition software  Occasional wrong word or "sound a like" substitutions may have occurred due to the inherent limitations of voice recognition software    Read the chart carefully and recognize, using context, where substitutions have occurred

## 2022-04-07 NOTE — ASSESSMENT & PLAN NOTE
Pt exhibiting significant exacerbation of psoriasis on neck/back/face    Plan:  - Triamcinolone 0 1 % cream   - Consider restarting home medications of clindamycin/otezla

## 2022-04-07 NOTE — DISCHARGE INSTRUCTIONS
Alteración del estado mental   LO QUE NECESITA SABER:   La alteración del estado mental es patricia interrupción en el funcionamiento de francisco cerebro que causa cambios de comportamiento  Keiry cambio puede pasar de repente o en el transcurso de unos días  La alteración del estado mental varia entre patricia leve confusión hasta patricia desorientación total y un aumento de sueño hasta estar en estado de coma  INSTRUCCIONES SOBRE EL SAMIRA HOSPITALARIA:   Medicamentos:  · Los antibióticos ayudan a combatir o prevenir contra infecciones  Oval viktor antibióticos hasta que se los termine, aunque se sienta mejor  · Oval viktor medicamentos renee se le haya indicado  Consulte con francisco médico si usted chad que francisco medicamento no le está ayudando o si presenta efectos secundarios  Infórmele si es alérgico a algún medicamento  Mantenga patricia lista actualizada de los [place], las vitaminas y los productos herbales que terry  Incluya los siguientes datos de los medicamentos: cantidad, frecuencia y motivo de administración  Traiga con usted la lista o los envases de las píldoras a viktor citas de seguimiento  Lleve la lista de los medicamentos con usted en lucrecia de patricia emergencia  Acuda a la consulta de control con francisco médico según las indicaciones: Anote viktor preguntas para que se acuerde de hacerlas andres viktor visitas  Comuníquese con francisco médico si:  · Usted presenta cambios súbitos de comportamiento  · Usted tiene más sueño o está confundido más que lo usual     · Usted tiene preguntas o inquietudes acerca de francisco condición o cuidado  Busque atención médica de inmediato o llame al 911 si:  · Al hablar le norris trabajo pronunciar las palabras o presenta un lenguaje mal articulado  · Usted sufre patricia convulsión  · Usted no puede  alguna parte de francisco cuerpo con facilidad  · Mamadou Chambers a usted es incapaz de despertarlo      © Copyright [employer] 2022 Information is for End User's use only and may not be sold, redistributed or otherwise used for commercial purposes  All illustrations and images included in CareNotes® are the copyrighted property of A D A M , Inc  or [address] [place] [address] es sólo para uso en educación  Francisco intención no es darle un consejo médico sobre enfermedades o tratamientos  Colsulte con francisco [employer] farmacéutico antes de seguir cualquier régimen médico para saber si es seguro y efectivo para usted

## 2022-04-07 NOTE — ASSESSMENT & PLAN NOTE
BP stable    Plan:  - Continue amlodipine 10 mg  - Continue Lisinopril 20 mg   - Continue HCTZ 25 mg

## 2022-04-07 NOTE — PLAN OF CARE
Problem: OCCUPATIONAL THERAPY ADULT  Goal: Performs self-care activities at highest level of function for planned discharge setting  See evaluation for individualized goals  Description: Treatment Interventions: ADL retraining,Functional transfer training,Compensatory technique education,Energy conservation,Activityengagement,Equipment evaluation/education,Cognitive reorientation,Patient/family training          See flowsheet documentation for full assessment, interventions and recommendations  Note: Limitation: Decreased ADL status,Decreased UE strength,Decreased Safe judgement during ADL,Decreased cognition,Decreased endurance,Decreased self-care trans,Decreased high-level ADLs  Prognosis: Good  Assessment: Pt is a 80 y o  female seen for OT evaluation s/p admission to [address] [place] [address] on 4/6/2022 due to Altered mental status  Pt with no recent admissions in the last 2 months  Pt has a significant PMH impacting occupational performance including: HTN, HLD, insomnia  Pt with active OT evaluation and treatment orders and activity orders for Up and OOB as tolerated   PTA pt living alone in apartment, pt (I) with all ADLs and IADLs  (-)falls, (-)drives  Pt is motivated to return to home and PLOF  Personal and environmental factors supporting pt at time of IE include attitude towards recovery and accessible home environment  Personal and environmental factors inhibiting engagement in occupations include limited social support  During evaluation pt performed as is outlined above in flowsheet  Pt required VC for safety and VC for attention to task  Standardized assessments used to assist in identifying performance deficits include AMPAC 6-Clicks, Barthel ADL Index and ACLS   Performance deficits that affect the pts occupational performance during the initial evaluation include impaired balance, functional mobility, endurance, activity tolerance, forward functional reach, functional standing tolerance and overall strength, attention to task, safety awareness, insight into deficits and pacing of tasks  Based on pts functional performance and deficits the following occupations will be addressed in OT treatments in order to maximize pts independence and overall occupational performance: grooming, bathing/showering, toileting and toilet hygiene, dressing, functional mobility, meal preparation, medication management and household preparation  Goals are listed below  Upon discharge from acute care setting recommend d/c to environment with 24/7 care  This evaluation required an extensive review of medical and/or therapy records and additional review of physical, cognitive and psychosocial history related to functional performance  Based upon functional performance deficits and assessments, this evaluation has been identified as a high complexity evaluation       OT Discharge Recommendation: (S) Home with outpatient rehabilitation (24/7 care; + OP cognitive therapy)

## 2022-04-07 NOTE — ASSESSMENT & PLAN NOTE
- Psychiatry evaluated patient  It was determined that no inpatient psychiatric treatment is currently indicated  It was discussed with the caretaker Javier Nguyen that a 2 week PHP program is very strongly recommended a long with 24-hour observation on discharge  - The patient is agreeable to the [facility] program on the day of discharge  I personally called the caretaker Javier Nguyen and discussed the importance of continual observation (the caretaker will accompany the patient home and stay the weekend after which other plans will be made)  - The caretaker and patient demonstrated understanding of the gravity of the need of having the [facility] program and having observation  All questions were answered  - The caretaker stated that she will call the PCP (Dr Rosalind Rodgers) to continue to work on psychiatric services and support for the patient      - Follow up with Psychiatry (referral placed)  - Follow up with PHP program (referral placed)  - Mirtazapine 15mg PO QHS for 3 days then 30mg PO QHS after that

## 2022-04-07 NOTE — UTILIZATION REVIEW
Initial Clinical Review    Admission: Date/Time/Statement:   Admission Orders (From admission, onward)     Ordered        22 2309  INPATIENT ADMISSION  Once                      Orders Placed This Encounter   Procedures    INPATIENT ADMISSION     Standing Status:   Standing     Number of Occurrences:   1     Order Specific Question:   Level of Care     Answer:   Med Surg [16]     Order Specific Question:   Estimated length of stay     Answer:   More than 2 Midnights     Order Specific Question:   Certification     Answer:   I certify that inpatient services are medically necessary for this patient for a duration of greater than two midnights  See H&P and MD Progress Notes for additional information about the patient's course of treatment  ED Arrival Information     Expected Arrival Acuity    - 2022 19:39 Urgent         Means of arrival Escorted by Service Admission type    Walk-In Friend Hospitalist Urgent         Arrival complaint    AMS        Chief Complaint   Patient presents with    Altered Mental Status     per pt caretaker, pt has been confused which is not like her  per caretaker, earlier today pt was normal, but neighbor reported that pt has been confuse for about four days  denies hx of dementia  pt caretaker reports pt taking about 4 pills, does not know of what       Initial Presentation: 80 y o  female  with a PMH of breast cancer, hypothyroidism, depression, psoriasis, HTN, HLD  who presents to ED from home with AMS that started per caregiver over the weekend  Pt with bizarre behaviors,  stating she was going to visit  relatives and visit daughters she has been estranged from for years, having visual hallucinations, home disheveled when is normally extremely clean  Pt was seen today engulfing multiple different home medications at once, and caregiver reports not knowing which or how much many of the home medications were taken   On exam, able to follow commands, no overt neuro deficit  CT head shows nothing acute  Pt given po haldol in ED  Pt admitted as Inpatient with altered mental status after possible drug OD/intoxication with home meds  Plan -Monitor mental status and vitals, gerontology consult  Hold home Remeron   Gerontology-metabolic encephalopathy -suspect underlying cognitive impairment with history of a psychiatric disorder  Evidence of delirium with visual hallucinations  On speaking with family ,strong suspicion for bipolar disorder  Patient will be maintained on delirium precautions  Recommend psych consult  Continue to home remeron  OT - recommends outpatient rehab on d/c with cognitive therapy  Date: 4/7   Day 2:   Pt more alert today  States she took the medications all at once due to being sad regarding her relationship with her daughters  Currently denies any suicidal or homicidal ideation  Plan - maintain delirium monitoring  Continue home BP meds  Await psych consult  PT-presents with pain, decreased strength, decreased range, flexibility, as well as tolerance to activity and postural awareness   Pt would benefit skilled PT intervention in order to address these impairments in order to be able to perform all desired activities   Behavioral health-altered mental status and questionable suicide attempt via overdose on Mirtazapine  Pt responds to same questions differently, on 2nd visit with pt , she denies SI/HI/AVH  Remorseful about suicide attempt, contracts for safety   Pt oriented x4   No indication for IP psych at this time, partial hospital program for at least 2 weeks  Increase remeron to 30 mg for insomnia, depression, and anxiety-starting tomorrow   Reassess cognition tomorrow      ED Triage Vitals   Temperature Pulse Respirations Blood Pressure SpO2   04/06/22 1948 04/06/22 1948 04/06/22 1948 04/06/22 1948 04/06/22 1948   98 °F (36 7 °C) 70 18 167/72 100 %      Temp Source Heart Rate Source Patient Position - Orthostatic VS BP Location FiO2 (%) 04/06/22 1948 04/06/22 1948 04/06/22 1948 04/06/22 1948 --   Oral Monitor Sitting Right arm       Pain Score       04/07/22 0018       No Pain          Wt Readings from Last 1 Encounters:   04/07/22 71 9 kg (158 lb 8 oz)     Additional Vital Signs:   Date/Time Temp Pulse Resp BP MAP (mmHg) SpO2   04/07/22 07:24:38 98 4 °F (36 9 °C) 59 -- 161/71 101 98 %   04/07/22 00:18:40 98 3 °F (36 8 °C) 69 16 143/80 101 96 %   04/07/22 0018 -- -- -- -- -- --   04/07/22 0006 -- 70 -- 164/74 106 98 %   04/06/22 2214 98 3 °F (36 8 °C) -- -- -- -- --   04/06/22 2200 -- 74 18 133/61 88 98 %   04/06/22 2045 -- 80 18 151/68 98 97 %     Date and Time Eye Opening Best Verbal Response Best Motor Response Daija Coma Scale Score   04/07/22 0730 4 4 6 14   04/07/22 0018 4 4 6 14   04/06/22 2030 4 4 6 14   04/06/22 1948 4 4 6 14       Pertinent Labs/Diagnostic Test Results:   · 4/6 ECG-NSR  HR 79     CT head without contrast   Final Result by Raya Delgado MD (04/06 2234)      No acute intracranial abnormality                    Workstation performed: [id number]               Results from last 7 days   Lab Units 04/07/22 0533 04/06/22 2049   WBC Thousand/uL 8 09 9 27   HEMOGLOBIN g/dL 13 8 14 1   HEMATOCRIT % 44 4 44 3   PLATELETS Thousands/uL 334 380   NEUTROS ABS Thousands/µL 4 59 5 07         Results from last 7 days   Lab Units 04/07/22 0533 04/06/22 2049   SODIUM mmol/L 141 144   POTASSIUM mmol/L 3 6 4 2   CHLORIDE mmol/L 107 106   CO2 mmol/L 27 30   ANION GAP mmol/L 7 8   BUN mg/dL 20 26*   CREATININE mg/dL 0 66 0 64   EGFR ml/min/1 73sq m 89 90   CALCIUM mg/dL 8 5 8 4   MAGNESIUM mg/dL 2 0  --      Results from last 7 days   Lab Units 04/07/22  0533 04/06/22  2049   AST U/L 17 23   ALT U/L 15 16   ALK PHOS U/L 81 77   TOTAL PROTEIN g/dL 6 7 6 4   ALBUMIN g/dL 3 0* 3 1*   TOTAL BILIRUBIN mg/dL 0 25 0 30         Results from last 7 days   Lab Units 04/07/22  0533 04/06/22  2049   GLUCOSE RANDOM mg/dL 169* 173* Results from last 7 days   Lab Units 04/07/22  0533 04/06/22 2049   PROTIME seconds 13 7 14 2   INR  1 05 1 10   PTT seconds 33 32     Results from last 7 days   Lab Units 04/06/22 2049   TSH 3RD GENERATON uIU/mL 2 645                                                 Results from last 7 days   Lab Units 04/06/22 1955   CLARITY UA  Clear   COLOR UA  Yellow   SPEC GRAV UA  >=1 030   PH UA  6 0   GLUCOSE UA mg/dl Negative   KETONES UA mg/dl Negative   BLOOD UA  Trace*   PROTEIN UA mg/dl Negative   NITRITE UA  Negative   BILIRUBIN UA  Negative   UROBILINOGEN UA E U /dl 0 2   LEUKOCYTES UA  Negative   WBC UA /hpf 0-1   RBC UA /hpf 2-4   BACTERIA UA /hpf None Seen   EPITHELIAL CELLS WET PREP /hpf Occasional   MUCUS THREADS  Occasional*             Results from last 7 days   Lab Units 04/06/22 2145   AMPH/METH  Negative   BARBITURATE UR  Negative   BENZODIAZEPINE UR  Negative   COCAINE UR  Negative   METHADONE URINE  Negative   OPIATE UR  Negative   PCP UR  Negative   THC UR  Negative     Results from last 7 days   Lab Units 04/06/22 2049   ETHANOL LVL mg/dL <3   ACETAMINOPHEN LVL ug/mL <2*   SALICYLATE LVL mg/dL <3*             ED Treatment:   Medication Administration from 04/06/2022 1939 to 04/07/2022 0012       Date/Time Order Dose Route Action     04/06/2022 2211 haloperidol (HALDOL) tablet 2 mg 2 mg Oral Given        Past Medical History:   Diagnosis Date    Breast [facility]) 1996 1996 lt mastectomy    Disease of thyroid gland     Hemorrhoids     Hyperlipidemia     Hypertension     Psoriasis      Present on Admission:   Altered mental status   Benign essential hypertension   Psoriasis   Hypothyroidism   Hyperlipidemia   Primary insomnia      Admitting Diagnosis: Visual hallucinations [R44 1]  Disorientation [R41 0]  AMS (altered mental status) [R41 82]  Age/Sex: 80 y o  female  Admission Orders:  Scheduled Medications:  amLODIPine, 10 mg, Oral, Daily  docusate sodium, 100 mg, Oral, BID  hydrochlorothiazide, 25 mg, Oral, Daily  levothyroxine, 50 mcg, Oral, Early Morning  lisinopril, 20 mg, Oral, Daily  triamcinolone, , Topical, BID    hydrOXYzine HCL (ATARAX) tablet 25 mg  Dose: 25 mg  Freq: Once Route: PO  Start: 04/07/22 0300 End: 04/07/22 0304  Continuous IV Infusions:     PRN Meds:  acetaminophen, 650 mg, Oral, Q6H PRN  calcium carbonate, 1,000 mg, Oral, Daily PRN  ondansetron, 4 mg, Intravenous, Q6H PRN    ambulate QID    IP CONSULT TO PSYCHIATRY  IP CONSULT TO GERONTOLOGY    Network Utilization Review Department  ATTENTION: Please call with any questions or concerns to [phone] and carefully listen to the prompts so that you are directed to the right person  All voicemails are confidential   Louis Short all requests for admission clinical reviews, approved or denied determinations and any other requests to dedicated fax number below belonging to the campus where the patient is receiving treatment   List of dedicated fax numbers for the Facilities:  [address] DENIALS (Administrative/Medical Necessity) [phone]   [address] (Maternity/NICU/Pediatrics) [phone]   [address] [place] [address] [place] [address] [address] [place] [address] [address] [facility] [place] [address] [phone]

## 2022-04-08 VITALS
SYSTOLIC BLOOD PRESSURE: 145 MMHG | HEIGHT: 56 IN | DIASTOLIC BLOOD PRESSURE: 70 MMHG | WEIGHT: 158.5 LBS | RESPIRATION RATE: 18 BRPM | OXYGEN SATURATION: 97 % | TEMPERATURE: 98.2 F | HEART RATE: 76 BPM | BODY MASS INDEX: 35.65 KG/M2

## 2022-04-08 LAB
ALBUMIN SERPL BCP-MCNC: 3.3 G/DL (ref 3.5–5)
ALP SERPL-CCNC: 87 U/L (ref 46–116)
ALT SERPL W P-5'-P-CCNC: 15 U/L (ref 12–78)
ANION GAP SERPL CALCULATED.3IONS-SCNC: 10 MMOL/L (ref 4–13)
AST SERPL W P-5'-P-CCNC: 16 U/L (ref 5–45)
ATRIAL RATE: 79 BPM
BILIRUB SERPL-MCNC: 0.35 MG/DL (ref 0.2–1)
BUN SERPL-MCNC: 19 MG/DL (ref 5–25)
CALCIUM ALBUM COR SERPL-MCNC: 10 MG/DL (ref 8.3–10.1)
CALCIUM SERPL-MCNC: 9.4 MG/DL (ref 8.3–10.1)
CHLORIDE SERPL-SCNC: 104 MMOL/L (ref 100–108)
CO2 SERPL-SCNC: 27 MMOL/L (ref 21–32)
CREAT SERPL-MCNC: 0.71 MG/DL (ref 0.6–1.3)
ERYTHROCYTE [DISTWIDTH] IN BLOOD BY AUTOMATED COUNT: 14.3 % (ref 11.6–15.1)
GFR SERPL CREATININE-BSD FRML MDRD: 86 ML/MIN/1.73SQ M
GLUCOSE SERPL-MCNC: 147 MG/DL (ref 65–140)
HCT VFR BLD AUTO: 48.7 % (ref 34.8–46.1)
HGB BLD-MCNC: 15.5 G/DL (ref 11.5–15.4)
MCH RBC QN AUTO: 28.7 PG (ref 26.8–34.3)
MCHC RBC AUTO-ENTMCNC: 31.8 G/DL (ref 31.4–37.4)
MCV RBC AUTO: 90 FL (ref 82–98)
P AXIS: 64 DEGREES
PLATELET # BLD AUTO: 329 THOUSANDS/UL (ref 149–390)
PMV BLD AUTO: 10.8 FL (ref 8.9–12.7)
POTASSIUM SERPL-SCNC: 3.7 MMOL/L (ref 3.5–5.3)
PR INTERVAL: 138 MS
PROT SERPL-MCNC: 7.4 G/DL (ref 6.4–8.2)
QRS AXIS: 52 DEGREES
QRSD INTERVAL: 76 MS
QT INTERVAL: 374 MS
QTC INTERVAL: 428 MS
RBC # BLD AUTO: 5.41 MILLION/UL (ref 3.81–5.12)
SODIUM SERPL-SCNC: 141 MMOL/L (ref 136–145)
T WAVE AXIS: 54 DEGREES
VENTRICULAR RATE: 79 BPM
WBC # BLD AUTO: 7.87 THOUSAND/UL (ref 4.31–10.16)

## 2022-04-08 PROCEDURE — 93010 ELECTROCARDIOGRAM REPORT: CPT | Performed by: INTERNAL MEDICINE

## 2022-04-08 PROCEDURE — 80053 COMPREHEN METABOLIC PANEL: CPT

## 2022-04-08 PROCEDURE — 99239 HOSP IP/OBS DSCHRG MGMT >30: CPT | Performed by: INTERNAL MEDICINE

## 2022-04-08 PROCEDURE — 85027 COMPLETE CBC AUTOMATED: CPT

## 2022-04-08 RX ORDER — PRAVASTATIN SODIUM 40 MG
40 TABLET ORAL
Status: DISCONTINUED | OUTPATIENT
Start: 2022-04-08 | End: 2022-04-08 | Stop reason: HOSPADM

## 2022-04-08 RX ORDER — IBUPROFEN 600 MG/1
600 TABLET ORAL ONCE
Status: COMPLETED | OUTPATIENT
Start: 2022-04-08 | End: 2022-04-08

## 2022-04-08 RX ORDER — LABETALOL 20 MG/4 ML (5 MG/ML) INTRAVENOUS SYRINGE
10 ONCE
Status: COMPLETED | OUTPATIENT
Start: 2022-04-08 | End: 2022-04-08

## 2022-04-08 RX ORDER — MIRTAZAPINE 15 MG/1
15 TABLET, FILM COATED ORAL
Qty: 63 TABLET | Refills: 0 | Status: SHIPPED | OUTPATIENT
Start: 2022-04-08 | End: 2022-06-10

## 2022-04-08 RX ADMIN — LEVOTHYROXINE SODIUM 50 MCG: 50 TABLET ORAL at 05:00

## 2022-04-08 RX ADMIN — HYDROCHLOROTHIAZIDE 25 MG: 25 TABLET ORAL at 08:09

## 2022-04-08 RX ADMIN — TRIAMCINOLONE ACETONIDE: 1 CREAM TOPICAL at 08:09

## 2022-04-08 RX ADMIN — ACETAMINOPHEN 650 MG: 325 TABLET ORAL at 11:57

## 2022-04-08 RX ADMIN — LABETALOL HYDROCHLORIDE 10 MG: 5 INJECTION, SOLUTION INTRAVENOUS at 10:32

## 2022-04-08 RX ADMIN — IBUPROFEN 600 MG: 600 TABLET, FILM COATED ORAL at 13:09

## 2022-04-08 RX ADMIN — DOCUSATE SODIUM 100 MG: 100 CAPSULE, LIQUID FILLED ORAL at 08:09

## 2022-04-08 RX ADMIN — AMLODIPINE BESYLATE 10 MG: 10 TABLET ORAL at 08:09

## 2022-04-08 RX ADMIN — LISINOPRIL 20 MG: 20 TABLET ORAL at 08:09

## 2022-04-08 NOTE — PLAN OF CARE
Problem: Potential for Falls  Goal: Patient will remain free of falls  Description: INTERVENTIONS:  - Educate patient/family on patient safety including physical limitations  - Instruct patient to call for assistance with activity   - Consult OT/PT to assist with strengthening/mobility   - Keep Call bell within reach  - Keep bed low and locked with side rails adjusted as appropriate  - Keep care items and personal belongings within reach  - Initiate and maintain comfort rounds  - Consider moving patient to room near nurses station  Outcome: Progressing     Problem: PAIN - ADULT  Goal: Verbalizes/displays adequate comfort level or baseline comfort level  Description: Interventions:  - Encourage patient to monitor pain and request assistance  - Assess pain using appropriate pain scale  - Administer analgesics based on type and severity of pain and evaluate response  - Implement non-pharmacological measures as appropriate and evaluate response  - Consider cultural and social influences on pain and pain management  - Notify physician/advanced practitioner if interventions unsuccessful or patient reports new pain  Outcome: Progressing     Problem: INFECTION - ADULT  Goal: Absence or prevention of progression during hospitalization  Description: INTERVENTIONS:  - Assess and monitor for signs and symptoms of infection  - Monitor lab/diagnostic results  - Monitor all insertion sites, i e  indwelling lines, tubes, and drains  - Monitor endotracheal if appropriate and nasal secretions for changes in amount and color  - [place] appropriate cooling/warming therapies per order  - Administer medications as ordered  - Instruct and encourage patient and family to use good hand hygiene technique  - Identify and instruct in appropriate isolation precautions for identified infection/condition  Outcome: Progressing     Problem: SAFETY ADULT  Goal: Patient will remain free of falls  Description: INTERVENTIONS:  - Educate patient/family on patient safety including physical limitations  - Instruct patient to call for assistance with activity   - Consult OT/PT to assist with strengthening/mobility   - Keep Call bell within reach  - Keep bed low and locked with side rails adjusted as appropriate  - Keep care items and personal belongings within reach  - Initiate and maintain comfort rounds  Outcome: Progressing     Problem: DISCHARGE PLANNING  Goal: Discharge to home or other facility with appropriate resources  Description: INTERVENTIONS:  - Identify barriers to discharge w/patient and caregiver  - Arrange for needed discharge resources and transportation as appropriate  - Identify discharge learning needs (meds, wound care, etc )  - Arrange for interpretive services to assist at discharge as needed  - Refer to Case Management Department for coordinating discharge planning if the patient needs post-hospital services based on physician/advanced practitioner order or complex needs related to functional status, cognitive ability, or social support system  Outcome: Progressing     Problem: SKIN/TISSUE INTEGRITY - ADULT  Goal: Skin Integrity remains intact(Skin Breakdown Prevention)  Description: Assess:  -Perform Tello assessment   -Clean and moisturize skin   -Inspect skin when repositioning, toileting, and assisting with ADLS  -Assess under medical devices  -Assess extremities for adequate circulation and sensation     Bed Management:  -Have minimal linens on bed & keep smooth, unwrinkled  -Change linens as needed when moist or perspiring  -Avoid sitting or lying in one position     Toileting:  -Offer bedside commode  -Assess for incontinence   -Use incontinent care products after each incontinent episode     Activity:  -Encourage activity and walks on unit  -Encourage or provide ROM exercises   -Turn and reposition patient  -Use appropriate equipment to lift or move patient in bed  -Instruct/ Assist with weight shifting  -Consider limitation of chair time    Skin Care:  -Avoid use of baby powder, tape, friction and shearing, hot water or constrictive clothing  -Relieve pressure over bony prominences  -Do not massage red bony areas    Next Steps:  -Teach patient strategies to minimize risks   -Consider consults to  interdisciplinary teams  Outcome: Progressing

## 2022-04-08 NOTE — PLAN OF CARE
Problem: PAIN - ADULT  Goal: Verbalizes/displays adequate comfort level or baseline comfort level  Description: Interventions:  - Encourage patient to monitor pain and request assistance  - Assess pain using appropriate pain scale  - Administer analgesics based on type and severity of pain and evaluate response  - Implement non-pharmacological measures as appropriate and evaluate response  - Consider cultural and social influences on pain and pain management  - Notify physician/advanced practitioner if interventions unsuccessful or patient reports new pain  Outcome: Progressing     Problem: INFECTION - ADULT  Goal: Absence or prevention of progression during hospitalization  Description: INTERVENTIONS:  - Assess and monitor for signs and symptoms of infection  - Monitor lab/diagnostic results  - Monitor all insertion sites, i e  indwelling lines, tubes, and drains  - Monitor endotracheal if appropriate and nasal secretions for changes in amount and color  - [place] appropriate cooling/warming therapies per order  - Administer medications as ordered  - Instruct and encourage patient and family to use good hand hygiene technique  - Identify and instruct in appropriate isolation precautions for identified infection/condition  Outcome: Progressing     Problem: DISCHARGE PLANNING  Goal: Discharge to home or other facility with appropriate resources  Description: INTERVENTIONS:  - Identify barriers to discharge w/patient and caregiver  - Arrange for needed discharge resources and transportation as appropriate  - Identify discharge learning needs (meds, wound care, etc )  - Arrange for interpretive services to assist at discharge as needed  - Refer to Case Management Department for coordinating discharge planning if the patient needs post-hospital services based on physician/advanced practitioner order or complex needs related to functional status, cognitive ability, or social support system  Outcome: Progressing

## 2022-04-08 NOTE — DISCHARGE INSTR - AVS FIRST PAGE
Dear Liang Eugene,     It was our pleasure to care for you here at [facility], [address]  It is our hope that we were always able to exceed the expected standards for your care during your stay  You were hospitalized due to medication intoxication  You were cared for on the 4th floor by Misael Wilkins DO under the service of Kadi Hoang MD with the [facility] who covers for your primary care physician (PCP), Aramis Turk DO, while you were hospitalized  If you have any questions or concerns related to this hospitalization, you may contact us at [phone]  For follow up as well as any medication refills, we recommend that you follow up with your primary care physician  A registered nurse will reach out to you by phone within a few days after your discharge to answer any additional questions that you may have after going home  However, at this time we provide for you here, the most important instructions / recommendations at discharge:     Notable Medication Adjustments -   START taking Mirtazapine 15mg by mouth at night for three days, then start to take 30 mg by mouth at night and continue with the 30mg dose once per day by mouth at bedtime  START taking melatonin 3mg by mouth at bedtime as needed for sleep  Please continue other medications as listed on the after visit summary  Testing Required after Discharge -   None  Important follow up information -   Follow up with PHP program referral as agreed upon (Spoke to patient and caretaker Mike Polanco, both understood and agreed)  Follow up with PCP for blood pressure medication regimen in 2-3 days  Get a blood pressure monitoring kit at a local pharmacy and take blood pressures three times per day (morning, afternoon, evening) and record the pressures in a logbook for your PCP to look at  A referral to psychiatry was placed for you  Please refer to paperwork    A referral to partial psychiatry program was placed  Please refer to paperwork  Other Instructions -   Please contact your PCP for any new symptoms or questions about your healthcare needs  Please do not hesitate to come to the ED or call 911 for immediate medical attention  Please review this entire after visit summary as additional general instructions including medication list, appointments, activity, diet, any pertinent wound care, and other additional recommendations from your care team that may be provided for you      Sincerely,     Jacki Burger, DO

## 2022-04-08 NOTE — DISCHARGE SUMMARY
[facility]  Discharge- Herrera Auguste 1950, 80 y o  female MRN: [id number]  Unit/Bed#: S -05 Encounter: [id number]  Primary Care Provider: Debbie Majano DO   Date and time admitted to hospital: 4/6/2022  7:45 PM    * Altered mental status  Assessment & Plan  - Psychiatry evaluated patient  It was determined that no inpatient psychiatric treatment is currently indicated  It was discussed with the caretaker Matilde Moran that a 2 week PHP program is very strongly recommended a long with 24-hour observation on discharge  - The patient is agreeable to the [facility] program on the day of discharge  I personally called the caretaker Matilde Moran and discussed the importance of continual observation (the caretaker will accompany the patient home and stay the weekend after which other plans will be made)  - The caretaker and patient demonstrated understanding of the gravity of the need of having the [facility] program and having observation  All questions were answered  - The caretaker stated that she will call the PCP (Dr Curry Lao) to continue to work on psychiatric services and support for the patient  - Follow up with Psychiatry (referral placed)  - Follow up with PHP program (referral placed)  - Mirtazapine 15mg PO QHS for 3 days then 30mg PO QHS after that    Hyperlipidemia  Assessment & Plan  - Follow up with PCP    Benign essential hypertension  Assessment & Plan  Continue following medications:  - Continue amlodipine 10 mg  - Continue Lisinopril 20 mg   - Continue HCTZ 25 mg    - OP blood pressure monitoring and logging with a follow up appointment to PCP for medication adjustment in 2-3 days  Caretaker Rajani called and information relayed as well      Primary insomnia  Assessment & Plan  - Mirtazapine as above  - Melatonin 3mg Po QHS PRN      Hypothyroidism  Assessment & Plan  - Follow up with PCP    Psoriasis  Assessment & Plan  - Follow up with PCP    Medical Problems             Resolved Problems  Date Reviewed: 4/8/2022    None              Discharging Resident: Ewelina Glynn DO  Discharging Attending: Rosalio Knapp MD  PCP: Daisha Tucker DO  Admission Date:   Admission Orders (From admission, onward)     Ordered        04/06/22 2309  INPATIENT ADMISSION  Once                      Discharge Date: 04/08/22    Consultations During Hospital Stay:  · OT  · Behavioral health  · Geriatric Medicine    Procedures Performed:   · [facility] wo  · EKG    Significant Findings / Test Results:   · None    Incidental Findings:   · None    Test Results Pending at Discharge (will require follow up): · None     Outpatient Tests Requested:  · Follow ups as listed above in problems    Complications:  None    Reason for Admission: Medication overdose    Hospital Course: The following issues were addressed during the hospitalization:  1) AMS: Patient was evaluated by behavioral health and geriatric health; PHP program recommended, (patient and caretaker Eliazar Stinson agreeable); mirtazapine to be restarted at d/c; supportive management provided  2) HLD: Continued home medications  3) beHTN: continued home medications and supportive management for HTN episodes  PCP f/u d/t continued HTN  4) Primary Insomnia: Held mirtazapine and started melatonin  5) Hypothyroidism: Continued home medications  6) Psoriasis:Contined home medications     Please see above list of diagnoses and related plan for additional information  Condition at Discharge: stable    Discharge Day Visit / Exam:   Subjective: Mrs Tori Chang was resting comfortably at bedside this AM with her caretaker Eliazar Irizarrynch at bedside  The patient reports that she does not have any new complaints other than a HA (which was addressed prior to discharge)  She reports that she feels good to go home and is aware that she needs to attend a 2-week PHP program as agreed upon with behavioural health and is in agreement to do so   The patient otherwise denies CP, SOB, ABD-pain, N/V/D/C/BBI, changes in sensorium  She is able to urinate, defecate, ambulate and feed  Vitals: Blood Pressure: 142/71 (04/08/22 1241)  Pulse: 79 (04/08/22 1241)  Temperature: 98 2 °F (36 8 °C) (04/08/22 1147)  Temp Source: Oral (04/07/22 1612)  Respirations: 18 (04/08/22 0759)  Height: 4' 8" (142 2 cm) (04/07/22 0018)  Weight - Scale: 71 9 kg (158 lb 8 oz) (04/07/22 0539)  SpO2: 94 % (04/08/22 1147)    Exam:   Physical Exam  HENT:      Head: Normocephalic  Nose: Nose normal       Mouth/Throat:      Mouth: Mucous membranes are moist    Eyes:      Extraocular Movements: Extraocular movements intact  Pupils: Pupils are equal, round, and reactive to light  Cardiovascular:      Rate and Rhythm: Normal rate  Pulses: Normal pulses  Pulmonary:      Effort: Pulmonary effort is normal    Abdominal:      Palpations: Abdomen is soft  Musculoskeletal:         General: Normal range of motion  Cervical back: Normal range of motion  Skin:     General: Skin is warm  Capillary Refill: Capillary refill takes less than 2 seconds  Neurological:      Mental Status: She is alert  Mental status is at baseline  Psychiatric:         Attention and Perception: Attention normal          Mood and Affect: Mood normal          Speech: Speech normal          Behavior: Behavior is cooperative  Discussion with Family: [address] [place] [address] evaluated patient  It was determined that no inpatient psychiatric treatment is currently indicated  It was discussed with the caretaker Lynell Sandhoff that a 2 week PHP program is very strongly recommended a long with 24-hour observation on discharge  - The patient is agreeable to the [facility] on the day of discharge   I personally called the caretaker Lynell Sandhoff and discussed the importance of continual observation (the caretaker will accompany the patient home and stay the weekend after which other plans will be made)  - The caretaker and patient demonstrated understanding of the gravity of the need of having the PHP program and having observation  All questions were answered  - The caretaker stated that she will call the PCP (Dr Alma Mao) to continue to work on psychiatric services and support for the patient  - Follow up with Psychiatry (referral placed)  - Follow up with PHP program (referral placed)    Discharge instructions/Information to patient and family:   See after visit summary for information provided to patient and family  Provisions for Follow-Up Care:  See after visit summary for information related to follow-up care and any pertinent home health orders  Disposition:   Home    Planned Readmission: None    Discharge Medications:  See after visit summary for reconciled discharge medications provided to patient and/or family        **Please Note: This note may have been constructed using a voice recognition system**

## 2022-04-08 NOTE — PLAN OF CARE
Problem: Potential for Falls  Goal: Patient will remain free of falls  Description: INTERVENTIONS:  - Educate patient/family on patient safety including physical limitations  - Instruct patient to call for assistance with activity   - Consult OT/PT to assist with strengthening/mobility   - Keep Call bell within reach  - Keep bed low and locked with side rails adjusted as appropriate  - Keep care items and personal belongings within reach  - Initiate and maintain comfort rounds  - Make Fall Risk Sign visible to staff  - Offer Toileting every 2 Hours, in advance of need  - Initiate/Maintain bed alarm  - Obtain necessary fall risk management equipment   - Apply yellow socks and bracelet for high fall risk patients  - Consider moving patient to room near nurses station  Outcome: Completed     Problem: PAIN - ADULT  Goal: Verbalizes/displays adequate comfort level or baseline comfort level  Description: Interventions:  - Encourage patient to monitor pain and request assistance  - Assess pain using appropriate pain scale  - Administer analgesics based on type and severity of pain and evaluate response  - Implement non-pharmacological measures as appropriate and evaluate response  - Consider cultural and social influences on pain and pain management  - Notify physician/advanced practitioner if interventions unsuccessful or patient reports new pain  Outcome: Completed     Problem: INFECTION - ADULT  Goal: Absence or prevention of progression during hospitalization  Description: INTERVENTIONS:  - Assess and monitor for signs and symptoms of infection  - Monitor lab/diagnostic results  - Monitor all insertion sites, i e  indwelling lines, tubes, and drains  - Monitor endotracheal if appropriate and nasal secretions for changes in amount and color  - [place] appropriate cooling/warming therapies per order  - Administer medications as ordered  - Instruct and encourage patient and family to use good hand hygiene technique  - Identify and instruct in appropriate isolation precautions for identified infection/condition  Outcome: Completed     Problem: SAFETY ADULT  Goal: Patient will remain free of falls  Description: INTERVENTIONS:  - Educate patient/family on patient safety including physical limitations  - Instruct patient to call for assistance with activity   - Consult OT/PT to assist with strengthening/mobility   - Keep Call bell within reach  - Keep bed low and locked with side rails adjusted as appropriate  - Keep care items and personal belongings within reach  - Initiate and maintain comfort rounds  - Make Fall Risk Sign visible to staff  - Offer Toileting every 2 Hours, in advance of need  - Initiate/Maintain bed alarm  - Obtain necessary fall risk management equipment  - Apply yellow socks and bracelet for high fall risk patients  - Consider moving patient to room near nurses station  Outcome: Completed     Problem: DISCHARGE PLANNING  Goal: Discharge to home or other facility with appropriate resources  Description: INTERVENTIONS:  - Identify barriers to discharge w/patient and caregiver  - Arrange for needed discharge resources and transportation as appropriate  - Identify discharge learning needs (meds, wound care, etc )  - Arrange for interpretive services to assist at discharge as needed  - Refer to Case Management Department for coordinating discharge planning if the patient needs post-hospital services based on physician/advanced practitioner order or complex needs related to functional status, cognitive ability, or social support system  Outcome: Completed     Problem: SKIN/TISSUE INTEGRITY - ADULT  Goal: Skin Integrity remains intact(Skin Breakdown Prevention)  Description: Assess:  -Perform Tello assessment every shift   -Clean and moisturize skin   -Inspect skin when repositioning, toileting, and assisting with ADLS  -Assess under medical devices  -Assess extremities for adequate circulation and sensation     Bed Management:  -Have minimal linens on bed & keep smooth, unwrinkled  -Change linens as needed when moist or perspiring  -Avoid sitting or lying in one position for more than 2 hours while in bed  -Keep HOB at 30 degrees     Toileting:  -Offer bedside commode  -Assess for incontinence billy  -Use incontinent care products after each incontinent episode    Activity:  -Mobilize patient 3 times a day  -Encourage activity and walks on unit  -Encourage or provide ROM exercises   -Turn and reposition patient every 2 Hours  -Use appropriate equipment to lift or move patient in bed  -Instruct/ Assist with weight shifting  when out of bed in chair  -Consider limitation of chair time   Skin Care:  -Avoid use of baby powder, tape, friction and shearing, hot water or constrictive clothing  -Relieve pressure over bony prominences  -Do not massage red bony areas    Outcome: Completed

## 2022-04-08 NOTE — CASE MANAGEMENT
Case Management Progress Note    Patient name Maia Matthew  Location S /S -26 MRN [id number]  : 1950 Date 2022       LOS (days): 2  Geometric Mean LOS (GMLOS) (days): 2 30  Days to GMLOS:0 6        OBJECTIVE:    Current admission status: Inpatient  Preferred Pharmacy:   [facility] [address] [place] [address]  Phone: [phone] Fax: [phone]    Primary Care Provider: Erick Oliver DO    Primary Insurance: [address] [place] [address]  Secondary Insurance:     PROGRESS NOTE:    Psychiatry recommending PHP program at d/c and OT recs for  Supervision  CM rounded with Dr Morgan Brannon who confirmed he spoke with pt caretaker, [facility], [facility], who agreed to stay the weekend with the pt and will make additional arrangements for care at home  As per Dr Mogran Brannon the pt is agreeable to [facility] and [facility], [place] will f/u with recommendation for PHP

## 2022-04-09 ENCOUNTER — HOSPITAL ENCOUNTER (EMERGENCY)
Facility: HOSPITAL | Age: 72
Discharge: HOME | End: 2022-04-09
Attending: EMERGENCY MEDICINE
Payer: COMMERCIAL

## 2022-04-09 VITALS
BODY MASS INDEX: 35.04 KG/M2 | HEART RATE: 84 BPM | OXYGEN SATURATION: 96 % | WEIGHT: 156.31 LBS | DIASTOLIC BLOOD PRESSURE: 70 MMHG | TEMPERATURE: 99.4 F | SYSTOLIC BLOOD PRESSURE: 142 MMHG | RESPIRATION RATE: 18 BRPM

## 2022-04-09 DIAGNOSIS — F32.A DEPRESSION, UNSPECIFIED DEPRESSION TYPE: Primary | ICD-10-CM

## 2022-04-09 PROCEDURE — 99282 EMERGENCY DEPT VISIT SF MDM: CPT | Performed by: EMERGENCY MEDICINE

## 2022-04-09 PROCEDURE — 99283 EMERGENCY DEPT VISIT LOW MDM: CPT

## 2022-04-09 NOTE — ED NOTES
Pt eating at this time  Has no complaints  Continues to deny SI/HI  Pt is pleasant  No evidence of distress       Paul Salter RN  04/09/22 2997

## 2022-04-09 NOTE — ED PROVIDER NOTES
History  Chief Complaint   Patient presents with    Psychiatric Evaluation     pt being taken care of by a care taker at home  Per EMS, caregiver reported finding scissors which pt is not supposed to have sharp objects  When EMS arrived, they did not see any sharp object  denies SI/HI       History provided by:  Patient and EMS personnel  Psychiatric Evaluation  Presenting symptoms comment:  Patient admits to feeling depressed, says she feels depressed because her children live in [facility] and she is in [place]  Says today she was at her home private home/residence, had her caregiver/friend named Yolande who came in to visit her  Apparently Rajani saw her holding a pair of scissors, and since she had depression she call 911 for the patient come here  Patient does admit to me that she was holding a pair scissors, says she was cutting open a bag, denies intent on harming herself patient does live alone, says to me clearly if she wanted to harm herself she would do it when no caregivers were coming in, but says that she does not want to harm herself does not want to harm anybody else  Denies any chest pain shortness of breath fevers abdominal pain nausea vomiting diarrhea dysuria denies any drug or alcohol use , no falls no injury no trauma  Patient accompanied by: self  Degree of incapacity (severity): Unable to specify  Onset quality:  Unable to specify  Timing:  Unable to specify  Progression:  Unable to specify  Chronicity:  New  Associated symptoms: no abdominal pain, no chest pain and no headaches        Prior to Admission Medications   Prescriptions Last Dose Informant Patient Reported? Taking?    Diclofenac Sodium (VOLTAREN) 1 % 2022 at Unknown time  No Yes   Sig: Apply 4 g topically 4 (four) times a day   OTEZLA 30 MG TABS 2022 at Unknown time Self Yes Yes   Si mg 2 (two) times a day    amLODIPine (NORVASC) 10 mg tablet 2022 at Unknown time Self No Yes   Sig: Take 1 tablet (10 mg total) by mouth daily   calcium carbonate-vitamin D (OSCAL-D) 500 mg-200 units per tablet 2022 at Unknown time Self No Yes   Sig: Take 2 tablets by mouth daily with breakfast   clindamycin (CLEOCIN T) 1 % lotion 2022 at Unknown time Self Yes Yes   fluocinonide (LIDEX) 0 05 % external solution 2022 at Unknown time Self Yes Yes   hydrochlorothiazide (HYDRODIURIL) 25 mg tablet 2022 at Unknown time  No Yes   Sig: Take 1 tablet (25 mg total) by mouth daily   levothyroxine 50 mcg tablet 2022 at Unknown time Self No Yes   Sig: Take 1 tablet (50 mcg total) by mouth daily   lidocaine (XYLOCAINE) 2 % topical gel Past Week at Unknown time Self No Yes   Sig: Apply topically as needed for mild pain   lisinopril (ZESTRIL) 20 mg tablet 2022 at Unknown time  No Yes   Sig: Take 1 tablet (20 mg total) by mouth daily   mirtazapine (REMERON) 15 mg tablet 2022 at Unknown time  No Yes   Sig: Take 1 tablet (15 mg total) by mouth daily at bedtime Take 15mg (one tablet) at night for three (3) days and then start on 30mg (two tablets) at night onwards     mometasone (ELOCON) 0 1 % cream 2022 at Unknown time Self Yes Yes   Sig: Apply 50 application topically 2 (two) times a day    nystatin (MYCOSTATIN) powder 2022 at Unknown time Self No Yes   Sig: Apply topically 4 (four) times a day   simvastatin (ZOCOR) 20 mg tablet 2022 at Unknown time  No Yes   Sig: Take 1 tablet (20 mg total) by mouth daily at bedtime      Facility-Administered Medications: None       Past Medical History:   Diagnosis Date    Breast cancer [facility]) 1996 lt mastectomy    Disease of thyroid gland     Hemorrhoids     Hyperlipidemia     Hypertension     Psoriasis        Past Surgical History:   Procedure Laterality Date    BREAST CYST EXCISION Right 1997    benign    BREAST LUMPECTOMY Left      SECTION      COLONOSCOPY      Complete    MASTECTOMY Left     Onset: 1996    SALPINGOOPHORECTOMY Bilateral age 40    TOTAL ABDOMINAL HYSTERECTOMY      age 40       Family History   Problem Relation Age of Onset    Arthritis Mother     Diabetes Mother     Breast cancer Mother 67    Colon cancer Mother     Hypertension Mother     Diabetes Father     Glaucoma Father     Prostate cancer Father     Asthma Brother     Hypertension Brother     Colon cancer Maternal Uncle 80    Breast cancer Maternal Aunt [de-identified]    No Known Problems Maternal Grandmother     No Known Problems Paternal Grandmother     No Known Problems Maternal Aunt     No Known Problems Maternal Aunt     No Known Problems Paternal Aunt     No Known Problems Paternal Aunt     No Known Problems Paternal Grandfather     Breast cancer Cousin      I have reviewed and agree with the history as documented  E-Cigarette/Vaping    E-Cigarette Use Never User      E-Cigarette/Vaping Substances    Nicotine No     THC No     CBD No     Flavoring No     Other No     Unknown No      Social History     Tobacco Use    Smoking status: Never Smoker    Smokeless tobacco: Never Used   Vaping Use    Vaping Use: Never used   Substance Use Topics    Alcohol use: No    Drug use: No       Review of Systems   Constitutional: Negative for activity change, chills, diaphoresis and fever  HENT: Negative for congestion, sinus pressure and sore throat  Eyes: Negative for pain and visual disturbance  Respiratory: Negative for cough, chest tightness, shortness of breath, wheezing and stridor  Cardiovascular: Negative for chest pain and palpitations  Gastrointestinal: Negative for abdominal distention, abdominal pain, constipation, diarrhea, nausea and vomiting  Genitourinary: Negative for dysuria and frequency  Musculoskeletal: Negative for neck pain and neck stiffness  Skin: Negative for rash  Neurological: Negative for dizziness, speech difficulty, light-headedness, numbness and headaches  Physical Exam  Physical Exam  Vitals reviewed  Constitutional:       General: She is not in acute distress  Appearance: She is well-developed  She is not diaphoretic  HENT:      Head: Normocephalic and atraumatic  Right Ear: External ear normal       Left Ear: External ear normal       Nose: Nose normal    Eyes:      General:         Right eye: No discharge  Left eye: No discharge  Pupils: Pupils are equal, round, and reactive to light  Neck:      Trachea: No tracheal deviation  Cardiovascular:      Rate and Rhythm: Normal rate and regular rhythm  Heart sounds: Normal heart sounds  No murmur heard  Pulmonary:      Effort: Pulmonary effort is normal  No respiratory distress  Breath sounds: Normal breath sounds  No stridor  Abdominal:      General: There is no distension  Palpations: Abdomen is soft  Tenderness: There is no abdominal tenderness  There is no guarding or rebound  Musculoskeletal:         General: Normal range of motion  Cervical back: Normal range of motion and neck supple  Skin:     General: Skin is warm and dry  Coloration: Skin is not pale  Findings: No erythema  Neurological:      General: No focal deficit present  Mental Status: She is alert and oriented to person, place, and time           Vital Signs  ED Triage Vitals [04/09/22 1041]   Temperature Pulse Respirations Blood Pressure SpO2   99 4 °F (37 4 °C) 90 18 144/68 96 %      Temp Source Heart Rate Source Patient Position - Orthostatic VS BP Location FiO2 (%)   Oral Monitor -- -- --      Pain Score       --           Vitals:    04/09/22 1041 04/09/22 1115   BP: 144/68 142/70   Pulse: 90 84         Visual Acuity      ED Medications  Medications - No data to display    Diagnostic Studies  Results Reviewed     None                 No orders to display              Procedures  Procedures         ED Course  ED Course as of 04/09/22 1149   Sat Apr 09, 2022   1144 Repeat examination, patient denies SI HI, says she wants to go home  , will discharge                                             MDM  Number of Diagnoses or Management Options  Depression, unspecified depression type: new and requires workup  Diagnosis management comments:       26-year-old female, increasing depression, no SI no HI, was sent in by caregiver she found her with a pair of scissors patient lives alone and does have multiple pairs of scissors and knives and other things in her house which is normal and acceptable, patient says she does not want to harm herself was using the scissors to cut open a bag, does not want harm anybody else patient admits to being depressed mainly because her daughters live in [facility] and she was in [place]  , patient contracts for safety was monitor emergency department for over an hour, multiple repeat examinations, still saying she does not want to harm herself or anybody else does not want to talk to our  does not want to sign a 201, wants to go back home  Will discharge no indication for 302  Not under the influence of any drugs or alcohol  Amount and/or Complexity of Data Reviewed  Decide to obtain previous medical records or to obtain history from someone other than the patient: yes  Obtain history from someone other than the patient: yes  Review and summarize past medical records: yes        Disposition  Final diagnoses:   Depression, unspecified depression type     Time reflects when diagnosis was documented in both MDM as applicable and the Disposition within this note     Time User Action Codes Description Comment    4/9/2022 11:45 AM Carolina ARELLANO] Depression, unspecified depression type       ED Disposition     ED Disposition Condition Date/Time Comment    Discharge Stable Sat Apr 9, 2022 11:45 AM Guillermo Stone discharge to home/self care              Follow-up Information     Follow up With Specialties Details Why Contact Info Additional Information    [facility] [address] Call in 1 day To arrange an appointment with a family doctor [address] [id number]  [id number] , [place], [place], [address]          Patient's Medications   Discharge Prescriptions    No medications on file       No discharge procedures on file      PDMP Review       Value Time User    PDMP Reviewed  Yes 4/6/2022 11:23 PM Erick Zacarias MD          ED Provider  Electronically Signed by           David Sanchez DO  04/09/22 1147

## 2022-04-11 ENCOUNTER — TRANSITIONAL CARE MANAGEMENT (OUTPATIENT)
Dept: FAMILY MEDICINE CLINIC | Facility: CLINIC | Age: 72
End: 2022-04-11

## 2022-04-11 ENCOUNTER — HOSPITAL ENCOUNTER (INPATIENT)
Facility: HOSPITAL | Age: 72
LOS: 15 days | Discharge: HOME | DRG: 951 | End: 2022-04-27
Attending: EMERGENCY MEDICINE | Admitting: FAMILY MEDICINE
Payer: COMMERCIAL

## 2022-04-11 ENCOUNTER — TELEPHONE (OUTPATIENT)
Dept: FAMILY MEDICINE CLINIC | Facility: CLINIC | Age: 72
End: 2022-04-11

## 2022-04-11 ENCOUNTER — APPOINTMENT (EMERGENCY)
Dept: RADIOLOGY | Facility: HOSPITAL | Age: 72
DRG: 951 | End: 2022-04-11
Payer: COMMERCIAL

## 2022-04-11 ENCOUNTER — OFFICE VISIT (OUTPATIENT)
Dept: FAMILY MEDICINE CLINIC | Facility: CLINIC | Age: 72
End: 2022-04-11

## 2022-04-11 VITALS
DIASTOLIC BLOOD PRESSURE: 90 MMHG | OXYGEN SATURATION: 98 % | SYSTOLIC BLOOD PRESSURE: 138 MMHG | WEIGHT: 162.4 LBS | TEMPERATURE: 98.1 F | BODY MASS INDEX: 36.53 KG/M2 | HEIGHT: 56 IN | RESPIRATION RATE: 18 BRPM | HEART RATE: 96 BPM

## 2022-04-11 DIAGNOSIS — Z91.89 DOES NOT FEEL SAFE AT HOME: ICD-10-CM

## 2022-04-11 DIAGNOSIS — Z60.2 LIVES ALONE: ICD-10-CM

## 2022-04-11 DIAGNOSIS — Z65.9 CONCERNED ABOUT HAVING SOCIAL PROBLEM: Primary | ICD-10-CM

## 2022-04-11 DIAGNOSIS — F40.9 FEAR FOR PERSONAL SAFETY: ICD-10-CM

## 2022-04-11 DIAGNOSIS — R46.89 BEHAVIORAL CHANGE: ICD-10-CM

## 2022-04-11 DIAGNOSIS — Z65.9 CONCERNED ABOUT HAVING SOCIAL PROBLEM: ICD-10-CM

## 2022-04-11 DIAGNOSIS — R07.9 CHEST PAIN: Primary | ICD-10-CM

## 2022-04-11 LAB
ANION GAP SERPL CALCULATED.3IONS-SCNC: 6 MMOL/L (ref 4–13)
BASOPHILS # BLD AUTO: 0.05 THOUSANDS/ΜL (ref 0–0.1)
BASOPHILS NFR BLD AUTO: 1 % (ref 0–1)
BUN SERPL-MCNC: 20 MG/DL (ref 5–25)
CALCIUM SERPL-MCNC: 9 MG/DL (ref 8.3–10.1)
CARDIAC TROPONIN I PNL SERPL HS: 9 NG/L
CHLORIDE SERPL-SCNC: 108 MMOL/L (ref 100–108)
CO2 SERPL-SCNC: 26 MMOL/L (ref 21–32)
CREAT SERPL-MCNC: 0.76 MG/DL (ref 0.6–1.3)
EOSINOPHIL # BLD AUTO: 0.21 THOUSAND/ΜL (ref 0–0.61)
EOSINOPHIL NFR BLD AUTO: 3 % (ref 0–6)
ERYTHROCYTE [DISTWIDTH] IN BLOOD BY AUTOMATED COUNT: 14.1 % (ref 11.6–15.1)
GFR SERPL CREATININE-BSD FRML MDRD: 79 ML/MIN/1.73SQ M
GLUCOSE SERPL-MCNC: 186 MG/DL (ref 65–140)
HCT VFR BLD AUTO: 44 % (ref 34.8–46.1)
HGB BLD-MCNC: 13.8 G/DL (ref 11.5–15.4)
IMM GRANULOCYTES # BLD AUTO: 0.03 THOUSAND/UL (ref 0–0.2)
IMM GRANULOCYTES NFR BLD AUTO: 0 % (ref 0–2)
LYMPHOCYTES # BLD AUTO: 2.6 THOUSANDS/ΜL (ref 0.6–4.47)
LYMPHOCYTES NFR BLD AUTO: 32 % (ref 14–44)
MCH RBC QN AUTO: 28.2 PG (ref 26.8–34.3)
MCHC RBC AUTO-ENTMCNC: 31.4 G/DL (ref 31.4–37.4)
MCV RBC AUTO: 90 FL (ref 82–98)
MONOCYTES # BLD AUTO: 0.69 THOUSAND/ΜL (ref 0.17–1.22)
MONOCYTES NFR BLD AUTO: 8 % (ref 4–12)
NEUTROPHILS # BLD AUTO: 4.67 THOUSANDS/ΜL (ref 1.85–7.62)
NEUTS SEG NFR BLD AUTO: 56 % (ref 43–75)
NRBC BLD AUTO-RTO: 0 /100 WBCS
PLATELET # BLD AUTO: 354 THOUSANDS/UL (ref 149–390)
PMV BLD AUTO: 10.2 FL (ref 8.9–12.7)
POTASSIUM SERPL-SCNC: 3.8 MMOL/L (ref 3.5–5.3)
RBC # BLD AUTO: 4.9 MILLION/UL (ref 3.81–5.12)
SODIUM SERPL-SCNC: 140 MMOL/L (ref 136–145)
WBC # BLD AUTO: 8.25 THOUSAND/UL (ref 4.31–10.16)

## 2022-04-11 PROCEDURE — 84484 ASSAY OF TROPONIN QUANT: CPT | Performed by: EMERGENCY MEDICINE

## 2022-04-11 PROCEDURE — 71046 X-RAY EXAM CHEST 2 VIEWS: CPT

## 2022-04-11 PROCEDURE — 80048 BASIC METABOLIC PNL TOTAL CA: CPT | Performed by: EMERGENCY MEDICINE

## 2022-04-11 PROCEDURE — 99285 EMERGENCY DEPT VISIT HI MDM: CPT

## 2022-04-11 PROCEDURE — 99214 OFFICE O/P EST MOD 30 MIN: CPT | Performed by: FAMILY MEDICINE

## 2022-04-11 PROCEDURE — 99285 EMERGENCY DEPT VISIT HI MDM: CPT | Performed by: EMERGENCY MEDICINE

## 2022-04-11 PROCEDURE — 93005 ELECTROCARDIOGRAM TRACING: CPT

## 2022-04-11 PROCEDURE — 85025 COMPLETE CBC W/AUTO DIFF WBC: CPT | Performed by: EMERGENCY MEDICINE

## 2022-04-11 PROCEDURE — 36415 COLL VENOUS BLD VENIPUNCTURE: CPT | Performed by: EMERGENCY MEDICINE

## 2022-04-11 SDOH — SOCIAL STABILITY - SOCIAL INSECURITY: PROBLEMS RELATED TO LIVING ALONE: Z60.2
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
## 2022-04-11 NOTE — TELEPHONE ENCOUNTER
Called caregiver, she states that patient will be getting her meds back before seeing you today  She states meds were removed because there is concern of patient over medicating herself  Patient has no family support  Just to let you know
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
## 2022-04-11 NOTE — UTILIZATION REVIEW
Notification of Discharge   This is a Notification of Discharge from our facility [address]  Please be advised that this patient has been discharge from our facility  Below you will find the admission and discharge date and time including the patients disposition  UTILIZATION REVIEW CONTACT:  Yvonne Wilson MA  Utilization   Network Utilization Review Department  Phone: [phone] x carefully listen to the prompts  All voicemails are confidential   Email: [email or web address]     PHYSICIAN ADVISORY SERVICES:  FOR [id number] REVIEW - MEDICAL NECESSITY DENIAL  Phone: [phone]  Fax: [phone]  Email: [email or web address]     PRESENTATION DATE: 4/6/2022  7:45 PM  OBERVATION ADMISSION DATE:   INPATIENT ADMISSION DATE: 4/6/22 11:09 PM   DISCHARGE DATE: 4/8/2022  4:41 PM  DISPOSITION: Home/Self Care Home/Self Care      IMPORTANT INFORMATION:  Send all requests for admission clinical reviews, approved or denied determinations and any other requests to dedicated fax number below belonging to the campus where the patient is receiving treatment   List of dedicated fax numbers:  [address] DENIALS (Administrative/Medical Necessity) [phone]   [address]  (Maternity/NICU/Pediatrics) [phone]   Iraida Walker [phone]   [address] [facility] [address] [phone]   [facility]  [phone]   [address] [facility] [address] [place] [address],[address] [place] [address] [phone]   [facility]  [phone]   [address] [facility] [address]  [address] [facility] [address] [facility] [phone]

## 2022-04-12 ENCOUNTER — TELEPHONE (OUTPATIENT)
Dept: FAMILY MEDICINE CLINIC | Facility: CLINIC | Age: 72
End: 2022-04-12

## 2022-04-12 PROBLEM — R46.89 BEHAVIORAL CHANGE: Status: ACTIVE | Noted: 2022-04-12

## 2022-04-12 LAB
ATRIAL RATE: 68 BPM
P AXIS: 84 DEGREES
PLATELET # BLD AUTO: 307 THOUSANDS/UL (ref 149–390)
PMV BLD AUTO: 10 FL (ref 8.9–12.7)
PR INTERVAL: 140 MS
QRS AXIS: 74 DEGREES
QRSD INTERVAL: 76 MS
QT INTERVAL: 426 MS
QTC INTERVAL: 452 MS
T WAVE AXIS: 81 DEGREES
VENTRICULAR RATE: 68 BPM

## 2022-04-12 PROCEDURE — 99233 SBSQ HOSP IP/OBS HIGH 50: CPT | Performed by: FAMILY MEDICINE

## 2022-04-12 PROCEDURE — 99204 OFFICE O/P NEW MOD 45 MIN: CPT | Performed by: INTERNAL MEDICINE

## 2022-04-12 PROCEDURE — 93010 ELECTROCARDIOGRAM REPORT: CPT | Performed by: INTERNAL MEDICINE

## 2022-04-12 PROCEDURE — 85049 AUTOMATED PLATELET COUNT: CPT | Performed by: FAMILY MEDICINE

## 2022-04-12 RX ORDER — B-COMPLEX WITH VITAMIN C
2 TABLET ORAL
Status: DISCONTINUED | OUTPATIENT
Start: 2022-04-12 | End: 2022-04-27 | Stop reason: HOSPADM

## 2022-04-12 RX ORDER — TRIAMCINOLONE ACETONIDE 1 MG/G
CREAM TOPICAL 2 TIMES DAILY
Status: DISCONTINUED | OUTPATIENT
Start: 2022-04-12 | End: 2022-04-27 | Stop reason: HOSPADM

## 2022-04-12 RX ORDER — AMLODIPINE BESYLATE 10 MG/1
10 TABLET ORAL DAILY
Status: DISCONTINUED | OUTPATIENT
Start: 2022-04-12 | End: 2022-04-27 | Stop reason: HOSPADM

## 2022-04-12 RX ORDER — LISINOPRIL 20 MG/1
20 TABLET ORAL DAILY
Status: DISCONTINUED | OUTPATIENT
Start: 2022-04-12 | End: 2022-04-27 | Stop reason: HOSPADM

## 2022-04-12 RX ORDER — NYSTATIN 100000 [USP'U]/G
POWDER TOPICAL 3 TIMES DAILY
Status: DISCONTINUED | OUTPATIENT
Start: 2022-04-12 | End: 2022-04-27 | Stop reason: HOSPADM

## 2022-04-12 RX ORDER — LEVOTHYROXINE SODIUM 0.05 MG/1
50 TABLET ORAL DAILY
Status: DISCONTINUED | OUTPATIENT
Start: 2022-04-12 | End: 2022-04-27 | Stop reason: HOSPADM

## 2022-04-12 RX ORDER — MIRTAZAPINE 15 MG/1
15 TABLET, FILM COATED ORAL
Status: DISCONTINUED | OUTPATIENT
Start: 2022-04-12 | End: 2022-04-27 | Stop reason: HOSPADM

## 2022-04-12 RX ORDER — PRAVASTATIN SODIUM 20 MG
40 TABLET ORAL
Status: DISCONTINUED | OUTPATIENT
Start: 2022-04-12 | End: 2022-04-27 | Stop reason: HOSPADM

## 2022-04-12 RX ORDER — HYDROCHLOROTHIAZIDE 25 MG/1
25 TABLET ORAL DAILY
Status: DISCONTINUED | OUTPATIENT
Start: 2022-04-12 | End: 2022-04-27 | Stop reason: HOSPADM

## 2022-04-12 RX ADMIN — ENOXAPARIN SODIUM 40 MG: 40 INJECTION SUBCUTANEOUS at 08:38

## 2022-04-12 RX ADMIN — LEVOTHYROXINE SODIUM 50 MCG: 50 TABLET ORAL at 06:14

## 2022-04-12 RX ADMIN — NYSTATIN: 100000 POWDER TOPICAL at 17:23

## 2022-04-12 RX ADMIN — PRAVASTATIN SODIUM 40 MG: 40 TABLET ORAL at 17:21

## 2022-04-12 RX ADMIN — TRIAMCINOLONE ACETONIDE: 1 CREAM TOPICAL at 17:21

## 2022-04-12 RX ADMIN — HYDROCHLOROTHIAZIDE 25 MG: 25 TABLET ORAL at 17:22

## 2022-04-12 RX ADMIN — AMLODIPINE BESYLATE 10 MG: 10 TABLET ORAL at 08:37

## 2022-04-12 RX ADMIN — LISINOPRIL 20 MG: 20 TABLET ORAL at 12:33

## 2022-04-12 RX ADMIN — MIRTAZAPINE 15 MG: 15 TABLET, FILM COATED ORAL at 01:32

## 2022-04-12 RX ADMIN — MIRTAZAPINE 15 MG: 15 TABLET, FILM COATED ORAL at 21:47
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
## 2022-04-12 NOTE — ED NOTES
Dr Dewey Klinefelter and Dr Ricky Campuzano at bedside for patient evaluation        Savanna Lester RN  04/11/22 2780

## 2022-04-12 NOTE — ED NOTES
Officer Baylee Feng would liked to be reached out to by , to discuss pt care   # [phone] [facility] [address], RN  04/12/22 0061
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
## 2022-04-12 NOTE — TELEPHONE ENCOUNTER
Good Afternoon Dr Pérez Frank  Form is in your bin in the nurse station  Medication list is attached   Thanks Patient is with OT and is unable to be seen by Cardiac Rehabilitation at this time. Will follow to progress.
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
## 2022-04-12 NOTE — ED NOTES
Spoke with patients daughter with patients permission  Daughter is requesting a psych evaluation for patient due to her "eating rotten food, talking to dead people/relatives, and refusing to shower"  Message sent via [employer] to Yoselin Alvares in regards to daughters concerns request to speak with someone        Penny Anderson RN  04/12/22 1038
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
## 2022-04-12 NOTE — TELEPHONE ENCOUNTER
Folder (To be completed by) - Dr Mario Finley     Name of [address] of [address] [place] [address]   Of [employer]    Form to be Faxed (Fax #), [phone]
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
## 2022-04-12 NOTE — PLAN OF CARE
Problem: PAIN - ADULT  Goal: Verbalizes/displays adequate comfort level or baseline comfort level  Description: Interventions:  - Encourage patient to monitor pain and request assistance  - Assess pain using appropriate pain scale  - Administer analgesics based on type and severity of pain and evaluate response  - Implement non-pharmacological measures as appropriate and evaluate response  - Consider cultural and social influences on pain and pain management  - Notify physician/advanced practitioner if interventions unsuccessful or patient reports new pain  Outcome: Progressing     Problem: INFECTION - ADULT  Goal: Absence or prevention of progression during hospitalization  Description: INTERVENTIONS:  - Assess and monitor for signs and symptoms of infection  - Monitor lab/diagnostic results  - Monitor all insertion sites, i e  indwelling lines, tubes, and drains  - Monitor endotracheal if appropriate and nasal secretions for changes in amount and color  - Wideman appropriate cooling/warming therapies per order  - Administer medications as ordered  - Instruct and encourage patient and family to use good hand hygiene technique  - Identify and instruct in appropriate isolation precautions for identified infection/condition  Outcome: Progressing  Goal: Absence of fever/infection during neutropenic period  Description: INTERVENTIONS:  - Monitor WBC    Outcome: Progressing     Problem: SAFETY ADULT  Goal: Patient will remain free of falls  Description: INTERVENTIONS:  - Educate patient/family on patient safety including physical limitations  - Instruct patient to call for assistance with activity   - Consult OT/PT to assist with strengthening/mobility   - Keep Call bell within reach  - Keep bed low and locked with side rails adjusted as appropriate  - Keep care items and personal belongings within reach  - Initiate and maintain comfort rounds  - Make Fall Risk Sign visible to staff  - Offer Toileting everyHours, in advance of need  - Apply yellow socks and bracelet for high fall risk patients  - Consider moving patient to room near nurses station  Outcome: Progressing  Goal: Maintain or return to baseline ADL function  Description: INTERVENTIONS:  -  Assess patient's ability to carry out ADLs; assess patient's baseline for ADL function and identify physical deficits which impact ability to perform ADLs (bathing, care of mouth/teeth, toileting, grooming, dressing, etc )  - Assess/evaluate cause of self-care deficits   - Assess range of motion  - Assess patient's mobility; develop plan if impaired  - Assess patient's need for assistive devices and provide as appropriate  - Encourage maximum independence but intervene and supervise when necessary  - Involve family in performance of ADLs  - Assess for home care needs following discharge   - Consider OT consult to assist with ADL evaluation and planning for discharge  - Provide patient education as appropriate  Outcome: Progressing  Goal: Maintains/Returns to pre admission functional level  Description: INTERVENTIONS:  - Perform BMAT or MOVE assessment daily    - Set and communicate daily mobility goal to care team and patient/family/caregiver     - Collaborate with rehabilitation services on mobility goals if consulted  - Out of bed for toileting  - Record patient progress and toleration of activity level   Outcome: Progressing     Problem: DISCHARGE PLANNING  Goal: Discharge to home or other facility with appropriate resources  Description: INTERVENTIONS:  - Identify barriers to discharge w/patient and caregiver  - Arrange for needed discharge resources and transportation as appropriate  - Identify discharge learning needs (meds, wound care, etc )  - Arrange for interpretive services to assist at discharge as needed  - Refer to Case Management Department for coordinating discharge planning if the patient needs post-hospital services based on physician/advanced practitioner order or complex needs related to functional status, cognitive ability, or social support system  Outcome: Progressing     Problem: Knowledge Deficit  Goal: Patient/family/caregiver demonstrates understanding of disease process, treatment plan, medications, and discharge instructions  Description: Complete learning assessment and assess knowledge base    Interventions:  - Provide teaching at level of understanding  - Provide teaching via preferred learning methods  Outcome: Progressing

## 2022-04-13 PROCEDURE — 99232 SBSQ HOSP IP/OBS MODERATE 35: CPT | Performed by: FAMILY MEDICINE

## 2022-04-13 RX ADMIN — Medication 2 TABLET: at 08:24

## 2022-04-13 RX ADMIN — ENOXAPARIN SODIUM 40 MG: 40 INJECTION SUBCUTANEOUS at 08:21

## 2022-04-13 RX ADMIN — LEVOTHYROXINE SODIUM 50 MCG: 50 TABLET ORAL at 08:21

## 2022-04-13 RX ADMIN — NYSTATIN: 100000 POWDER TOPICAL at 17:21

## 2022-04-13 RX ADMIN — AMLODIPINE BESYLATE 10 MG: 10 TABLET ORAL at 08:22

## 2022-04-13 RX ADMIN — HYDROCHLOROTHIAZIDE 25 MG: 25 TABLET ORAL at 08:22

## 2022-04-13 RX ADMIN — LISINOPRIL 20 MG: 20 TABLET ORAL at 08:22

## 2022-04-13 RX ADMIN — TRIAMCINOLONE ACETONIDE: 1 CREAM TOPICAL at 17:21

## 2022-04-13 RX ADMIN — MIRTAZAPINE 15 MG: 15 TABLET, FILM COATED ORAL at 22:51

## 2022-04-13 RX ADMIN — PRAVASTATIN SODIUM 40 MG: 40 TABLET ORAL at 17:22

## 2022-04-13 NOTE — PLAN OF CARE
Problem: PAIN - ADULT  Goal: Verbalizes/displays adequate comfort level or baseline comfort level  Description: Interventions:  - Encourage patient to monitor pain and request assistance  - Assess pain using appropriate pain scale  - Administer analgesics based on type and severity of pain and evaluate response  - Implement non-pharmacological measures as appropriate and evaluate response  - Consider cultural and social influences on pain and pain management  - Notify physician/advanced practitioner if interventions unsuccessful or patient reports new pain  Outcome: Progressing     Problem: INFECTION - ADULT  Goal: Absence or prevention of progression during hospitalization  Description: INTERVENTIONS:  - Assess and monitor for signs and symptoms of infection  - Monitor lab/diagnostic results  - Monitor all insertion sites, i e  indwelling lines, tubes, and drains  - Monitor endotracheal if appropriate and nasal secretions for changes in amount and color  - [place] appropriate cooling/warming therapies per order  - Administer medications as ordered  - Instruct and encourage patient and family to use good hand hygiene technique  - Identify and instruct in appropriate isolation precautions for identified infection/condition  Outcome: Progressing  Goal: Absence of fever/infection during neutropenic period  Description: INTERVENTIONS:  - Monitor WBC    Outcome: Progressing     Problem: SAFETY ADULT  Goal: Patient will remain free of falls  Description: INTERVENTIONS:  - Educate patient/family on patient safety including physical limitations  - Instruct patient to call for assistance with activity   - Consult OT/PT to assist with strengthening/mobility   - Keep Call bell within reach  - Keep bed low and locked with side rails adjusted as appropriate  - Keep care items and personal belongings within reach  - Initiate and maintain comfort rounds  - Make Fall Risk Sign visible to staff  - Apply yellow socks and bracelet for high fall risk patients  - Consider moving patient to room near nurses station  Outcome: Progressing  Goal: Maintain or return to baseline ADL function  Description: INTERVENTIONS:  -  Assess patient's ability to carry out ADLs; assess patient's baseline for ADL function and identify physical deficits which impact ability to perform ADLs (bathing, care of mouth/teeth, toileting, grooming, dressing, etc )  - Assess/evaluate cause of self-care deficits   - Assess range of motion  - Assess patient's mobility; develop plan if impaired  - Assess patient's need for assistive devices and provide as appropriate  - Encourage maximum independence but intervene and supervise when necessary  - Involve family in performance of ADLs  - Assess for home care needs following discharge   - Consider OT consult to assist with ADL evaluation and planning for discharge  - Provide patient education as appropriate  Outcome: Progressing  Goal: Maintains/Returns to pre admission functional level  Description: INTERVENTIONS:  - Perform BMAT or MOVE assessment daily    - Set and communicate daily mobility goal to care team and patient/family/caregiver     - Collaborate with rehabilitation services on mobility goals if consulted  - Ambulate patient 3 times a day  - Out of bed for toileting  - Record patient progress and toleration of activity level   Outcome: Progressing
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
## 2022-04-14 PROCEDURE — 99232 SBSQ HOSP IP/OBS MODERATE 35: CPT | Performed by: FAMILY MEDICINE

## 2022-04-14 PROCEDURE — 99221 1ST HOSP IP/OBS SF/LOW 40: CPT | Performed by: PSYCHIATRY & NEUROLOGY

## 2022-04-14 RX ADMIN — HYDROCHLOROTHIAZIDE 25 MG: 25 TABLET ORAL at 08:55

## 2022-04-14 RX ADMIN — PRAVASTATIN SODIUM 40 MG: 40 TABLET ORAL at 17:10

## 2022-04-14 RX ADMIN — Medication 2 TABLET: at 08:54

## 2022-04-14 RX ADMIN — ENOXAPARIN SODIUM 40 MG: 40 INJECTION SUBCUTANEOUS at 08:54

## 2022-04-14 RX ADMIN — LEVOTHYROXINE SODIUM 50 MCG: 50 TABLET ORAL at 08:54

## 2022-04-14 RX ADMIN — AMLODIPINE BESYLATE 10 MG: 10 TABLET ORAL at 08:54

## 2022-04-14 RX ADMIN — MIRTAZAPINE 15 MG: 15 TABLET, FILM COATED ORAL at 21:08

## 2022-04-14 RX ADMIN — LISINOPRIL 20 MG: 20 TABLET ORAL at 08:54

## 2022-04-14 RX ADMIN — TRIAMCINOLONE ACETONIDE 1 APPLICATION: 1 CREAM TOPICAL at 08:54
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
## 2022-04-14 NOTE — CASE MANAGEMENT
Case Management Progress Note    Patient name Bubba Treadwell  Location [facility] 321/[facility] 173-08 MRN [id number]  : 1950 Date 2022       LOS (days): 2  Geometric Mean LOS (GMLOS) (days): 3 90  Days to GMLOS:2 3        OBJECTIVE:        Current admission status: Inpatient  Preferred Pharmacy:   [id number] [address] [place] [address]  Phone: [phone] Fax: [phone]    Primary Care Provider: Anita Hopkins DO    Primary Insurance: [facility]  Secondary Insurance:     PROGRESS NOTE:  Received call back from Sam Rothman from [facility]/Protective services [phone]  She reports she was in to evaluate patient yesterday but patient would not complete all of the answers of her assessment for dementia  (pt refused to draw a clock and answer other questions) pt scored 6/30     Await neuro-psych eval for capacity  According to [place], patient's daughter Hermann Soares is POA
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
## 2022-04-15 PROCEDURE — 99232 SBSQ HOSP IP/OBS MODERATE 35: CPT | Performed by: FAMILY MEDICINE

## 2022-04-15 RX ADMIN — LEVOTHYROXINE SODIUM 50 MCG: 50 TABLET ORAL at 08:20

## 2022-04-15 RX ADMIN — LISINOPRIL 20 MG: 20 TABLET ORAL at 08:19

## 2022-04-15 RX ADMIN — ENOXAPARIN SODIUM 40 MG: 40 INJECTION SUBCUTANEOUS at 08:26

## 2022-04-15 RX ADMIN — Medication 2 TABLET: at 08:19

## 2022-04-15 RX ADMIN — HYDROCHLOROTHIAZIDE 25 MG: 25 TABLET ORAL at 08:24

## 2022-04-15 RX ADMIN — PRAVASTATIN SODIUM 40 MG: 40 TABLET ORAL at 17:19

## 2022-04-15 RX ADMIN — MIRTAZAPINE 15 MG: 15 TABLET, FILM COATED ORAL at 21:06

## 2022-04-15 RX ADMIN — TRIAMCINOLONE ACETONIDE: 1 CREAM TOPICAL at 08:25

## 2022-04-15 RX ADMIN — AMLODIPINE BESYLATE 10 MG: 10 TABLET ORAL at 08:19

## 2022-04-15 RX ADMIN — NYSTATIN: 100000 POWDER TOPICAL at 08:24
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
## 2022-04-16 PROCEDURE — 99232 SBSQ HOSP IP/OBS MODERATE 35: CPT | Performed by: FAMILY MEDICINE

## 2022-04-16 RX ADMIN — ENOXAPARIN SODIUM 40 MG: 40 INJECTION SUBCUTANEOUS at 08:41

## 2022-04-16 RX ADMIN — HYDROCHLOROTHIAZIDE 25 MG: 25 TABLET ORAL at 08:41

## 2022-04-16 RX ADMIN — AMLODIPINE BESYLATE 10 MG: 10 TABLET ORAL at 08:41

## 2022-04-16 RX ADMIN — PRAVASTATIN SODIUM 40 MG: 40 TABLET ORAL at 17:12

## 2022-04-16 RX ADMIN — Medication 2 TABLET: at 08:41

## 2022-04-16 RX ADMIN — MIRTAZAPINE 15 MG: 15 TABLET, FILM COATED ORAL at 22:07

## 2022-04-16 RX ADMIN — LISINOPRIL 20 MG: 20 TABLET ORAL at 08:41

## 2022-04-16 RX ADMIN — LEVOTHYROXINE SODIUM 50 MCG: 50 TABLET ORAL at 08:41

## 2022-04-16 NOTE — PLAN OF CARE
Problem: PAIN - ADULT  Goal: Verbalizes/displays adequate comfort level or baseline comfort level  Description: Interventions:  - Encourage patient to monitor pain and request assistance  - Assess pain using appropriate pain scale  - Administer analgesics based on type and severity of pain and evaluate response  - Implement non-pharmacological measures as appropriate and evaluate response  - Consider cultural and social influences on pain and pain management  - Notify physician/advanced practitioner if interventions unsuccessful or patient reports new pain  Outcome: Progressing     Problem: INFECTION - ADULT  Goal: Absence or prevention of progression during hospitalization  Description: INTERVENTIONS:  - Assess and monitor for signs and symptoms of infection  - Monitor lab/diagnostic results  - Monitor all insertion sites, i e  indwelling lines, tubes, and drains  - Monitor endotracheal if appropriate and nasal secretions for changes in amount and color  - [place] appropriate cooling/warming therapies per order  - Administer medications as ordered  - Instruct and encourage patient and family to use good hand hygiene technique  - Identify and instruct in appropriate isolation precautions for identified infection/condition  Outcome: Progressing  Goal: Absence of fever/infection during neutropenic period  Description: INTERVENTIONS:  - Monitor WBC    Outcome: Progressing     Problem: SAFETY ADULT  Goal: Patient will remain free of falls  Description: INTERVENTIONS:  - Educate patient/family on patient safety including physical limitations  - Instruct patient to call for assistance with activity   - Consult OT/PT to assist with strengthening/mobility   - Keep Call bell within reach  - Keep bed low and locked with side rails adjusted as appropriate  - Keep care items and personal belongings within reach  - Initiate and maintain comfort rounds  - Make Fall Risk Sign visible to staff  - Apply yellow socks and bracelet for high fall risk patients  - Consider moving patient to room near nurses station  Outcome: Progressing  Goal: Maintain or return to baseline ADL function  Description: INTERVENTIONS:  -  Assess patient's ability to carry out ADLs; assess patient's baseline for ADL function and identify physical deficits which impact ability to perform ADLs (bathing, care of mouth/teeth, toileting, grooming, dressing, etc )  - Assess/evaluate cause of self-care deficits   - Assess range of motion  - Assess patient's mobility; develop plan if impaired  - Assess patient's need for assistive devices and provide as appropriate  - Encourage maximum independence but intervene and supervise when necessary  - Involve family in performance of ADLs  - Assess for home care needs following discharge   - Consider OT consult to assist with ADL evaluation and planning for discharge  - Provide patient education as appropriate  Outcome: Progressing  Goal: Maintains/Returns to pre admission functional level  Description: INTERVENTIONS:  - Perform BMAT or MOVE assessment daily    - Set and communicate daily mobility goal to care team and patient/family/caregiver     - Collaborate with rehabilitation services on mobility goals if consulted  - Out of bed for toileting  - Record patient progress and toleration of activity level   Outcome: Progressing
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
## 2022-04-17 PROCEDURE — 99232 SBSQ HOSP IP/OBS MODERATE 35: CPT | Performed by: FAMILY MEDICINE

## 2022-04-17 RX ADMIN — HYDROCHLOROTHIAZIDE 25 MG: 25 TABLET ORAL at 08:43

## 2022-04-17 RX ADMIN — LEVOTHYROXINE SODIUM 50 MCG: 50 TABLET ORAL at 08:43

## 2022-04-17 RX ADMIN — AMLODIPINE BESYLATE 10 MG: 10 TABLET ORAL at 08:43

## 2022-04-17 RX ADMIN — LISINOPRIL 20 MG: 20 TABLET ORAL at 08:43

## 2022-04-17 RX ADMIN — ENOXAPARIN SODIUM 40 MG: 40 INJECTION SUBCUTANEOUS at 08:43

## 2022-04-17 RX ADMIN — Medication 2 TABLET: at 08:43

## 2022-04-17 RX ADMIN — MIRTAZAPINE 15 MG: 15 TABLET, FILM COATED ORAL at 21:45

## 2022-04-17 RX ADMIN — PRAVASTATIN SODIUM 40 MG: 40 TABLET ORAL at 17:25
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
## 2022-04-18 PROCEDURE — 99232 SBSQ HOSP IP/OBS MODERATE 35: CPT | Performed by: FAMILY MEDICINE

## 2022-04-18 RX ADMIN — LEVOTHYROXINE SODIUM 50 MCG: 50 TABLET ORAL at 08:31

## 2022-04-18 RX ADMIN — ENOXAPARIN SODIUM 40 MG: 40 INJECTION SUBCUTANEOUS at 10:13

## 2022-04-18 RX ADMIN — AMLODIPINE BESYLATE 10 MG: 10 TABLET ORAL at 08:31

## 2022-04-18 RX ADMIN — HYDROCHLOROTHIAZIDE 25 MG: 25 TABLET ORAL at 08:30

## 2022-04-18 RX ADMIN — NYSTATIN: 100000 POWDER TOPICAL at 16:40

## 2022-04-18 RX ADMIN — MIRTAZAPINE 15 MG: 15 TABLET, FILM COATED ORAL at 22:43

## 2022-04-18 RX ADMIN — TRIAMCINOLONE ACETONIDE: 1 CREAM TOPICAL at 16:40

## 2022-04-18 RX ADMIN — LISINOPRIL 20 MG: 20 TABLET ORAL at 08:30

## 2022-04-18 RX ADMIN — PRAVASTATIN SODIUM 40 MG: 40 TABLET ORAL at 16:39

## 2022-04-18 RX ADMIN — Medication 2 TABLET: at 08:30
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
## 2022-04-19 PROCEDURE — 99232 SBSQ HOSP IP/OBS MODERATE 35: CPT | Performed by: FAMILY MEDICINE

## 2022-04-19 RX ADMIN — APREMILAST 1 TABLET: 30 TABLET, FILM COATED ORAL at 21:16

## 2022-04-19 RX ADMIN — PRAVASTATIN SODIUM 40 MG: 40 TABLET ORAL at 17:36

## 2022-04-19 RX ADMIN — Medication 2 TABLET: at 09:06

## 2022-04-19 RX ADMIN — LEVOTHYROXINE SODIUM 50 MCG: 50 TABLET ORAL at 09:06

## 2022-04-19 RX ADMIN — HYDROCHLOROTHIAZIDE 25 MG: 25 TABLET ORAL at 09:05

## 2022-04-19 RX ADMIN — AMLODIPINE BESYLATE 10 MG: 10 TABLET ORAL at 09:06

## 2022-04-19 RX ADMIN — LISINOPRIL 20 MG: 20 TABLET ORAL at 09:06

## 2022-04-19 RX ADMIN — MIRTAZAPINE 15 MG: 15 TABLET, FILM COATED ORAL at 21:15

## 2022-04-19 RX ADMIN — ENOXAPARIN SODIUM 40 MG: 40 INJECTION SUBCUTANEOUS at 09:06
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
## 2022-04-20 DIAGNOSIS — E03.9 ACQUIRED HYPOTHYROIDISM: ICD-10-CM

## 2022-04-20 PROCEDURE — 99232 SBSQ HOSP IP/OBS MODERATE 35: CPT | Performed by: FAMILY MEDICINE

## 2022-04-20 RX ADMIN — APREMILAST 1 TABLET: 30 TABLET, FILM COATED ORAL at 08:31

## 2022-04-20 RX ADMIN — PRAVASTATIN SODIUM 40 MG: 40 TABLET ORAL at 18:01

## 2022-04-20 RX ADMIN — APREMILAST 1 TABLET: 30 TABLET, FILM COATED ORAL at 21:18

## 2022-04-20 RX ADMIN — MIRTAZAPINE 15 MG: 15 TABLET, FILM COATED ORAL at 21:18

## 2022-04-20 RX ADMIN — LISINOPRIL 20 MG: 20 TABLET ORAL at 08:30

## 2022-04-20 RX ADMIN — HYDROCHLOROTHIAZIDE 25 MG: 25 TABLET ORAL at 08:30

## 2022-04-20 RX ADMIN — Medication 2 TABLET: at 08:30

## 2022-04-20 RX ADMIN — AMLODIPINE BESYLATE 10 MG: 10 TABLET ORAL at 08:30

## 2022-04-20 RX ADMIN — ENOXAPARIN SODIUM 40 MG: 40 INJECTION SUBCUTANEOUS at 08:30

## 2022-04-20 RX ADMIN — LEVOTHYROXINE SODIUM 50 MCG: 50 TABLET ORAL at 08:30

## 2022-04-20 NOTE — CASE MANAGEMENT
Case Management Discharge Planning Note    Patient name David Guardado  Location [facility] 572/-92 MRN [id number]  : 1950 Date 2022       Current Admission Date: 2022  Current Admission Diagnosis:Behavioral change   Patient Active Problem List    Diagnosis Date Noted    Behavioral change 2022    Fear for personal safety 2022    Concerned about having social problem 2022    Altered mental status 2022    Acute neck sprain, initial encounter 2022    Hyperglycemia 2022    Encounter for hepatitis C screening test for low risk patient 2022    Muscular chest pain 2021    Encounter for immunization 2021    Yeast infection of the skin 2021    Polyp of colon 2021    Obesity (BMI 30 0-34 9) 2021    Medicare annual wellness visit, subsequent 2019    Chronic bilateral low back pain without sciatica 10/05/2018    Edema of left lower eyelid 2018    IFG (impaired fasting glucose) 2018    Primary insomnia 10/03/2016    Peripheral neuropathy 2016    Lymphedema 10/01/2015    Cancer of breast, female ([facility] [address] ) 2013    Hyperlipidemia 2013    Osteoarthritis 2012    Age-related osteoporosis without current pathological fracture 2012    Hypothyroidism 10/25/2012    Psoriasis 2012    Benign essential hypertension 2012      LOS (days): 8  Geometric Mean LOS (GMLOS) (days): 3 90  Days to GMLOS:-4     OBJECTIVE:  Risk of Unplanned Readmission Score: 16         Current admission status: Inpatient   Preferred Pharmacy:   [facility] [address] [place] [id number]  Phone: [phone] Fax: [phone]    Primary Care Provider: Spencer Llamas DO    Primary Insurance: [facility]  Secondary Insurance:     DISCHARGE DETAILS:    Per Co[place] and [place] (daughter): [phone] she and her sisters have a 2 hour meeting tomorrow with a  from [address]  Meeting starts at 230pm, Carmen Galaviz will call  tomorrow with an update on guardianship
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
## 2022-04-21 PROCEDURE — 99232 SBSQ HOSP IP/OBS MODERATE 35: CPT | Performed by: FAMILY MEDICINE

## 2022-04-21 RX ORDER — LEVOTHYROXINE SODIUM 0.05 MG/1
TABLET ORAL
Qty: 90 TABLET | Refills: 1 | Status: SHIPPED | OUTPATIENT
Start: 2022-04-21

## 2022-04-21 RX ADMIN — APREMILAST 1 TABLET: 30 TABLET, FILM COATED ORAL at 08:20

## 2022-04-21 RX ADMIN — ENOXAPARIN SODIUM 40 MG: 40 INJECTION SUBCUTANEOUS at 08:21

## 2022-04-21 RX ADMIN — Medication 2 TABLET: at 08:20

## 2022-04-21 RX ADMIN — MIRTAZAPINE 15 MG: 15 TABLET, FILM COATED ORAL at 21:05

## 2022-04-21 RX ADMIN — TRIAMCINOLONE ACETONIDE: 1 CREAM TOPICAL at 17:02

## 2022-04-21 RX ADMIN — PRAVASTATIN SODIUM 40 MG: 40 TABLET ORAL at 16:19

## 2022-04-21 RX ADMIN — LISINOPRIL 20 MG: 20 TABLET ORAL at 08:20

## 2022-04-21 RX ADMIN — APREMILAST 1 TABLET: 30 TABLET, FILM COATED ORAL at 21:06

## 2022-04-21 RX ADMIN — AMLODIPINE BESYLATE 10 MG: 10 TABLET ORAL at 08:20

## 2022-04-21 RX ADMIN — HYDROCHLOROTHIAZIDE 25 MG: 25 TABLET ORAL at 08:20

## 2022-04-21 RX ADMIN — LEVOTHYROXINE SODIUM 50 MCG: 50 TABLET ORAL at 08:20

## 2022-04-21 RX ADMIN — NYSTATIN: 100000 POWDER TOPICAL at 16:18
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
## 2022-04-22 ENCOUNTER — TELEPHONE (OUTPATIENT)
Dept: PSYCHIATRY | Facility: CLINIC | Age: 72
End: 2022-04-22

## 2022-04-22 PROCEDURE — 99232 SBSQ HOSP IP/OBS MODERATE 35: CPT | Performed by: FAMILY MEDICINE

## 2022-04-22 RX ADMIN — LISINOPRIL 20 MG: 20 TABLET ORAL at 08:00

## 2022-04-22 RX ADMIN — MIRTAZAPINE 15 MG: 15 TABLET, FILM COATED ORAL at 21:21

## 2022-04-22 RX ADMIN — Medication 2 TABLET: at 07:46

## 2022-04-22 RX ADMIN — PRAVASTATIN SODIUM 40 MG: 40 TABLET ORAL at 17:08

## 2022-04-22 RX ADMIN — APREMILAST 1 TABLET: 30 TABLET, FILM COATED ORAL at 08:00

## 2022-04-22 RX ADMIN — AMLODIPINE BESYLATE 10 MG: 10 TABLET ORAL at 08:00

## 2022-04-22 RX ADMIN — HYDROCHLOROTHIAZIDE 25 MG: 25 TABLET ORAL at 08:00

## 2022-04-22 RX ADMIN — APREMILAST 1 TABLET: 30 TABLET, FILM COATED ORAL at 21:21

## 2022-04-22 RX ADMIN — TRIAMCINOLONE ACETONIDE: 1 CREAM TOPICAL at 17:09

## 2022-04-22 RX ADMIN — ENOXAPARIN SODIUM 40 MG: 40 INJECTION SUBCUTANEOUS at 08:00

## 2022-04-22 RX ADMIN — LEVOTHYROXINE SODIUM 50 MCG: 50 TABLET ORAL at 08:00

## 2022-04-22 NOTE — CASE MANAGEMENT
Case Management Discharge Planning Note    Patient name Raven Nelson  Location [facility] [address] 572/-00 MRN [id number]  : 1950 Date 2022       Current Admission Date: 2022  Current Admission Diagnosis:Behavioral change   Patient Active Problem List    Diagnosis Date Noted    Behavioral change 2022    Fear for personal safety 2022    Concerned about having social problem 2022    Altered mental status 2022    Acute neck sprain, initial encounter 2022    Hyperglycemia 2022    Encounter for hepatitis C screening test for low risk patient 2022    Muscular chest pain 2021    Encounter for immunization 2021    Yeast infection of the skin 2021    Polyp of colon 2021    Obesity (BMI 30 0-34 9) 2021    Medicare annual wellness visit, subsequent 2019    Chronic bilateral low back pain without sciatica 10/05/2018    Edema of left lower eyelid 2018    IFG (impaired fasting glucose) 2018    Primary insomnia 10/03/2016    Peripheral neuropathy 2016    Lymphedema 10/01/2015    Cancer of breast, female ([facility] [address] ) 2013    Hyperlipidemia 2013    Osteoarthritis 2012    Age-related osteoporosis without current pathological fracture 2012    Hypothyroidism 10/25/2012    Psoriasis 2012    Benign essential hypertension 2012      LOS (days): 10  Geometric Mean LOS (GMLOS) (days): 3 90  Days to GMLOS:-6 1     OBJECTIVE:  Risk of Unplanned Readmission Score: 17         Current admission status: Inpatient   Preferred Pharmacy:   [facility] [address] [place] [id number]  Phone: [phone] Fax: [phone]    Primary Care Provider: Abelardo Diaz DO    Primary Insurance: [facility]  Secondary Insurance:     DISCHARGE DETAILS:                      Additional Comments: [place] phone call from patient's daughter Elsi Mancia concerned for the pt's bird and its care in her mother's absence  Asked pt who is caring the for the bird and she stated her cousin, Rafat Dougherty in [facility] # [phone]  Per Elsi Mancia she is unaware if this is a relative to the patient and states she has no way of knowing wether this is the case or not  Received call from Pedro Weber, pt's caregiver asking about the status of the pt dc plan and what next steps will be for the pt belongings  Referred Vasquez Tracichester to speak with pt's daughter Magalis Argueta and could not discuss or disclose information regarding the patient
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
## 2022-04-23 PROCEDURE — 99232 SBSQ HOSP IP/OBS MODERATE 35: CPT | Performed by: FAMILY MEDICINE

## 2022-04-23 RX ADMIN — APREMILAST 1 TABLET: 30 TABLET, FILM COATED ORAL at 08:16

## 2022-04-23 RX ADMIN — PRAVASTATIN SODIUM 40 MG: 40 TABLET ORAL at 17:08

## 2022-04-23 RX ADMIN — AMLODIPINE BESYLATE 10 MG: 10 TABLET ORAL at 08:16

## 2022-04-23 RX ADMIN — NYSTATIN: 100000 POWDER TOPICAL at 08:15

## 2022-04-23 RX ADMIN — LEVOTHYROXINE SODIUM 50 MCG: 50 TABLET ORAL at 08:16

## 2022-04-23 RX ADMIN — NYSTATIN: 100000 POWDER TOPICAL at 17:10

## 2022-04-23 RX ADMIN — TRIAMCINOLONE ACETONIDE: 1 CREAM TOPICAL at 17:10

## 2022-04-23 RX ADMIN — LISINOPRIL 20 MG: 20 TABLET ORAL at 08:16

## 2022-04-23 RX ADMIN — ENOXAPARIN SODIUM 40 MG: 40 INJECTION SUBCUTANEOUS at 08:15

## 2022-04-23 RX ADMIN — Medication 2 TABLET: at 08:16

## 2022-04-23 RX ADMIN — TRIAMCINOLONE ACETONIDE: 1 CREAM TOPICAL at 08:15

## 2022-04-23 RX ADMIN — HYDROCHLOROTHIAZIDE 25 MG: 25 TABLET ORAL at 08:15

## 2022-04-23 RX ADMIN — MIRTAZAPINE 15 MG: 15 TABLET, FILM COATED ORAL at 21:44

## 2022-04-23 RX ADMIN — APREMILAST 1 TABLET: 30 TABLET, FILM COATED ORAL at 21:44
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
## 2022-04-24 PROCEDURE — 99232 SBSQ HOSP IP/OBS MODERATE 35: CPT | Performed by: FAMILY MEDICINE

## 2022-04-24 RX ADMIN — HYDROCHLOROTHIAZIDE 25 MG: 25 TABLET ORAL at 08:25

## 2022-04-24 RX ADMIN — Medication 2 TABLET: at 08:25

## 2022-04-24 RX ADMIN — APREMILAST 1 TABLET: 30 TABLET, FILM COATED ORAL at 08:25

## 2022-04-24 RX ADMIN — TRIAMCINOLONE ACETONIDE: 1 CREAM TOPICAL at 08:25

## 2022-04-24 RX ADMIN — NYSTATIN: 100000 POWDER TOPICAL at 08:25

## 2022-04-24 RX ADMIN — AMLODIPINE BESYLATE 10 MG: 10 TABLET ORAL at 08:25

## 2022-04-24 RX ADMIN — MIRTAZAPINE 15 MG: 15 TABLET, FILM COATED ORAL at 21:15

## 2022-04-24 RX ADMIN — ENOXAPARIN SODIUM 40 MG: 40 INJECTION SUBCUTANEOUS at 08:25

## 2022-04-24 RX ADMIN — APREMILAST 1 TABLET: 30 TABLET, FILM COATED ORAL at 21:15

## 2022-04-24 RX ADMIN — LEVOTHYROXINE SODIUM 50 MCG: 50 TABLET ORAL at 08:25

## 2022-04-24 RX ADMIN — PRAVASTATIN SODIUM 40 MG: 40 TABLET ORAL at 18:01

## 2022-04-24 RX ADMIN — LISINOPRIL 20 MG: 20 TABLET ORAL at 08:25
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
## 2022-04-25 PROCEDURE — 99232 SBSQ HOSP IP/OBS MODERATE 35: CPT | Performed by: FAMILY MEDICINE

## 2022-04-25 RX ADMIN — APREMILAST 1 TABLET: 30 TABLET, FILM COATED ORAL at 08:46

## 2022-04-25 RX ADMIN — ENOXAPARIN SODIUM 40 MG: 40 INJECTION SUBCUTANEOUS at 08:46

## 2022-04-25 RX ADMIN — NYSTATIN: 100000 POWDER TOPICAL at 17:07

## 2022-04-25 RX ADMIN — NYSTATIN: 100000 POWDER TOPICAL at 08:46

## 2022-04-25 RX ADMIN — LISINOPRIL 20 MG: 20 TABLET ORAL at 08:48

## 2022-04-25 RX ADMIN — NYSTATIN: 100000 POWDER TOPICAL at 22:04

## 2022-04-25 RX ADMIN — TRIAMCINOLONE ACETONIDE: 1 CREAM TOPICAL at 08:46

## 2022-04-25 RX ADMIN — PRAVASTATIN SODIUM 40 MG: 40 TABLET ORAL at 17:07

## 2022-04-25 RX ADMIN — Medication 2 TABLET: at 08:46

## 2022-04-25 RX ADMIN — MIRTAZAPINE 15 MG: 15 TABLET, FILM COATED ORAL at 22:03

## 2022-04-25 RX ADMIN — TRIAMCINOLONE ACETONIDE: 1 CREAM TOPICAL at 17:07

## 2022-04-25 RX ADMIN — APREMILAST 1 TABLET: 30 TABLET, FILM COATED ORAL at 22:03

## 2022-04-25 RX ADMIN — HYDROCHLOROTHIAZIDE 25 MG: 25 TABLET ORAL at 08:45

## 2022-04-25 RX ADMIN — AMLODIPINE BESYLATE 10 MG: 10 TABLET ORAL at 08:46

## 2022-04-25 RX ADMIN — LEVOTHYROXINE SODIUM 50 MCG: 50 TABLET ORAL at 08:48

## 2022-04-25 NOTE — CASE MANAGEMENT
Case Management Discharge Planning Note    Patient name Martin Najjar  Location [address] [id number] MRN [id number]  : 1950 Date 2022       Current Admission Date: 2022  Current Admission Diagnosis:Behavioral change   Patient Active Problem List    Diagnosis Date Noted    Behavioral change 2022    Fear for personal safety 2022    Concerned about having social problem 2022    Altered mental status 2022    Acute neck sprain, initial encounter 2022    Hyperglycemia 2022    Encounter for hepatitis C screening test for low risk patient 2022    Muscular chest pain 2021    Encounter for immunization 2021    Yeast infection of the skin 2021    Polyp of colon 2021    Obesity (BMI 30 0-34 9) 2021    Medicare annual wellness visit, subsequent 2019    Chronic bilateral low back pain without sciatica 10/05/2018    Edema of left lower eyelid 2018    IFG (impaired fasting glucose) 2018    Primary insomnia 10/03/2016    Peripheral neuropathy 2016    Lymphedema 10/01/2015    Cancer of breast, female ([facility] [address] ) 2013    Hyperlipidemia 2013    Osteoarthritis 2012    Age-related osteoporosis without current pathological fracture 2012    Hypothyroidism 10/25/2012    Psoriasis 2012    Benign essential hypertension 2012      LOS (days): 13  Geometric Mean LOS (GMLOS) (days): 3 90  Days to GMLOS:-9 1     OBJECTIVE:  Risk of Unplanned Readmission Score: 18         Current admission status: Inpatient   Preferred Pharmacy:   [id number] [address] [place] [id number]  Phone: [phone] Fax: [phone]    Primary Care Provider: Kavon Wing DO    Primary Insurance: [address] [place] [address]  Secondary Insurance:     DISCHARGE DETAILS:        Additional Comments: Notified by [facility] resident Dr Mabeline Cabot Liyanges that pt expresses concerns regarding paying her rent so she does not lose her apartment  Made aware that Neuropsych is being consulted to reevaluate the patient  Will continue to follow and notified Ericka Porter via EMail that neurospych was being reevaluated and this CM is to notify him of the result
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
## 2022-04-26 PROCEDURE — 99232 SBSQ HOSP IP/OBS MODERATE 35: CPT | Performed by: FAMILY MEDICINE

## 2022-04-26 RX ADMIN — PRAVASTATIN SODIUM 40 MG: 40 TABLET ORAL at 17:29

## 2022-04-26 RX ADMIN — APREMILAST 1 TABLET: 30 TABLET, FILM COATED ORAL at 21:08

## 2022-04-26 RX ADMIN — AMLODIPINE BESYLATE 10 MG: 10 TABLET ORAL at 08:08

## 2022-04-26 RX ADMIN — ENOXAPARIN SODIUM 40 MG: 40 INJECTION SUBCUTANEOUS at 08:07

## 2022-04-26 RX ADMIN — TRIAMCINOLONE ACETONIDE: 1 CREAM TOPICAL at 17:29

## 2022-04-26 RX ADMIN — MIRTAZAPINE 15 MG: 15 TABLET, FILM COATED ORAL at 21:07

## 2022-04-26 RX ADMIN — HYDROCHLOROTHIAZIDE 25 MG: 25 TABLET ORAL at 08:08

## 2022-04-26 RX ADMIN — LISINOPRIL 20 MG: 20 TABLET ORAL at 08:08

## 2022-04-26 RX ADMIN — Medication 2 TABLET: at 08:08

## 2022-04-26 RX ADMIN — APREMILAST 1 TABLET: 30 TABLET, FILM COATED ORAL at 08:09

## 2022-04-26 RX ADMIN — LEVOTHYROXINE SODIUM 50 MCG: 50 TABLET ORAL at 08:11

## 2022-04-26 RX ADMIN — NYSTATIN: 100000 POWDER TOPICAL at 17:29

## 2022-04-26 RX ADMIN — TRIAMCINOLONE ACETONIDE: 1 CREAM TOPICAL at 08:09

## 2022-04-26 RX ADMIN — NYSTATIN: 100000 POWDER TOPICAL at 08:09
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
## 2022-04-26 NOTE — CASE MANAGEMENT
Case Management Discharge Planning Note    Patient name Bubba Treadwell  Location [address] [id number] MRN [id number]  : 1950 Date 2022       Current Admission Date: 2022  Current Admission Diagnosis:Behavioral change   Patient Active Problem List    Diagnosis Date Noted    Behavioral change 2022    Fear for personal safety 2022    Concerned about having social problem 2022    Altered mental status 2022    Acute neck sprain, initial encounter 2022    Hyperglycemia 2022    Encounter for hepatitis C screening test for low risk patient 2022    Muscular chest pain 2021    Encounter for immunization 2021    Yeast infection of the skin 2021    Polyp of colon 2021    Obesity (BMI 30 0-34 9) 2021    Medicare annual wellness visit, subsequent 2019    Chronic bilateral low back pain without sciatica 10/05/2018    Edema of left lower eyelid 2018    IFG (impaired fasting glucose) 2018    Primary insomnia 10/03/2016    Peripheral neuropathy 2016    Lymphedema 10/01/2015    Cancer of breast, female ([facility] [address] ) 2013    Hyperlipidemia 2013    Osteoarthritis 2012    Age-related osteoporosis without current pathological fracture 2012    Hypothyroidism 10/25/2012    Psoriasis 2012    Benign essential hypertension 2012      LOS (days): 14  Geometric Mean LOS (GMLOS) (days): 3 90  Days to GMLOS:-10 1     OBJECTIVE:  Risk of Unplanned Readmission Score: 18         Current admission status: Inpatient   Preferred Pharmacy:   [id number] [address] [place] [id number]  Phone: [phone] Fax: [phone]    Primary Care Provider: Anita Hopkins DO    Primary Insurance: [address] [place] [address]  Secondary Insurance:     DISCHARGE DETAILS:         Other Referral/Resources/Interventions Provided:  Interventions: Outpatient   Referral Comments: In basket referral made to OP CM for follow up regarding assistance for community resources  Pt offered referral to the Waiver fprogram, Meals on Wheels and refused both as well as offered VNA , pt refused  Pt states she knows she can receive transportation through [facility] and has the phone number to call to schedule rides  Pt states her Анна Proud will be here tomorrow to pick her up and transport to home  Would you like to participate in our [address] service program?  : No - Declined    Treatment Team Recommendation: Home  Discharge Destination Plan[de-identified] Home  Transport at Discharge : Family (cousin Jakub Wong will be coming to pick pt up)                             IMM Given (Date):: 04/26/22  IMM Given to[de-identified] Patient  Family notified[de-identified] Explained the IMM to the pt, all questions answered, pt agreeable with DC to home today/tomorrow  Copy of IMM given to the pt and original sent to medical records  Additional Comments: Pt reevaluated today by Dr Santiago Weiner, Neuropsychology and found to have capacity  Pt is being DC to home  Met with the pt at bedside and made aware of this  brought pt POA/AD information and paperwork and explained the importance of having this filled out and notarized and selecting an agent for herself  Pt verbalized understanding, Discussed OP CM referral and pt is agreeble but not agreeable to VNA, referral to Waiver program or Meals on Wheels program  Pt states she is aware she can receive transportation to MD appts through [employer] and has the phone number to arrange this
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
## 2022-04-27 PROCEDURE — 99238 HOSP IP/OBS DSCHRG MGMT 30/<: CPT | Performed by: FAMILY MEDICINE

## 2022-04-27 RX ADMIN — LEVOTHYROXINE SODIUM 50 MCG: 50 TABLET ORAL at 08:32

## 2022-04-27 RX ADMIN — HYDROCHLOROTHIAZIDE 25 MG: 25 TABLET ORAL at 08:31

## 2022-04-27 RX ADMIN — AMLODIPINE BESYLATE 10 MG: 10 TABLET ORAL at 08:31

## 2022-04-27 RX ADMIN — APREMILAST 1 TABLET: 30 TABLET, FILM COATED ORAL at 08:32

## 2022-04-27 RX ADMIN — LISINOPRIL 20 MG: 20 TABLET ORAL at 08:32

## 2022-04-27 RX ADMIN — TRIAMCINOLONE ACETONIDE: 1 CREAM TOPICAL at 08:32

## 2022-04-27 RX ADMIN — Medication 2 TABLET: at 08:31

## 2022-04-27 RX ADMIN — ENOXAPARIN SODIUM 40 MG: 40 INJECTION SUBCUTANEOUS at 08:31

## 2022-04-27 RX ADMIN — NYSTATIN 1 APPLICATION: 100000 POWDER TOPICAL at 08:34

## 2022-04-27 NOTE — NURSING NOTE
AVS reviewed with patient and friend who was accompanying her  Offered no further questions/concerns  Patient declined offer to go by w/c to lobby and requested to walk

## 2022-04-27 NOTE — CASE MANAGEMENT
Case Management Discharge Planning Note    Patient name Freddy West  Location [place] 573/-41 MRN [id number]  : 1950 Date 2022       Current Admission Date: 2022  Current Admission Diagnosis:Behavioral change   Patient Active Problem List    Diagnosis Date Noted    Behavioral change 2022    Fear for personal safety 2022    Concerned about having social problem 2022    Altered mental status 2022    Acute neck sprain, initial encounter 2022    Hyperglycemia 2022    Encounter for hepatitis C screening test for low risk patient 2022    Muscular chest pain 2021    Encounter for immunization 2021    Yeast infection of the skin 2021    Polyp of colon 2021    Obesity (BMI 30 0-34 9) 2021    Medicare annual wellness visit, subsequent 2019    Chronic bilateral low back pain without sciatica 10/05/2018    Edema of left lower eyelid 2018    IFG (impaired fasting glucose) 2018    Primary insomnia 10/03/2016    Peripheral neuropathy 2016    Lymphedema 10/01/2015    Cancer of breast, female ([facility] [address] ) 2013    Hyperlipidemia 2013    Osteoarthritis 2012    Age-related osteoporosis without current pathological fracture 2012    Hypothyroidism 10/25/2012    Psoriasis 2012    Benign essential hypertension 2012      LOS (days): 15  Geometric Mean LOS (GMLOS) (days): 3 90  Days to GMLOS:-10 8     OBJECTIVE:  Risk of Unplanned Readmission Score: 18         Current admission status: Inpatient   Preferred Pharmacy:   [facility] [address] [place] [id number]  Phone: [phone] Fax: [phone]    Primary Care Provider: Stan Jason DO    Primary Insurance: [facility]  Secondary Insurance:     DISCHARGE DETAILS:          Other Referral/Resources/Interventions Provided:  Referral Comments: Called and spoke with pt's daughter Mariposa Dunbar and made aware of pt to be DC to home today with f/u with her PCP, dtr Mariposa Dunbar is also aware that her mother now has capacity  and has refused VNA, waiver, and resources offered but will have OP CM follow up  Per Dtr Leah Mcclain she will notify her sisters Terrie Rushing and Kai Ybarra  they are aware that pt will go home today and cousin Jessy Dixon will trasnport her to her home           Treatment Team Recommendation: Home  Discharge Destination Plan[de-identified] Home  Transport at Discharge : Family

## 2022-04-27 NOTE — PLAN OF CARE
Problem: PAIN - ADULT  Goal: Verbalizes/displays adequate comfort level or baseline comfort level  Description: Interventions:  - Encourage patient to monitor pain and request assistance  - Assess pain using appropriate pain scale  - Administer analgesics based on type and severity of pain and evaluate response  - Implement non-pharmacological measures as appropriate and evaluate response  - Consider cultural and social influences on pain and pain management  - Notify physician/advanced practitioner if interventions unsuccessful or patient reports new pain  Outcome: Progressing     Problem: INFECTION - ADULT  Goal: Absence or prevention of progression during hospitalization  Description: INTERVENTIONS:  - Assess and monitor for signs and symptoms of infection  - Monitor lab/diagnostic results  - Monitor all insertion sites, i e  indwelling lines, tubes, and drains  - Monitor endotracheal if appropriate and nasal secretions for changes in amount and color  - Stanton appropriate cooling/warming therapies per order  - Administer medications as ordered  - Instruct and encourage patient and family to use good hand hygiene technique  - Identify and instruct in appropriate isolation precautions for identified infection/condition  Outcome: Progressing  Goal: Absence of fever/infection during neutropenic period  Description: INTERVENTIONS:  - Monitor WBC    Outcome: Progressing     Problem: SAFETY ADULT  Goal: Patient will remain free of falls  Description: INTERVENTIONS:  - Educate patient/family on patient safety including physical limitations  - Instruct patient to call for assistance with activity   - Consult OT/PT to assist with strengthening/mobility   - Keep Call bell within reach  - Keep bed low and locked with side rails adjusted as appropriate  - Keep care items and personal belongings within reach  - Initiate and maintain comfort rounds  - Apply yellow socks and bracelet for high fall risk patients  - Consider moving patient to room near nurses station  Outcome: Progressing  Goal: Maintain or return to baseline ADL function  Description: INTERVENTIONS:  -  Assess patient's ability to carry out ADLs; assess patient's baseline for ADL function and identify physical deficits which impact ability to perform ADLs (bathing, care of mouth/teeth, toileting, grooming, dressing, etc )  - Assess/evaluate cause of self-care deficits   - Assess range of motion  - Assess patient's mobility; develop plan if impaired  - Assess patient's need for assistive devices and provide as appropriate  - Encourage maximum independence but intervene and supervise when necessary  - Involve family in performance of ADLs  - Assess for home care needs following discharge   - Consider OT consult to assist with ADL evaluation and planning for discharge  - Provide patient education as appropriate  Outcome: Progressing  Goal: Maintains/Returns to pre admission functional level  Description: INTERVENTIONS:  - Perform BMAT or MOVE assessment daily    - Set and communicate daily mobility goal to care team and patient/family/caregiver  - Collaborate with rehabilitation services on mobility goals if consulted  - Perform Range of Motion 2 times a day  - Reposition patient every 2 hours    - Dangle patient 2 times a day  - Stand patient 2 times a day  - Ambulate patient 2 times a day  - Out of bed to chair 2 times a day   - Out of bed for meals 2 times a day  - Out of bed for toileting  - Record patient progress and toleration of activity level   Outcome: Progressing     Problem: DISCHARGE PLANNING  Goal: Discharge to home or other facility with appropriate resources  Description: INTERVENTIONS:  - Identify barriers to discharge w/patient and caregiver  - Arrange for needed discharge resources and transportation as appropriate  - Identify discharge learning needs (meds, wound care, etc )  - Arrange for interpretive services to assist at discharge as needed  - Refer to Case Management Department for coordinating discharge planning if the patient needs post-hospital services based on physician/advanced practitioner order or complex needs related to functional status, cognitive ability, or social support system  Outcome: Progressing     Problem: Knowledge Deficit  Goal: Patient/family/caregiver demonstrates understanding of disease process, treatment plan, medications, and discharge instructions  Description: Complete learning assessment and assess knowledge base    Interventions:  - Provide teaching at level of understanding  - Provide teaching via preferred learning methods  Outcome: Progressing     Problem: Potential for Falls  Goal: Patient will remain free of falls  Description: INTERVENTIONS:  - Educate patient/family on patient safety including physical limitations  - Instruct patient to call for assistance with activity   - Consult OT/PT to assist with strengthening/mobility   - Keep Call bell within reach  - Keep bed low and locked with side rails adjusted as appropriate  - Keep care items and personal belongings within reach  - Initiate and maintain comfort rounds  - Make Fall Risk Sign visible to staff  - Apply yellow socks and bracelet for high fall risk patients  - Consider moving patient to room near nurses station  Outcome: Progressing     Problem: COPING  Goal: Will report anxiety at manageable levels  Description: INTERVENTIONS:  - Administer medication as ordered  - Teach and encourage coping skills  - Provide emotional support  - Assess patient/family for anxiety and ability to cope  Outcome: Progressing     Problem: ABUSE/NEGLECT  Goal: Pt/Caregiver/Family aware of resources to assist with issues of abuse and neglect  Description: INTERVENTIONS:  - If child abuse and/or neglect is suspected, notify Childline directly  - Assess for level of risk and safety  - Initiate referral to Case Management  - Notify PHYSICIAN/AP and Nursing Supervisor  - Provide appropriate education and resources to patient and/or family  - Initiate referral to age appropriate protective services, i e  [facility] or Child Protective Services  - Offer patient/caregiver the option to [facility] patient [employer]  - Provide emotional support, including active listening and acknowledgment of concerns  - Provide the patient with information about supportive services i e  shelters, community resources for domestic violence  Outcome: Progressing

## 2022-04-28 VITALS
OXYGEN SATURATION: 95 % | HEART RATE: 49 BPM | DIASTOLIC BLOOD PRESSURE: 61 MMHG | RESPIRATION RATE: 16 BRPM | TEMPERATURE: 97.4 F | WEIGHT: 162.4 LBS | SYSTOLIC BLOOD PRESSURE: 131 MMHG | HEIGHT: 56 IN | BODY MASS INDEX: 36.53 KG/M2

## 2022-04-28 DIAGNOSIS — I10 BENIGN ESSENTIAL HYPERTENSION: ICD-10-CM

## 2022-04-28 RX ORDER — HYDROCHLOROTHIAZIDE 25 MG/1
TABLET ORAL
Qty: 90 TABLET | Refills: 0 | Status: SHIPPED | OUTPATIENT
Start: 2022-04-28 | End: 2022-07-14 | Stop reason: SDUPTHER

## 2022-04-28 RX ORDER — LISINOPRIL 20 MG/1
TABLET ORAL
Qty: 90 TABLET | Refills: 0 | Status: SHIPPED | OUTPATIENT
Start: 2022-04-28 | End: 2022-07-26

## 2022-04-28 NOTE — UTILIZATION REVIEW
Notification of Discharge   This is a Notification of Discharge from our facility [address]  Please be advised that this patient has been discharge from our facility  Below you will find the admission and discharge date and time including the patients disposition  UTILIZATION REVIEW CONTACT:  Genie Lei  Utilization   Network Utilization Review Department  Phone: [phone] x carefully listen to the prompts  All voicemails are confidential   Email: [email or web address]     PHYSICIAN ADVISORY SERVICES:  FOR [id number] REVIEW - MEDICAL NECESSITY DENIAL  Phone: [phone]  Fax: [phone]  Email: [email or web address]     PRESENTATION DATE: 4/11/2022  8:39 PM  OBERVATION ADMISSION DATE:   INPATIENT ADMISSION DATE: 4/12/22  6:18 PM   DISCHARGE DATE: 4/27/2022 12:21 PM  DISPOSITION: Home/Self Care Home/Self Care      IMPORTANT INFORMATION:  Send all requests for admission clinical reviews, approved or denied determinations and any other requests to dedicated fax number below belonging to the campus where the patient is receiving treatment   List of dedicated fax numbers:  [address] DENIALS (Administrative/Medical Necessity) [phone]   [address] (Maternity/NICU/Pediatrics) [phone]   [facility] [phone]   130 [facility] [phone]   [address] [place] [address] [phone]   [address] [phone]   [facility]  [phone]   2205 [facility], [facility]  2401 [facility] 1000 [facility] [phone]

## 2022-04-29 ENCOUNTER — TRANSITIONAL CARE MANAGEMENT (OUTPATIENT)
Dept: FAMILY MEDICINE CLINIC | Facility: CLINIC | Age: 72
End: 2022-04-29

## 2022-05-04 ENCOUNTER — HOSPITAL ENCOUNTER (EMERGENCY)
Facility: HOSPITAL | Age: 72
Discharge: HOME | End: 2022-05-04
Attending: EMERGENCY MEDICINE
Payer: COMMERCIAL

## 2022-05-04 VITALS
TEMPERATURE: 99.1 F | OXYGEN SATURATION: 99 % | HEART RATE: 72 BPM | DIASTOLIC BLOOD PRESSURE: 82 MMHG | RESPIRATION RATE: 18 BRPM | SYSTOLIC BLOOD PRESSURE: 173 MMHG

## 2022-05-04 DIAGNOSIS — Z00.8 ENCOUNTER FOR PSYCHOLOGICAL EVALUATION: Primary | ICD-10-CM

## 2022-05-04 LAB
ALBUMIN SERPL BCP-MCNC: 3.7 G/DL (ref 3.5–5)
ALP SERPL-CCNC: 85 U/L (ref 46–116)
ALT SERPL W P-5'-P-CCNC: 22 U/L (ref 12–78)
AMPHETAMINES SERPL QL SCN: NEGATIVE
ANION GAP SERPL CALCULATED.3IONS-SCNC: 7 MMOL/L (ref 4–13)
AST SERPL W P-5'-P-CCNC: 19 U/L (ref 5–45)
BACTERIA UR QL AUTO: ABNORMAL /HPF
BARBITURATES UR QL: NEGATIVE
BASOPHILS # BLD AUTO: 0.05 THOUSANDS/ΜL (ref 0–0.1)
BASOPHILS NFR BLD AUTO: 1 % (ref 0–1)
BENZODIAZ UR QL: NEGATIVE
BILIRUB SERPL-MCNC: 0.28 MG/DL (ref 0.2–1)
BILIRUB UR QL STRIP: NEGATIVE
BUN SERPL-MCNC: 21 MG/DL (ref 5–25)
CALCIUM SERPL-MCNC: 9.4 MG/DL (ref 8.3–10.1)
CHLORIDE SERPL-SCNC: 107 MMOL/L (ref 100–108)
CLARITY UR: CLEAR
CO2 SERPL-SCNC: 24 MMOL/L (ref 21–32)
COCAINE UR QL: NEGATIVE
COLOR UR: YELLOW
CREAT SERPL-MCNC: 1.04 MG/DL (ref 0.6–1.3)
EOSINOPHIL # BLD AUTO: 0.07 THOUSAND/ΜL (ref 0–0.61)
EOSINOPHIL NFR BLD AUTO: 1 % (ref 0–6)
ERYTHROCYTE [DISTWIDTH] IN BLOOD BY AUTOMATED COUNT: 14.3 % (ref 11.6–15.1)
ETHANOL EXG-MCNC: 0 MG/DL
GFR SERPL CREATININE-BSD FRML MDRD: 54 ML/MIN/1.73SQ M
GLUCOSE SERPL-MCNC: 319 MG/DL (ref 65–140)
GLUCOSE UR STRIP-MCNC: ABNORMAL MG/DL
HCT VFR BLD AUTO: 44.7 % (ref 34.8–46.1)
HGB BLD-MCNC: 14 G/DL (ref 11.5–15.4)
HGB UR QL STRIP.AUTO: ABNORMAL
HYALINE CASTS #/AREA URNS LPF: ABNORMAL /LPF
IMM GRANULOCYTES # BLD AUTO: 0.08 THOUSAND/UL (ref 0–0.2)
IMM GRANULOCYTES NFR BLD AUTO: 1 % (ref 0–2)
KETONES UR STRIP-MCNC: ABNORMAL MG/DL
LEUKOCYTE ESTERASE UR QL STRIP: ABNORMAL
LYMPHOCYTES # BLD AUTO: 1.88 THOUSANDS/ΜL (ref 0.6–4.47)
LYMPHOCYTES NFR BLD AUTO: 19 % (ref 14–44)
MCH RBC QN AUTO: 28.3 PG (ref 26.8–34.3)
MCHC RBC AUTO-ENTMCNC: 31.3 G/DL (ref 31.4–37.4)
MCV RBC AUTO: 91 FL (ref 82–98)
METHADONE UR QL: NEGATIVE
MONOCYTES # BLD AUTO: 0.7 THOUSAND/ΜL (ref 0.17–1.22)
MONOCYTES NFR BLD AUTO: 7 % (ref 4–12)
MUCOUS THREADS UR QL AUTO: ABNORMAL
NEUTROPHILS # BLD AUTO: 7.28 THOUSANDS/ΜL (ref 1.85–7.62)
NEUTS SEG NFR BLD AUTO: 71 % (ref 43–75)
NITRITE UR QL STRIP: NEGATIVE
NON-SQ EPI CELLS URNS QL MICRO: ABNORMAL /HPF
NRBC BLD AUTO-RTO: 0 /100 WBCS
OPIATES UR QL SCN: NEGATIVE
OXYCODONE+OXYMORPHONE UR QL SCN: NEGATIVE
PCP UR QL: NEGATIVE
PH UR STRIP.AUTO: 5.5 [PH]
PLATELET # BLD AUTO: 361 THOUSANDS/UL (ref 149–390)
PMV BLD AUTO: 10.3 FL (ref 8.9–12.7)
POTASSIUM SERPL-SCNC: 3.5 MMOL/L (ref 3.5–5.3)
PROT SERPL-MCNC: 7.7 G/DL (ref 6.4–8.2)
PROT UR STRIP-MCNC: ABNORMAL MG/DL
RBC # BLD AUTO: 4.94 MILLION/UL (ref 3.81–5.12)
RBC #/AREA URNS AUTO: ABNORMAL /HPF
SODIUM SERPL-SCNC: 138 MMOL/L (ref 136–145)
SP GR UR STRIP.AUTO: 1.02 (ref 1–1.03)
T4 FREE SERPL-MCNC: 0.88 NG/DL (ref 0.76–1.46)
THC UR QL: NEGATIVE
TSH SERPL DL<=0.05 MIU/L-ACNC: 6.93 UIU/ML (ref 0.45–4.5)
UROBILINOGEN UR STRIP-ACNC: <2 MG/DL
WBC # BLD AUTO: 10.06 THOUSAND/UL (ref 4.31–10.16)
WBC #/AREA URNS AUTO: ABNORMAL /HPF

## 2022-05-04 PROCEDURE — 36415 COLL VENOUS BLD VENIPUNCTURE: CPT | Performed by: EMERGENCY MEDICINE

## 2022-05-04 PROCEDURE — 81001 URINALYSIS AUTO W/SCOPE: CPT | Performed by: EMERGENCY MEDICINE

## 2022-05-04 PROCEDURE — 82075 ASSAY OF BREATH ETHANOL: CPT | Performed by: EMERGENCY MEDICINE

## 2022-05-04 PROCEDURE — 85025 COMPLETE CBC W/AUTO DIFF WBC: CPT | Performed by: EMERGENCY MEDICINE

## 2022-05-04 PROCEDURE — 99284 EMERGENCY DEPT VISIT MOD MDM: CPT

## 2022-05-04 PROCEDURE — 80307 DRUG TEST PRSMV CHEM ANLYZR: CPT | Performed by: EMERGENCY MEDICINE

## 2022-05-04 PROCEDURE — 80053 COMPREHEN METABOLIC PANEL: CPT | Performed by: EMERGENCY MEDICINE

## 2022-05-04 PROCEDURE — 99285 EMERGENCY DEPT VISIT HI MDM: CPT | Performed by: EMERGENCY MEDICINE

## 2022-05-04 PROCEDURE — 84439 ASSAY OF FREE THYROXINE: CPT | Performed by: EMERGENCY MEDICINE

## 2022-05-04 PROCEDURE — 93005 ELECTROCARDIOGRAM TRACING: CPT

## 2022-05-04 PROCEDURE — 84443 ASSAY THYROID STIM HORMONE: CPT | Performed by: EMERGENCY MEDICINE

## 2022-05-04 NOTE — ED PROVIDER NOTES
History  Chief Complaint   Patient presents with    Psychiatric Evaluation     Patient's daughter and [facility] crisis petitioned a 36 on patient because her neighbors reported patient wondering neighborhood and stating she has bugs coming into her home  Also, daughter reports she is over drafting her account and doesn't know why  Patient arrives to ED via EMS awake, alert, and oriented x4  Patient reports she was shopping today with her friend buying groceries and picking up prescriptions  patient denies SI/HI  Patient cooperative  45-year-old female with a past medical history of hypertension, hyperlipidemia, hypothyroidism, breast cancer, and arthritis presents for psychiatric evaluation  Apparently patient's daughter had been getting calls from the neighbors about patient wandering the neighborhood  She decided to petition the [facility] for 302  Paper that arrived with patient via EMS does not say much about why there is a petition for a 302  Patient states that she was mostly in her house today sewing  She went to the store with a friend and then they walked around the neighborhood  Patient is unsure why her daughter would have petitioned a 36  Patient states that her daughter lives in [facility] and does not see her frequently, so she does not know much about what is going on in her life  Patient recently got out of the psychiatric hospital   Patient lives alone and cares for herself  Patient says that she is able to perform all of her ADLs  She has no suicidal or homicidal ideation  No auditory or visual hallucinations  Patient does not do any illicit drugs or use alcohol  Prior to Admission Medications   Prescriptions Last Dose Informant Patient Reported? Taking?    Diclofenac Sodium (VOLTAREN) 1 %  Self No No   Sig: Apply 4 g topically 4 (four) times a day   OTEZLA 30 MG TABS  Self Yes No   Si mg 2 (two) times a day    amLODIPine (NORVASC) 10 mg tablet  Self No No   Sig: Take 1 tablet (10 mg total) by mouth daily   calcium carbonate-vitamin D (OSCAL-D) 500 mg-200 units per tablet  Self No No   Sig: Take 2 tablets by mouth daily with breakfast   clindamycin (CLEOCIN T) 1 % lotion  Self Yes No   fluocinonide (LIDEX) 0 05 % external solution  Self Yes No   hydrochlorothiazide (HYDRODIURIL) 25 mg tablet   No No   Sig: take 1 tablet by mouth once daily   levothyroxine 50 mcg tablet   No No   Sig: TAKE 1 TABLET BY MOUTH DAILY   lidocaine (XYLOCAINE) 2 % topical gel  Self No No   Sig: Apply topically as needed for mild pain   lisinopril (ZESTRIL) 20 mg tablet   No No   Sig: take 1 tablet by mouth once daily   mirtazapine (REMERON) 15 mg tablet  Self No No   Sig: Take 1 tablet (15 mg total) by mouth daily at bedtime Take 15mg (one tablet) at night for three (3) days and then start on 30mg (two tablets) at night onwards     mometasone (ELOCON) 0 1 % cream  Self Yes No   Sig: Apply 50 application topically 2 (two) times a day    nystatin (MYCOSTATIN) powder  Self No No   Sig: Apply topically 4 (four) times a day   simvastatin (ZOCOR) 20 mg tablet  Self No No   Sig: Take 1 tablet (20 mg total) by mouth daily at bedtime      Facility-Administered Medications: None       Past Medical History:   Diagnosis Date    Breast cancer [facility]) 1996 lt mastectomy    Disease of thyroid gland     Hemorrhoids     Hyperlipidemia     Hypertension     Psoriasis        Past Surgical History:   Procedure Laterality Date    BREAST CYST EXCISION Right     benign    BREAST LUMPECTOMY Left      SECTION      COLONOSCOPY      Complete    MASTECTOMY Left     Onset: 1996    SALPINGOOPHORECTOMY Bilateral     age 40    TOTAL ABDOMINAL HYSTERECTOMY      age 40       Family History   Problem Relation Age of Onset    Arthritis Mother     Diabetes Mother     Breast cancer Mother 67    Colon cancer Mother     Hypertension Mother     Diabetes Father     Glaucoma Father     Prostate cancer Father     Asthma Brother     Hypertension Brother     Colon cancer Maternal Uncle 80    Breast cancer Maternal Aunt [de-identified]    No Known Problems Maternal Grandmother     No Known Problems Paternal Grandmother     No Known Problems Maternal Aunt     No Known Problems Maternal Aunt     No Known Problems Paternal Aunt     No Known Problems Paternal Aunt     No Known Problems Paternal Grandfather     Breast cancer Cousin      I have reviewed and agree with the history as documented  E-Cigarette/Vaping    E-Cigarette Use Never User      E-Cigarette/Vaping Substances    Nicotine No     THC No     CBD No     Flavoring No     Other No     Unknown No      Social History     Tobacco Use    Smoking status: Never Smoker    Smokeless tobacco: Never Used   Vaping Use    Vaping Use: Never used   Substance Use Topics    Alcohol use: No    Drug use: No        Review of Systems   Constitutional: Negative for chills, fatigue and fever  HENT: Negative for congestion, rhinorrhea and sore throat  Eyes: Negative for pain and redness  Respiratory: Negative for cough, chest tightness, shortness of breath and wheezing  Cardiovascular: Negative for chest pain and palpitations  Gastrointestinal: Negative for abdominal pain, diarrhea, nausea and vomiting  Endocrine: Negative  Genitourinary: Negative for difficulty urinating and hematuria  Musculoskeletal: Negative for back pain and myalgias  Skin: Negative for pallor and rash  Allergic/Immunologic: Negative  Neurological: Negative for dizziness, weakness, light-headedness and headaches  Hematological: Negative  Psychiatric/Behavioral: Negative for agitation, hallucinations, self-injury and suicidal ideas         Physical Exam  ED Triage Vitals [05/04/22 1830]   Temperature Pulse Respirations Blood Pressure SpO2   99 1 °F (37 3 °C) (!) 108 18 (!) 199/86 98 %      Temp src Heart Rate Source Patient Position - Orthostatic VS BP Location FiO2 (%) -- Monitor Sitting Right arm --      Pain Score       No Pain             Orthostatic Vital Signs  Vitals:    05/04/22 1830 05/04/22 2150   BP: (!) 199/86 (!) 173/82   Pulse: (!) 108 72   Patient Position - Orthostatic VS: Sitting Sitting       Physical Exam  Vitals and nursing note reviewed  Constitutional:       Appearance: Normal appearance  HENT:      Head: Normocephalic and atraumatic  Eyes:      Conjunctiva/sclera: Conjunctivae normal    Cardiovascular:      Rate and Rhythm: Normal rate and regular rhythm  Heart sounds: No murmur heard  Pulmonary:      Effort: Pulmonary effort is normal  No respiratory distress  Breath sounds: Normal breath sounds  No wheezing, rhonchi or rales  Abdominal:      General: Abdomen is flat  Musculoskeletal:         General: Normal range of motion  Cervical back: Normal range of motion and neck supple  Skin:     General: Skin is warm and dry  Neurological:      General: No focal deficit present  Mental Status: She is alert and oriented to person, place, and time  Psychiatric:         Attention and Perception: Attention normal  She does not perceive auditory or visual hallucinations  Mood and Affect: Mood and affect normal          Speech: Speech normal          Behavior: Behavior normal  Behavior is cooperative  Thought Content: Thought content normal  Thought content does not include homicidal or suicidal ideation           Cognition and Memory: Cognition and memory normal          Judgment: Judgment normal          ED Medications  Medications - No data to display    Diagnostic Studies  Results Reviewed     Procedure Component Value Units Date/Time    T4, free [[id number]]  (Normal) Collected: 05/04/22 1949    Lab Status: Final result Specimen: Blood from Arm, Right Updated: 05/04/22 2130     Free T4 0 88 ng/dL     Rapid drug screen, urine [[id number]]  (Normal) Collected: 05/04/22 1920    Lab Status: Final result Specimen: Urine, Catheter Updated: 05/04/22 2111     Amph/Meth UR Negative     Barbiturate Ur Negative     Benzodiazepine Urine Negative     Cocaine Urine Negative     Methadone Urine Negative     Opiate Urine Negative     PCP Ur Negative     THC Urine Negative     Oxycodone Urine Negative    Narrative:      FOR MEDICAL PURPOSES ONLY  IF CONFIRMATION NEEDED PLEASE CONTACT THE LAB WITHIN 5 DAYS  Drug Screen Cutoff Levels:  AMPHETAMINE/METHAMPHETAMINES  1000 ng/mL  BARBITURATES     200 ng/mL  BENZODIAZEPINES     200 ng/mL  COCAINE      300 ng/mL  METHADONE      300 ng/mL  OPIATES      300 ng/mL  PHENCYCLIDINE     25 ng/mL  THC       50 ng/mL  OXYCODONE      100 ng/mL    TSH [[id number]]  (Abnormal) Collected: 05/04/22 1949    Lab Status: Final result Specimen: Blood from Arm, Right Updated: 05/04/22 2033     TSH 3RD GENERATON 6 930 uIU/mL     Narrative:      Patients undergoing fluorescein dye angiography may retain small amounts of fluorescein in the body for 48-72 hours post procedure  Samples containing fluorescein can produce falsely depressed TSH values  If the patient had this procedure,a specimen should be resubmitted post fluorescein clearance        Comprehensive metabolic panel [[id number]]  (Abnormal) Collected: 05/04/22 1949    Lab Status: Final result Specimen: Blood from Arm, Right Updated: 05/04/22 2027     Sodium 138 mmol/L      Potassium 3 5 mmol/L      Chloride 107 mmol/L      CO2 24 mmol/L      ANION GAP 7 mmol/L      BUN 21 mg/dL      Creatinine 1 04 mg/dL      Glucose 319 mg/dL      Calcium 9 4 mg/dL      AST 19 U/L      ALT 22 U/L      Alkaline Phosphatase 85 U/L      Total Protein 7 7 g/dL      Albumin 3 7 g/dL      Total Bilirubin 0 28 mg/dL      eGFR 54 ml/min/1 73sq m     Narrative:      [place] guidelines for Chronic Kidney Disease (CKD):     Stage 1 with normal or high GFR (GFR > 90 mL/min/1 73 square meters)    Stage 2 Mild CKD (GFR = 60-89 mL/min/1 73 square meters)    Stage 3A Moderate CKD (GFR = 45-59 mL/min/1 73 square meters)    Stage 3B Moderate CKD (GFR = 30-44 mL/min/1 73 square meters)    Stage 4 Severe CKD (GFR = 15-29 mL/min/1 73 square meters)    Stage 5 End Stage CKD (GFR <15 mL/min/1 73 square meters)  Note: GFR calculation is accurate only with a steady state creatinine    CBC and differential [[id number]]  (Abnormal) Collected: 05/04/22 1949    Lab Status: Final result Specimen: Blood from Arm, Right Updated: 05/04/22 1959     WBC 10 06 Thousand/uL      RBC 4 94 Million/uL      Hemoglobin 14 0 g/dL      Hematocrit 44 7 %      MCV 91 fL      MCH 28 3 pg      MCHC 31 3 g/dL      RDW 14 3 %      MPV 10 3 fL      Platelets 099 Thousands/uL      nRBC 0 /100 WBCs      Neutrophils Relative 71 %      Immat GRANS % 1 %      Lymphocytes Relative 19 %      Monocytes Relative 7 %      Eosinophils Relative 1 %      Basophils Relative 1 %      Neutrophils Absolute 7 28 Thousands/µL      Immature Grans Absolute 0 08 Thousand/uL      Lymphocytes Absolute 1 88 Thousands/µL      Monocytes Absolute 0 70 Thousand/µL      Eosinophils Absolute 0 07 Thousand/µL      Basophils Absolute 0 05 Thousands/µL     Urine Microscopic [[id number]]  (Abnormal) Collected: 05/04/22 1921    Lab Status: Final result Specimen: Urine, Clean Catch Updated: 05/04/22 1956     RBC, UA 4-10 /hpf      WBC, UA 2-4 /hpf      Epithelial Cells Occasional /hpf      Bacteria, UA None Seen /hpf      MUCUS THREADS Moderate     Hyaline Casts, UA 5-10 /lpf     UA w Reflex to Microscopic w Reflex to Culture [[id number]]  (Abnormal) Collected: 05/04/22 1921    Lab Status: Final result Specimen: Urine, Clean Catch Updated: 05/04/22 1949     Color, UA Yellow     Clarity, UA Clear     Specific Gravity, UA 1 019     pH, UA 5 5     Leukocytes, UA Small     Nitrite, UA Negative     Protein, UA Trace mg/dl      Glucose,  (3/10%) mg/dl      Ketones, UA Trace mg/dl      Urobilinogen, UA <2 0 mg/dl      Bilirubin, UA Negative     Blood, UA Small    POCT alcohol breath test [[id number]]  (Normal) Resulted: 05/04/22 1921    Lab Status: Final result Updated: 05/04/22 1921     EXTBreath Alcohol 0 000                 No orders to display         Procedures  Procedures      ED Course  ED Course as of 05/05/22 0204   Wed May 04, 2022   1851 Spoke with patient's caregiver who is a family friend  She states that patient recently got out of the hospital for a psychiatric admission  When she was discharged, patient was told that she has "sundown syndrome"  Caregiver has noticed behavioral changes over the past few weeks  When she was in the hospital, she was also being treated for a urinary tract infection, which the doctors told her may also be causing these changes  Caregiver has not seen patient since she got at the hospital because patient has not been herself and did not want caregiver to come monitor  There have been discussions with the family about getting patient more extensive help at home, but patient does not want any help  2127 The heart rate is 69, which is normal  The rhythm is regular  The axis is normal  The P waves are normal and the MT interval is normal  The QRS height is normal and width is normal  The ST segments are not elevated or depressed  The T waves are normal  The QT segment is normal  This ecg shows sinus rhythm  MDM  Number of Diagnoses or Management Options  Encounter for psychological evaluation: established and improving  Diagnosis management comments: 61-year-old female presents for psychiatric evaluation after family petitioned [place] for [place]  302 paperwork does not have much information about why this 302 was started  The only information on there is that patient wanders the neighborhood  Patient is alert and oriented  She is not confused  Patient cares for herself  Based on my evaluation, I see no reason to uphold the 302   Will do a Voltari workup to rule out medical causes for confusion  It seems like patient may have some mild dementia based on information that I am getting from neighbors and family  Will reach out to caretakers or family for more information  Amount and/or Complexity of Data Reviewed  Clinical lab tests: ordered and reviewed  Review and summarize past medical records: yes  Discuss the patient with other providers: yes    Risk of Complications, Morbidity, and/or Mortality  Presenting problems: moderate  Diagnostic procedures: moderate  Management options: moderate    Patient Progress  Patient progress: improved    Patient likely has mild dementia  She has been told by other physicians that she sundowns  Patient is able to care for herself and can verbally tell me all of the things that she does in a day  I do believe that patient needs some kind of assistance at home  I recommended this to both patient and her caretaker  There is no medical reason that patient needs to be admitted to the hospital   No reason to [facility] the 302  Recommend follow up with primary care physician  Return precautions given  All questions answered  Disposition  Final diagnoses:   Encounter for psychological evaluation     Time reflects when diagnosis was documented in both MDM as applicable and the Disposition within this note     Time User Action Codes Description Comment    5/4/2022  9:53 PM Pink Fabry Add [Z00 8] Encounter for psychological evaluation       ED Disposition     ED Disposition Condition Date/Time Comment    Discharge Good Wed May 4, 2022  9:53 PM Tima Paz discharge to home/self care              Follow-up Information     Follow up With Specialties Details Why Contact Info Additional [address]   [address], [facility] [id number]  [address] [place] [address], [address], [address], James Valles [place], [address]          Discharge Medication List as of 5/4/2022  9:54 PM      CONTINUE these medications which have NOT CHANGED    Details   amLODIPine (NORVASC) 10 mg tablet Take 1 tablet (10 mg total) by mouth daily, Starting Thu 1/20/2022, Normal      calcium carbonate-vitamin D (OSCAL-D) 500 mg-200 units per tablet Take 2 tablets by mouth daily with breakfast, Starting Tue 9/22/2020, Normal      clindamycin (CLEOCIN T) 1 % lotion Starting Mon 7/22/2019, Historical Med      Diclofenac Sodium (VOLTAREN) 1 % Apply 4 g topically 4 (four) times a day, Starting Mon 2/21/2022, Normal      fluocinonide (LIDEX) 0 05 % external solution Starting Mon 3/5/2018, Historical Med      hydrochlorothiazide (HYDRODIURIL) 25 mg tablet take 1 tablet by mouth once daily, Normal      levothyroxine 50 mcg tablet TAKE 1 TABLET BY MOUTH DAILY, Normal      lidocaine (XYLOCAINE) 2 % topical gel Apply topically as needed for mild pain, Starting Wed 9/29/2021, Normal      lisinopril (ZESTRIL) 20 mg tablet take 1 tablet by mouth once daily, Normal      mirtazapine (REMERON) 15 mg tablet Take 1 tablet (15 mg total) by mouth daily at bedtime Take 15mg (one tablet) at night for three (3) days and then start on 30mg (two tablets) at night onwards  , Starting Fri 4/8/2022, Until Fri 6/10/2022, Normal      mometasone (ELOCON) 0 1 % cream Apply 50 application topically 2 (two) times a day , Starting Fri 7/14/2017, Historical Med      nystatin (MYCOSTATIN) powder Apply topically 4 (four) times a day, Starting Fri 9/24/2021, Normal      OTEZLA 30 MG TABS 30 mg 2 (two) times a day , Starting Mon 3/19/2018, Historical Med      simvastatin (ZOCOR) 20 mg tablet Take 1 tablet (20 mg total) by mouth daily at bedtime, Starting Mon 3/21/2022, Normal           No discharge procedures on file      PDMP Review       Value Time User    PDMP Reviewed  Yes 4/6/2022 11:23 PM Sha Rae MD           ED Provider  Attending physically available and evaluated Jocelynn Bragg  I managed the patient along with the ED Attending      Electronically Signed by         Ronald Patel DO  05/05/22 9619

## 2022-05-05 ENCOUNTER — PATIENT OUTREACH (OUTPATIENT)
Dept: FAMILY MEDICINE CLINIC | Facility: CLINIC | Age: 72
End: 2022-05-05

## 2022-05-05 ENCOUNTER — OFFICE VISIT (OUTPATIENT)
Dept: FAMILY MEDICINE CLINIC | Facility: CLINIC | Age: 72
End: 2022-05-05

## 2022-05-05 VITALS
BODY MASS INDEX: 35.32 KG/M2 | DIASTOLIC BLOOD PRESSURE: 84 MMHG | SYSTOLIC BLOOD PRESSURE: 145 MMHG | WEIGHT: 157 LBS | HEART RATE: 74 BPM | OXYGEN SATURATION: 98 % | RESPIRATION RATE: 18 BRPM | HEIGHT: 56 IN | TEMPERATURE: 98 F

## 2022-05-05 DIAGNOSIS — R46.89 BEHAVIORAL CHANGE: ICD-10-CM

## 2022-05-05 DIAGNOSIS — Z78.9 NEEDS ASSISTANCE WITH COMMUNITY RESOURCES: Primary | ICD-10-CM

## 2022-05-05 DIAGNOSIS — Z65.9 CONCERNED ABOUT HAVING SOCIAL PROBLEM: Primary | ICD-10-CM

## 2022-05-05 LAB
ATRIAL RATE: 69 BPM
P AXIS: 75 DEGREES
PR INTERVAL: 150 MS
QRS AXIS: 73 DEGREES
QRSD INTERVAL: 82 MS
QT INTERVAL: 380 MS
QTC INTERVAL: 407 MS
T WAVE AXIS: 70 DEGREES
VENTRICULAR RATE: 69 BPM

## 2022-05-05 PROCEDURE — 93010 ELECTROCARDIOGRAM REPORT: CPT | Performed by: INTERNAL MEDICINE

## 2022-05-05 PROCEDURE — 3077F SYST BP >= 140 MM HG: CPT | Performed by: FAMILY MEDICINE

## 2022-05-05 PROCEDURE — 99213 OFFICE O/P EST LOW 20 MIN: CPT | Performed by: FAMILY MEDICINE

## 2022-05-05 PROCEDURE — 3079F DIAST BP 80-89 MM HG: CPT | Performed by: FAMILY MEDICINE
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
## 2022-05-05 NOTE — ED NOTES
Overturned 302 emailed to [address] at [facility]       FEDERICO Rene  05/04/22   [id number]
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
## 2022-05-05 NOTE — DISCHARGE INSTRUCTIONS
You have been seen for a psychological evaluation  You should return to the ED if you develop thoughts of hurting herself, thoughts of hurting others, inability to care for yourself, or other worsening symptoms  Follow up with your primary care physician  Consider getting help in the home with your daily activities

## 2022-05-05 NOTE — ED NOTES
Patient resting comfortably on side of bed talking on phone with a friend       Silvia Laurent RN  05/04/22 [id number]

## 2022-05-05 NOTE — TELEPHONE ENCOUNTER
PT daughter return call, Daughter stated PT was found talking to her self and swiping the grass  PT daughter stated she is out of the area  PT daughter stated PT will not give verbal consent   Inform daughter I will wait for a call back from PT

## 2022-05-05 NOTE — ASSESSMENT & PLAN NOTE
Pt went to the hospital yesterday for concerns of wandering around neighborhood  Police picked her up when neighbors called 46  She states she was going to the grocery store for food  She was cleared from the hospital without any cojncerns  - f/u in 2 months for annual physical   - no other concerns, daughter wanted pt to be 36'd however, daughter lives in [facility] and is not close with mother   - pt's neuro status is normal today, no concerns

## 2022-05-12 ENCOUNTER — PATIENT OUTREACH (OUTPATIENT)
Dept: FAMILY MEDICINE CLINIC | Facility: CLINIC | Age: 72
End: 2022-05-12

## 2022-05-12 NOTE — PROGRESS NOTES
SWCM spoke to Dr Elinor Fernandez regarding AAA concerns about pt safety in the home  Referral for neuropsychology was placed  SW left VM at [address] [place] [address] Neuropsychology/Psychiatry in [facility], [phone] to inquire about waiting list for neuropsych exam and whether they will reach out to pt directly  Awaiting return phone call  RICARDO did speak to pt who feels she no longer feels she needs counseling as she is doing well  SEVENCM expressed the importance of social supports and this connection could be positive for her  Pt continued to decline service  SWCM did explain that a neuropsychological exam was ordered for cognitive functioning and pt is agreeable to this test  RICARDO left  for AAA worker Loco Orozco [phone] to update her   RICARDO to follow

## 2022-06-01 ENCOUNTER — PATIENT OUTREACH (OUTPATIENT)
Dept: FAMILY MEDICINE CLINIC | Facility: CLINIC | Age: 72
End: 2022-06-01

## 2022-06-01 NOTE — PROGRESS NOTES
RICARDO outreached patient to determine if she had followed up with the neuropsychological exam  Patient now stating that she does not desire the neuropsych eval and does not feel that it is necessary  Also continues to refuse OP MH  RICARDO spoke to AAA worker Ronalddiane, [place], [phone] to update her on pt refusal on services offered and that orders are in [address] [place] [address] Ave psychiatry and [facility] neuropsych should patient change her mind  Ronalddiane understood  Patient reports no other needs at this time  RICARDO provided supportive counseling  RICARDO will close case at this time, but will remain available for support

## 2022-06-06 ENCOUNTER — TELEPHONE (OUTPATIENT)
Dept: FAMILY MEDICINE CLINIC | Facility: CLINIC | Age: 72
End: 2022-06-06

## 2022-06-06 NOTE — TELEPHONE ENCOUNTER
Folder (To be completed by) - Marti Haynes     Name of Form - Department of Human Services Area of Aging    Form to be Faxed [phone]      Patient was made aware of the 7 business day form policy

## 2022-06-20 NOTE — TELEPHONE ENCOUNTER
Form completed and signed by Dr Claudean Bears  Form placed in BLUE clerical folder to be faxed  Thank You

## 2022-07-07 ENCOUNTER — TELEPHONE (OUTPATIENT)
Dept: FAMILY MEDICINE CLINIC | Facility: CLINIC | Age: 72
End: 2022-07-07

## 2022-07-07 DIAGNOSIS — L40.9 PSORIASIS: Primary | ICD-10-CM

## 2022-07-07 NOTE — TELEPHONE ENCOUNTER
Jamilah from [facility] calling, pt has appt with Dermatology needs updated Dr order put into Epic   Dx L40 9 has appt 7/13

## 2022-07-14 ENCOUNTER — OFFICE VISIT (OUTPATIENT)
Dept: FAMILY MEDICINE CLINIC | Facility: CLINIC | Age: 72
End: 2022-07-14

## 2022-07-14 VITALS
HEART RATE: 86 BPM | TEMPERATURE: 98.5 F | BODY MASS INDEX: 35.41 KG/M2 | DIASTOLIC BLOOD PRESSURE: 82 MMHG | RESPIRATION RATE: 18 BRPM | WEIGHT: 157.4 LBS | SYSTOLIC BLOOD PRESSURE: 149 MMHG | HEIGHT: 56 IN

## 2022-07-14 DIAGNOSIS — I10 BENIGN ESSENTIAL HYPERTENSION: Primary | ICD-10-CM

## 2022-07-14 DIAGNOSIS — E03.9 HYPOTHYROIDISM, UNSPECIFIED TYPE: ICD-10-CM

## 2022-07-14 DIAGNOSIS — R46.89 BEHAVIORAL CHANGE: ICD-10-CM

## 2022-07-14 DIAGNOSIS — E78.5 HYPERLIPIDEMIA, UNSPECIFIED HYPERLIPIDEMIA TYPE: ICD-10-CM

## 2022-07-14 PROCEDURE — 99213 OFFICE O/P EST LOW 20 MIN: CPT | Performed by: FAMILY MEDICINE

## 2022-07-14 PROCEDURE — 3079F DIAST BP 80-89 MM HG: CPT | Performed by: FAMILY MEDICINE

## 2022-07-14 PROCEDURE — 3077F SYST BP >= 140 MM HG: CPT | Performed by: FAMILY MEDICINE

## 2022-07-14 PROCEDURE — 1124F ACP DISCUSS-NO DSCNMKR DOCD: CPT | Performed by: FAMILY MEDICINE

## 2022-07-14 RX ORDER — AMLODIPINE BESYLATE 10 MG/1
10 TABLET ORAL DAILY
Qty: 90 TABLET | Refills: 3 | Status: SHIPPED | OUTPATIENT
Start: 2022-07-14

## 2022-07-14 RX ORDER — HYDROCHLOROTHIAZIDE 25 MG/1
25 TABLET ORAL DAILY
Qty: 90 TABLET | Refills: 3 | Status: SHIPPED | OUTPATIENT
Start: 2022-07-14

## 2022-07-14 NOTE — PATIENT INSTRUCTIONS
Seguridad de los medicamentos para los adultos mayores   LO QUE NECESITA SABER:   ¿Qué necesito saber acerca de la seguridad de los medicamentos para los adultos Plons? La seguridad de los medicamentos incluye jose el medicamento correcto, en el momento adecuado y en la dosis correcta  Los medicamentos pueden afectar a los adultos Plons de [place] a los adultos jóvenes o a los niños  Asegúrese de que cualquier persona que lo ayude conozca la información de seguridad y las instrucciones específicas para todos viktor medicamentos  ¿Cuáles son algunos de los desafíos comunes de los medicamentos para los adultos mayores? Se michelle varios medicamentos dentro del mismo período de [place]  [place] puede causar cualquiera de los siguientes desafíos:    Es posible que sea necesario jose los medicamentos a diferentes horas del día  Algunos se pueden jose con comida, y otros sin comida  Varios medicamentos se parecen  [place] puede causar confusión si usted no está seguro de qué medicamento necesita jose a cierta hora  Usted puede jose accidentalmente el medicamento equivocado u olvidarse de jose un medicamento  Ciertos medicamentos pueden aumentar o disminuir los efectos de otros medicamentos  Algunos medicamentos pueden causar efectos secundarios ya que interactúan  Ciertos medicamentos son menos seguros para los [facility]  Usted puede planear jose un medicamento que mcelroy tomado Rajesh, sin darse cuenta de que es menos seguro a medida que envejece  Un ejemplo son los antiinflamatorios no esteroides (HA) para el dolor y la inflamación, renee el ibuprofeno o el naproxeno  [place] pueden aumentar el riesgo de sangrado estomacal  [place] problema puede ser peor para los adultos Plons  Las etiquetas de los medicamentos pueden ser difíciles de leer  La etiqueta puede tener letra pequeña  La información puede ser confusa o difícil de entender  Recibe atención de varios médicos   Usted puede tener un médico de atención primaria, silvia también puede recibir atención de especialistas  Cada médico puede recetar o recomendar diferentes medicamentos  Necesita medicamentos andres un corto período de tiempo después de [place] o procedimiento  Los medicamentos nuevos pueden interactuar con los medicamentos que usted terry todos los días  Los efectos secundarios de los medicamentos no son dior  Los efectos secundarios pueden pasarse por alto porque son similares a los efectos de patricia afección médica que usted tiene  Por ejemplo, a menudo se puede sentir mareado por la presión arterial luis daniel y no reconocer el mareo causado por un medicamento nuevo  Los medicamentos tienen interacciones que no esperaba  Por ejemplo, ciertos alimentos pueden impedir que un medicamento funcione correctamente  Las afecciones médicas también pueden hacer que un medicamento sea perjudicial  Por ejemplo, la presión arterial luis daniel, que puede hacer que ciertos medicamentos para el resfriado nicole peligrosos  ¿Cuáles son Jeny Puffer generales de seguridad con los medicamentos? Nada viktor medicamentos renee se le haya indicado  No divida o triture las pastillas a menos que se lo indiquen  Sí se le olvidó jose francisco dosis, pregunte cómo completar la dosis que se le olvidó  No intente hacer que francisco medicamento dure [employer] omitiendo dosis  No tome los medicamentos andres más tiempo del necesario  Por ejemplo, un opiáceo recetado para el dolor después de la cirugía puede no ser necesario después de unos días  Verifique la etiqueta cada vez y antes de jose el medicamento  Lanetta Manner patricia jono para asegurarse de que está tomando el medicamento correcto  No tome ningún medicamento que haya vencido o que parezca aby sido manipulado  [place] a francisco médico o a francisco farmacéutico que le explique cualquier cosa que no entienda en la etiqueta  Use gafas o patricia lupa si es difícil leer la etiqueta   Francisco farmacia también puede darle etiquetas con letras grandes  [place] medicamento a la hora correcta  Asegúrese de que usted entiende la frecuencia con la que debe o puede jose el medicamento  Anote cuándo necesita el medicamento y cuándo lo tomó  Es posible que desee utilizar un recordatorio en francisco teléfono o un temporizador  Asegúrese de que todas las personas que lo ayuden puedan acceder a la información de seguimiento y de horarios  Mantenga los medicamentos organizados  Guarde los [employer] frascos en los que llegaron y no quite las etiquetas  No tire todas viktor píldoras en patricia bolsa o caja  Muchas píldoras se parecen, y es importante mantenerlas separadas hasta que se usen  Puede pasar las píldoras de viktor frascos a un pastillero para organizar viktor medicamentos diarios para la semana  No saque las píldoras de viktor empaques hasta que esté listo para tomarlas  No vierta medicamentos líquidos en frascos u otros recipientes  Mida el medicamento líquido correctamente  Utilice patricia herramienta especialmente diseñada para medir el medicamento líquido  Por ejemplo las jeringas para vía oral y las cucharitas o tacitas marcadas con la dosis  Estos medidores los puede encontrar en [place]  No use cucharitas o cucharas de la cocina  Estas no son precisas, por lo que podría recibir mucho o muy poco del [facility]  [employer]  Guarde [employer]  Es probable que usted necesite guardar el medicamento en un lugar fresco, oscuro y [address] es probable que lo tenga que refrigerar  El almacenamiento adecuado evitará que el medicamento se degrade o se estropee antes de la fecha de caducidad  No tome alcohol ni use drogas  El alcohol y las drogas pueden interactuar con viktor medicamentos  Francisco riesgo de caídas es mayor si usted mezcla alcohol o drogas con ciertos medicamentos  El alcohol mezclado con analgésicos recetados puede causarle adormecimiento y disminuir el ritmo respiratorio   Es posible que deje de respirar completamente  No maneje hasta que usted sepa cómo lo afectan los medicamentos  Algunos medicamentos pueden provocarle somnolencia o mareos  Nunca comparta medicamentos  No tome ningún medicamento recetado para otra persona  No le dé ninguno de viktor medicamentos a otra persona  ¿Qué necesito saber acerca de la seguridad al jose analgésicos recetados? No deje de jose francisco medicamento para el dolor recetado repentinamente  Si usted ha estado tomando medicamentos para el dolor recetados por más de 2 semanas, dejar de tomarlos repentinamente puede causar efectos secundarios graves  Pídale más información a francisco médico antes de dejar de jose francisco medicamento  [place] viktor medicamentos renee se le haya indicado  [place] solo la cantidad recetada o recomendada por francisco médico  Demasiado medicamento puede causar problemas respiratorios u otros problemas de [place]  Si usted [place] un parche para el dolor, asegúrese de remover el parche anterior antes de colocar camilo nuevo  Respete los horarios del medicamento correctamente  [place] francisco medicamento para el dolor 30 minutos antes de ejercicio o fisioterapia  [place] ayuda a disminuir el dolor para que pueda alcanzar viktor metas de [place]  Es posible que usted necesite jose francisco medicamento antes de [place]  [place] puede ayudarlo a dormir y a no despertarse a causa del dolor  Esté atento a la aparición de [employer]  Algunos alimentos, el alcohol y otros medicamentos pueden causar efectos secundarios cuando usted terry francisco medicamento para el dolor  Pregúntele a francisco médico cómo prevenir estos problemas  Prevenga el estreñimiento  El estreñimiento es el efecto secundario más común de los analgésicos recetados  Consuma alimentos ricos en fibra, renee frutas frescas, verduras, frijoles y panes y cereales integrales  Pregunte a francisco médico sobre la cantidad de líquido que necesita jose todos los días y cuáles le recomienda   El ejercicio y la actividad también pueden ayudar a disminuir el riesgo de tener estreñimiento  Siga las instrucciones sobre qué hacer con los medicamentos que no [place]  Francisco médico le dará instrucciones sobre cómo [employer] medicamentos para el dolor con seguridad  [place] ayuda a asegurarse de que [employer]  ¿Qué puedo hacer para asegurarme de que otros tengan información sobre mis medicamentos? Mantenga patricia lista de viktor medicamentos  Entregue la lista a cualquier persona que ayude a cuidar de usted y a todos viktor médicos  Incluya los siguientes datos de los medicamentos: cantidad, frecuencia y motivo de administración  Incluya cualquier vitamina, hierba o suplemento que tome  Pregúntele a francisco médico o farmacéutico si un medicamento podría interactuar con cualquier otro medicamento o con los alimentos  No empiece ni suspenda ningún medicamento a menos que francisco médico se lo indique  Actualice francisco lista si comienza o travis de jose cualquier medicamento  Use patricia farmacia para todas viktor recetas  Francisco farmacia mantendrá patricia lista de viktor medicamentos  Cuando usted obtiene patricia nueva receta, el farmacéutico verificará que no interactúe con viktor otros medicamentos  Mantenga patricia copia de la lista  Francisco farmacéutico puede decirle los efectos secundarios de cada medicamento y cómo le pueden afectar los medicamentos tomados en conjunto  Llame al número local de emergencias (911 en los [place]), o pídale a alguien que llame si:  Usted tiene dificultad para respirar o travis de respirar  Está inconsciente o alguien no puede hacer que responda  Usted sufre patricia convulsión  ¿Cuándo cat buscar atención inmediata? Se siente excitable y tiene latidos cardíacos rápidos  Tiene sibilancia nueva o repentina  Tiene hinchazón en la boca o la garganta  Tiene los ojos hundidos, o sequedad en la boca o los ojos  ¿Cuándo cat llamar a mi médico?  Ha omitido patricia dosis de francisco medicamento o ha tomado [employer]      Linda Justice sarpullido, o hinchazón en la carol ann o los labios  Tiene dolor de Herbert Line  Se siente confundido o irritable  Usted está vomitando  Tiene diarrea con darshan  Usted tiene preguntas o inquietudes acerca de mullen condición o cuidado  ACUERDOS SOBRE MULLEN CUIDADO:   Usted tiene el derecho de ayudar a planear mullen cuidado  Aprenda todo lo que pueda sobre mullen condición y renee darle tratamiento  Discuta viktor opciones de tratamiento con viktor médicos para decidir el cuidado que usted desea recibir  Usted siempre tiene el derecho de rechazar el tratamiento  Esta información es sólo para uso en educación  Mullen intención no es darle un consejo médico sobre enfermedades o tratamientos  Colsulte con mullen Burlene Wilton farmacéutico antes de seguir cualquier régimen médico para saber si es seguro y efectivo para usted  © Copyright [employer] 2022 Information is for End User's use only and may not be sold, redistributed or otherwise used for commercial purposes   All illustrations and images included in CareNotes® are the copyrighted property of [employer]ILEEN [employer]ULL[employer]  or [address] [place] [address]

## 2022-07-14 NOTE — ASSESSMENT & PLAN NOTE
Doing much better during this visit  Patient does all ADLs without issue, support with family ([facility]), patient transports herself via bus to doctors visits and other errands

## 2022-07-14 NOTE — PROGRESS NOTES
Assessment/Plan:    Hypothyroidism  Continue levothyroxine 50 mcg daily  Benign essential hypertension  BP stable, asymptomatic   - continue amlodipine 10mg, lisinopril 20mg and HCTZ 25mg daily    Hyperlipidemia  Lipid       Lab Results   Component Value Date/Time    CHOLESTEROL 171 04/07/2022 05:33 AM    CHOLESTEROL 187 12/28/2020 11:53 AM    TRIG 160 (H) 04/07/2022 05:33 AM    TRIG 117 12/28/2020 11:53 AM    HDL 49 (L) 04/07/2022 05:33 AM    HDL 57 12/28/2020 11:53 AM    LDLCALC 90 04/07/2022 05:33 AM    LDLCALC 108 (H) 12/28/2020 11:53 AM     Continue zocor 20mg daily at bedtime  Behavioral change  Doing much better during this visit  Patient does all ADLs without issue, support with family (cousins in [facility]), patient transports herself via bus to doctors visits and other errands  Problem List Items Addressed This Visit        Endocrine    Hypothyroidism     Continue levothyroxine 50 mcg daily  Cardiovascular and Mediastinum    Benign essential hypertension - Primary     BP stable, asymptomatic   - continue amlodipine 10mg, lisinopril 20mg and HCTZ 25mg daily           Relevant Medications    hydrochlorothiazide (HYDRODIURIL) 25 mg tablet    amLODIPine (NORVASC) 10 mg tablet       Other    Hyperlipidemia     Lipid       Lab Results   Component Value Date/Time    CHOLESTEROL 171 04/07/2022 05:33 AM    CHOLESTEROL 187 12/28/2020 11:53 AM    TRIG 160 (H) 04/07/2022 05:33 AM    TRIG 117 12/28/2020 11:53 AM    HDL 49 (L) 04/07/2022 05:33 AM    HDL 57 12/28/2020 11:53 AM    LDLCALC 90 04/07/2022 05:33 AM    LDLCALC 108 (H) 12/28/2020 11:53 AM     Continue zocor 20mg daily at bedtime  Behavioral change     Doing much better during this visit  Patient does all ADLs without issue, support with family (cousins in [facility]), patient transports herself via bus to doctors visits and other errands  Subjective:      Patient ID: Maggie Haney is a 71 y o  female  Hypertension  This is a chronic problem  The current episode started more than 1 year ago  The problem is unchanged  The problem is controlled  Pertinent negatives include no anxiety, blurred vision, chest pain, headaches, malaise/fatigue, neck pain, orthopnea, palpitations, peripheral edema, PND, shortness of breath or sweats  There are no associated agents to hypertension  Risk factors for coronary artery disease include family history, obesity, post-menopausal state, sedentary lifestyle and dyslipidemia  Past treatments include ACE inhibitors, calcium channel blockers and diuretics  The current treatment provides significant improvement  There are no compliance problems  There is no history of angina or kidney disease  Identifiable causes of hypertension include a thyroid problem  There is no history of chronic renal disease  Hyperlipidemia  This is a chronic problem  The current episode started more than 1 year ago  The problem is controlled  Recent lipid tests were reviewed and are normal  She has no history of chronic renal disease  Factors aggravating her hyperlipidemia include thiazides  Pertinent negatives include no chest pain or shortness of breath  Current antihyperlipidemic treatment includes statins  The current treatment provides significant improvement of lipids  There are no compliance problems  Risk factors for coronary artery disease include dyslipidemia, obesity, family history, a sedentary lifestyle and hypertension  Thyroid Problem  Presents for follow-up visit  Patient reports no anxiety, cold intolerance, diarrhea, dry skin, hair loss, palpitations, weight gain or weight loss  The symptoms have been stable  Her past medical history is significant for hyperlipidemia         The following portions of the patient's history were reviewed and updated as appropriate: allergies, current medications, past family history, past medical history, past social history, past surgical history and problem list     Review of Systems   Constitutional: Negative for malaise/fatigue, weight gain and weight loss  Eyes: Negative for blurred vision  Respiratory: Negative for shortness of breath  Cardiovascular: Negative for chest pain, palpitations, orthopnea and PND  Gastrointestinal: Negative for diarrhea  Endocrine: Negative for cold intolerance  Musculoskeletal: Negative for neck pain  Neurological: Negative for headaches  Psychiatric/Behavioral: The patient is not nervous/anxious  Objective:      /82   Pulse 86   Temp 98 5 °F (36 9 °C) (Temporal)   Resp 18   Ht 4' 8" (1 422 m)   Wt 71 4 kg (157 lb 6 4 oz)   BMI 35 29 kg/m²          Physical Exam  Vitals reviewed  Constitutional:       General: She is not in acute distress  Appearance: Normal appearance  She is obese  She is not ill-appearing or diaphoretic  HENT:      Head: Normocephalic and atraumatic  Nose: Nose normal  No congestion or rhinorrhea  Mouth/Throat:      Mouth: Mucous membranes are moist       Pharynx: Oropharynx is clear  No oropharyngeal exudate  Eyes:      General: No scleral icterus  Conjunctiva/sclera: Conjunctivae normal       Pupils: Pupils are equal, round, and reactive to light  Cardiovascular:      Rate and Rhythm: Normal rate and regular rhythm  Pulses: Normal pulses  Heart sounds: No murmur heard  Pulmonary:      Effort: Pulmonary effort is normal       Breath sounds: Normal breath sounds  No wheezing  Chest:      Chest wall: No tenderness  Abdominal:      General: Bowel sounds are normal       Palpations: Abdomen is soft  There is no mass  Tenderness: There is no abdominal tenderness  There is no right CVA tenderness or left CVA tenderness  Musculoskeletal:         General: No swelling or tenderness  Normal range of motion  Cervical back: Normal range of motion  Right lower leg: No edema  Left lower leg: No edema     Skin: General: Skin is warm  Capillary Refill: Capillary refill takes less than 2 seconds  Findings: No bruising or rash  Neurological:      Mental Status: She is alert and oriented to person, place, and time  Mental status is at baseline  Cranial Nerves: No cranial nerve deficit  Sensory: No sensory deficit  Motor: No weakness        Gait: Gait normal    Psychiatric:         Mood and Affect: Mood normal          Behavior: Behavior normal          Ailyn Wilde DO  Family Medicine Resident, PGY3  3:00 PM

## 2022-07-14 NOTE — ASSESSMENT & PLAN NOTE
Lipid       Lab Results   Component Value Date/Time    CHOLESTEROL 171 04/07/2022 05:33 AM    CHOLESTEROL 187 12/28/2020 11:53 AM    TRIG 160 (H) 04/07/2022 05:33 AM    TRIG 117 12/28/2020 11:53 AM    HDL 49 (L) 04/07/2022 05:33 AM    HDL 57 12/28/2020 11:53 AM    LDLCALC 90 04/07/2022 05:33 AM    LDLCALC 108 (H) 12/28/2020 11:53 AM     Continue zocor 20mg daily at bedtime

## 2022-07-20 ENCOUNTER — TELEPHONE (OUTPATIENT)
Dept: FAMILY MEDICINE CLINIC | Facility: CLINIC | Age: 72
End: 2022-07-20

## 2022-07-20 NOTE — TELEPHONE ENCOUNTER
Folder (To be completed by) -Dr Ritesh Romero      Name of [address] [place] [address] [address] [facility] [employer] [facility]     Form to be Faxed (Fax #):[phone]    Patient was made aware of the 7 business day form policy      *Request evaluation of OA regarding  Cognitive/mental capacity on new form

## 2022-07-26 DIAGNOSIS — I10 BENIGN ESSENTIAL HYPERTENSION: ICD-10-CM

## 2022-07-26 RX ORDER — LISINOPRIL 20 MG/1
TABLET ORAL
Qty: 90 TABLET | Refills: 0 | Status: SHIPPED | OUTPATIENT
Start: 2022-07-26 | End: 2022-10-25

## 2022-08-11 DIAGNOSIS — E78.5 HYPERLIPIDEMIA, UNSPECIFIED HYPERLIPIDEMIA TYPE: ICD-10-CM

## 2022-08-11 RX ORDER — SIMVASTATIN 20 MG
TABLET ORAL
Qty: 90 TABLET | Refills: 1 | Status: SHIPPED | OUTPATIENT
Start: 2022-08-11

## 2022-10-12 PROBLEM — Z00.00 MEDICARE ANNUAL WELLNESS VISIT, SUBSEQUENT: Status: RESOLVED | Noted: 2019-01-07 | Resolved: 2022-10-12

## 2022-10-25 DIAGNOSIS — I10 BENIGN ESSENTIAL HYPERTENSION: ICD-10-CM

## 2022-10-25 RX ORDER — LISINOPRIL 20 MG/1
TABLET ORAL
Qty: 90 TABLET | Refills: 0 | Status: SHIPPED | OUTPATIENT
Start: 2022-10-25

## 2022-10-25 NOTE — TELEPHONE ENCOUNTER
Per last encounter on 7/14/22 with Dr Nasim Bucio :  Benign essential hypertension  BP stable, asymptomatic   - continue amlodipine 10mg, lisinopril 20mg and HCTZ 25mg daily

## 2022-11-10 DIAGNOSIS — E03.9 ACQUIRED HYPOTHYROIDISM: ICD-10-CM

## 2022-11-10 RX ORDER — LEVOTHYROXINE SODIUM 0.05 MG/1
50 TABLET ORAL DAILY
Qty: 90 TABLET | Refills: 1 | Status: SHIPPED | OUTPATIENT
Start: 2022-11-10

## 2022-12-15 ENCOUNTER — TELEPHONE (OUTPATIENT)
Dept: PSYCHIATRY | Facility: CLINIC | Age: 72
End: 2022-12-15

## 2023-01-11 ENCOUNTER — OFFICE VISIT (OUTPATIENT)
Dept: MULTI SPECIALTY CLINIC | Facility: CLINIC | Age: 73
End: 2023-01-11

## 2023-01-11 ENCOUNTER — TELEPHONE (OUTPATIENT)
Dept: INTERNAL MEDICINE CLINIC | Facility: CLINIC | Age: 73
End: 2023-01-11

## 2023-01-11 VITALS
DIASTOLIC BLOOD PRESSURE: 78 MMHG | HEART RATE: 59 BPM | BODY MASS INDEX: 33.87 KG/M2 | HEIGHT: 57 IN | TEMPERATURE: 97.9 F | WEIGHT: 157 LBS | SYSTOLIC BLOOD PRESSURE: 172 MMHG

## 2023-01-11 DIAGNOSIS — L40.9 PSORIASIS: Primary | ICD-10-CM

## 2023-01-11 RX ORDER — FLUOCINONIDE TOPICAL SOLUTION USP, 0.05% 0.5 MG/ML
SOLUTION TOPICAL DAILY
Qty: 60 ML | Refills: 2 | Status: SHIPPED | OUTPATIENT
Start: 2023-01-11 | End: 2023-01-11

## 2023-01-11 RX ORDER — APREMILAST 30 MG/1
30 TABLET, FILM COATED ORAL 2 TIMES DAILY
Qty: 180 TABLET | Refills: 2 | Status: SHIPPED | OUTPATIENT
Start: 2023-01-11 | End: 2023-01-11

## 2023-01-11 RX ORDER — APREMILAST 30 MG/1
TABLET, FILM COATED ORAL
Qty: 90 TABLET | Refills: 0 | Status: SHIPPED | OUTPATIENT
Start: 2023-01-11

## 2023-01-11 RX ORDER — FLUOCINONIDE TOPICAL SOLUTION USP, 0.05% 0.5 MG/ML
SOLUTION TOPICAL DAILY
Qty: 60 ML | Refills: 2 | Status: SHIPPED | OUTPATIENT
Start: 2023-01-11

## 2023-01-11 RX ORDER — MOMETASONE FUROATE 1 MG/G
50 CREAM TOPICAL DAILY
Qty: 45 G | Refills: 2 | Status: SHIPPED | OUTPATIENT
Start: 2023-01-11 | End: 2023-01-11

## 2023-01-11 RX ORDER — MOMETASONE FUROATE 1 MG/G
OINTMENT TOPICAL DAILY
Qty: 45 G | Refills: 2 | Status: SHIPPED | OUTPATIENT
Start: 2023-01-11

## 2023-01-11 NOTE — PROGRESS NOTES
[facility] [address] [facility] Note     Patient Name: Monica Fagan  Encounter Date: 1/11/23    Have you been cared for by a [facility] [address] [facility]ologist in the last 3 years and, if so, which description applies to you? Yes  I have been here within the last 3 years, and my medical history has NOT changed since that time  I am FEMALE/of child-bearing potential     REVIEW OF SYSTEMS:  Have you recently had or currently have any of the following? · No changes in my recent health  PAST MEDICAL HISTORY:  Have you personally ever had or currently have any of the following? If "YES," then please provide more detail  · No changes in my medical history  FAMILY HISTORY:  Any "first degree relatives" (parent, brother, sister, or child) with the following? • No changes in my family's known health  PATIENT EXPERIENCE:    • Do you want the Dermatologist to perform a COMPLETE skin exam today including a clinical examination under the "bra and underwear" areas? NO  • If necessary, do we have your permission to call and leave a detailed message on your Preferred Phone number that includes your specific medical information? Yes      No Known Allergies   Current Outpatient Medications:   •  Apremilast (Otezla) 30 MG TABS, Please take two pills daily  , Disp: 90 tablet, Rfl: 0  •  fluocinonide (LIDEX) 0 05 % external solution, Apply topically daily, Disp: 60 mL, Rfl: 2  •  mometasone (ELOCON) 0 1 % ointment, Apply topically daily, Disp: 45 g, Rfl: 2  •  amLODIPine (NORVASC) 10 mg tablet, Take 1 tablet (10 mg total) by mouth daily, Disp: 90 tablet, Rfl: 3  •  calcium carbonate-vitamin D (OSCAL-D) 500 mg-200 units per tablet, Take 2 tablets by mouth daily with breakfast, Disp: 120 tablet, Rfl: 3  •  clindamycin (CLEOCIN T) 1 % lotion, , Disp: , Rfl: 0  •  Diclofenac Sodium (VOLTAREN) 1 %, Apply 4 g topically 4 (four) times a day, Disp: 100 g, Rfl: 0  •  hydrochlorothiazide (HYDRODIURIL) 25 mg tablet, Take 1 tablet (25 mg total) by mouth daily, Disp: 90 tablet, Rfl: 3  •  levothyroxine 50 mcg tablet, Take 1 tablet (50 mcg total) by mouth daily, Disp: 90 tablet, Rfl: 1  •  lidocaine (XYLOCAINE) 2 % topical gel, Apply topically as needed for mild pain, Disp: 30 mL, Rfl: 0  •  lisinopril (ZESTRIL) 20 mg tablet, take 1 tablet by mouth once daily, Disp: 90 tablet, Rfl: 0  •  mirtazapine (REMERON) 15 mg tablet, Take 1 tablet (15 mg total) by mouth daily at bedtime Take 15mg (one tablet) at night for three (3) days and then start on 30mg (two tablets) at night onwards  , Disp: 63 tablet, Rfl: 0  •  nystatin (MYCOSTATIN) powder, Apply topically 4 (four) times a day, Disp: 30 g, Rfl: 1  •  simvastatin (ZOCOR) 20 mg tablet, take 1 tablet by mouth once daily at bedtime, Disp: 90 tablet, Rfl: 1          • Whom besides the patient is providing clinical information about today's encounter?   o NO ADDITIONAL HISTORIAN (patient alone provided history)    Physical Exam and Assessment/Plan by Diagnosis:      PSORIASIS    Physical Exam:  • Anatomic Location Affected:  Extremities, torso  • Morphological Description:  Well demarcated pink plaques some with silvery scale   • Severity: mild  • Body Percent Affected: 3%  • Pertinent Positives:  • Pertinent Negatives: Additional History of Present Condition:  Has been on regimen of otezla 30mg BID, mometasone 0 1% cream, and lidex 0 05% solution since 2015  She says that her psoriasis has been present for nearly 40 years  This is the best that it has been but she feels that there is still room for improvement  She is considering injectable biologics such as skyrizi but is hesitant at this time as she thinks she cannot give herself the injection  She asked to revisit injectables at her next visit       Assessment and Plan:  Based on a thorough discussion of this condition and the management approach to it (including a comprehensive discussion of the known risks, side effects and potential benefits of treatment), the patient (family) agrees to implement the following specific plan:  • Continue otezla 30mg BID  Will file for prior authorization  Unclear when her prior Catherine Manriquez will  as there is no note in the chart  • Will refill mometasone 0 1% cream and lidex 0 05% topical solution as needed for flares  • Will revisit talking about skyrizi at next visit  Patient is happy on her current regimen  Psoriasis is a chronic inflammatory condition that causes the body to make new skin cells in days rather than weeks  As these cells pile up on the surface of the skin, you may see thick, scaly patches of thickened skin  Psoriasis affects 2-4% of males and females  It can start at any age including childhood, with peaks of onset at 15-25 years and 50-60 years  It tends to persist lifelong, fluctuating in extent and severity  It is particularly common in Caucasians but may affect people of any race  About one-third of patients with psoriasis have family members with psoriasis  Psoriasis is multifactorial  It is classified as an immune-mediated inflammatory disease (IMID)  Genetic factors are important and influence the type of psoriasis and response to treatment  What are the signs and symptoms of psoriasis? There are many different types of psoriasis that each have present uniquely  The types of psoriasis include:    Plaque psoriasis: About 80% to 90% of people who have psoriasis develop this type  When plaque psoriasis appears, you may see:  Plaque psoriasis usually presents with symmetrically distributed, red, scaly plaques with well-defined edges  The scale is typically silvery white, except in skin folds where the plaques often appear shiny and they may have a moist peeling surface  The most common sites are scalp, elbows and knees, but any part of the skin can be involved  The plaques are usually very persistent without treatment    Itch is mostly mild but may be severe in some patients, leading to scratching and lichenification (thickened leathery skin with increased skin markings)  Painful skin cracks or fissures may occur  When psoriatic plaques clear up, they may leave brown or pale marks that can be expected to fade over several months  Guttate psoriasis: When someone gets this type of psoriasis, you often see tiny bumps appear on the skin quite suddenly  The bumps tend to cover much of the torso, legs, and arms  Sometimes, the bumps also develop on the face, scalp, and ears  No matter where they appear, the bumps tend to be:   • Small and scaly  • [place]-colored to pink  • Temporary, clearing in a few weeks or months without treatment  When guttate psoriasis clears, it may never return  Why this happens is still a bit of a mystery  Guttate psoriasis tends to develop in children and young adults who've had an infection, such as strep throat  It's possible that when the infection clears so does guttate psoriasis  It's also possible to have:  • Guttate psoriasis for life  • See the guttate psoriasis clear and plaque psoriasis develop later in life  • Plaque psoriasis when you develop guttate psoriasis  There's no way to predict what will happen after the first flare-up of guttate psoriasis clears  Inverse psoriasis: This type of psoriasis develops in areas where skin touches skin, such as the armpits, genitals, and crease of the buttocks  Where the inverse psoriasis appears, you're likely to notice:  • Smooth, red patches of skin that look raw  • Little, if any, silvery-white coating  • Sore or painful skin  Other names for this type of psoriasis are intertriginous psoriasis or flexural psoriasis  Pustular psoriasis: This type of psoriasis causes pus-filled bumps that usually appear only on the feet and hands  While the pus-filled bumps may look like an infection, the skin is not infected  The bumps don't contain bacteria or anything else that could cause an infection    Where pustular psoriasis appears, you tend to notice:  • Red, swollen skin that is dotted with pus-filled bumps  • Extremely sore or painful skin  • Brown dots (and sometimes scale) appear as the pus-filled bumps dry  Pustular psoriasis can make just about any activity that requires your hands or feet, such as typing or walking, unbearably painful  Pustular psoriasis (generalized): Serious and life-threatening, this rare type of psoriasis causes pus-filled bumps to develop on much of the skin  Also called von Zumbusch psoriasis, a flare-up causes this sequence of events:  1  Skin on most of the body suddenly turns dry, red, and tender  2  Within hours, pus-filled bumps cover most of the skin  3  Often within a day, the pus-filled bumps break open and pools of pus leak onto the skin  4  As the pus dries (usually within 24 to 48 hours), the skin dries out and peels (as shown in this picture)  5  When the dried skin peels off, you see a smooth, glazed surface  6  In a few days or weeks, you may see a new crop of pus-filled bumps covering most of the skin, as the cycle repeats itself  Anyone with pustular psoriasis also feels very sick, and may develop a fever, headache, muscle weakness, and other symptoms  Medical care is often necessary to save the person's life  Erythrodermic psoriasis: Serious and life-threatening, this type of psoriasis requires immediate medical care  When someone develops erythrodermic psoriasis, you may notice:  • Skin on most of the body looks burnt  • Chills, fever, and the person looks extremely ill  • Muscle weakness, a rapid pulse, and severe itch  The person may also be unable to keep warm, so hypothermia can set in quickly  Most people who develop this type of psoriasis already have another type of psoriasis  Before developing erythrodermic psoriasis, they often notice that their psoriasis is worsening or not improving with treatment   If you notice either of these happening, see a board-certified dermatologist     Nails    Nail psoriasis: With any type of psoriasis, you may see changes to your fingernails or toenails  About half of the people who have plaque psoriasis see signs of psoriasis on their fingernails at some point2  When psoriasis affects the nails, you may notice:  • Tiny dents in your nails (called “nail pits”)  • White, yellow, or brown discoloration under one or more nails  • Crumbling, rough nails  • A nail lifting up so that it's no longer attached  • Buildup of skin cells beneath one or more nails, which lifts up the nail  Treatment and proper nail care can help you control nail psoriasis  Psoriatic arthritis: If you have psoriasis, it's important to pay attention to your joints  Some people who have psoriasis develop a type of arthritis called psoriatic arthritis  This is more likely to occur if you have severe psoriasis  Most people notice psoriasis on their skin years before they develop psoriatic arthritis  It's also possible to get psoriatic arthritis before psoriasis, but this is less common  When psoriatic arthritis develops, the signs can be subtle  At first, you may notice:  • A swollen and tender joint, especially in a finger or toe  • Heel pain  • Swelling on the back of your leg, just above your heel  • Stiffness in the morning that fades during the day  Like psoriasis, psoriatic arthritis cannot be cured  Treatment can prevent psoriatic arthritis from worsening, which is important  Allowed to progress, psoriatic arthritis can become disabling  Diagnosis and treatment of psoriasis   Psoriasis is usually diagnosed by clinical features, and skin biopsy if necessary  It is important to decrease factors that aggravate psoriasis  These include treating streptococcal infections, minimizing skin injuries, avoiding sun exposure if it exacerbates psoriasis, smoking, alcohol usage, decreasing stress, and maintaining an optimal body weight   Certain medications such as lithium, beta blockers, antimalarials, and NSAIDs have also been implicated  Suddenly stopping oral steroids or strong topical steroids can cause rebound disease  There are many categories of psoriasis treatments available  Topical therapy  Mild psoriasis is generally treated with topical agents alone  Which treatment is selected may depend on body site, extent and severity of psoriasis  • Emollients  • Coal tar preparations  • Dithranol  • Salicylic acid  • Vitamin D analogue (calcipotriol)  • Topical corticosteroids  • Calcineurin inhibitor (tacrolimus, pimecrolimus)  Phototherapy  Most psoriasis centres offer phototherapy with ultraviolet (UV) radiation, often in combination with topical or systemic agents  Types of phototherapy include  • Narrowband UVB  • Broadband UVB  • Photochemotherapy (PUVA)  • Targeted phototherapy  Systemic therapy  Moderate to severe psoriasis warrants treatment with a systemic agent and/or phototherapy  The most common treatments are:  • Methotrexate  • Ciclosporin  • Acitretin  Other medicines occasionally used for psoriasis include:  • Mycophenolate  • Apremilast  • Hydroxyurea  • Azathioprine  • 6-mercaptopurine  Systemic corticosteroids are best avoided due to a risk of severe withdrawal flare of psoriasis and adverse effects  Biologics or targeted therapies are reserved for conventional treatment-resistant severe psoriasis, mainly because of expense, as side effects compare favorably with other systemic agents  These include:  • Anti-tumour necrosis factor-alpha antagonists (anti-TNF?) infliximab, adalimumab and etanercept  • The interleukin (IL)-12/23 antagonist ustekinumab  • IL-17 antagonists such as secukinumab  Many other monoclonal antibodies are under investigation in the treatment of psoriasis

## 2023-01-11 NOTE — TELEPHONE ENCOUNTER
Patient has requested additional refills for the following medications:  Apremilast, mometasone, and flucinonide  The patient stated that she would like for her refills to last until her follow up appointmet in case the pharmacy of choice does not have hernt that was scheduled for 6/28  Patient wanted to wait for a physical copy of the prescription just in case the pharmacy does not have her  meds  I advised patient, that if appropriate, her refills will be sent to her pharmacy electronically because I did not know how long the wait would be to get a new prescription

## 2023-01-17 ENCOUNTER — OFFICE VISIT (OUTPATIENT)
Dept: FAMILY MEDICINE CLINIC | Facility: CLINIC | Age: 73
End: 2023-01-17

## 2023-01-17 VITALS
HEART RATE: 81 BPM | WEIGHT: 156.4 LBS | SYSTOLIC BLOOD PRESSURE: 162 MMHG | OXYGEN SATURATION: 98 % | HEIGHT: 56 IN | DIASTOLIC BLOOD PRESSURE: 82 MMHG | TEMPERATURE: 98.1 F | RESPIRATION RATE: 16 BRPM | BODY MASS INDEX: 35.18 KG/M2

## 2023-01-17 DIAGNOSIS — Z00.00 MEDICARE ANNUAL WELLNESS VISIT, SUBSEQUENT: Primary | ICD-10-CM

## 2023-01-17 DIAGNOSIS — Z12.31 ENCOUNTER FOR SCREENING MAMMOGRAM FOR MALIGNANT NEOPLASM OF BREAST: ICD-10-CM

## 2023-01-17 DIAGNOSIS — Z23 ENCOUNTER FOR IMMUNIZATION: ICD-10-CM

## 2023-01-17 DIAGNOSIS — I10 BENIGN ESSENTIAL HYPERTENSION: ICD-10-CM

## 2023-01-17 DIAGNOSIS — Z13.6 ENCOUNTER FOR SCREENING FOR CARDIOVASCULAR DISORDERS: ICD-10-CM

## 2023-01-17 RX ORDER — LISINOPRIL 20 MG/1
TABLET ORAL
Qty: 90 TABLET | Refills: 0 | Status: SHIPPED | OUTPATIENT
Start: 2023-01-17

## 2023-01-17 NOTE — PATIENT INSTRUCTIONS
Medicare Preventive Visit Patient Instructions  Thank you for completing your Welcome to Medicare Visit or Medicare Annual Wellness Visit today  Your next wellness visit will be due in one year (1/18/2024)  The screening/preventive services that you may require over the next 5-10 years are detailed below  Some tests may not apply to you based off risk factors and/or age  Screening tests ordered at today's visit but not completed yet may show as past due  Also, please note that scanned in results may not display below  Preventive Screenings:  Service Recommendations Previous Testing/Comments   Colorectal Cancer Screening  * Colonoscopy    * Fecal Occult Blood Test (FOBT)/Fecal Immunochemical Test (FIT)  * Fecal DNA/Cologuard Test  * Flexible Sigmoidoscopy Age: 39-70 years old   Colonoscopy: every 10 years (may be performed more frequently if at higher risk)  OR  FOBT/FIT: every 1 year  OR  Cologuard: every 3 years  OR  Sigmoidoscopy: every 5 years  Screening may be recommended earlier than age 39 if at higher risk for colorectal cancer  Also, an individualized decision between you and your healthcare provider will decide whether screening between the ages of 74-80 would be appropriate  Colonoscopy: 01/13/2021  FOBT/FIT: Not on file  Cologuard: Not on file  Sigmoidoscopy: Not on file    Screening Current     Breast Cancer Screening Age: 36 years old  Frequency: every 1-2 years  Not required if history of left and right mastectomy Mammogram: 10/16/2021    History Breast Cancer   Cervical Cancer Screening Between the ages of 21-29, pap smear recommended once every 3 years  Between the ages of 33-67, can perform pap smear with HPV co-testing every 5 years     Recommendations may differ for women with a history of total hysterectomy, cervical cancer, or abnormal pap smears in past  Pap Smear: Not on file    Screening Not Indicated   Hepatitis C Screening Once for adults born between 1945 and 1965  More frequently in patients at high risk for Hepatitis C Hep C Antibody: Not on file        Diabetes Screening 1-2 times per year if you're at risk for diabetes or have pre-diabetes Fasting glucose: No results in last 5 years (No results in last 5 years)  A1C: 6 0 (12/17/2019)  Screening Current   Cholesterol Screening Once every 5 years if you don't have a lipid disorder  May order more often based on risk factors  Lipid panel: 04/07/2022    Screening Not Indicated  History Lipid Disorder     Other Preventive Screenings Covered by Medicare:  1  Abdominal Aortic Aneurysm (AAA) Screening: covered once if your at risk  You're considered to be at risk if you have a family history of AAA  2  Lung Cancer Screening: covers low dose CT scan once per year if you meet all of the following conditions: (1) Age 50-69; (2) No signs or symptoms of lung cancer; (3) Current smoker or have quit smoking within the last 15 years; (4) You have a tobacco smoking history of at least 20 pack years (packs per day multiplied by number of years you smoked); (5) You get a written order from a healthcare provider  3  Glaucoma Screening: covered annually if you're considered high risk: (1) You have diabetes OR (2) Family history of glaucoma OR (3)  aged 48 and older OR (3)  American aged 72 and older  3  Osteoporosis Screening: covered every 2 years if you meet one of the following conditions: (1) You're estrogen deficient and at risk for osteoporosis based off medical history and other findings; (2) Have a vertebral abnormality; (3) On glucocorticoid therapy for more than 3 months; (4) Have primary hyperparathyroidism; (5) On osteoporosis medications and need to assess response to drug therapy  · Last bone density test (DXA Scan): 10/30/2018  5  HIV Screening: covered annually if you're between the age of 12-76  Also covered annually if you are younger than 13 and older than 72 with risk factors for HIV infection   For pregnant patients, it is covered up to 3 times per pregnancy  Immunizations:  Immunization Recommendations   Influenza Vaccine Annual influenza vaccination during flu season is recommended for all persons aged >= 6 months who do not have contraindications   Pneumococcal Vaccine   * Pneumococcal conjugate vaccine = PCV13 (Prevnar 13), PCV15 (Vaxneuvance), PCV20 (Prevnar 20)  * Pneumococcal polysaccharide vaccine = PPSV23 (Pneumovax) Adults 25-60 years old: 1-3 doses may be recommended based on certain risk factors  Adults 72 years old: 1-2 doses may be recommended based off what pneumonia vaccine you previously received   Hepatitis B Vaccine 3 dose series if at intermediate or high risk (ex: diabetes, end stage renal disease, liver disease)   Tetanus (Td) Vaccine - COST NOT COVERED BY MEDICARE PART B Following completion of primary series, a booster dose should be given every 10 years to maintain immunity against tetanus  Td may also be given as tetanus wound prophylaxis  Tdap Vaccine - COST NOT COVERED BY MEDICARE PART B Recommended at least once for all adults  For pregnant patients, recommended with each pregnancy  Shingles Vaccine (Shingrix) - COST NOT COVERED BY MEDICARE PART B  2 shot series recommended in those aged 48 and above     Health Maintenance Due:      Topic Date Due   • Hepatitis C Screening  Never done   • Breast Cancer Screening: Mammogram  10/16/2022   • Colorectal Cancer Screening  01/13/2024     Immunizations Due:      Topic Date Due   • Pneumococcal Vaccine: 65+ Years (3) 10/03/2017   • COVID-19 Vaccine (2 - Booster for Lupillo series) 06/05/2021     Advance Directives   What are advance directives? Advance directives are legal documents that state your wishes and plans for medical care  These plans are made ahead of time in case you lose your ability to make decisions for yourself   Advance directives can apply to any medical decision, such as the treatments you want, and if you want to donate organs  What are the types of advance directives? There are many types of advance directives, and each state has rules about how to use them  You may choose a combination of any of the following:  · Living will: This is a written record of the treatment you want  You can also choose which treatments you do not want, which to limit, and which to stop at a certain time  This includes surgery, medicine, IV fluid, and tube feedings  · Durable power of  for healthcare [facility]): This is a written record that states who you want to make healthcare choices for you when you are unable to make them for yourself  This person, called a proxy, is usually a family member or a friend  You may choose more than 1 proxy  · Do not resuscitate (DNR) order:  A DNR order is used in case your heart stops beating or you stop breathing  It is a request not to have certain forms of treatment, such as CPR  A DNR order may be included in other types of advance directives  · Medical directive: This covers the care that you want if you are in a coma, near death, or unable to make decisions for yourself  You can list the treatments you want for each condition  Treatment may include pain medicine, surgery, blood transfusions, dialysis, IV or tube feedings, and a ventilator (breathing machine)  · Values history: This document has questions about your views, beliefs, and how you feel and think about life  This information can help others choose the care that you would choose  Why are advance directives important? An advance directive helps you control your care  Although spoken wishes may be used, it is better to have your wishes written down  Spoken wishes can be misunderstood, or not followed  Treatments may be given even if you do not want them  An advance directive may make it easier for your family to make difficult choices about your care     Weight Management   Why it is important to manage your weight:  Being overweight increases your risk of health conditions such as heart disease, high blood pressure, type 2 diabetes, and certain types of cancer  It can also increase your risk for osteoarthritis, sleep apnea, and other respiratory problems  Aim for a slow, steady weight loss  Even a small amount of weight loss can lower your risk of health problems  How to lose weight safely:  A safe and healthy way to lose weight is to eat fewer calories and get regular exercise  You can lose up about 1 pound a week by decreasing the number of calories you eat by 500 calories each day  Healthy meal plan for weight management:  A healthy meal plan includes a variety of foods, contains fewer calories, and helps you stay healthy  A healthy meal plan includes the following:  · Eat whole-grain foods more often  A healthy meal plan should contain fiber  Fiber is the part of grains, fruits, and vegetables that is not broken down by your body  Whole-grain foods are healthy and provide extra fiber in your diet  Some examples of whole-grain foods are whole-wheat breads and pastas, oatmeal, brown rice, and bulgur  · Eat a variety of vegetables every day  Include dark, leafy greens such as spinach, kale, jenniffer greens, and mustard greens  Eat yellow and orange vegetables such as carrots, sweet potatoes, and winter squash  · Eat a variety of fruits every day  Choose fresh or canned fruit (canned in its own juice or light syrup) instead of juice  Fruit juice has very little or no fiber  · Eat low-fat dairy foods  Drink fat-free (skim) milk or 1% milk  Eat fat-free yogurt and low-fat cottage cheese  Try low-fat cheeses such as mozzarella and other reduced-fat cheeses  · Choose meat and other protein foods that are low in fat  Choose beans or other legumes such as split peas or lentils  Choose fish, skinless poultry (chicken or turkey), or lean cuts of red meat (beef or pork)  Before you cook meat or poultry, cut off any visible fat     · Use less fat and oil  Try baking foods instead of frying them  Add less fat, such as margarine, sour cream, regular salad dressing and mayonnaise to foods  Eat fewer high-fat foods  Some examples of high-fat foods include french fries, doughnuts, ice cream, and cakes  · Eat fewer sweets  Limit foods and drinks that are high in sugar  This includes candy, cookies, regular soda, and sweetened drinks  Exercise:  Exercise at least 30 minutes per day on most days of the week  Some examples of exercise include walking, biking, dancing, and swimming  You can also fit in more physical activity by taking the stairs instead of the elevator or parking farther away from stores  Ask your healthcare provider about the best exercise plan for you  © Copyright [employer] 2018 Information is for End User's use only and may not be sold, redistributed or otherwise used for commercial purposes   All illustrations and images included in CareNotes® are the copyrighted property of [employer]  or [address] [place] [address]

## 2023-01-17 NOTE — PROGRESS NOTES
Assessment and Plan:     Problem List Items Addressed This Visit        Other    Medicare annual wellness visit, subsequent - Primary     History of breast cancer status post left mastectomy   -Ordered mammogram  -Normal exam         Encounter for immunization     Received flu shot today         Relevant Orders    influenza vaccine, high-dose, PF 0 7 mL (FLUZONE HIGH-DOSE) (Completed)    Encounter for screening mammogram for malignant neoplasm of breast     Ordered mammogram         Relevant Orders    Mammo screening bilateral w 3d & cad    Encounter for screening for cardiovascular disorders     Ordered routine labs         Relevant Orders    Comprehensive metabolic panel    TSH, 3rd generation with Free T4 reflex    HEMOGLOBIN A1C W/ EAG ESTIMATION     BMI Counseling: Body mass index is 34 69 kg/m²  The BMI is above normal  Nutrition recommendations include limiting drinks that contain sugar, moderation in carbohydrate intake, reducing intake of saturated and trans fat and reducing intake of cholesterol  Exercise recommendations include vigorous physical activity 75 minutes/week and exercising 3-5 times per week  Rationale for BMI follow-up plan is due to patient being overweight or obese  Depression Screening and Follow-up Plan: Patient was screened for depression during today's encounter  They screened negative with a PHQ-2 score of 0  Falls Plan of Care: balance, strength, and gait training instructions were provided  Pt refuses PT referral     Urinary Incontinence Plan of Care: counseling topics discussed: limit alcohol, caffeine, spicy foods, and acidic foods, limiting fluid intake 3-4 hours before bed, preventing constipation and bladder retraining  Preventive health issues were discussed with patient, and age appropriate screening tests were ordered as noted in patient's After Visit Summary    Personalized health advice and appropriate referrals for health education or preventive services given if needed, as noted in patient's After Visit Summary  History of Present Illness:     Patient presents for a Medicare Wellness Visit    Patient is a 42-year-old female presenting to clinic for Medicare annual wellness  Patient states there are no concerns today  Ordered mammogram, blood work and received a shot today  Patient Care Team:  Ricky Patel DO as PCP - General (Family Medicine)  Sonal Banegas as PCP - [address] [place] [address] (RTE)  Sonal Banegas as PCP - PCP-[facility] (RTE)  MD Dana Dale MD     Review of Systems:     Review of Systems   Constitutional: Negative for chills and fever  HENT: Negative for ear pain and sore throat  Eyes: Negative for pain and visual disturbance  Respiratory: Negative for cough and shortness of breath  Cardiovascular: Negative for chest pain and palpitations  Gastrointestinal: Negative for abdominal pain and vomiting  Genitourinary: Negative for dysuria and hematuria  Musculoskeletal: Negative for arthralgias and back pain  Skin: Negative for color change and rash  Neurological: Negative for seizures and syncope  All other systems reviewed and are negative  Problem List:     Patient Active Problem List   Diagnosis   • Benign essential hypertension   • Hyperlipidemia   • Hypothyroidism   • Lymphedema   • Primary insomnia   • IFG (impaired fasting glucose)   • Cancer of breast, female ([facility] [address] )   • Osteoarthritis   • Age-related osteoporosis without current pathological fracture   • Peripheral neuropathy   • Psoriasis   • Edema of left lower eyelid   • Chronic bilateral low back pain without sciatica   • Medicare annual wellness visit, subsequent   • Obesity (BMI 30 0-34  9)   • Polyp of colon   • Encounter for immunization   • Yeast infection of the skin   • Muscular chest pain   • Encounter for hepatitis C screening test for low risk patient   • Hyperglycemia   • Acute neck sprain, initial encounter   • Altered mental status   • Fear for personal safety   • Concerned about having social problem   • Behavioral change   • Encounter for screening mammogram for malignant neoplasm of breast   • Encounter for screening for cardiovascular disorders      Past Medical and Surgical History:     Past Medical History:   Diagnosis Date   • Breast cancer ([facility] [address] ) [address] mastectomy   • Disease of thyroid gland    • Hemorrhoids    • Hyperlipidemia    • Hypertension    • Psoriasis      Past Surgical History:   Procedure Laterality Date   • BREAST CYST EXCISION Right     benign   • BREAST LUMPECTOMY Left    •  SECTION     • COLONOSCOPY      Complete   • MASTECTOMY Left     Onset: 1996   • SALPINGOOPHORECTOMY Bilateral     age 40   • TOTAL ABDOMINAL HYSTERECTOMY      age 40      Family History:     Family History   Problem Relation Age of Onset   • Arthritis Mother    • Diabetes Mother    • Breast cancer Mother 67   • Colon cancer Mother    • Hypertension Mother    • Diabetes Father    • Glaucoma Father    • Prostate cancer Father    • Asthma Brother    • Hypertension Brother    • Colon cancer Maternal Uncle 80   • Breast cancer Maternal Aunt [de-identified]   • No Known Problems Maternal Grandmother    • No Known Problems Paternal Grandmother    • No Known Problems Maternal Aunt    • No Known Problems Maternal Aunt    • No Known Problems Paternal Aunt    • No Known Problems Paternal Aunt    • No Known Problems Paternal Grandfather    • Breast cancer Cousin       Social History:     Social History     Socioeconomic History   • Marital status:      Spouse name: None   • Number of children: None   • Years of education: None   • Highest education level: None   Occupational History   • None   Tobacco Use   • Smoking status: Never   • Smokeless tobacco: Never   Vaping Use   • Vaping Use: Never used   Substance and Sexual Activity   • Alcohol use: No   • Drug use: No   • Sexual activity: Not Currently     Partners: Male   Other Topics Concern   • None   Social History Narrative   • None     Social Determinants of Health     Financial Resource Strain: Low Risk    • Difficulty of Paying Living Expenses: Not hard at all   Food Insecurity: No Food Insecurity   • Worried About Running Out of Food in the Last Year: Never true   • Ran Out of Food in the Last Year: Never true   Transportation Needs: No Transportation Needs   • Lack of Transportation (Medical): No   • Lack of Transportation (Non-Medical): No   Physical Activity: Not on file   Stress: Not on file   Social Connections: Unknown   • Frequency of Communication with Friends and Family: Three times a week   • Frequency of Social Gatherings with Friends and Family: Not on file   • Attends Temple Services: Not on file   • Active Member of Clubs or Organizations: Not on file   • Attends Club or Organization Meetings: Not on file   • Marital Status: Not on file   Intimate Partner Violence: Not on file   Housing Stability: Low Risk    • Unable to Pay for Housing in the Last Year: No   • Number of Places Lived in the Last Year: 1   • Unstable Housing in the Last Year: No      Medications and Allergies:     Current Outpatient Medications   Medication Sig Dispense Refill   • amLODIPine (NORVASC) 10 mg tablet Take 1 tablet (10 mg total) by mouth daily 90 tablet 3   • Apremilast (Otezla) 30 MG TABS Please take two pills daily   90 tablet 0   • calcium carbonate-vitamin D (OSCAL-D) 500 mg-200 units per tablet Take 2 tablets by mouth daily with breakfast 120 tablet 3   • clindamycin (CLEOCIN T) 1 % lotion   0   • Diclofenac Sodium (VOLTAREN) 1 % Apply 4 g topically 4 (four) times a day 100 g 0   • fluocinonide (LIDEX) 0 05 % external solution Apply topically daily 60 mL 2   • hydrochlorothiazide (HYDRODIURIL) 25 mg tablet Take 1 tablet (25 mg total) by mouth daily 90 tablet 3   • levothyroxine 50 mcg tablet Take 1 tablet (50 mcg total) by mouth daily 90 tablet 1   • lidocaine (XYLOCAINE) 2 % topical gel Apply topically as needed for mild pain 30 mL 0   • lisinopril (ZESTRIL) 20 mg tablet take 1 tablet by mouth once daily 90 tablet 0   • mometasone (ELOCON) 0 1 % ointment Apply topically daily 45 g 2   • nystatin (MYCOSTATIN) powder Apply topically 4 (four) times a day 30 g 1   • simvastatin (ZOCOR) 20 mg tablet take 1 tablet by mouth once daily at bedtime 90 tablet 1   • mirtazapine (REMERON) 15 mg tablet Take 1 tablet (15 mg total) by mouth daily at bedtime Take 15mg (one tablet) at night for three (3) days and then start on 30mg (two tablets) at night onwards  63 tablet 0     No current facility-administered medications for this visit  No Known Allergies   Immunizations:     Immunization History   Administered Date(s) Administered   • COVID-19 J&J (Lupillo) vaccine 0 5 mL 04/10/2021   • Influenza, high dose seasonal 0 7 mL 12/22/2020, 09/24/2021, 01/17/2023   • Influenza, seasonal, injectable 10/27/2011, 10/25/2012, 09/26/2013, 12/14/2016   • Pneumococcal Conjugate 13-Valent 10/03/2016   • Pneumococcal Polysaccharide PPV23 10/27/2011   • Tdap 05/30/2018      Health Maintenance:         Topic Date Due   • Hepatitis C Screening  Never done   • Breast Cancer Screening: Mammogram  10/16/2022   • Colorectal Cancer Screening  01/13/2024         Topic Date Due   • Pneumococcal Vaccine: 65+ Years (3) 10/03/2017   • COVID-19 Vaccine (2 - Booster for Lupillo series) 06/05/2021      Medicare Screening Tests and Risk Assessments:     Aide Rosen is here for her Subsequent Wellness visit  Health Risk Assessment:   Patient rates overall health as good  Patient feels that their physical health rating is slightly worse  Patient is satisfied with their life  Eyesight was rated as same  Hearing was rated as same  Patient feels that their emotional and mental health rating is slightly better  Patients states they are never, rarely angry  Patient states they are sometimes unusually tired/fatigued   Pain experienced in the last 7 days has been a lot  Patient's pain rating has been 8/10  Patient states that she has experienced no weight loss or gain in last 6 months  Depression Screening:   PHQ-2 Score: 0      Fall Risk Screening: In the past year, patient has experienced: no history of falling in past year      Urinary Incontinence Screening:   Patient has not leaked urine accidently in the last six months  Home Safety:  Patient has trouble with stairs inside or outside of their home  Patient has working smoke alarms and has working carbon monoxide detector  Home safety hazards include: none  Nutrition:   Current diet is Regular  Medications:   Patient is currently taking over-the-counter supplements  OTC medications include: see medication list  Patient is able to manage medications  Activities of Daily Living (ADLs)/Instrumental Activities of Daily Living (IADLs):   Walk and transfer into and out of bed and chair?: Yes  Dress and groom yourself?: Yes    Bathe or shower yourself?: Yes    Feed yourself?  Yes  Do your laundry/housekeeping?: Yes  Manage your money, pay your bills and track your expenses?: Yes  Make your own meals?: Yes    Do your own shopping?: Yes    Previous Hospitalizations:   Any hospitalizations or ED visits within the last 12 months?: Yes    How many hospitalizations have you had in the last year?: 1-2    Advance Care Planning:   Living will: No    Durable POA for healthcare: No    Advanced directive: No      PREVENTIVE SCREENINGS      Cardiovascular Screening:    General: Screening Not Indicated and History Lipid Disorder      Diabetes Screening:     General: Screening Current      Colorectal Cancer Screening:     General: Screening Current      Breast Cancer Screening:     General: History Breast Cancer      Cervical Cancer Screening:    General: Screening Not Indicated      Osteoporosis Screening:    General: Screening Not Indicated and History Osteoporosis      Lung Cancer Screening:     General: Screening Not Indicated    Screening, Brief Intervention, and Referral to Treatment (SBIRT)    Screening  Typical number of drinks in a day: 0  Typical number of drinks in a week: 0  Interpretation: Low risk drinking behavior  AUDIT-C Screenin) How often did you have a drink containing alcohol in the past year? never  2) How many drinks did you have on a typical day when you were drinking in the past year? 0  3) How often did you have 6 or more drinks on one occasion in the past year? never    AUDIT-C Score: 0  Interpretation: Score 0-2 (female): Negative screen for alcohol misuse    Single Item Drug Screening:  How often have you used an illegal drug (including marijuana) or a prescription medication for non-medical reasons in the past year? never    Single Item Drug Screen Score: 0  Interpretation: Negative screen for possible drug use disorder    No results found  Physical Exam:     /82 (BP Location: Right arm, Patient Position: Sitting, Cuff Size: Standard)   Pulse 81   Temp 98 1 °F (36 7 °C) (Temporal)   Resp 16   Ht 4' 8 3" (1 43 m)   Wt 70 9 kg (156 lb 6 4 oz)   SpO2 98%   BMI 34 69 kg/m²     Physical Exam  Vitals reviewed  Constitutional:       General: She is not in acute distress  Appearance: She is well-developed  HENT:      Head: Normocephalic and atraumatic  Eyes:      Conjunctiva/sclera: Conjunctivae normal    Cardiovascular:      Rate and Rhythm: Normal rate and regular rhythm  Heart sounds: No murmur heard  Pulmonary:      Effort: Pulmonary effort is normal  No respiratory distress  Breath sounds: Normal breath sounds  Abdominal:      Palpations: Abdomen is soft  Tenderness: There is no abdominal tenderness  Musculoskeletal:         General: No swelling  Cervical back: Neck supple  Skin:     General: Skin is warm and dry  Capillary Refill: Capillary refill takes less than 2 seconds     Neurological: Mental Status: She is alert     Psychiatric:         Mood and Affect: Mood normal           Eri Fraga DO

## 2023-01-18 DIAGNOSIS — L40.9 PSORIASIS: Primary | ICD-10-CM

## 2023-01-20 RX ORDER — APREMILAST 30 MG/1
TABLET, FILM COATED ORAL
Qty: 60 TABLET | Refills: 3 | Status: SHIPPED | OUTPATIENT
Start: 2023-01-20

## 2023-01-20 NOTE — TELEPHONE ENCOUNTER
Called patient and made her aware that medication approved 200 tablets for 100 days  Patient stated that her insurance said she could have the medication for as long as she needed  I said that may be true but as of right now the medication was only approved for 100 days and then we would just resubmit the authorization to extend her approval  I advised her that the medication needed to go through a specialty pharmacy and as I tried to provider her the number she said she wanted to sent to [facility] pharmacy  I advised that there is a chance they might not be able to fill the medication and she would need to reach out to the office so that way we could send it to the other pharmacy  She confirmed understanding

## 2023-03-01 DIAGNOSIS — E78.5 HYPERLIPIDEMIA, UNSPECIFIED HYPERLIPIDEMIA TYPE: ICD-10-CM

## 2023-03-01 RX ORDER — SIMVASTATIN 20 MG
TABLET ORAL
Qty: 90 TABLET | Refills: 1 | Status: SHIPPED | OUTPATIENT
Start: 2023-03-01

## 2023-03-18 PROBLEM — Z00.00 MEDICARE ANNUAL WELLNESS VISIT, SUBSEQUENT: Status: RESOLVED | Noted: 2019-01-07 | Resolved: 2023-03-18

## 2023-03-18 PROBLEM — Z13.6 ENCOUNTER FOR SCREENING FOR CARDIOVASCULAR DISORDERS: Status: RESOLVED | Noted: 2023-01-17 | Resolved: 2023-03-18

## 2023-05-05 DIAGNOSIS — E03.9 ACQUIRED HYPOTHYROIDISM: ICD-10-CM

## 2023-05-05 RX ORDER — LEVOTHYROXINE SODIUM 0.05 MG/1
50 TABLET ORAL DAILY
Qty: 90 TABLET | Refills: 1 | Status: SHIPPED | OUTPATIENT
Start: 2023-05-05

## 2023-05-16 ENCOUNTER — TELEPHONE (OUTPATIENT)
Dept: FAMILY MEDICINE CLINIC | Facility: CLINIC | Age: 73
End: 2023-05-16

## 2023-06-02 ENCOUNTER — HOSPITAL ENCOUNTER (OUTPATIENT)
Dept: MAMMOGRAPHY | Facility: CLINIC | Age: 73
End: 2023-06-02
Payer: COMMERCIAL

## 2023-06-02 VITALS — BODY MASS INDEX: 35.09 KG/M2 | HEIGHT: 56 IN | WEIGHT: 156 LBS

## 2023-06-02 DIAGNOSIS — Z12.31 ENCOUNTER FOR SCREENING MAMMOGRAM FOR MALIGNANT NEOPLASM OF BREAST: ICD-10-CM

## 2023-06-02 PROCEDURE — 77067 SCR MAMMO BI INCL CAD: CPT

## 2023-06-02 PROCEDURE — 77063 BREAST TOMOSYNTHESIS BI: CPT

## 2023-06-15 LAB
ALBUMIN SERPL-MCNC: 4.2 G/DL (ref 3.6–5.1)
ALBUMIN/GLOB SERPL: 1.4 (CALC) (ref 1–2.5)
ALP SERPL-CCNC: 79 U/L (ref 37–153)
ALT SERPL-CCNC: 9 U/L (ref 6–29)
AST SERPL-CCNC: 16 U/L (ref 10–35)
BILIRUB SERPL-MCNC: 0.4 MG/DL (ref 0.2–1.2)
BUN SERPL-MCNC: 16 MG/DL (ref 7–25)
BUN/CREAT SERPL: ABNORMAL (CALC) (ref 6–22)
CALCIUM SERPL-MCNC: 9.8 MG/DL (ref 8.6–10.4)
CHLORIDE SERPL-SCNC: 101 MMOL/L (ref 98–110)
CO2 SERPL-SCNC: 29 MMOL/L (ref 20–32)
CREAT SERPL-MCNC: 0.71 MG/DL (ref 0.6–1)
EST. AVERAGE GLUCOSE BLD GHB EST-MCNC: 160 MG/DL
EST. AVERAGE GLUCOSE BLD GHB EST-SCNC: 8.9 MMOL/L
GFR/BSA.PRED SERPLBLD CYS-BASED-ARV: 90 ML/MIN/1.73M2
GLOBULIN SER CALC-MCNC: 2.9 G/DL (CALC) (ref 1.9–3.7)
GLUCOSE SERPL-MCNC: 177 MG/DL (ref 65–99)
HBA1C MFR BLD: 7.2 % OF TOTAL HGB
POTASSIUM SERPL-SCNC: 4 MMOL/L (ref 3.5–5.3)
PROT SERPL-MCNC: 7.1 G/DL (ref 6.1–8.1)
SODIUM SERPL-SCNC: 139 MMOL/L (ref 135–146)
T4 FREE SERPL-MCNC: 1 NG/DL (ref 0.8–1.8)
TSH SERPL-ACNC: 5.65 MIU/L (ref 0.4–4.5)

## 2023-06-20 ENCOUNTER — OFFICE VISIT (OUTPATIENT)
Dept: FAMILY MEDICINE CLINIC | Facility: CLINIC | Age: 73
End: 2023-06-20

## 2023-06-20 VITALS
TEMPERATURE: 98.6 F | DIASTOLIC BLOOD PRESSURE: 73 MMHG | HEIGHT: 56 IN | WEIGHT: 158 LBS | BODY MASS INDEX: 35.54 KG/M2 | OXYGEN SATURATION: 97 % | SYSTOLIC BLOOD PRESSURE: 155 MMHG | HEART RATE: 69 BPM

## 2023-06-20 DIAGNOSIS — E03.9 ACQUIRED HYPOTHYROIDISM: ICD-10-CM

## 2023-06-20 DIAGNOSIS — R10.11 RIGHT UPPER QUADRANT ABDOMINAL PAIN: Primary | ICD-10-CM

## 2023-06-20 PROCEDURE — 99213 OFFICE O/P EST LOW 20 MIN: CPT | Performed by: FAMILY MEDICINE

## 2023-06-20 NOTE — ASSESSMENT & PLAN NOTE
Pt reports RUQ abdominal pain on deep palpation  States that she has this pain only when initiating her stream   -Ordered abdominal ultrasound  Will call her with results

## 2023-06-20 NOTE — ASSESSMENT & PLAN NOTE
Pt has hx of hypothyroidism  TSH is 5 65 and free T4 is 1 0 Pt denies fatigue, weight gain, cold intolerance, constipation    -Continue levothyroxine 50 mcg tablet daily  -Repeat TSH and T4 in 5 weeks  -F/u in 5 weeks

## 2023-06-20 NOTE — PROGRESS NOTES
Name: Klaudia Amor      : 1950      MRN: [id number]  Encounter Provider: Panfilo Youssef DO  Encounter Date: 2023   Encounter department: [address] [place] [address]     1  Right upper quadrant abdominal pain  Assessment & Plan:  Pt reports RUQ abdominal pain on deep palpation  States that she has this pain only when initiating her stream   -Ordered abdominal ultrasound  Will call her with results  Orders:  -     US abdomen complete; Future; Expected date: 2023    2  Acquired hypothyroidism  Assessment & Plan:  Pt has hx of hypothyroidism  TSH is 5 65 and free T4 is 1 0 Pt denies fatigue, weight gain, cold intolerance, constipation    -Continue levothyroxine 50 mcg tablet daily  -Repeat TSH and T4 in 5 weeks  -F/u in 5 weeks  Orders:  -     TSH, 3rd generation with Free T4 reflex; Future           Subjective      HPI   Chuyita Hills is a 68 y o  female with hx of hypothyroidism who presents for follow up to discuss her blood work  Pt states that she has been feeling well  Denies fatigue, weight gain, increased appetite, cold intolerance, hair loss, skin dryness, and constipation  Pt states she does not need any med refills  Pt also reported RUQ pain while initiating her stream x 3 months  Denies dysuria and dyschezia  Review of Systems   Constitutional: Negative for activity change, appetite change and fatigue  Gastrointestinal: Positive for abdominal pain (RUQ pain while initiating urination)  Negative for constipation, diarrhea and rectal pain  Endocrine: Negative for cold intolerance  Genitourinary: Positive for frequency (every hour)  Negative for difficulty urinating and dysuria  Positive for RUQ pain while initiating urination  All other systems reviewed and are negative        Current Outpatient Medications on File Prior to Visit   Medication Sig   • amLODIPine (NORVASC) 10 mg tablet Take 1 tablet (10 mg total) "by mouth daily   • Apremilast (Otezla) 30 MG TABS Please take two pills daily  • Apremilast (Otezla) 30 MG TABS Take one pill by mouth twice daily  • calcium carbonate-vitamin D (OSCAL-D) 500 mg-200 units per tablet Take 2 tablets by mouth daily with breakfast   • clindamycin (CLEOCIN T) 1 % lotion    • Diclofenac Sodium (VOLTAREN) 1 % Apply 4 g topically 4 (four) times a day   • fluocinonide (LIDEX) 0 05 % external solution Apply topically daily   • hydrochlorothiazide (HYDRODIURIL) 25 mg tablet Take 1 tablet (25 mg total) by mouth daily   • levothyroxine 50 mcg tablet Take 1 tablet (50 mcg total) by mouth daily   • lidocaine (XYLOCAINE) 2 % topical gel Apply topically as needed for mild pain   • lisinopril (ZESTRIL) 20 mg tablet take 1 tablet by mouth once daily   • mirtazapine (REMERON) 15 mg tablet Take 1 tablet (15 mg total) by mouth daily at bedtime Take 15mg (one tablet) at night for three (3) days and then start on 30mg (two tablets) at night onwards  • mometasone (ELOCON) 0 1 % ointment Apply topically daily   • nystatin (MYCOSTATIN) powder Apply topically 4 (four) times a day   • simvastatin (ZOCOR) 20 mg tablet take 1 tablet by mouth once daily at bedtime       Objective     /73 (BP Location: Right arm, Patient Position: Sitting, Cuff Size: Large)   Pulse 69   Temp 98 6 °F (37 °C) (Temporal)   Ht 4' 8\" (1 422 m)   Wt 71 7 kg (158 lb)   SpO2 97%   BMI 35 42 kg/m²     Physical Exam  Constitutional:       Appearance: Normal appearance  She is obese  HENT:      Head: Normocephalic and atraumatic  Neck:      Thyroid: No thyroid mass, thyromegaly or thyroid tenderness  Cardiovascular:      Rate and Rhythm: Normal rate and regular rhythm  Pulmonary:      Effort: Pulmonary effort is normal       Breath sounds: Normal breath sounds  Abdominal:      General: Abdomen is flat  Bowel sounds are normal       Palpations: Abdomen is soft  Tenderness: There is abdominal tenderness (RUQ)   " There is no guarding  Skin:     General: Skin is warm and dry  Neurological:      General: No focal deficit present  Mental Status: She is alert and oriented to person, place, and time     Psychiatric:         Mood and Affect: Mood normal          Behavior: Behavior normal        Rg Smallwood DO

## 2023-06-28 ENCOUNTER — HOSPITAL ENCOUNTER (OUTPATIENT)
Dept: RADIOLOGY | Facility: HOSPITAL | Age: 73
Discharge: HOME | End: 2023-06-28
Payer: COMMERCIAL

## 2023-06-28 ENCOUNTER — OFFICE VISIT (OUTPATIENT)
Dept: MULTI SPECIALTY CLINIC | Facility: CLINIC | Age: 73
End: 2023-06-28

## 2023-06-28 VITALS — TEMPERATURE: 98.1 F | WEIGHT: 158 LBS | HEIGHT: 56 IN | BODY MASS INDEX: 35.54 KG/M2

## 2023-06-28 DIAGNOSIS — L40.9 PSORIASIS: Primary | ICD-10-CM

## 2023-06-28 DIAGNOSIS — R10.11 RIGHT UPPER QUADRANT ABDOMINAL PAIN: ICD-10-CM

## 2023-06-28 PROCEDURE — 99214 OFFICE O/P EST MOD 30 MIN: CPT | Performed by: DERMATOLOGY

## 2023-06-28 PROCEDURE — 76705 ECHO EXAM OF ABDOMEN: CPT

## 2023-06-28 RX ORDER — APREMILAST 30 MG/1
TABLET, FILM COATED ORAL
Qty: 60 TABLET | Refills: 3 | Status: SHIPPED | OUTPATIENT
Start: 2023-06-28

## 2023-06-28 NOTE — PROGRESS NOTES
"[facility]     Patient Name: Mendoza Martines  Encounter Date: 6/28/2023     Have you been cared for by a [place] [address] Dermatologist in the last 3 years and, if so, which description applies to you? Yes  I have been here within the last 3 years, and my medical history has NOT changed since that time  I am FEMALE/not capable of bearing children  REVIEW OF SYSTEMS:  Have you recently had or currently have any of the following? · No changes in my recent health  PAST MEDICAL HISTORY:  Have you personally ever had or currently have any of the following? If \"YES,\" then please provide more detail  · No changes in my medical history  FAMILY HISTORY:  Any \"first degree relatives\" (parent, brother, sister, or child) with the following? • No changes in my family's known health  PATIENT EXPERIENCE:    • Do you want the Dermatologist to perform a COMPLETE skin exam today including a clinical examination under the \"bra and underwear\" areas? NO  • If necessary, do we have your permission to call and leave a detailed message on your Preferred Phone number that includes your specific medical information? Yes      No Known Allergies   Current Outpatient Medications:   •  amLODIPine (NORVASC) 10 mg tablet, Take 1 tablet (10 mg total) by mouth daily, Disp: 90 tablet, Rfl: 3  •  Apremilast (Otezla) 30 MG TABS, Please take two pills daily  , Disp: 90 tablet, Rfl: 0  •  Apremilast (Otezla) 30 MG TABS, Take one pill by mouth twice daily  , Disp: 60 tablet, Rfl: 3  •  calcium carbonate-vitamin D (OSCAL-D) 500 mg-200 units per tablet, Take 2 tablets by mouth daily with breakfast, Disp: 120 tablet, Rfl: 3  •  clindamycin (CLEOCIN T) 1 % lotion, , Disp: , Rfl: 0  •  Diclofenac Sodium (VOLTAREN) 1 %, Apply 4 g topically 4 (four) times a day, Disp: 100 g, Rfl: 0  •  fluocinonide (LIDEX) 0 05 % external solution, Apply topically daily, Disp: 60 mL, Rfl: 2  •  hydrochlorothiazide (HYDRODIURIL) 25 mg " tablet, Take 1 tablet (25 mg total) by mouth daily, Disp: 90 tablet, Rfl: 3  •  levothyroxine 50 mcg tablet, Take 1 tablet (50 mcg total) by mouth daily, Disp: 90 tablet, Rfl: 1  •  lidocaine (XYLOCAINE) 2 % topical gel, Apply topically as needed for mild pain, Disp: 30 mL, Rfl: 0  •  lisinopril (ZESTRIL) 20 mg tablet, take 1 tablet by mouth once daily, Disp: 90 tablet, Rfl: 0  •  mirtazapine (REMERON) 15 mg tablet, Take 1 tablet (15 mg total) by mouth daily at bedtime Take 15mg (one tablet) at night for three (3) days and then start on 30mg (two tablets) at night onwards  , Disp: 63 tablet, Rfl: 0  •  mometasone (ELOCON) 0 1 % ointment, Apply topically daily, Disp: 45 g, Rfl: 2  •  nystatin (MYCOSTATIN) powder, Apply topically 4 (four) times a day, Disp: 30 g, Rfl: 1  •  simvastatin (ZOCOR) 20 mg tablet, take 1 tablet by mouth once daily at bedtime, Disp: 90 tablet, Rfl: 1          • Whom besides the patient is providing clinical information about today's encounter?   o NO ADDITIONAL HISTORIAN (patient alone provided history)    Physical Exam and Assessment/Plan by Diagnosis:      PSORIASIS    Physical Exam:  • Anatomic Location: scalp, elbows, knees, buttocks, trunk and back  • Morphologic Description:  Pink patches with silvery scales   o Pustules present? No  o Severity: moderate  o Body Percent Affected: 20%  • Pertinent Positives:  o Itching? No  o Nail pitting? YES  • Pertinent Negatives: Additional History of Present Condition:  Patient has been on otezla 30mg BID for nearly 30 years  She feels that over the past couple of months the medication is not working and she is getting flares of psoriasis on the trunk, extremities, and scalp  She has tried and failed phototherapy and topical corticosteroids for longer than 3 months  • Joint pain? No  • Recent infection (strep throat)? No    Plan:  • Discussed chronic nature of disease  Psoriasis tends to persist lifelong and fluctuates in extent and severity   To "help manage the disease, decrease factors that aggravate psoriasis  This includes treating streptococcal infections, minimizing skin injuries, avoiding sun exposure if it exacerbates psoriasis, avoiding smoking and alcohol usage, decreasing stress, and maintaining an optimal body weight  Certain medications such as lithium, beta blockers, antimalarials, and NSAIDs have also been implicated  Suddenly stopping oral steroids or strong topical steroids can cause rebound disease  • Dry skin is more susceptible to outbreaks of psoriasis  Practice moisturizing throughout the day and after bathing or showering to reduce itching, dryness and scaling  • Topical Therapy: Lidex 0 05% cream BID for up to 14 days for flare treatment  • Systemic Therapy: Aggie Fanti (apremilast) oral tablet: Aggie Fanti is a PDE4 inhibitor pill used to treat adult patients with moderate to severe plaque psoriasis and psoriatic arthritis who are candidates for systemic therapy  Psoriasis patients eligible for Aggie Fanti must have adequate body surface area coverage and an inadequate response to standard therapies, including topical medications  Document disease severity and prior treatments in note  • \"ADULT DOSE\" MAINTENANCE: Take 30 mg twice daily starting on Day 6   • MOST COMMON SIDE EFFECTS: In clinical studies involving adults with moderate to severe plaque psoriasis, the most common side effects of Otezla (occurring in over 5% of patients) included diarrhea, nausea, upper respiratory tract infection, tension headache, and headache  Most events occurred within the first few weeks of treatment  Patients 72years of age or older and patients taking medications that can lead to volume depletion or hypotension may be at a higher risk of complications from severe diarrhea, nausea, or vomiting  We discussed contacting the office about any side effect that is bothersome or does not go away     • DEPRESSION: Aggie Fanti is associated with an increase in " depression  During clinical trials in patients with moderate to severe plaque psoriasis, 1 3% (12/920) of [employer] patients reported depression compared to 0 4% (2/506) on placebo  Some patients stopped taking Otezla due to depression, and some reported suicidal behavior while taking [employer]ora Goods  Before starting [employer]s, inform the doctor if you are currently experiencing or have a history of feelings of depression, or suicidal thoughts or behavior  We discussed that if any of these symptoms or other mood changes develop or worsen during treatment with Velora [employer]s, call the office  • WEIGHT LOSS: Some patients taking Otezla have lost body weight  Body weight loss of greater than 10% occurred in 2% (16/784) of patients treated with [employer]s compared to 1% (3/382) of patients treated with placebo  While on Otezla, monitor your weight and call the doctor if unexplained or significant weight loss occurs  • DRUG INTERACTIONS: Apremilast exposure was decreased when Vel[employer]s was co-administered with rifampin, a strong ZZY254 enzyme inducer  Concomitant use of Otezla with ESC448 enzyme inducers (e g , rifampin, phenobarbital, carbamazepine, phenytoin) is not recommended, as loss of Otezla efficacy may occur  • ALLERGIES: DO NOT take [employer]s if you are allergic to apremilast or to any of the ingredients  • PREGNANCY: Velora Goods has not been studied in pregnant women  We discussed the potential risk of fetal loss and risks and benefits of becoming pregnant while on Velora Goods  • Will apply for Tremfya as this is the safest medication with the patient's history of malignancy      • Will order CBC, CMP, Hepatitis Panel, and Quant Gold for baseline labs   • Medication risks were reviewed including increased risk of infection, injection site reactions, and increasing risk of IBD   PROCEDURES PERFORMED TODAY ASSOCIATED WITH THIS CONDITION:               Medical Complexity:    CHRONIC ILLNESS (expected duration of >1 year):  POSES A THREAT TO LIFE OR BODILY FUNCTION  A chronic illness or injury with exacerbation and/or progression or side effects of treatment that poses a threat to life or bodily function in the NEAR TERM if left without treatment  Example provided by [facility] is progressive severe rheumatoid arthritis

## 2023-06-30 ENCOUNTER — TELEPHONE (OUTPATIENT)
Dept: DERMATOLOGY | Facility: CLINIC | Age: 73
End: 2023-06-30

## 2023-06-30 NOTE — TELEPHONE ENCOUNTER
PA needed Tremfya Subcutaneous Solution Pen-injector 100MG/ML    Received by fax, scanned into chart

## 2023-07-06 ENCOUNTER — APPOINTMENT (OUTPATIENT)
Dept: LAB | Facility: CLINIC | Age: 73
End: 2023-07-06
Payer: COMMERCIAL

## 2023-07-06 ENCOUNTER — TELEPHONE (OUTPATIENT)
Dept: FAMILY MEDICINE CLINIC | Facility: CLINIC | Age: 73
End: 2023-07-06

## 2023-07-06 DIAGNOSIS — Z13.6 ENCOUNTER FOR SCREENING FOR CARDIOVASCULAR DISORDERS: ICD-10-CM

## 2023-07-06 DIAGNOSIS — L40.9 PSORIASIS: ICD-10-CM

## 2023-07-06 DIAGNOSIS — E03.9 ACQUIRED HYPOTHYROIDISM: ICD-10-CM

## 2023-07-06 LAB
ALBUMIN SERPL BCP-MCNC: 3.4 G/DL (ref 3.5–5)
ALP SERPL-CCNC: 80 U/L (ref 46–116)
ALT SERPL W P-5'-P-CCNC: 16 U/L (ref 12–78)
ANION GAP SERPL CALCULATED.3IONS-SCNC: 3 MMOL/L
AST SERPL W P-5'-P-CCNC: 16 U/L (ref 5–45)
BASOPHILS # BLD AUTO: 0.06 THOUSANDS/ÂΜL (ref 0–0.1)
BASOPHILS NFR BLD AUTO: 1 % (ref 0–1)
BILIRUB SERPL-MCNC: 0.46 MG/DL (ref 0.2–1)
BUN SERPL-MCNC: 21 MG/DL (ref 5–25)
CALCIUM ALBUM COR SERPL-MCNC: 9.9 MG/DL (ref 8.3–10.1)
CALCIUM SERPL-MCNC: 9.4 MG/DL (ref 8.3–10.1)
CHLORIDE SERPL-SCNC: 110 MMOL/L (ref 96–108)
CO2 SERPL-SCNC: 28 MMOL/L (ref 21–32)
CREAT SERPL-MCNC: 0.92 MG/DL (ref 0.6–1.3)
EOSINOPHIL # BLD AUTO: 0.08 THOUSAND/ÂΜL (ref 0–0.61)
EOSINOPHIL NFR BLD AUTO: 1 % (ref 0–6)
ERYTHROCYTE [DISTWIDTH] IN BLOOD BY AUTOMATED COUNT: 14.3 % (ref 11.6–15.1)
GFR SERPL CREATININE-BSD FRML MDRD: 62 ML/MIN/1.73SQ M
GLUCOSE P FAST SERPL-MCNC: 170 MG/DL (ref 65–99)
HBV CORE AB SER QL: NORMAL
HBV CORE IGM SER QL: NORMAL
HBV SURFACE AG SER QL: NORMAL
HCT VFR BLD AUTO: 42.7 % (ref 34.8–46.1)
HCV AB SER QL: NORMAL
HGB BLD-MCNC: 13.7 G/DL (ref 11.5–15.4)
IMM GRANULOCYTES # BLD AUTO: 0.03 THOUSAND/UL (ref 0–0.2)
IMM GRANULOCYTES NFR BLD AUTO: 0 % (ref 0–2)
LYMPHOCYTES # BLD AUTO: 2.45 THOUSANDS/ÂΜL (ref 0.6–4.47)
LYMPHOCYTES NFR BLD AUTO: 29 % (ref 14–44)
MCH RBC QN AUTO: 28.7 PG (ref 26.8–34.3)
MCHC RBC AUTO-ENTMCNC: 32.1 G/DL (ref 31.4–37.4)
MCV RBC AUTO: 89 FL (ref 82–98)
MONOCYTES # BLD AUTO: 0.6 THOUSAND/ÂΜL (ref 0.17–1.22)
MONOCYTES NFR BLD AUTO: 7 % (ref 4–12)
NEUTROPHILS # BLD AUTO: 5.39 THOUSANDS/ÂΜL (ref 1.85–7.62)
NEUTS SEG NFR BLD AUTO: 62 % (ref 43–75)
NRBC BLD AUTO-RTO: 0 /100 WBCS
PLATELET # BLD AUTO: 364 THOUSANDS/UL (ref 149–390)
PMV BLD AUTO: 11 FL (ref 8.9–12.7)
POTASSIUM SERPL-SCNC: 3.7 MMOL/L (ref 3.5–5.3)
PROT SERPL-MCNC: 7.2 G/DL (ref 6.4–8.4)
RBC # BLD AUTO: 4.78 MILLION/UL (ref 3.81–5.12)
SODIUM SERPL-SCNC: 141 MMOL/L (ref 135–147)
TSH SERPL DL<=0.05 MIU/L-ACNC: 2.56 UIU/ML (ref 0.45–4.5)
WBC # BLD AUTO: 8.61 THOUSAND/UL (ref 4.31–10.16)

## 2023-07-06 PROCEDURE — 86480 TB TEST CELL IMMUN MEASURE: CPT

## 2023-07-06 PROCEDURE — 83036 HEMOGLOBIN GLYCOSYLATED A1C: CPT

## 2023-07-06 PROCEDURE — 86705 HEP B CORE ANTIBODY IGM: CPT

## 2023-07-06 PROCEDURE — 87340 HEPATITIS B SURFACE AG IA: CPT

## 2023-07-06 PROCEDURE — 36415 COLL VENOUS BLD VENIPUNCTURE: CPT

## 2023-07-06 PROCEDURE — 84443 ASSAY THYROID STIM HORMONE: CPT

## 2023-07-06 PROCEDURE — 80053 COMPREHEN METABOLIC PANEL: CPT

## 2023-07-06 PROCEDURE — 86704 HEP B CORE ANTIBODY TOTAL: CPT

## 2023-07-06 PROCEDURE — 86803 HEPATITIS C AB TEST: CPT

## 2023-07-06 PROCEDURE — 85025 COMPLETE CBC W/AUTO DIFF WBC: CPT

## 2023-07-06 NOTE — TELEPHONE ENCOUNTER
Called patient regarding results of RUQ US which indicated no acute abnormality. Patient reports that her symptoms are slowly improving. Continues to have good UOP and denies urinary symptoms. Patient instructed to return for evaluation if symptoms change or are worsening. Pt understands and agrees to plan.

## 2023-07-09 LAB
EST. AVERAGE GLUCOSE BLD GHB EST-MCNC: 169 MG/DL
GAMMA INTERFERON BACKGROUND BLD IA-ACNC: 0.01 IU/ML
HBA1C MFR BLD: 7.5 %
M TB IFN-G BLD-IMP: NEGATIVE
M TB IFN-G CD4+ BCKGRND COR BLD-ACNC: 0 IU/ML
M TB IFN-G CD4+ BCKGRND COR BLD-ACNC: 0.01 IU/ML
MITOGEN IGNF BCKGRD COR BLD-ACNC: >10 IU/ML

## 2023-07-11 DIAGNOSIS — I10 BENIGN ESSENTIAL HYPERTENSION: ICD-10-CM

## 2023-07-12 RX ORDER — AMLODIPINE BESYLATE 10 MG/1
TABLET ORAL
Qty: 90 TABLET | Refills: 3 | Status: SHIPPED | OUTPATIENT
Start: 2023-07-12

## 2023-07-12 RX ORDER — HYDROCHLOROTHIAZIDE 25 MG/1
TABLET ORAL
Qty: 90 TABLET | Refills: 3 | Status: SHIPPED | OUTPATIENT
Start: 2023-07-12

## 2023-07-18 DIAGNOSIS — I10 BENIGN ESSENTIAL HYPERTENSION: ICD-10-CM

## 2023-07-18 RX ORDER — LISINOPRIL 20 MG/1
TABLET ORAL
Qty: 90 TABLET | Refills: 0 | Status: SHIPPED | OUTPATIENT
Start: 2023-07-18

## 2023-08-07 ENCOUNTER — APPOINTMENT (EMERGENCY)
Dept: CT IMAGING | Facility: HOSPITAL | Age: 73
End: 2023-08-07
Payer: COMMERCIAL

## 2023-08-07 ENCOUNTER — TELEPHONE (OUTPATIENT)
Dept: INTERNAL MEDICINE CLINIC | Facility: CLINIC | Age: 73
End: 2023-08-07

## 2023-08-07 ENCOUNTER — APPOINTMENT (EMERGENCY)
Dept: RADIOLOGY | Facility: HOSPITAL | Age: 73
End: 2023-08-07
Payer: COMMERCIAL

## 2023-08-07 ENCOUNTER — HOSPITAL ENCOUNTER (EMERGENCY)
Facility: HOSPITAL | Age: 73
Discharge: HOME | End: 2023-08-07
Attending: EMERGENCY MEDICINE
Payer: COMMERCIAL

## 2023-08-07 VITALS
TEMPERATURE: 98.2 F | OXYGEN SATURATION: 96 % | SYSTOLIC BLOOD PRESSURE: 139 MMHG | DIASTOLIC BLOOD PRESSURE: 64 MMHG | RESPIRATION RATE: 18 BRPM | HEART RATE: 55 BPM

## 2023-08-07 DIAGNOSIS — S20.219A CHEST WALL CONTUSION: ICD-10-CM
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
## 2023-08-07 DIAGNOSIS — S09.90XA INJURY OF HEAD, INITIAL ENCOUNTER: ICD-10-CM

## 2023-08-07 PROCEDURE — 99285 EMERGENCY DEPT VISIT HI MDM: CPT | Performed by: EMERGENCY MEDICINE

## 2023-08-07 PROCEDURE — 71046 X-RAY EXAM CHEST 2 VIEWS: CPT

## 2023-08-07 PROCEDURE — 99284 EMERGENCY DEPT VISIT MOD MDM: CPT

## 2023-08-07 PROCEDURE — 70450 CT HEAD/BRAIN W/O DYE: CPT

## 2023-08-07 PROCEDURE — 93005 ELECTROCARDIOGRAM TRACING: CPT

## 2023-08-07 RX ORDER — ACETAMINOPHEN 325 MG/1
650 TABLET ORAL ONCE
Status: COMPLETED | OUTPATIENT
Start: 2023-08-07 | End: 2023-08-07

## 2023-08-07 RX ORDER — LISINOPRIL 10 MG/1
20 TABLET ORAL ONCE
Status: COMPLETED | OUTPATIENT
Start: 2023-08-07 | End: 2023-08-07

## 2023-08-07 RX ORDER — LIDOCAINE 50 MG/G
2 PATCH TOPICAL ONCE
Status: DISCONTINUED | OUTPATIENT
Start: 2023-08-07 | End: 2023-08-08 | Stop reason: HOSPADM

## 2023-08-07 RX ORDER — ONDANSETRON 4 MG/1
8 TABLET, FILM COATED ORAL EVERY 8 HOURS PRN
Qty: 10 TABLET | Refills: 0 | Status: SHIPPED | OUTPATIENT
Start: 2023-08-07

## 2023-08-07 RX ORDER — ONDANSETRON 4 MG/1
8 TABLET, ORALLY DISINTEGRATING ORAL ONCE
Status: COMPLETED | OUTPATIENT
Start: 2023-08-07 | End: 2023-08-07

## 2023-08-07 RX ADMIN — LIDOCAINE 2 PATCH: 700 PATCH TOPICAL at 22:22

## 2023-08-07 RX ADMIN — LISINOPRIL 20 MG: 10 TABLET ORAL at 22:21

## 2023-08-07 RX ADMIN — ACETAMINOPHEN 650 MG: 325 TABLET, FILM COATED ORAL at 21:45

## 2023-08-07 RX ADMIN — ONDANSETRON 8 MG: 4 TABLET, ORALLY DISINTEGRATING ORAL at 21:46

## 2023-08-07 NOTE — TELEPHONE ENCOUNTER
Pt was seen her 6/28 with derm and was told she was supposed to get a shot for psoriasis. She stated someone was supposed to call her however she hasn't received a call or any information. So if someone could look into this and I can give Ayana Garza a call back.

## 2023-08-08 NOTE — ED PROCEDURE NOTE
PROCEDURE  ECG 12 Lead Documentation Only    Date/Time: 8/7/2023 10:54 PM    Performed by: Deborah Cheek MD  Authorized by: Deborah Cheek MD    Indications / Diagnosis:  Chest wall pain   ECG reviewed by me, the ED Provider: yes    Patient location:  ED and bedside  Previous ECG:     Previous ECG:  Compared to current    Comparison ECG info:  5/4/22- no sign changes    Similarity:  No change    Comparison to cardiac monitor: Yes    Interpretation:     Interpretation: non-specific    Rate:     ECG rate:  63  Rhythm:     Rhythm: sinus rhythm    Ectopy:     Ectopy: none    QRS:     QRS axis:  Normal    QRS intervals:  Normal  Conduction:     Conduction: normal    ST segments:     ST segments:  Normal  T waves:     T waves: flattening      Flattening:  V1  Q waves:     Q waves:  V1 and V2  Other findings:     Other findings: poor R wave progression and U wave    Comments:      No ecg signs of ischemia/ injury / r heart strain / ana lilia/pericarditis          Deborah Cheek MD  08/07/23 9327

## 2023-08-08 NOTE — ED NOTES
Multiple attempts made with Ultrasound Machine for IV placement, unsuccessful      Madhav Mercado RN  08/07/23 [id number]

## 2023-08-09 ENCOUNTER — TELEPHONE (OUTPATIENT)
Dept: FAMILY MEDICINE CLINIC | Facility: CLINIC | Age: 73
End: 2023-08-09

## 2023-08-09 LAB
ATRIAL RATE: 63 BPM
P AXIS: 56 DEGREES
PR INTERVAL: 146 MS
QRS AXIS: 37 DEGREES
QRSD INTERVAL: 76 MS
QT INTERVAL: 394 MS
QTC INTERVAL: 403 MS
T WAVE AXIS: 58 DEGREES
VENTRICULAR RATE: 63 BPM

## 2023-08-09 PROCEDURE — 93010 ELECTROCARDIOGRAM REPORT: CPT | Performed by: INTERNAL MEDICINE

## 2023-08-09 NOTE — TELEPHONE ENCOUNTER
Patient called this morning to request an appointment with a male provider to address her dizziness, weakness and that her head feels funny . She had fallen and made an appointment for next Friday. I offered her today but she needs to call lilliam for a ride so she refused.

## 2023-08-10 ENCOUNTER — OFFICE VISIT (OUTPATIENT)
Dept: FAMILY MEDICINE CLINIC | Facility: CLINIC | Age: 73
End: 2023-08-10

## 2023-08-10 VITALS
RESPIRATION RATE: 20 BRPM | DIASTOLIC BLOOD PRESSURE: 75 MMHG | WEIGHT: 162.2 LBS | HEIGHT: 57 IN | SYSTOLIC BLOOD PRESSURE: 160 MMHG | TEMPERATURE: 98.2 F | HEART RATE: 54 BPM | OXYGEN SATURATION: 98 % | BODY MASS INDEX: 34.99 KG/M2

## 2023-08-10 DIAGNOSIS — M79.10 MYALGIA: Primary | ICD-10-CM

## 2023-08-10 PROCEDURE — 99213 OFFICE O/P EST LOW 20 MIN: CPT | Performed by: FAMILY MEDICINE

## 2023-08-10 RX ORDER — SENNOSIDES 8.6 MG
650 CAPSULE ORAL EVERY 8 HOURS PRN
Qty: 30 TABLET | Refills: 0 | Status: SHIPPED | OUTPATIENT
Start: 2023-08-10

## 2023-08-10 NOTE — ASSESSMENT & PLAN NOTE
- Patient had an episode of fall on 8/7 led to an ED visit  hospital where most workup has been negative  -At this point, it seems that she has an episode of vasovagal/orthostatic hypotension. She does have mouth pain on her sternum area, tender to palpation today. Likely due to the impact of the fall. Said pain is improved with Tylenol however she is is asking for high strength Tylenol to better control pain.   -Blood pressure slightly elevated today likely due to pain  Plan  -Recommend patient to appropriately hydrate  -Prescribed Tylenol 650 every 8 hours as needed  -ED precautions discussed, if pain persisting despite Tylenol will consider imaging of the chest area as well as shoulder  -Cant also rule out other cause of dizziness/fall that led to myalgia secondary to medication. I recommend patient to follow-up with our clinical pharmacist to address her polypharmacy concerns.

## 2023-08-10 NOTE — PROGRESS NOTES
Name: Noy Tellez      : 1950      MRN: [id number]  Encounter Provider: Magdalena Viramontes DO  Encounter Date: 8/10/2023   Encounter department: [address]     1. Myalgia  Assessment & Plan:  - Patient had an episode of fall on  led to an ED visit  hospital where most workup has been negative  -At this point, it seems that she has an episode of vasovagal/orthostatic hypotension. She does have mouth pain on her sternum area, tender to palpation today. Likely due to the impact of the fall. Said pain is improved with Tylenol however she is is asking for high strength Tylenol to better control pain.   -Blood pressure slightly elevated today likely due to pain  Plan  -Recommend patient to appropriately hydrate  -Prescribed Tylenol 650 every 8 hours as needed  -ED precautions discussed, if pain persisting despite Tylenol will consider imaging of the chest area as well as shoulder  -Cant also rule out other cause of dizziness/fall that led to myalgia secondary to medication. I recommend patient to follow-up with our clinical pharmacist to address her polypharmacy concerns. Orders:  -     acetaminophen (TYLENOL) 650 mg CR tablet; Take 1 tablet (650 mg total) by mouth every 8 (eight) hours as needed for moderate pain         Subjective      HPI   68years old female with history of hyperlipidemia, hypertension, peripheral neuropathy; presented to follow-up on the ED visit. Patient had episode of chemical fall due to complaint dizziness. in the ED most workup has been negative cardiac EKG is unremarkable CT head normal.  Patient came to clinic today with complaint she has diffuse body aches. No apparent bruises, but she does have tenderness to palpation on her sternal region. She she has no change in mental status, no episode of fever. Review of Systems   Constitutional: Negative for chills and fever.    HENT: Negative for ear pain and sore throat. Eyes: Negative for pain and visual disturbance. Respiratory: Negative for cough and shortness of breath. Cardiovascular: Negative for chest pain and palpitations. Gastrointestinal: Negative for abdominal pain and vomiting. Genitourinary: Negative for dysuria and hematuria. Musculoskeletal: Positive for myalgias. Negative for arthralgias and back pain. Skin: Negative for color change and rash. Neurological: Negative for seizures and syncope. All other systems reviewed and are negative. Current Outpatient Medications on File Prior to Visit   Medication Sig   • amLODIPine (NORVASC) 10 mg tablet take 1 tablet by mouth once daily   • Apremilast (Otezla) 30 MG TABS Take one pill by mouth twice daily. • calcium carbonate-vitamin D (OSCAL-D) 500 mg-200 units per tablet Take 2 tablets by mouth daily with breakfast   • clindamycin (CLEOCIN T) 1 % lotion    • Diclofenac Sodium (VOLTAREN) 1 % Apply 4 g topically 4 (four) times a day   • fluocinonide (LIDEX) 0.05 % cream Apply topically 2 (two) times a day For up to 14 days. • hydrochlorothiazide (HYDRODIURIL) 25 mg tablet take 1 tablet by mouth once daily   • levothyroxine 50 mcg tablet Take 1 tablet (50 mcg total) by mouth daily   • lidocaine (XYLOCAINE) 2 % topical gel Apply topically as needed for mild pain   • lisinopril (ZESTRIL) 20 mg tablet take 1 tablet by mouth once daily   • ondansetron (Zofran) 4 mg tablet Take 2 tablets (8 mg total) by mouth every 8 (eight) hours as needed for nausea or vomiting for up to 10 doses Zofran odt 2 tablets dissolve in the mouth  every 8 hrs as needed for nausea/ vomiting   • simvastatin (ZOCOR) 20 mg tablet take 1 tablet by mouth once daily at bedtime   • mirtazapine (REMERON) 15 mg tablet Take 1 tablet (15 mg total) by mouth daily at bedtime Take 15mg (one tablet) at night for three (3) days and then start on 30mg (two tablets) at night onwards.    • nystatin (MYCOSTATIN) powder Apply topically 4 (four) times a day (Patient not taking: Reported on 8/10/2023)       Objective     /75 (BP Location: Right arm, Patient Position: Sitting, Cuff Size: Large)   Pulse (!) 54   Temp 98.2 °F (36.8 °C) (Temporal)   Resp 20   Ht 4' 8.7" (1.44 m)   Wt 73.6 kg (162 lb 3.2 oz)   SpO2 98%   BMI 35.47 kg/m²     Physical Exam  Constitutional:       General: She is not in acute distress. HENT:      Head: Normocephalic and atraumatic. Nose: No congestion. Mouth/Throat:      Pharynx: No oropharyngeal exudate. Eyes:      General: No scleral icterus. Cardiovascular:      Rate and Rhythm: Normal rate and regular rhythm. Heart sounds: Normal heart sounds. No murmur heard. Pulmonary:      Effort: No respiratory distress. Breath sounds: Normal breath sounds. Abdominal:      General: There is no distension. Palpations: Abdomen is soft. Musculoskeletal:         General: Tenderness present. No deformity or signs of injury. Right lower leg: No edema. Comments: Along sternal region, tender to palpation. No apparent bruise, no aggravated pain with deep inhalation or exhalation   Skin:     Capillary Refill: Capillary refill takes less than 2 seconds. Coloration: Skin is not jaundiced. Findings: No bruising. Neurological:      General: No focal deficit present. Mental Status: She is alert. Cranial Nerves: No cranial nerve deficit.    Psychiatric:         Mood and Affect: Mood normal.         Behavior: Behavior normal.       Abhinav Bowie DO

## 2023-08-13 NOTE — ED PROVIDER NOTES
History  Chief Complaint   Patient presents with   • Fall     Pt reports falling at home and hitting head. Pt reports feeling dizzy prior to falling. Pt does not take blood thinner. Pt remembers event but believes she may have lost consciousness for a moment. 68 yr female this am fell while trying to get up dn ht back of head- also after injury c/o anteriro chest pain - worse with movements-- no sob- no cp prior ro fall--  C/o feeling more lightheaddd after hit hed but no other comps or other injury - hx obtained on language ipad       History provided by:  Patient   used: Yes        Prior to Admission Medications   Prescriptions Last Dose Informant Patient Reported? Taking? Apremilast (Otezla) 30 MG TABS   No No   Sig: Take one pill by mouth twice daily. Diclofenac Sodium (VOLTAREN) 1 %  Self No No   Sig: Apply 4 g topically 4 (four) times a day   amLODIPine (NORVASC) 10 mg tablet   No No   Sig: take 1 tablet by mouth once daily   calcium carbonate-vitamin D (OSCAL-D) 500 mg-200 units per tablet  Self No No   Sig: Take 2 tablets by mouth daily with breakfast   clindamycin (CLEOCIN T) 1 % lotion  Self Yes No   fluocinonide (LIDEX) 0.05 % cream   No No   Sig: Apply topically 2 (two) times a day For up to 14 days. hydrochlorothiazide (HYDRODIURIL) 25 mg tablet   No No   Sig: take 1 tablet by mouth once daily   levothyroxine 50 mcg tablet   No No   Sig: Take 1 tablet (50 mcg total) by mouth daily   lidocaine (XYLOCAINE) 2 % topical gel  Self No No   Sig: Apply topically as needed for mild pain   lisinopril (ZESTRIL) 20 mg tablet   No No   Sig: take 1 tablet by mouth once daily   mirtazapine (REMERON) 15 mg tablet  Self No No   Sig: Take 1 tablet (15 mg total) by mouth daily at bedtime Take 15mg (one tablet) at night for three (3) days and then start on 30mg (two tablets) at night onwards.    nystatin (MYCOSTATIN) powder  Self No No   Sig: Apply topically 4 (four) times a day   Patient not taking: Reported on 8/10/2023   simvastatin (ZOCOR) 20 mg tablet   No No   Sig: take 1 tablet by mouth once daily at bedtime      Facility-Administered Medications: None       Past Medical History:   Diagnosis Date   • Breast cancer [facility]) 1996 lt mastectomy   • Disease of thyroid gland    • Hemorrhoids    • Hyperlipidemia    • Hypertension    • Psoriasis        Past Surgical History:   Procedure Laterality Date   • BREAST CYST EXCISION Right     benign   • BREAST LUMPECTOMY Left    •  SECTION     • COLONOSCOPY      Complete   • MASTECTOMY Left     Onset: 1996   • SALPINGOOPHORECTOMY Bilateral     age 40   • TOTAL ABDOMINAL HYSTERECTOMY      age 40       Family History   Problem Relation Age of Onset   • Arthritis Mother    • Diabetes Mother    • Breast cancer Mother 67   • Colon cancer Mother    • Hypertension Mother    • Diabetes Father    • Glaucoma Father    • Prostate cancer Father    • Asthma Brother    • Hypertension Brother    • Colon cancer Maternal Uncle 80   • Breast cancer Maternal Aunt 80   • No Known Problems Maternal Grandmother    • No Known Problems Paternal Grandmother    • No Known Problems Maternal Aunt    • No Known Problems Maternal Aunt    • No Known Problems Paternal Aunt    • No Known Problems Paternal Aunt    • No Known Problems Paternal Grandfather    • Breast cancer Cousin      I have reviewed and agree with the history as documented. E-Cigarette/Vaping   • E-Cigarette Use Never User      E-Cigarette/Vaping Substances   • Nicotine No    • THC No    • CBD No    • Flavoring No    • Other No    • Unknown No      Social History     Tobacco Use   • Smoking status: Never   • Smokeless tobacco: Never   Vaping Use   • Vaping Use: Never used   Substance Use Topics   • Alcohol use: No   • Drug use: No       Review of Systems   Constitutional: Negative. HENT: Negative. Eyes: Negative. Respiratory: Negative. Cardiovascular: Positive for chest pain. Negative for palpitations and leg swelling. Gastrointestinal: Positive for nausea. Negative for abdominal distention, abdominal pain, anal bleeding, blood in stool, constipation, diarrhea, rectal pain and vomiting. Endocrine: Negative. Genitourinary: Negative. Musculoskeletal: Negative. Skin: Negative. Allergic/Immunologic: Negative. Neurological: Positive for dizziness and headaches. Negative for tremors, seizures, syncope, facial asymmetry, speech difficulty, weakness, light-headedness and numbness. Hematological: Negative. Psychiatric/Behavioral: Negative. Physical Exam  Physical Exam  Vitals and nursing note reviewed. Constitutional:       General: She is not in acute distress. Appearance: Normal appearance. She is not ill-appearing, toxic-appearing or diaphoretic. Comments: avss- htnsive which resolved in er -- pulse ox 98 % on ra- interpretation is normal- no intervention well appearing    HENT:      Head: Normocephalic and atraumatic. Comments: No scalp contusion/ hematoma/ or signs of injury      Right Ear: Tympanic membrane, ear canal and external ear normal. There is no impacted cerumen. Left Ear: Tympanic membrane, ear canal and external ear normal. There is no impacted cerumen. Nose: Nose normal. No congestion or rhinorrhea. Mouth/Throat:      Mouth: Mucous membranes are moist.      Pharynx: Oropharynx is clear. No oropharyngeal exudate or posterior oropharyngeal erythema. Eyes:      General: No scleral icterus. Right eye: No discharge. Left eye: No discharge. Extraocular Movements: Extraocular movements intact. Conjunctiva/sclera: Conjunctivae normal.      Pupils: Pupils are equal, round, and reactive to light. Comments: Mm pink    Neck:      Vascular: No carotid bruit. Comments: No pmt c/t/l/s spine   Cardiovascular:      Rate and Rhythm: Normal rate and regular rhythm. Pulses: Normal pulses. Heart sounds: Normal heart sounds. No murmur heard. No friction rub. No gallop. Pulmonary:      Effort: Pulmonary effort is normal. No respiratory distress. Breath sounds: Normal breath sounds. No stridor. No wheezing, rhonchi or rales. Comments: Diffuse anterior sternal tenderness- no signs of injury - no ecchymosis/crepitus/palpabel deformity   Chest:      Chest wall: Tenderness present. Abdominal:      General: Bowel sounds are normal. There is no distension. Palpations: Abdomen is soft. There is no mass. Tenderness: There is no abdominal tenderness. There is no right CVA tenderness, left CVA tenderness, guarding or rebound. Hernia: No hernia is present. Comments: Soft nt.nd- no hsm - no cva tenderness- no ascites- no peritoneal signs    Musculoskeletal:         General: No swelling, tenderness, deformity or signs of injury. Normal range of motion. Cervical back: Normal range of motion and neck supple. No rigidity or tenderness. Right lower leg: No edema. Left lower leg: No edema. Comments: moving all bue/ble pain free with no signs of injury    Lymphadenopathy:      Cervical: No cervical adenopathy. Skin:     General: Skin is warm. Coloration: Skin is not jaundiced or pale. Findings: No bruising, erythema, lesion or rash. Neurological:      General: No focal deficit present. Mental Status: She is alert and oriented to person, place, and time. Mental status is at baseline. Cranial Nerves: No cranial nerve deficit. Sensory: No sensory deficit. Motor: No weakness. Coordination: Coordination normal.      Gait: Gait normal.      Comments: Normal non focal neuro exm    Psychiatric:         Mood and Affect: Mood normal.         Behavior: Behavior normal.         Thought Content:  Thought content normal.         Judgment: Judgment normal.         Vital Signs  ED Triage Vitals [08/07/23 1920]   Temperature Pulse Respirations Blood Pressure SpO2   98.2 °F (36.8 °C) 60 19 (!) 222/90 98 %      Temp Source Heart Rate Source Patient Position - Orthostatic VS BP Location FiO2 (%)   Oral Monitor Sitting Left arm --      Pain Score       7           Vitals:    08/07/23 1920 08/07/23 2246   BP: (!) 222/90 139/64   Pulse: 60 55   Patient Position - Orthostatic VS: Sitting Sitting         Visual Acuity  Visual Acuity    Flowsheet Row Most Recent Value   L Pupil Size (mm) 3   R Pupil Size (mm) 3          ED Medications  Medications   lisinopril (ZESTRIL) tablet 20 mg (20 mg Oral Given 8/7/23 2221)   acetaminophen (TYLENOL) tablet 650 mg (650 mg Oral Given 8/7/23 2145)   ondansetron (ZOFRAN-ODT) dispersible tablet 8 mg (8 mg Oral Given 8/7/23 2146)       Diagnostic Studies  Results Reviewed     None                 CT head without contrast   Final Result by Chilango Solorzano MD (08/07 2259)      No acute intracranial abnormality. Workstation performed: [id number]         XR chest pa & lateral   Final Result by Jevon Limon MD (08/08 0840)      No acute cardiopulmonary disease. Workstation performed: [id number]                    Procedures  Procedures         ED Course  ED Course as of 08/13/23 1159   Mon Aug 07, 2023   2248 Cxr pa/lat- compared to previous- 4/22- no sign changes--  no change in mediastinum/cardiac silhouette- no free/sq air- no ptx- infiltrate - pulm edema- pleural effusion - no sternal fx seen on lateral                                              Medical Decision Making  Pt with fall with head injury > 72 yrs old -- wih ?   concussion -- pt will jamey head ct -- pt also with  Chest wall pain - with no overt signs of chest wall injury - pt will nee screening cxr and ecg - and re-eval - symptom control     Chest wall contusion: acute illness or injury     Details: see above and chart   Fall, initial encounter: acute illness or injury that poses a threat to life or bodily functions     Details: see chart and above   Injury of head, initial encounter: acute illness or injury that poses a threat to life or bodily functions     Details: see chart   Amount and/or Complexity of Data Reviewed  Independent Historian:      Details: language i pad   Radiology: ordered and independent interpretation performed. Decision-making details documented in ED Course. Details: all reviewed   ECG/medicine tests: ordered and independent interpretation performed. Discussion of management or test interpretation with external provider(s): All reviewed     Risk  OTC drugs. Prescription drug management. Decision regarding hospitalization. Risk Details: Moderate amount of er md thought complexity and workup         Disposition  Final diagnoses:   Fall, initial encounter   Injury of head, initial encounter   Chest wall contusion     Time reflects when diagnosis was documented in both MDM as applicable and the Disposition within this note     Time User Action Codes Description Comment    8/7/2023 11:08 PM Timbo Cheng Add [Y89. QMQQ] Fall, initial encounter     8/7/2023 11:09 PM Ramírez SULLIVAN Add [S09.90XA] Injury of head, initial encounter     8/7/2023 11:09 PM Timbo Cheng Add [S20.219A] Chest wall contusion       ED Disposition     ED Disposition   Discharge    Condition   Stable    Date/Time   Mon Aug 7, 2023 11:08 PM    Comment   Earna Loveless discharge to home/self care.                Follow-up Information    None         Discharge Medication List as of 8/7/2023 11:16 PM      START taking these medications    Details   ondansetron (Zofran) 4 mg tablet Take 2 tablets (8 mg total) by mouth every 8 (eight) hours as needed for nausea or vomiting for up to 10 doses Zofran odt 2 tablets dissolve in the mouth  every 8 hrs as needed for nausea/ vomiting, Starting Mon 8/7/2023, Print         CONTINUE these medications which have NOT CHANGED    Details   amLODIPine (NORVASC) 10 mg tablet take 1 tablet by mouth once daily, Normal      Apremilast (Otezla) 30 MG TABS Take one pill by mouth twice daily. , Normal      calcium carbonate-vitamin D (OSCAL-D) 500 mg-200 units per tablet Take 2 tablets by mouth daily with breakfast, Starting Tue 9/22/2020, Normal      clindamycin (CLEOCIN T) 1 % lotion Starting Mon 7/22/2019, Historical Med      Diclofenac Sodium (VOLTAREN) 1 % Apply 4 g topically 4 (four) times a day, Starting Mon 2/21/2022, Normal      fluocinonide (LIDEX) 0.05 % cream Apply topically 2 (two) times a day For up to 14 days. , Starting Wed 6/28/2023, Normal      hydrochlorothiazide (HYDRODIURIL) 25 mg tablet take 1 tablet by mouth once daily, Normal      levothyroxine 50 mcg tablet Take 1 tablet (50 mcg total) by mouth daily, Starting Fri 5/5/2023, Normal      lidocaine (XYLOCAINE) 2 % topical gel Apply topically as needed for mild pain, Starting Wed 9/29/2021, Normal      lisinopril (ZESTRIL) 20 mg tablet take 1 tablet by mouth once daily, Normal      mirtazapine (REMERON) 15 mg tablet Take 1 tablet (15 mg total) by mouth daily at bedtime Take 15mg (one tablet) at night for three (3) days and then start on 30mg (two tablets) at night onwards. , Starting Fri 4/8/2022, Until Tue 6/20/2023, Normal      nystatin (MYCOSTATIN) powder Apply topically 4 (four) times a day, Starting Fri 9/24/2021, Normal      simvastatin (ZOCOR) 20 mg tablet take 1 tablet by mouth once daily at bedtime, Normal             No discharge procedures on file.     PDMP Review       Value Time User    PDMP Reviewed  Yes 4/6/2022 11:23 PM Akilah Lomas MD          ED Provider  Electronically Signed by           Reji Weems MD  08/13/23 1197

## 2023-08-18 ENCOUNTER — OFFICE VISIT (OUTPATIENT)
Dept: FAMILY MEDICINE CLINIC | Facility: CLINIC | Age: 73
End: 2023-08-18

## 2023-08-18 VITALS
DIASTOLIC BLOOD PRESSURE: 86 MMHG | HEIGHT: 60 IN | OXYGEN SATURATION: 96 % | TEMPERATURE: 99.4 F | SYSTOLIC BLOOD PRESSURE: 138 MMHG | WEIGHT: 162.4 LBS | BODY MASS INDEX: 31.88 KG/M2 | RESPIRATION RATE: 16 BRPM | HEART RATE: 88 BPM

## 2023-08-18 DIAGNOSIS — R26.2 AMBULATORY DYSFUNCTION: ICD-10-CM

## 2023-08-18 DIAGNOSIS — R42 DIZZINESS: Primary | ICD-10-CM

## 2023-08-18 PROCEDURE — 3075F SYST BP GE 130 - 139MM HG: CPT | Performed by: FAMILY MEDICINE

## 2023-08-18 PROCEDURE — 99214 OFFICE O/P EST MOD 30 MIN: CPT | Performed by: FAMILY MEDICINE

## 2023-08-18 PROCEDURE — 3079F DIAST BP 80-89 MM HG: CPT | Performed by: FAMILY MEDICINE

## 2023-08-18 RX ORDER — MECLIZINE HYDROCHLORIDE 25 MG/1
12.5 TABLET ORAL 3 TIMES DAILY PRN
Qty: 30 TABLET | Refills: 1 | Status: SHIPPED | OUTPATIENT
Start: 2023-08-18

## 2023-08-18 NOTE — ASSESSMENT & PLAN NOTE
Patient with dizziness described as the "room is spinning". Right beating nystagmus noted on exam. Patient has had several falls lately (8/7 and 8/11). Appear to be orthostatic and mechanical, most recent involved slipping on water and falling forward. Minor bruising noted on scalp, no focal neural deficits. Suspected multifactorial with ambulatory dysfunction.  Patient encouraged to hydrate, is not drinking water outside of a sip with her pills in the AM.  Plan is to start patient on meclizine 12.5mg PRN for suspected vertigo and follow up in 4 weeks

## 2023-08-18 NOTE — PROGRESS NOTES
Name: Dante Garcia      : 1950      MRN: [id number]  Encounter Provider: Chinyere Lopez MD  Encounter Date: 2023   Encounter department: [address]     1. Dizziness  Assessment & Plan:  Patient with dizziness described as the "room is spinning". Right beating nystagmus noted on exam. Patient has had several falls lately ( and ). Appear to be orthostatic and mechanical, most recent involved slipping on water and falling forward. Minor bruising noted on scalp, no focal neural deficits. Suspected multifactorial with ambulatory dysfunction. Patient encouraged to hydrate, is not drinking water outside of a sip with her pills in the AM.  Plan is to start patient on meclizine 12.5mg PRN for suspected vertigo and follow up in 4 weeks    Orders:  -     meclizine (ANTIVERT) 25 mg tablet; Take 0.5 tablets (12.5 mg total) by mouth 3 (three) times a day as needed for dizziness    2. Ambulatory dysfunction  Assessment & Plan:  Patient has had multiple falls lately. Appear mechanical vs dizziness, unlikely syncopal. Has had recent benign workup in emergency department. With her dizziness, patient is at increased risk for a fall with complication. Not on blood thinners. Patient with loss of balance before 10 seconds on half tandem test. Would benefit from a rolling walker to use while outside the house. Will send for supply to patient pharmacy. Orders:  -     Misc. Devices ([employer] Rolling Walker) MISC; Use if needed (ambulatory assistance)         Subjective      Patient with h/o HTN and hypothyroidism arrives s/p fall onset 4 days ago. Patient states that she bhakti to get her  a water bottle and slipped on water than was on the floor fell forwards, caught herself on outstretched hands. Denies LOC. Denies any bruising or bleeding. Pt now reports mild numbness and weakness to L hand.  On questioning recalls that she felt dizzy before falling. Pt currently feels dizzy, has nausea, and reports floaters and blurry vision to R eye. Dizziness is described as the room is spinning. No bowel incontinence, fever, chills, abdominal pain, SOB or any other issues or concerns. Review of Systems   Constitutional: Negative for chills and fever. HENT: Negative for congestion and sinus pressure. Eyes: Negative for visual disturbance. Blurry vision and floaters to R eye   Respiratory: Negative for chest tightness and shortness of breath. Cardiovascular: Negative for chest pain. Gastrointestinal: Positive for nausea. Negative for abdominal pain, diarrhea and vomiting. Genitourinary: Negative for difficulty urinating and urgency. Musculoskeletal: Negative for back pain and myalgias. Skin: Negative for wound. Neurological: Positive for dizziness and numbness. Current Outpatient Medications on File Prior to Visit   Medication Sig   • acetaminophen (TYLENOL) 650 mg CR tablet Take 1 tablet (650 mg total) by mouth every 8 (eight) hours as needed for moderate pain   • amLODIPine (NORVASC) 10 mg tablet take 1 tablet by mouth once daily   • Apremilast (Otezla) 30 MG TABS Take one pill by mouth twice daily. • calcium carbonate-vitamin D (OSCAL-D) 500 mg-200 units per tablet Take 2 tablets by mouth daily with breakfast   • clindamycin (CLEOCIN T) 1 % lotion    • Diclofenac Sodium (VOLTAREN) 1 % Apply 4 g topically 4 (four) times a day   • fluocinonide (LIDEX) 0.05 % cream Apply topically 2 (two) times a day For up to 14 days.    • hydrochlorothiazide (HYDRODIURIL) 25 mg tablet take 1 tablet by mouth once daily   • levothyroxine 50 mcg tablet Take 1 tablet (50 mcg total) by mouth daily   • lidocaine (XYLOCAINE) 2 % topical gel Apply topically as needed for mild pain   • lisinopril (ZESTRIL) 20 mg tablet take 1 tablet by mouth once daily   • nystatin (MYCOSTATIN) powder Apply topically 4 (four) times a day   • ondansetron (Zofran) 4 mg tablet Take 2 tablets (8 mg total) by mouth every 8 (eight) hours as needed for nausea or vomiting for up to 10 doses Zofran odt 2 tablets dissolve in the mouth  every 8 hrs as needed for nausea/ vomiting   • simvastatin (ZOCOR) 20 mg tablet take 1 tablet by mouth once daily at bedtime   • mirtazapine (REMERON) 15 mg tablet Take 1 tablet (15 mg total) by mouth daily at bedtime Take 15mg (one tablet) at night for three (3) days and then start on 30mg (two tablets) at night onwards. Objective     /86 (BP Location: Right arm, Patient Position: Sitting, Cuff Size: Standard)   Pulse 88   Temp 99.4 °F (37.4 °C) (Temporal)   Resp 16   Ht 5' (1.524 m)   Wt 73.7 kg (162 lb 6.4 oz)   SpO2 96%   BMI 31.72 kg/m²     Physical Exam  Constitutional:       Appearance: Normal appearance. HENT:      Head: Normocephalic and atraumatic. Right Ear: External ear normal.      Left Ear: External ear normal.   Eyes:      Conjunctiva/sclera: Conjunctivae normal.      Pupils: Pupils are equal, round, and reactive to light. Comments: Right beating horizontal nystagmus   Cardiovascular:      Rate and Rhythm: Normal rate and regular rhythm. Heart sounds: Normal heart sounds. No murmur heard. Pulmonary:      Effort: No respiratory distress. Breath sounds: Normal breath sounds. No wheezing. Abdominal:      General: Bowel sounds are normal.      Palpations: Abdomen is soft. Tenderness: There is no abdominal tenderness. Skin:     General: Skin is warm and dry. Capillary Refill: Capillary refill takes less than 2 seconds. Neurological:      General: No focal deficit present. Mental Status: She is alert and oriented to person, place, and time. Cranial Nerves: Cranial nerves 2-12 are intact. Sensory: Sensation is intact. No sensory deficit. Motor: Motor function is intact. No weakness or pronator drift.       Gait: Tandem walk abnormal.      Comments: perceived decreased sensation to L UE compared to R  Equal  strength bilaterally. Loss of balance <10 second during half tandem test. Get up and go without complication.     Psychiatric:         Mood and Affect: Mood normal.         Behavior: Behavior normal.       Glorya Kocher, MD

## 2023-08-18 NOTE — ASSESSMENT & PLAN NOTE
Patient has had multiple falls lately. Appear mechanical vs dizziness, unlikely syncopal. Has had recent benign workup in emergency department. With her dizziness, patient is at increased risk for a fall with complication. Not on blood thinners. Patient with loss of balance before 10 seconds on half tandem test. Would benefit from a rolling walker to use while outside the house. Will send for supply to patient pharmacy.

## 2023-08-23 ENCOUNTER — PATIENT OUTREACH (OUTPATIENT)
Dept: FAMILY MEDICINE CLINIC | Facility: CLINIC | Age: 73
End: 2023-08-23

## 2023-08-23 DIAGNOSIS — I10 BENIGN ESSENTIAL HYPERTENSION: Primary | ICD-10-CM

## 2023-08-23 DIAGNOSIS — E78.5 HYPERLIPIDEMIA, UNSPECIFIED HYPERLIPIDEMIA TYPE: ICD-10-CM

## 2023-08-23 NOTE — PROGRESS NOTES
Outpatient RN Care Manager Note    RN CM received patient referral for Chronic Care Management outreach. Patient has a medical history of hypertension, hypothyroidism, osteoarthritis, hyperlipidemia, chronic low back pain, cancer of left breast, fear for personal safety, behavorial change, mylagia. Called patient with no answer. Left message, this is Natalya Sanon the Outpatient Nurse Care Manager at [address] [place] [address] calling to follow up with you. Requested return phone call at [phone].

## 2023-08-24 ENCOUNTER — TELEPHONE (OUTPATIENT)
Dept: FAMILY MEDICINE CLINIC | Facility: CLINIC | Age: 73
End: 2023-08-24

## 2023-08-24 ENCOUNTER — PATIENT OUTREACH (OUTPATIENT)
Dept: FAMILY MEDICINE CLINIC | Facility: CLINIC | Age: 73
End: 2023-08-24

## 2023-08-24 RX ORDER — SIMVASTATIN 20 MG
TABLET ORAL
Qty: 90 TABLET | Refills: 1 | Status: SHIPPED | OUTPATIENT
Start: 2023-08-24

## 2023-08-24 NOTE — LETTER
[facility]  [address] [facility] [place] [address]    Dear Ms. Jaskaran Solomon would like to invite you to participate in our Chronic Care Management program.   Care Management is a team that includes your provider, nurse care manager,  care manager, and other members of your primary care office. This team is available to provide or set up resources that help to manage chronic conditions. This helps to reduce complications and improves quality of life. Some services that can be offered include disease management and cope with any stressors that may be affecting the ability to manage chronic conditions. Our goal is to ensure you have the tools and information needed to make healthcare decisions. We will pair you with a Care Manager who will reach out via telephone and help to create and achieve health goals for yourself. Please review the flyer on the Chronic Care Management program. If you wish to enroll in the program or have further questions, please call the [facility] Care Manager Bertha Amador at [phone]. The Care Managers are available M-F 8a to 4:30p. We are happy to assist you in becoming a healthier you. Sincerely,      Your Care Team                Chronic Care Management Program:  [facility] Chronic Care Management program is a way to ensure you are receiving the most comprehensive care possible. You will have a dedicated team that includes your Primary Care Provider, office staff, and care management staff who will work with you to develop a care plan that meets your health care goals. Chronic Care Management (CCM) is defined as the non-face-to-face services provided to Medicare recipients who have multiple (two or more), significant chronic conditions. Chronic conditions are ongoing medical problems that must be managed effectively in a partnership between you and your health care team to maintain the best possible health.   Examples of chronic conditions include but are not limited to: Arthritis High Blood Pressure   Asthma Heart Disease   Chronic Obstructive Pulmonary Disease (COPD) Obesity   Depression Osteoporosis   Diabetes      What is Chronic Care Management? By participating in a call each month, your physician and primary wellness team will carefully monitor and provide comprehensive care for your chronic health conditions in a thoughtful and personalized way. This call will be in addition to the care received during your regular office visits. Federal regulations now enable Medicare to pay for chronic care management. What are the benefits of Chronic Care Management? • Enhanced access to your primary care team  • Close monitoring of your medication  • Personalized, comprehensive care plan for all of your health needs  • Coordinated care by your primary care wellness team and other health providers, including care you may receive at other locations, such as the hospital, other care facilities or your home. What do you need to know before signing up? Based on your current insurance plan, this type of personalized care may require you to pay $8 or $9 (your Medicare co-pay amount) to your primary care practice each month that you receive chronic care management. The service is also subject to your Medicare deductible. You must also sign an agreement to receive this type of chronic care management. You may withdraw from this program at any time. [facility] provides comprehensive care, excellent service, along with the compassion and personal attention you deserve. Managing your chronic conditions is an important part of your health care, at any age.

## 2023-08-24 NOTE — PROGRESS NOTES
Outpatient RN Care Manager Note    RN ELI received IB message from Clerical staff requesting a RC to patient. Patient called regarding a shot and there is a message from you. Demario Kauffman can be reached at [phone]. RN ELI received patient referral for Chronic Care Management outreach. Patient has a medical history of hypertension, hypothyroidism, osteoarthritis, hyperlipidemia, chronic low back pain, cancer of left breast, fear for personal safety, behavorial change, mylagia    Called patient with no answer. Left message, this is Kourtney Erwin the Outpatient Nurse Care Manager at [address] [place] [address] office calling to follow up with you. Requested return phone call at [phone]. Chronic Care Management letter mailed to patient.

## 2023-08-25 ENCOUNTER — TELEPHONE (OUTPATIENT)
Dept: DERMATOLOGY | Facility: CLINIC | Age: 73
End: 2023-08-25

## 2023-08-25 NOTE — TELEPHONE ENCOUNTER
[facility] called stating they did receive clinicals for the tremfya injection. However the medication was already approved as of 6/29/23 until 12/31/23 for both loading and maintenance dose.      He stated the patient still have not filled it yet but it is ready to be paid     Rep stated he will be sending over approval letter

## 2023-08-31 ENCOUNTER — PATIENT OUTREACH (OUTPATIENT)
Dept: FAMILY MEDICINE CLINIC | Facility: CLINIC | Age: 73
End: 2023-08-31

## 2023-08-31 NOTE — PROGRESS NOTES
Outpatient RN Care Manager Note    RN ELI received a RC from the patient. I introduced myself and explained the Chronic Care Management program to the patient. Patient declines program at this time. Will close referral to Chronic Care Management. Chronic Care Management Program Consent:    Patient informed of availability of Chronic Care Management services. The services will billed monthly by their Primary Care Provider only. Patient is informed there may be a monthly cost sharing associated with the Chronic Care Management services. Patient is aware that financial counseling is available to assist with any co-pay questions or concerns. Chronic Care Management services include:    • 24/7 access to care. • Comprehensive plan of care created by the provider. • Individualized care planning by the care manager(s). • Transitional care support. The patient is informed that they have the right to stop Chronic Care Management services at anytime. Patient consents to Chronic Care Management services? No    Patient informed that consent is needed only once unless the patient switches qualifying providers.

## 2023-09-01 DIAGNOSIS — L40.9 PSORIASIS: Primary | ICD-10-CM

## 2023-09-01 NOTE — TELEPHONE ENCOUNTER
Sent for tremfya 100mg SQ on week 0, 4, then q 8 weeks. Just sending as reminder to see if received by specialty pharmacy. Patient has an appointment with me in October. I can get her [facility] sooner [facility] or have a nursing visit an Dale Springer to show her how to use pens. Thanks!

## 2023-09-05 ENCOUNTER — TELEPHONE (OUTPATIENT)
Age: 73
End: 2023-09-05

## 2023-09-05 NOTE — TELEPHONE ENCOUNTER
[facility] called to question rx for tremfya injection. PA approved a different form of the med. Script was written for prefilled syringes. Either the script or the PA needs to change so that they match. Please call pharmacy to clarify next steps.

## 2023-09-13 ENCOUNTER — OFFICE VISIT (OUTPATIENT)
Dept: MULTI SPECIALTY CLINIC | Facility: CLINIC | Age: 73
End: 2023-09-13

## 2023-09-13 VITALS — WEIGHT: 162 LBS | HEIGHT: 57 IN | BODY MASS INDEX: 34.95 KG/M2

## 2023-09-13 DIAGNOSIS — Z71.89 INJECTION EDUCATION, ENCOUNTER FOR: Primary | ICD-10-CM

## 2023-09-13 PROCEDURE — 99214 OFFICE O/P EST MOD 30 MIN: CPT | Performed by: DERMATOLOGY

## 2023-09-13 NOTE — PROGRESS NOTES
[facility] Note     Patient Name: Andrés Vicente  Encounter Date: 9/13/2023     Have you been cared for by a [facility] Dermatologist in the last 3 years and, if so, which description applies to you? Yes. I have been here within the last 3 years, and my medical history has NOT changed since that time. I am MALE/not capable of bearing children. REVIEW OF SYSTEMS:  Have you recently had or currently have any of the following? · No changes in my recent health. PAST MEDICAL HISTORY:  Have you personally ever had or currently have any of the following? If "YES," then please provide more detail. · No changes in my medical history. FAMILY HISTORY:  Any "first degree relatives" (parent, brother, sister, or child) with the following? • No changes in my family's known health. PATIENT EXPERIENCE:    • Do you want the Dermatologist to perform a COMPLETE skin exam today including a clinical examination under the "bra and underwear" areas? NO  • If necessary, do we have your permission to call and leave a detailed message on your Preferred Phone number that includes your specific medical information? Yes      No Known Allergies   Current Outpatient Medications:   •  acetaminophen (TYLENOL) 650 mg CR tablet, Take 1 tablet (650 mg total) by mouth every 8 (eight) hours as needed for moderate pain, Disp: 30 tablet, Rfl: 0  •  amLODIPine (NORVASC) 10 mg tablet, take 1 tablet by mouth once daily, Disp: 90 tablet, Rfl: 3  •  calcium carbonate-vitamin D (OSCAL-D) 500 mg-200 units per tablet, Take 2 tablets by mouth daily with breakfast, Disp: 120 tablet, Rfl: 3  •  clindamycin (CLEOCIN T) 1 % lotion, , Disp: , Rfl: 0  •  Diclofenac Sodium (VOLTAREN) 1 %, Apply 4 g topically 4 (four) times a day, Disp: 100 g, Rfl: 0  •  fluocinonide (LIDEX) 0.05 % cream, Apply topically 2 (two) times a day For up to 14 days. , Disp: 120 g, Rfl: 1  •  Guselkumab 100 MG/ML SOSY, Please inject 100mg (1 pen) subcutaneously once on week 0, week 4, then every 8 weeks thereafter., Disp: 1 mL, Rfl: 7  •  hydrochlorothiazide (HYDRODIURIL) 25 mg tablet, take 1 tablet by mouth once daily, Disp: 90 tablet, Rfl: 3  •  levothyroxine 50 mcg tablet, Take 1 tablet (50 mcg total) by mouth daily, Disp: 90 tablet, Rfl: 1  •  lidocaine (XYLOCAINE) 2 % topical gel, Apply topically as needed for mild pain, Disp: 30 mL, Rfl: 0  •  lisinopril (ZESTRIL) 20 mg tablet, take 1 tablet by mouth once daily, Disp: 90 tablet, Rfl: 0  •  meclizine (ANTIVERT) 25 mg tablet, Take 0.5 tablets (12.5 mg total) by mouth 3 (three) times a day as needed for dizziness, Disp: 30 tablet, Rfl: 1  •  mirtazapine (REMERON) 15 mg tablet, Take 1 tablet (15 mg total) by mouth daily at bedtime Take 15mg (one tablet) at night for three (3) days and then start on 30mg (two tablets) at night onwards. , Disp: 63 tablet, Rfl: 0  •  Misc. Devices (GetGo Rolling Walker) MISC, Use if needed (ambulatory assistance), Disp: 1 each, Rfl: 0  •  nystatin (MYCOSTATIN) powder, Apply topically 4 (four) times a day, Disp: 30 g, Rfl: 1  •  ondansetron (Zofran) 4 mg tablet, Take 2 tablets (8 mg total) by mouth every 8 (eight) hours as needed for nausea or vomiting for up to 10 doses Zofran odt 2 tablets dissolve in the mouth  every 8 hrs as needed for nausea/ vomiting, Disp: 10 tablet, Rfl: 0  •  simvastatin (ZOCOR) 20 mg tablet, take 1 tablet by mouth once daily at bedtime, Disp: 90 tablet, Rfl: 1          • Whom besides the patient is providing clinical information about today's encounter?   o NO ADDITIONAL HISTORIAN (patient alone provided history)    Physical Exam and Assessment/Plan by Diagnosis:    PSORIASIS    Physical Exam:  • Anatomic Location: scalp, elbows, knees, buttocks, trunk and back  • Morphologic Description:  Pink patches with silvery scales   ? Pustules present? No  ? Severity: moderate  ? Body Percent Affected: 20%  • Pertinent Positives:  ? Itching?  No  ? Nail pitting? YES  • Pertinent Negatives:     Additional History of Present Condition:  Patient has been on otezla 30mg BID for nearly 30 years. She feels that over the past couple of months the medication is not working and she is getting flares of psoriasis on the trunk, extremities, and scalp. She has tried and failed phototherapy and topical corticosteroids for longer than 3 months. Labs reviewed and okay, has diabetes. She is here for her first injection of tremfya: Sent for tremfya 100mg SQ on week 0, 4, then q 8 weeks. • Joint pain? No  • Recent infection (strep throat)? No     Plan:  • Hepatitis panel normal, First dose of Tremfya today, return in 4 weeks for 2nd dose, warned of possible injection site reaction that will self resolve         PROCEDURES PERFORMED TODAY ASSOCIATED WITH THIS CONDITION:        After obtaining consent, and per orders of Dr. Benny Hairston, Dr. Marc Solis, injection of Tremfya given by Dr. Mikhail Chau.               Medical Complexity:    CHRONIC ILLNESS (expected duration of >1 year):  POSES A THREAT TO LIFE OR BODILY FUNCTION. A chronic illness or injury with exacerbation and/or progression or side effects of treatment that poses a threat to life or bodily function in the NEAR TERM if left without treatment. Example provided by [facility] is progressive severe rheumatoid arthritis.

## 2023-09-13 NOTE — PATIENT INSTRUCTIONS
To day we injected your first dose of Tremfya. You may get a local injection site reaction for a few days that will be red and itchy, it will go away on its own within the week. You will receive your next dose in 4 weeks (10/11).

## 2023-09-15 ENCOUNTER — OFFICE VISIT (OUTPATIENT)
Dept: FAMILY MEDICINE CLINIC | Facility: CLINIC | Age: 73
End: 2023-09-15

## 2023-09-15 VITALS
HEIGHT: 56 IN | SYSTOLIC BLOOD PRESSURE: 138 MMHG | TEMPERATURE: 98.2 F | WEIGHT: 160.4 LBS | HEART RATE: 74 BPM | OXYGEN SATURATION: 97 % | BODY MASS INDEX: 36.08 KG/M2 | DIASTOLIC BLOOD PRESSURE: 81 MMHG

## 2023-09-15 DIAGNOSIS — R42 DIZZINESS: Primary | ICD-10-CM

## 2023-09-15 DIAGNOSIS — R26.2 AMBULATORY DYSFUNCTION: ICD-10-CM

## 2023-09-15 LAB
DME PARACHUTE DELIVERY DATE REQUESTED: NORMAL
DME PARACHUTE ITEM DESCRIPTION: NORMAL
DME PARACHUTE ITEM DESCRIPTION: NORMAL
DME PARACHUTE ORDER STATUS: NORMAL
DME PARACHUTE SUPPLIER NAME: NORMAL
DME PARACHUTE SUPPLIER PHONE: NORMAL

## 2023-09-15 PROCEDURE — 99213 OFFICE O/P EST LOW 20 MIN: CPT | Performed by: FAMILY MEDICINE

## 2023-09-15 NOTE — PROGRESS NOTES
Name: Carlyle Agrawal      : 1950      MRN: [id number]  Encounter Provider: Sherri Islas MD  Encounter Date: 9/15/2023   Encounter department: [address]     1. Dizziness  Assessment & Plan:  Pt responding well to meclizine and zofran given at last month's visit (2023). Reports no sx of dizziness, headache, nausea, vomiting, blurry vision, flashes. She is only hydrating with ~16oz H2O daily and is trying to make a conscious effort to consume more. - Continue meclizine 12.5mg PRN upto 3 tabs a day  - Will re-assess during next scheduled f/u      2. Ambulatory dysfunction  Assessment & Plan:  Patient reports two falls in 2023 and experiences dizziness, now controlled on meclizine. Abnormal tandem gait noted on physical exam. Patient would benefit from rolling walker for ambulatory dysfunction. Patient at high risk for falls and fractures. - DME ordered via parachute           Subjective      Monica Paris is a 67 yo F with PMH of HTN and hypothyroidism who presents for dizziness f/u visit. She was seen at the office on 2023 for 2 recent falls, dizziness, nausea, blurry vision. She received meclizine and Zofran which she takes upto twice and once a day, respectively. She reports that they provide adequate relief of her sx and she has no further concerns. Denies headache, nausea, vomiting, dizziness, visual disturbances, flashes at this time. She also reports no new falls since last month. She was unable to retrieve her walker from the pharmacy because they said they don't order them and she has to go to a specialty pharmacy. Requesting that we order the walker to [facility] pharmacy where she gets her Guselkumab injections.    Admits to only drinking ~16oz water every day but is trying to make more of an effort although she "does not like water."      Review of Systems   Constitutional: Negative for activity change, appetite change, chills, diaphoresis, fatigue, fever and unexpected weight change. HENT: Negative. Eyes: Positive for visual disturbance (b/l floaters in the morning). Respiratory: Negative. Cardiovascular: Negative. Gastrointestinal: Negative. Endocrine: Negative. Genitourinary: Negative. Allergic/Immunologic: Negative. Neurological: Negative. Hematological: Negative. Psychiatric/Behavioral: Negative. Current Outpatient Medications on File Prior to Visit   Medication Sig   • acetaminophen (TYLENOL) 650 mg CR tablet Take 1 tablet (650 mg total) by mouth every 8 (eight) hours as needed for moderate pain   • amLODIPine (NORVASC) 10 mg tablet take 1 tablet by mouth once daily   • calcium carbonate-vitamin D (OSCAL-D) 500 mg-200 units per tablet Take 2 tablets by mouth daily with breakfast   • clindamycin (CLEOCIN T) 1 % lotion    • Diclofenac Sodium (VOLTAREN) 1 % Apply 4 g topically 4 (four) times a day   • fluocinonide (LIDEX) 0.05 % cream Apply topically 2 (two) times a day For up to 14 days. • Guselkumab 100 MG/ML SOSY Please inject 100mg (1 pen) subcutaneously once on week 0, week 4, then every 8 weeks thereafter. • hydrochlorothiazide (HYDRODIURIL) 25 mg tablet take 1 tablet by mouth once daily   • levothyroxine 50 mcg tablet Take 1 tablet (50 mcg total) by mouth daily   • lidocaine (XYLOCAINE) 2 % topical gel Apply topically as needed for mild pain   • lisinopril (ZESTRIL) 20 mg tablet take 1 tablet by mouth once daily   • meclizine (ANTIVERT) 25 mg tablet Take 0.5 tablets (12.5 mg total) by mouth 3 (three) times a day as needed for dizziness   • Misc.  Devices (GetGo Rolling Steward) MISC Use if needed (ambulatory assistance)   • nystatin (MYCOSTATIN) powder Apply topically 4 (four) times a day   • ondansetron (Zofran) 4 mg tablet Take 2 tablets (8 mg total) by mouth every 8 (eight) hours as needed for nausea or vomiting for up to 10 doses Zofran odt 2 tablets dissolve in the mouth  every 8 hrs as needed for nausea/ vomiting   • simvastatin (ZOCOR) 20 mg tablet take 1 tablet by mouth once daily at bedtime   • mirtazapine (REMERON) 15 mg tablet Take 1 tablet (15 mg total) by mouth daily at bedtime Take 15mg (one tablet) at night for three (3) days and then start on 30mg (two tablets) at night onwards. Objective     /81 (BP Location: Right arm, Patient Position: Sitting, Cuff Size: Standard)   Pulse 74   Temp 98.2 °F (36.8 °C) (Temporal)   Ht 4' 8" (1.422 m)   Wt 72.8 kg (160 lb 6.4 oz)   SpO2 97%   BMI 35.96 kg/m²     Physical Exam  Constitutional:       General: She is not in acute distress. Appearance: Normal appearance. She is obese. She is not diaphoretic. HENT:      Right Ear: External ear normal.      Left Ear: External ear normal.      Nose: Nose normal.   Eyes:      Extraocular Movements: Extraocular movements intact. Conjunctiva/sclera: Conjunctivae normal.   Cardiovascular:      Rate and Rhythm: Normal rate and regular rhythm. Pulses: Normal pulses. Heart sounds: Normal heart sounds. Pulmonary:      Effort: Pulmonary effort is normal.      Breath sounds: Normal breath sounds. Abdominal:      General: Bowel sounds are normal.   Musculoskeletal:         General: Normal range of motion. Cervical back: Normal range of motion and neck supple. Skin:     General: Skin is warm. Neurological:      Mental Status: She is alert and oriented to person, place, and time. Psychiatric:         Mood and Affect: Mood normal.         Behavior: Behavior normal.         Thought Content:  Thought content normal.         Judgment: Judgment normal.       Andi Pressley MD

## 2023-09-15 NOTE — ASSESSMENT & PLAN NOTE
Patient reports two falls in 8/2023 and experiences dizziness, now controlled on meclizine. Abnormal tandem gait noted on physical exam. Patient would benefit from rolling walker for ambulatory dysfunction. Patient at high risk for falls and fractures.    - DME ordered via paracte

## 2023-09-15 NOTE — ASSESSMENT & PLAN NOTE
Pt responding well to meclizine and zofran given at last month's visit (8/18/2023). Reports no sx of dizziness, headache, nausea, vomiting, blurry vision, flashes. She is only hydrating with ~16oz H2O daily and is trying to make a conscious effort to consume more.   - Continue meclizine 12.5mg PRN upto 3 tabs a day  - Will re-assess during next scheduled f/u

## 2023-09-21 LAB
DME PARACHUTE DELIVERY DATE ACTUAL: NORMAL
DME PARACHUTE DELIVERY DATE REQUESTED: NORMAL
DME PARACHUTE ITEM DESCRIPTION: NORMAL
DME PARACHUTE ITEM DESCRIPTION: NORMAL
DME PARACHUTE ORDER STATUS: NORMAL
DME PARACHUTE SUPPLIER NAME: NORMAL
DME PARACHUTE SUPPLIER PHONE: NORMAL

## 2023-09-30 NOTE — TELEPHONE ENCOUNTER
Patient called regarding a shot and there is a message from you.     Quan Munguia can be reached at [phone]
(0) Absent

## 2023-10-07 DIAGNOSIS — L40.9 PSORIASIS: Primary | ICD-10-CM

## 2023-10-09 RX ORDER — APREMILAST 30 MG/1
2 TABLET, FILM COATED ORAL DAILY
Qty: 90 TABLET | Refills: 0 | Status: SHIPPED | OUTPATIENT
Start: 2023-10-09

## 2023-10-11 ENCOUNTER — OFFICE VISIT (OUTPATIENT)
Dept: MULTI SPECIALTY CLINIC | Facility: CLINIC | Age: 73
End: 2023-10-11

## 2023-10-11 VITALS — HEIGHT: 56 IN | BODY MASS INDEX: 36.89 KG/M2 | WEIGHT: 164 LBS | TEMPERATURE: 97.7 F

## 2023-10-11 DIAGNOSIS — L40.9 PSORIASIS: Primary | ICD-10-CM

## 2023-10-11 PROCEDURE — 96372 THER/PROPH/DIAG INJ SC/IM: CPT | Performed by: DERMATOLOGY

## 2023-10-11 NOTE — PATIENT INSTRUCTIONS
PSORIASIS - INJECTION NUMBER 2 OF TREMFYA    Physical Exam:  Anatomic Location: scalp, elbows, knees, buttocks, trunk and back  Morphologic Description:  Pink patches with silvery scales   Pustules present? No  Severity: moderate  Body Percent Affected: 20%  Pertinent Positives:  Itching? No  Nail pitting? YES  Pertinent Negatives: Additional History of Present Condition:  Patient has been on otezla 30mg BID for nearly 30 years. She feels that over the past couple of months the medication is not working and she is getting flares of psoriasis on the trunk, extremities, and scalp. She has tried and failed phototherapy and topical corticosteroids for longer than 3 months. Labs reviewed and okay, has diabetes. She is here for her second injection of tremfya: Sent for tremfya 100mg SQ on week 0, 4, then q 8 weeks. Joint pain? No  Recent infection (strep throat)? No     Plan:  Hepatitis panel normal, First dose of Tremfya today, return in 4 weeks for 2nd dose, warned of possible injection site reaction that will self resolve            PROCEDURES PERFORMED TODAY ASSOCIATED WITH THIS CONDITION:       Tremfya injection number 2 with Dr. Cici Gary and Dr. Stevan Boeck with assistance of Dr. Nubia Garrido:    CHRONIC ILLNESS (expected duration of >1 year):  POSES A THREAT TO LIFE OR BODILY FUNCTION. A chronic illness or injury with exacerbation and/or progression or side effects of treatment that poses a threat to life or bodily function in the NEAR TERM if left without treatment. Example provided by [facility] is progressive severe rheumatoid arthritis.

## 2023-10-11 NOTE — PROGRESS NOTES
[facility] Note     Patient Name: Zoe Ignacio  Encounter Date: 10/11/2023     Have you been cared for by a [facility] D[facility]atologist in the last 3 years and, if so, which description applies to you? Yes. I have been here within the last 3 years, and my medical history has NOT changed since that time. I am FEMALE/of child-bearing potential.    REVIEW OF SYSTEMS:  Have you recently had or currently have any of the following? No changes in my recent health. PAST MEDICAL HISTORY:  Have you personally ever had or currently have any of the following? If "YES," then please provide more detail. No changes in my medical history. FAMILY HISTORY:  Any "first degree relatives" (parent, brother, sister, or child) with the following? No changes in my family's known health. PATIENT EXPERIENCE:    Do you want the Dermatologist to perform a COMPLETE skin exam today including a clinical examination under the "bra and underwear" areas? NO  If necessary, do we have your permission to call and leave a detailed message on your Preferred Phone number that includes your specific medical information? Yes      No Known Allergies   Current Outpatient Medications:     acetaminophen (TYLENOL) 650 mg CR tablet, Take 1 tablet (650 mg total) by mouth every 8 (eight) hours as needed for moderate pain, Disp: 30 tablet, Rfl: 0    amLODIPine (NORVASC) 10 mg tablet, take 1 tablet by mouth once daily, Disp: 90 tablet, Rfl: 3    calcium carbonate-vitamin D (OSCAL-D) 500 mg-200 units per tablet, Take 2 tablets by mouth daily with breakfast, Disp: 120 tablet, Rfl: 3    clindamycin (CLEOCIN T) 1 % lotion, , Disp: , Rfl: 0    Diclofenac Sodium (VOLTAREN) 1 %, Apply 4 g topically 4 (four) times a day, Disp: 100 g, Rfl: 0    fluocinonide (LIDEX) 0.05 % cream, Apply topically 2 (two) times a day For up to 14 days. , Disp: 120 g, Rfl: 1    Guselkumab 100 MG/ML SOSY, Please inject 100mg (1 pen) subcutaneously once on week 0, week 4, then every 8 weeks thereafter., Disp: 1 mL, Rfl: 7    hydrochlorothiazide (HYDRODIURIL) 25 mg tablet, take 1 tablet by mouth once daily, Disp: 90 tablet, Rfl: 3    levothyroxine 50 mcg tablet, Take 1 tablet (50 mcg total) by mouth daily, Disp: 90 tablet, Rfl: 1    lidocaine (XYLOCAINE) 2 % topical gel, Apply topically as needed for mild pain, Disp: 30 mL, Rfl: 0    lisinopril (ZESTRIL) 20 mg tablet, take 1 tablet by mouth once daily, Disp: 90 tablet, Rfl: 0    meclizine (ANTIVERT) 25 mg tablet, Take 0.5 tablets (12.5 mg total) by mouth 3 (three) times a day as needed for dizziness, Disp: 30 tablet, Rfl: 1    mirtazapine (REMERON) 15 mg tablet, Take 1 tablet (15 mg total) by mouth daily at bedtime Take 15mg (one tablet) at night for three (3) days and then start on 30mg (two tablets) at night onwards. , Disp: 63 tablet, Rfl: 0    Misc. Devices (GetGo Rolling Walker) MISC, Use if needed (ambulatory assistance), Disp: 1 each, Rfl: 0    nystatin (MYCOSTATIN) powder, Apply topically 4 (four) times a day, Disp: 30 g, Rfl: 1    ondansetron (Zofran) 4 mg tablet, Take 2 tablets (8 mg total) by mouth every 8 (eight) hours as needed for nausea or vomiting for up to 10 doses Zofran odt 2 tablets dissolve in the mouth  every 8 hrs as needed for nausea/ vomiting, Disp: 10 tablet, Rfl: 0    Otezla 30 MG TABS, TAKE 2 TABLETS BY MOUTH DAILY, Disp: 90 tablet, Rfl: 0    simvastatin (ZOCOR) 20 mg tablet, take 1 tablet by mouth once daily at bedtime, Disp: 90 tablet, Rfl: 1          Whom besides the patient is providing clinical information about today's encounter? NO ADDITIONAL HISTORIAN (patient alone provided history)    Physical Exam and Assessment/Plan by Diagnosis:    PSORIASIS - INJECTION NUMBER 2 OF TREMFYA    Physical Exam:  Anatomic Location: scalp, elbows, knees, buttocks, trunk and back  Morphologic Description:  Pink patches with silvery scales   Pustules present?  No  Severity: moderate  Body Percent Affected: 20%  Pertinent Positives:  Itching? No  Nail pitting? YES  Pertinent Negatives: Additional History of Present Condition:  Patient has been on otezla 30mg BID for nearly 30 years. She feels that over the past couple of months the medication is not working and she is getting flares of psoriasis on the trunk, extremities, and scalp. She has tried and failed phototherapy and topical corticosteroids for longer than 3 months. She is here for her second injection of tremfya: Sent for tremfya 100mg SQ on week 0, 4, then q 8 weeks. Joint pain? No  Recent infection (strep throat)? No     Plan:  Hepatitis panel normal, First dose of Tremfya today, return in 4 weeks for 2nd dose, warned of possible injection site reaction that will self resolve            PROCEDURES PERFORMED TODAY ASSOCIATED WITH THIS CONDITION:       Tremfya injection number 2 with Dr. Cameron Evangelista and Dr. Pinky Esteban with assistance of Dr. Felicitas Florence:    CHRONIC ILLNESS (expected duration of >1 year):  POSES A THREAT TO LIFE OR BODILY FUNCTION. A chronic illness or injury with exacerbation and/or progression or side effects of treatment that poses a threat to life or bodily function in the NEAR TERM if left without treatment. Example provided by [facility] is progressive severe rheumatoid arthritis.

## 2023-10-17 DIAGNOSIS — I10 BENIGN ESSENTIAL HYPERTENSION: ICD-10-CM

## 2023-10-17 RX ORDER — LISINOPRIL 20 MG/1
TABLET ORAL
Qty: 90 TABLET | Refills: 0 | Status: SHIPPED | OUTPATIENT
Start: 2023-10-17

## 2023-10-27 ENCOUNTER — TELEPHONE (OUTPATIENT)
Dept: FAMILY MEDICINE CLINIC | Facility: CLINIC | Age: 73
End: 2023-10-27

## 2023-10-31 DIAGNOSIS — E03.9 ACQUIRED HYPOTHYROIDISM: ICD-10-CM

## 2023-10-31 RX ORDER — LEVOTHYROXINE SODIUM 0.05 MG/1
50 TABLET ORAL DAILY
Qty: 90 TABLET | Refills: 1 | Status: SHIPPED | OUTPATIENT
Start: 2023-10-31

## 2023-11-23 DIAGNOSIS — L40.9 PSORIASIS: ICD-10-CM

## 2023-11-24 RX ORDER — APREMILAST 30 MG/1
2 TABLET, FILM COATED ORAL DAILY
Qty: 90 TABLET | Refills: 0 | Status: SHIPPED | OUTPATIENT
Start: 2023-11-24

## 2023-12-06 ENCOUNTER — OFFICE VISIT (OUTPATIENT)
Dept: MULTI SPECIALTY CLINIC | Facility: CLINIC | Age: 73
End: 2023-12-06

## 2023-12-06 DIAGNOSIS — Z71.89 INJECTION EDUCATION, ENCOUNTER FOR: ICD-10-CM

## 2023-12-06 DIAGNOSIS — L40.9 PSORIASIS: Primary | ICD-10-CM

## 2023-12-06 NOTE — PATIENT INSTRUCTIONS
WHAT IS TREMFYA® (guselkumab)? Peggye Cyphers is a prescription medicine used to treat adults with moderate to severe plaque psoriasis who may benefit from taking injections or pills (systemic therapy) or phototherapy (treatment using ultraviolet or UV light). Peggye Cyphers is a prescription medicine used to treat adults with active psoriatic arthritis. IMPORTANT SAFETY INFORMATION     What is the most important information I should know about TREMFYA®? Peggye Cyphers is a prescription medicine that may cause serious side effects, including:  Serious Allergic Reactions. Stop using   TREMFYA®     and get emergency medical help right away if you develop any of the following symptoms of a serious allergic reaction:   fainting, dizziness, feeling lightheaded (low blood pressure)  swelling of your face, eyelids, lips, mouth, tongue or throat  trouble breathing or throat tightness  chest tightness  skin rash, hives  itching    Infections. Peggye Cyphers   may lower the ability of your immune system to fight infections and may increase your risk of infections. Your healthcare provider should check you for infections and tuberculosis (TB) before starting treatment with TREMFYA®   and may treat you for TB before you begin treatment with TREMFYA®   if you have a history of TB or have active TB. Your healthcare provider should watch you closely for signs and symptoms of TB during and after treatment with TREMFYA®. Tell your healthcare provider right away if you have an infection or have symptoms of an infection, including:  fever, sweats, or chills  muscle aches  weight loss  cough  warm, red, or painful skin or sores on your body different from your psoriasis  diarrhea or stomach pain  shortness of breath  blood in your phlegm (mucus)  burning when you urinate or urinating more often than normal    Do not take TREMFYA® if you have had a serious allergic reaction to guselkumab or any of the ingredients in Peggye Cyphers.   Before using Elizabeth Patel, tell your healthcare provider about all of your medical conditions, including if you:  have any of the conditions or symptoms listed in the section “What is the most important information I should know about TREMFYA®?”  have an infection that does not go away or that keeps coming back.  have TB or have been in close contact with someone with TB.  have recently received or are scheduled to receive an immunization (vaccine). You should avoid receiving live vaccines during treatment with TREMFYA®.  are pregnant or plan to become pregnant. It is not known if TREMFYA® can harm your unborn baby. are breastfeeding or plan to breastfeed. It is not known if TREMFYA® passes into your breast milk. Tell your healthcare provider about all the medicines you take, including prescription and over-the-counter medicines, vitamins, and herbal supplements. What are the possible side effects of TREMFYA®? Elizabeth Amanda may cause serious side effects. See Francenia Minor is the most important information I should know about TREMFYA®?”  The most common side effects of TREMFYA® include: upper respiratory infections, headache, injection site reactions, joint pain (arthralgia), diarrhea, stomach flu (gastroenteritis), fungal skin infections, herpes simplex infections, and bronchitis. These are not all the possible side effects of TREMFYA®. Call your doctor for medical advice about side effects. Use TREMFYA® exactly as your healthcare provider tells you to use it. Please read the full Prescribing Information, including Medication Guide for [place], and discuss any questions that you have with your doctor. You are encouraged to report negative side effects of prescription drugs to the FDA. Visit www.fda.gov/medwatch, or call 3-744-HAJ-9223.

## 2023-12-06 NOTE — PROGRESS NOTES
[facility] Note     Patient Name: Gaston Pineda  Encounter Date: 12/6/2023     Have you been cared for by a [facility]atologist in the last 3 years and, if so, which description applies to you? Yes. I have been here within the last 3 years, and my medical history has NOT changed since that time. I am FEMALE/of child-bearing potential.    REVIEW OF SYSTEMS:  Have you recently had or currently have any of the following? No changes in my recent health. PAST MEDICAL HISTORY:  Have you personally ever had or currently have any of the following? If "YES," then please provide more detail. No changes in my medical history. HISTORY OF IMMUNOSUPPRESSION: Do you have a history of any of the following:  Systemic Immunosuppression such as Diabetes, Biologic or Immunotherapy, Chemotherapy, Organ Transplantation, Bone Marrow Transplantation? No     Answering "YES" requires the addition of the dotphrase "IMMUNOSUPPRESSED" as the first diagnosis of the patient's visit. FAMILY HISTORY:  Any "first degree relatives" (parent, brother, sister, or child) with the following? No changes in my family's known health. PATIENT EXPERIENCE:    Do you want the Dermatologist to perform a COMPLETE skin exam today including a clinical examination under the "bra and underwear" areas? NO  If necessary, do we have your permission to call and leave a detailed message on your Preferred Phone number that includes your specific medical information?   Yes      No Known Allergies   Current Outpatient Medications:     acetaminophen (TYLENOL) 650 mg CR tablet, Take 1 tablet (650 mg total) by mouth every 8 (eight) hours as needed for moderate pain, Disp: 30 tablet, Rfl: 0    amLODIPine (NORVASC) 10 mg tablet, take 1 tablet by mouth once daily, Disp: 90 tablet, Rfl: 3    Apremilast (Otezla) 30 MG TABS, TAKE 2 TABLETS BY MOUTH DAILY, Disp: 90 tablet, Rfl: 0    calcium carbonate-vitamin D (OSCAL-D) 500 mg-200 units per tablet, Take 2 tablets by mouth daily with breakfast, Disp: 120 tablet, Rfl: 3    clindamycin (CLEOCIN T) 1 % lotion, , Disp: , Rfl: 0    Diclofenac Sodium (VOLTAREN) 1 %, Apply 4 g topically 4 (four) times a day, Disp: 100 g, Rfl: 0    fluocinonide (LIDEX) 0.05 % cream, Apply topically 2 (two) times a day For up to 14 days. , Disp: 120 g, Rfl: 1    Guselkumab 100 MG/ML SOSY, Please inject 100mg (1 pen) subcutaneously once on week 0, week 4, then every 8 weeks thereafter., Disp: 1 mL, Rfl: 7    hydrochlorothiazide (HYDRODIURIL) 25 mg tablet, take 1 tablet by mouth once daily, Disp: 90 tablet, Rfl: 3    levothyroxine 50 mcg tablet, Take 1 tablet (50 mcg total) by mouth daily, Disp: 90 tablet, Rfl: 1    lidocaine (XYLOCAINE) 2 % topical gel, Apply topically as needed for mild pain, Disp: 30 mL, Rfl: 0    lisinopril (ZESTRIL) 20 mg tablet, take 1 tablet by mouth once daily, Disp: 90 tablet, Rfl: 0    meclizine (ANTIVERT) 25 mg tablet, Take 0.5 tablets (12.5 mg total) by mouth 3 (three) times a day as needed for dizziness, Disp: 30 tablet, Rfl: 1    mirtazapine (REMERON) 15 mg tablet, Take 1 tablet (15 mg total) by mouth daily at bedtime Take 15mg (one tablet) at night for three (3) days and then start on 30mg (two tablets) at night onwards. , Disp: 63 tablet, Rfl: 0    Misc. Devices (GetGo Rolling Walker) MISC, Use if needed (ambulatory assistance), Disp: 1 each, Rfl: 0    nystatin (MYCOSTATIN) powder, Apply topically 4 (four) times a day, Disp: 30 g, Rfl: 1    ondansetron (Zofran) 4 mg tablet, Take 2 tablets (8 mg total) by mouth every 8 (eight) hours as needed for nausea or vomiting for up to 10 doses Zofran odt 2 tablets dissolve in the mouth  every 8 hrs as needed for nausea/ vomiting, Disp: 10 tablet, Rfl: 0    simvastatin (ZOCOR) 20 mg tablet, take 1 tablet by mouth once daily at bedtime, Disp: 90 tablet, Rfl: 1          Whom besides the patient is providing clinical information about today's encounter?    NO ADDITIONAL HISTORIAN (patient alone provided history)    Physical Exam and Assessment/Plan by Diagnosis:    TREMFYA Biologic Injectable Administration - INJECTION NUMBER 3. TO RECEIVE NUMBER 4 IN 8 WEEKS    Additional History of Present Condition:  Patient is present for education and administration of Tremfya. M  Assessment and Plan:  Based on a thorough discussion of this condition and the management approach to it (including a comprehensive discussion of the known risks, side effects and potential benefits of treatment), the patient (family) agrees to implement the following specific plan:    Verbal consent obtained to administer. Next dose due 2/6 then 4/6/2023  Side effects discussed and how to report  Schedule 6 month follow up with ordering provider. Biologic Injectable Administration Note - Patient brought medication   Diagnosis: PSORIASIS  This is injection number 3    Informed consent: Discussed risks (Risks of hypersensitivity reaction, injection site reaction, conjunctivitis/keratitis, HSV reactivation, increased susceptibility to parasitic infections, inefficacy were reviewed.) Verbal consent obtained. Preparation: After discussion potential procedure related risks including pain, bleeding, new infection, reactivation of latent infection, inefficacy, increased risk of malignancy, hypersensitivity reaction, injection site reaction, verbal consent was obtained. The areas were cleansed with alcohol prep pads and allowed to fully air dry for 3 minutes. Procedure Details:  100 MG  was injected subcutaneously in the LEFT DELTOID     Patient tolerated procedure well, with minimal pinpoint bleeding that was controlled with pressure. Aftercare was reviewed. WHAT IS TREMFYA® (guselkumab)?      Ledora Neville is a prescription medicine used to treat adults with moderate to severe plaque psoriasis who may benefit from taking injections or pills (systemic therapy) or phototherapy (treatment using ultraviolet or UV light). Fidelia Ku is a prescription medicine used to treat adults with active psoriatic arthritis. IMPORTANT SAFETY INFORMATION     What is the most important information I should know about TREMFYA®? Fidelia Ku is a prescription medicine that may cause serious side effects, including:  Serious Allergic Reactions. Stop using   TREMFYA®     and get emergency medical help right away if you develop any of the following symptoms of a serious allergic reaction:   fainting, dizziness, feeling lightheaded (low blood pressure)  swelling of your face, eyelids, lips, mouth, tongue or throat  trouble breathing or throat tightness  chest tightness  skin rash, hives  itching    Infections. Fidelia Ku   may lower the ability of your immune system to fight infections and may increase your risk of infections. Your healthcare provider should check you for infections and tuberculosis (TB) before starting treatment with TREMFYA®   and may treat you for TB before you begin treatment with TREMFYA®   if you have a history of TB or have active TB. Your healthcare provider should watch you closely for signs and symptoms of TB during and after treatment with TREMFYA®. Tell your healthcare provider right away if you have an infection or have symptoms of an infection, including:  fever, sweats, or chills  muscle aches  weight loss  cough  warm, red, or painful skin or sores on your body different from your psoriasis  diarrhea or stomach pain  shortness of breath  blood in your phlegm (mucus)  burning when you urinate or urinating more often than normal    Do not take TREMFYA® if you have had a serious allergic reaction to guselkumab or any of the ingredients in Fidelia Ku.   Before using TREMFYA®, tell your healthcare provider about all of your medical conditions, including if you:  have any of the conditions or symptoms listed in the section “What is the most important information I should know about TREMFYA®?”  have an infection that does not go away or that keeps coming back.  have TB or have been in close contact with someone with TB.  have recently received or are scheduled to receive an immunization (vaccine). You should avoid receiving live vaccines during treatment with TREMFYA®.  are pregnant or plan to become pregnant. It is not known if TREMFYA® can harm your unborn baby. are breastfeeding or plan to breastfeed. It is not known if TREMFYA® passes into your breast milk. Tell your healthcare provider about all the medicines you take, including prescription and over-the-counter medicines, vitamins, and herbal supplements. What are the possible side effects of TREMFYA®? Fidelia Ku may cause serious side effects. See Purvi Schafer is the most important information I should know about TREMFYA®?”  The most common side effects of TREMFYA® include: upper respiratory infections, headache, injection site reactions, joint pain (arthralgia), diarrhea, stomach flu (gastroenteritis), fungal skin infections, herpes simplex infections, and bronchitis. These are not all the possible side effects of TREMFYA®. Call your doctor for medical advice about side effects. Use TREMFYA® exactly as your healthcare provider tells you to use it. Please read the full Prescribing Information, including Medication Guide for Fidelia Ku, and discuss any questions that you have with your doctor. You are encouraged to report negative side effects of prescription drugs to the FDA. Visit www.fda.gov/medwatch, or call 7-112-FDA-7065.

## 2024-01-04 DIAGNOSIS — L40.9 PSORIASIS: ICD-10-CM

## 2024-01-04 RX ORDER — APREMILAST 30 MG/1
2 TABLET, FILM COATED ORAL DAILY
Qty: 90 TABLET | Refills: 0 | Status: SHIPPED | OUTPATIENT
Start: 2024-01-04

## 2024-01-11 DIAGNOSIS — I10 BENIGN ESSENTIAL HYPERTENSION: ICD-10-CM

## 2024-01-11 RX ORDER — LISINOPRIL 20 MG/1
TABLET ORAL
Qty: 90 TABLET | Refills: 0 | Status: SHIPPED | OUTPATIENT
Start: 2024-01-11

## 2024-01-29 ENCOUNTER — OFFICE VISIT (OUTPATIENT)
Dept: FAMILY MEDICINE CLINIC | Facility: CLINIC | Age: 74
End: 2024-01-29

## 2024-01-29 VITALS
HEIGHT: 58 IN | OXYGEN SATURATION: 98 % | WEIGHT: 156.4 LBS | HEART RATE: 83 BPM | DIASTOLIC BLOOD PRESSURE: 76 MMHG | SYSTOLIC BLOOD PRESSURE: 123 MMHG | TEMPERATURE: 98.6 F | RESPIRATION RATE: 18 BRPM | BODY MASS INDEX: 32.83 KG/M2

## 2024-01-29 DIAGNOSIS — M79.604 RIGHT LEG PAIN: ICD-10-CM

## 2024-01-29 DIAGNOSIS — Z12.31 ENCOUNTER FOR SCREENING MAMMOGRAM FOR BREAST CANCER: ICD-10-CM

## 2024-01-29 DIAGNOSIS — Z86.010 PERSONAL HISTORY OF COLONIC POLYPS: ICD-10-CM

## 2024-01-29 DIAGNOSIS — R73.9 HYPERGLYCEMIA: ICD-10-CM

## 2024-01-29 DIAGNOSIS — Z00.00 MEDICARE ANNUAL WELLNESS VISIT, SUBSEQUENT: Primary | ICD-10-CM

## 2024-01-29 DIAGNOSIS — Z78.0 ASYMPTOMATIC POSTMENOPAUSAL STATE: ICD-10-CM

## 2024-01-29 PROBLEM — L98.9 SKIN LESION: Status: ACTIVE | Noted: 2024-01-29

## 2024-01-29 PROBLEM — Z86.0100 PERSONAL HISTORY OF COLONIC POLYPS: Status: ACTIVE | Noted: 2024-01-29

## 2024-01-29 PROCEDURE — 3074F SYST BP LT 130 MM HG: CPT | Performed by: FAMILY MEDICINE

## 2024-01-29 PROCEDURE — G0439 PPPS, SUBSEQ VISIT: HCPCS | Performed by: FAMILY MEDICINE

## 2024-01-29 PROCEDURE — 3078F DIAST BP <80 MM HG: CPT | Performed by: FAMILY MEDICINE

## 2024-01-29 NOTE — ASSESSMENT & PLAN NOTE
Physical exam remarkable for tight hamstring muscles.  No changes in ambulation.  Offered patient physical therapy but she denied.  Recommended finding a stretching routine and stretching daily.

## 2024-01-29 NOTE — ASSESSMENT & PLAN NOTE
Patient has 2 consecutive elevated A1c's.  Currently not on any diabetic medications.  Will reorder A1c today and follow-up in 3 months to address blood sugars.

## 2024-01-29 NOTE — PROGRESS NOTES
Assessment and Plan:     Problem List Items Addressed This Visit          Other    Medicare annual wellness visit, subsequent - Primary    Hyperglycemia     Patient has 2 consecutive elevated A1c's.  Currently not on any diabetic medications.  Will reorder A1c today and follow-up in 3 months to address blood sugars.         Relevant Orders    Hemoglobin A1C    Right leg pain     Physical exam remarkable for tight hamstring muscles.  No changes in ambulation.  Offered patient physical therapy but she denied.  Recommended finding a stretching routine and stretching daily.         Encounter for screening mammogram for breast cancer     Patient due for screening mammogram         Relevant Orders    Mammo screening bilateral w 3d & cad    Personal history of colonic polyps     Patient due for colonoscopy per last GI note         Relevant Orders    Ambulatory referral to Gastroenterology    Asymptomatic postmenopausal state    Relevant Orders    DXA bone density spine hip and pelvis     BMI Counseling: Body mass index is 32.69 kg/m². The BMI is above normal. Nutrition recommendations include decreasing portion sizes, encouraging healthy choices of fruits and vegetables and limiting drinks that contain sugar. Exercise recommendations include exercising 3-5 times per week. Rationale for BMI follow-up plan is due to patient being overweight or obese.     Depression Screening and Follow-up Plan: Patient was screened for depression during today's encounter. They screened negative with a PHQ-2 score of 0.      Preventive health issues were discussed with patient, and age appropriate screening tests were ordered as noted in patient's After Visit Summary.  Personalized health advice and appropriate referrals for health education or preventive services given if needed, as noted in patient's After Visit Summary.     History of Present Illness:     Patient presents for a Medicare Wellness Visit    73-year-old female presenting for  her Medicare annual wellness.  Reporting sporadic pain in the right leg 1-2 times per week.  Pain only occurs at night when getting out of bed and weightbearing.  The pain is sharp and only lasts several minutes before resolving.  Otherwise she has no symptoms or new complaints.       Patient Care Team:  Rome Kendall MD as PCP - General (Family Medicine)  Sheri Sanchez as PCP - PCP-[facility] ([facility])  Sheri Sanchez as PCP - PCP-[facility] ([facility])  MD Ariel Stiles MD     Review of Systems:     Review of Systems   Constitutional: Negative.    HENT: Negative.     Respiratory: Negative.     Cardiovascular: Negative.    Gastrointestinal: Negative.    Musculoskeletal:  Positive for myalgias.   Skin:         Small skin lesion over the upper abdomen   Neurological: Negative.    Psychiatric/Behavioral: Negative.          Problem List:     Patient Active Problem List   Diagnosis    Benign essential hypertension    Hyperlipidemia    Hypothyroidism    Lymphedema    Primary insomnia    IFG (impaired fasting glucose)    Cancer of breast, female (HCC)    Osteoarthritis    Age-related osteoporosis without current pathological fracture    Peripheral neuropathy    Psoriasis    Edema of left lower eyelid    Chronic bilateral low back pain without sciatica    Medicare annual wellness visit, subsequent    Obesity (BMI 30.0-34.9)    Polyp of colon    Encounter for immunization    Yeast infection of the skin    Muscular chest pain    Encounter for hepatitis C screening test for low risk patient    Hyperglycemia    Acute neck sprain, initial encounter    Altered mental status    Fear for personal safety    Concerned about having social problem    Behavioral change    Encounter for screening mammogram for malignant neoplasm of breast    Right upper quadrant abdominal pain    Myalgia    Dizziness    Ambulatory dysfunction    Right leg pain    Skin lesion    Encounter for screening  mammogram for breast cancer    Personal history of colonic polyps    Asymptomatic postmenopausal state      Past Medical and Surgical History:     Past Medical History:   Diagnosis Date    Breast cancer (HCC) 1996 lt mastectomy    Disease of thyroid gland     Hemorrhoids     Hyperlipidemia     Hypertension     Obesity (BMI 30.0-34.9) 2021    Psoriasis      Past Surgical History:   Procedure Laterality Date    BREAST CYST EXCISION Right     benign    BREAST LUMPECTOMY Left      SECTION      COLONOSCOPY      Complete    MASTECTOMY Left     Onset: 1996    SALPINGOOPHORECTOMY Bilateral     age 42    TOTAL ABDOMINAL HYSTERECTOMY      age 42      Family History:     Family History   Problem Relation Age of Onset    Arthritis Mother     Diabetes Mother     Breast cancer Mother 72    Colon cancer Mother     Hypertension Mother     Diabetes Father     Glaucoma Father     Prostate cancer Father     Asthma Brother     Hypertension Brother     Colon cancer Maternal Uncle 84    Breast cancer Maternal Aunt 80    No Known Problems Maternal Grandmother     No Known Problems Paternal Grandmother     No Known Problems Maternal Aunt     No Known Problems Maternal Aunt     No Known Problems Paternal Aunt     No Known Problems Paternal Aunt     No Known Problems Paternal Grandfather     Breast cancer Cousin       Social History:     Social History     Socioeconomic History    Marital status:      Spouse name: None    Number of children: None    Years of education: None    Highest education level: None   Occupational History    None   Tobacco Use    Smoking status: Never    Smokeless tobacco: Never   Vaping Use    Vaping status: Never Used   Substance and Sexual Activity    Alcohol use: No    Drug use: No    Sexual activity: Not Currently     Partners: Male   Other Topics Concern    None   Social History Narrative    None     Social Determinants of Health     Financial Resource Strain: Low  Risk  (1/29/2024)    Overall Financial Resource Strain (CARDIA)     Difficulty of Paying Living Expenses: Not hard at all   Food Insecurity: No Food Insecurity (1/29/2024)    Hunger Vital Sign     Worried About Running Out of Food in the Last Year: Never true     Ran Out of Food in the Last Year: Never true   Transportation Needs: No Transportation Needs (1/29/2024)    PRAPARE - Transportation     Lack of Transportation (Medical): No     Lack of Transportation (Non-Medical): No   Physical Activity: Insufficiently Active (8/10/2023)    Exercise Vital Sign     Days of Exercise per Week: 4 days     Minutes of Exercise per Session: 30 min   Stress: No Stress Concern Present (1/29/2024)    [employer] - Occupational Stress Questionnaire     Feeling of Stress : Not at all   Social Connections: Moderately Isolated (8/10/2023)    Social Connection and Isolation Panel [NHANES]     Frequency of Communication with Friends and Family: Twice a week     Frequency of Social Gatherings with Friends and Family: Once a week     Attends Jew Services: 1 to 4 times per year     Active Member of Clubs or Organizations: No     Attends Club or Organization Meetings: Never     Marital Status:    Intimate Partner Violence: Not At Risk (1/29/2024)    Humiliation, Afraid, Rape, and Kick questionnaire     Fear of Current or Ex-Partner: No     Emotionally Abused: No     Physically Abused: No     Sexually Abused: No   Housing Stability: Low Risk  (1/29/2024)    Housing Stability Vital Sign     Unable to Pay for Housing in the Last Year: No     Number of Places Lived in the Last Year: 1     Unstable Housing in the Last Year: No      Medications and Allergies:     Current Outpatient Medications   Medication Sig Dispense Refill    acetaminophen (TYLENOL) 650 mg CR tablet Take 1 tablet (650 mg total) by mouth every 8 (eight) hours as needed for moderate pain 30 tablet 0    amLODIPine (NORVASC) 10 mg tablet  take 1 tablet by mouth once daily 90 tablet 3    Apremilast (Otezla) 30 MG TABS TAKE 2 TABLETS BY MOUTH DAILY 90 tablet 0    calcium carbonate-vitamin D (OSCAL-D) 500 mg-200 units per tablet Take 2 tablets by mouth daily with breakfast 120 tablet 3    clindamycin (CLEOCIN T) 1 % lotion   0    Diclofenac Sodium (VOLTAREN) 1 % Apply 4 g topically 4 (four) times a day 100 g 0    fluocinonide (LIDEX) 0.05 % cream Apply topically 2 (two) times a day For up to 14 days. 120 g 1    Guselkumab 100 MG/ML SOSY Please inject 100mg (1 pen) subcutaneously once on week 0, week 4, then every 8 weeks thereafter. 1 mL 7    hydrochlorothiazide (HYDRODIURIL) 25 mg tablet take 1 tablet by mouth once daily 90 tablet 3    levothyroxine 50 mcg tablet Take 1 tablet (50 mcg total) by mouth daily 90 tablet 1    lidocaine (XYLOCAINE) 2 % topical gel Apply topically as needed for mild pain 30 mL 0    lisinopril (ZESTRIL) 20 mg tablet take 1 tablet by mouth once daily 90 tablet 0    meclizine (ANTIVERT) 25 mg tablet Take 0.5 tablets (12.5 mg total) by mouth 3 (three) times a day as needed for dizziness 30 tablet 1    Misc. Devices (GetGo Rolling Walker) MISC Use if needed (ambulatory assistance) 1 each 0    nystatin (MYCOSTATIN) powder Apply topically 4 (four) times a day 30 g 1    ondansetron (Zofran) 4 mg tablet Take 2 tablets (8 mg total) by mouth every 8 (eight) hours as needed for nausea or vomiting for up to 10 doses Zofran odt 2 tablets dissolve in the mouth  every 8 hrs as needed for nausea/ vomiting 10 tablet 0    simvastatin (ZOCOR) 20 mg tablet take 1 tablet by mouth once daily at bedtime 90 tablet 1    mirtazapine (REMERON) 15 mg tablet Take 1 tablet (15 mg total) by mouth daily at bedtime Take 15mg (one tablet) at night for three (3) days and then start on 30mg (two tablets) at night onwards. 63 tablet 0     No current facility-administered medications for this visit.     No Known Allergies   Immunizations:     Immunization History    Administered Date(s) Administered    COVID-19 J&J ([employer]) vaccine 0.5 mL 04/10/2021    Influenza, high dose seasonal 0.7 mL 12/22/2020, 09/24/2021, 01/17/2023    Influenza, seasonal, injectable 10/27/2011, 10/25/2012, 09/26/2013, 12/14/2016    Pneumococcal Conjugate 13-Valent 10/03/2016    Pneumococcal Polysaccharide PPV23 10/27/2011    Tdap 05/30/2018      Health Maintenance:         Topic Date Due    Colorectal Cancer Screening  01/13/2024    Breast Cancer Screening: Mammogram  06/02/2024    Hepatitis C Screening  Completed         Topic Date Due    Pneumococcal Vaccine: 65+ Years (3 - PPSV23 or PCV20) 10/03/2021    Influenza Vaccine (1) 09/01/2023    COVID-19 Vaccine (2 - 2023-24 season) 09/01/2023      Medicare Screening Tests and Risk Assessments:     Lindsey is here for her Subsequent Wellness visit.     Health Risk Assessment:   Patient rates overall health as very good. Patient feels that their physical health rating is slightly better. Patient is satisfied with their life. Eyesight was rated as same. Hearing was rated as same. Patient feels that their emotional and mental health rating is same. Patients states they are never, rarely angry. Patient states they are sometimes unusually tired/fatigued. Pain experienced in the last 7 days has been some. Patient's pain rating has been 5/10. Patient states that she has experienced no weight loss or gain in last 6 months.     Depression Screening:   PHQ-2 Score: 0      Fall Risk Screening:   In the past year, patient has experienced: no history of falling in past year      Urinary Incontinence Screening:   Patient has not leaked urine accidently in the last six months.     Home Safety:  Patient does not have trouble with stairs inside or outside of their home. Patient has working smoke alarms and has working carbon monoxide detector. Home safety hazards include: none.     Nutrition:   Current diet is Regular.     Medications:   Patient is not currently taking any  over-the-counter supplements. Patient is able to manage medications.     Activities of Daily Living (ADLs)/Instrumental Activities of Daily Living (IADLs):   Walk and transfer into and out of bed and chair?: Yes  Dress and groom yourself?: Yes    Bathe or shower yourself?: Yes    Feed yourself? Yes  Do your laundry/housekeeping?: Yes  Manage your money, pay your bills and track your expenses?: Yes  Make your own meals?: Yes    Do your own shopping?: Yes    Previous Hospitalizations:   Any hospitalizations or ED visits within the last 12 months?: No      Advance Care Planning:   Living will: No    Durable POA for healthcare: No    Advanced directive: No      PREVENTIVE SCREENINGS      Cardiovascular Screening:    General: Screening Not Indicated and History Lipid Disorder      Diabetes Screening:     General: Screening Current      Colorectal Cancer Screening:     General: Screening Current      Breast Cancer Screening:     General: History Breast Cancer      Cervical Cancer Screening:    General: Screening Not Indicated      Osteoporosis Screening:    General: Screening Not Indicated and History Osteoporosis      Lung Cancer Screening:     General: Screening Not Indicated      Hepatitis C Screening:    General: Screening Current    Screening, Brief Intervention, and Referral to Treatment (SBIRT)    Screening  Typical number of drinks in a day: 0    AUDIT-C Screenin) How often did you have a drink containing alcohol in the past year? never  2) How many drinks did you have on a typical day when you were drinking in the past year? 0  3) How often did you have 6 or more drinks on one occasion in the past year? never    AUDIT-C Score: 0  Interpretation: Score 0-2 (female): Negative screen for alcohol misuse    Single Item Drug Screening:  How often have you used an illegal drug (including marijuana) or a prescription medication for non-medical reasons in the past year? never    Single Item Drug Screen Score:  "0  Interpretation: Negative screen for possible drug use disorder    No results found.     Physical Exam:     /76 (BP Location: Right arm, Patient Position: Sitting, Cuff Size: Large)   Pulse 83   Temp 98.6 °F (37 °C) (Temporal)   Resp 18   Ht 4' 10\" (1.473 m)   Wt 70.9 kg (156 lb 6.4 oz)   SpO2 98%   BMI 32.69 kg/m²     Physical Exam  Vitals reviewed.   Constitutional:       Appearance: Normal appearance.   HENT:      Head: Normocephalic.      Right Ear: External ear normal.      Left Ear: External ear normal.      Nose: Nose normal.      Mouth/Throat:      Mouth: Mucous membranes are moist.      Pharynx: Oropharynx is clear.   Eyes:      Extraocular Movements: Extraocular movements intact.      Pupils: Pupils are equal, round, and reactive to light.   Cardiovascular:      Rate and Rhythm: Normal rate and regular rhythm.      Pulses: Normal pulses.   Pulmonary:      Effort: Pulmonary effort is normal.      Breath sounds: Normal breath sounds.   Abdominal:      General: There is no distension.   Skin:     General: Skin is warm and dry.      Findings: Lesion present.   Neurological:      General: No focal deficit present.      Mental Status: She is alert and oriented to person, place, and time.   Psychiatric:         Mood and Affect: Mood normal.         Behavior: Behavior normal.          Rome Kendall MD  "

## 2024-01-29 NOTE — LETTER
To whom it may concern,    Please allow Lindsey Chandra to keep her dog with her in her apartment. It provides her with a source of companionship which is beneficial to her health.    If you have any questions please feel free to contact my office.     Thank you,  Rome Kendall MD

## 2024-01-29 NOTE — PATIENT INSTRUCTIONS
Medicare Preventive Visit Patient Instructions  Thank you for completing your Welcome to Medicare Visit or Medicare Annual Wellness Visit today. Your next wellness visit will be due in one year (1/29/2025).  The screening/preventive services that you may require over the next 5-10 years are detailed below. Some tests may not apply to you based off risk factors and/or age. Screening tests ordered at today's visit but not completed yet may show as past due. Also, please note that scanned in results may not display below.  Preventive Screenings:  Service Recommendations Previous Testing/Comments   Colorectal Cancer Screening  * Colonoscopy    * Fecal Occult Blood Test (FOBT)/Fecal Immunochemical Test (FIT)  * Fecal DNA/Cologuard Test  * Flexible Sigmoidoscopy Age: 45-75 years old   Colonoscopy: every 10 years (may be performed more frequently if at higher risk)  OR  FOBT/FIT: every 1 year  OR  Cologuard: every 3 years  OR  Sigmoidoscopy: every 5 years  Screening may be recommended earlier than age 45 if at higher risk for colorectal cancer. Also, an individualized decision between you and your healthcare provider will decide whether screening between the ages of 76-85 would be appropriate. Colonoscopy: 01/13/2021  FOBT/FIT: Not on file  Cologuard: Not on file  Sigmoidoscopy: Not on file    Screening Current     Breast Cancer Screening Age: 40+ years old  Frequency: every 1-2 years  Not required if history of left and right mastectomy Mammogram: 06/02/2023    History Breast Cancer   Cervical Cancer Screening Between the ages of 21-29, pap smear recommended once every 3 years.   Between the ages of 30-65, can perform pap smear with HPV co-testing every 5 years.   Recommendations may differ for women with a history of total hysterectomy, cervical cancer, or abnormal pap smears in past. Pap Smear: Not on file    Screening Not Indicated   Hepatitis C Screening Once for adults born between 1945 and 1965  More frequently in  patients at high risk for Hepatitis C Hep C Antibody: Not on file    Screening Current   Diabetes Screening 1-2 times per year if you're at risk for diabetes or have pre-diabetes Fasting glucose: 170 mg/dL (7/6/2023)  A1C: 7.5 % (7/6/2023)  Screening Current   Cholesterol Screening Once every 5 years if you don't have a lipid disorder. May order more often based on risk factors. Lipid panel: 04/07/2022    Screening Not Indicated  History Lipid Disorder     Other Preventive Screenings Covered by Medicare:  Abdominal Aortic Aneurysm (AAA) Screening: covered once if your at risk. You're considered to be at risk if you have a family history of AAA.  Lung Cancer Screening: covers low dose CT scan once per year if you meet all of the following conditions: (1) Age 55-77; (2) No signs or symptoms of lung cancer; (3) Current smoker or have quit smoking within the last 15 years; (4) You have a tobacco smoking history of at least 20 pack years (packs per day multiplied by number of years you smoked); (5) You get a written order from a healthcare provider.  Glaucoma Screening: covered annually if you're considered high risk: (1) You have diabetes OR (2) Family history of glaucoma OR (3)  aged 50 and older OR (4)  American aged 65 and older  Osteoporosis Screening: covered every 2 years if you meet one of the following conditions: (1) You're estrogen deficient and at risk for osteoporosis based off medical history and other findings; (2) Have a vertebral abnormality; (3) On glucocorticoid therapy for more than 3 months; (4) Have primary hyperparathyroidism; (5) On osteoporosis medications and need to assess response to drug therapy.   Last bone density test (DXA Scan): 10/30/2018.  HIV Screening: covered annually if you're between the age of 15-65. Also covered annually if you are younger than 15 and older than 65 with risk factors for HIV infection. For pregnant patients, it is covered up to 3 times per  pregnancy.    Immunizations:  Immunization Recommendations   Influenza Vaccine Annual influenza vaccination during flu season is recommended for all persons aged >= 6 months who do not have contraindications   Pneumococcal Vaccine   * Pneumococcal conjugate vaccine = PCV13 (Prevnar 13), PCV15 (Vaxneuvance), PCV20 (Prevnar 20)  * Pneumococcal polysaccharide vaccine = PPSV23 (Pneumovax) Adults 19-65 yo with certain risk factors or if 65+ yo  If never received any pneumonia vaccine: recommend Prevnar 20 (PCV20)  Give PCV20 if previously received 1 dose of PCV13 or PPSV23   Hepatitis B Vaccine 3 dose series if at intermediate or high risk (ex: diabetes, end stage renal disease, liver disease)   Respiratory syncytial virus (RSV) Vaccine - COVERED BY MEDICARE PART D  * RSVPreF3 (Arexvy) CDC recommends that adults 60 years of age and older may receive a single dose of RSV vaccine using shared clinical decision-making (SCDM)   Tetanus (Td) Vaccine - COST NOT COVERED BY MEDICARE PART B Following completion of primary series, a booster dose should be given every 10 years to maintain immunity against tetanus. Td may also be given as tetanus wound prophylaxis.   Tdap Vaccine - COST NOT COVERED BY MEDICARE PART B Recommended at least once for all adults. For pregnant patients, recommended with each pregnancy.   Shingles Vaccine (Shingrix) - COST NOT COVERED BY MEDICARE PART B  2 shot series recommended in those 19 years and older who have or will have weakened immune systems or those 50 years and older     Health Maintenance Due:      Topic Date Due   • Colorectal Cancer Screening  01/13/2024   • Breast Cancer Screening: Mammogram  06/02/2024   • Hepatitis C Screening  Completed     Immunizations Due:      Topic Date Due   • Pneumococcal Vaccine: 65+ Years (3 - PPSV23 or PCV20) 10/03/2021   • Influenza Vaccine (1) 09/01/2023   • COVID-19 Vaccine (2 - 2023-24 season) 09/01/2023     Advance Directives   What are advance  directives?  Advance directives are legal documents that state your wishes and plans for medical care. These plans are made ahead of time in case you lose your ability to make decisions for yourself. Advance directives can apply to any medical decision, such as the treatments you want, and if you want to donate organs.   What are the types of advance directives?  There are many types of advance directives, and each state has rules about how to use them. You may choose a combination of any of the following:  Living will:  This is a written record of the treatment you want. You can also choose which treatments you do not want, which to limit, and which to stop at a certain time. This includes surgery, medicine, IV fluid, and tube feedings.   Durable power of  for healthcare (DPAHC):  This is a written record that states who you want to make healthcare choices for you when you are unable to make them for yourself. This person, called a proxy, is usually a family member or a friend. You may choose more than 1 proxy.  Do not resuscitate (DNR) order:  A DNR order is used in case your heart stops beating or you stop breathing. It is a request not to have certain forms of treatment, such as CPR. A DNR order may be included in other types of advance directives.  Medical directive:  This covers the care that you want if you are in a coma, near death, or unable to make decisions for yourself. You can list the treatments you want for each condition. Treatment may include pain medicine, surgery, blood transfusions, dialysis, IV or tube feedings, and a ventilator (breathing machine).  Values history:  This document has questions about your views, beliefs, and how you feel and think about life. This information can help others choose the care that you would choose.  Why are advance directives important?  An advance directive helps you control your care. Although spoken wishes may be used, it is better to have your wishes  written down. Spoken wishes can be misunderstood, or not followed. Treatments may be given even if you do not want them. An advance directive may make it easier for your family to make difficult choices about your care.   Weight Management   Why it is important to manage your weight:  Being overweight increases your risk of health conditions such as heart disease, high blood pressure, type 2 diabetes, and certain types of cancer. It can also increase your risk for osteoarthritis, sleep apnea, and other respiratory problems. Aim for a slow, steady weight loss. Even a small amount of weight loss can lower your risk of health problems.  How to lose weight safely:  A safe and healthy way to lose weight is to eat fewer calories and get regular exercise. You can lose up about 1 pound a week by decreasing the number of calories you eat by 500 calories each day.   Healthy meal plan for weight management:  A healthy meal plan includes a variety of foods, contains fewer calories, and helps you stay healthy. A healthy meal plan includes the following:  Eat whole-grain foods more often.  A healthy meal plan should contain fiber. Fiber is the part of grains, fruits, and vegetables that is not broken down by your body. Whole-grain foods are healthy and provide extra fiber in your diet. Some examples of whole-grain foods are whole-wheat breads and pastas, oatmeal, brown rice, and bulgur.  Eat a variety of vegetables every day.  Include dark, leafy greens such as spinach, kale, jenniffer greens, and mustard greens. Eat yellow and orange vegetables such as carrots, sweet potatoes, and winter squash.   Eat a variety of fruits every day.  Choose fresh or canned fruit (canned in its own juice or light syrup) instead of juice. Fruit juice has very little or no fiber.  Eat low-fat dairy foods.  Drink fat-free (skim) milk or 1% milk. Eat fat-free yogurt and low-fat cottage cheese. Try low-fat cheeses such as mozzarella and other reduced-fat  cheeses.  Choose meat and other protein foods that are low in fat.  Choose beans or other legumes such as split peas or lentils. Choose fish, skinless poultry (chicken or turkey), or lean cuts of red meat (beef or pork). Before you cook meat or poultry, cut off any visible fat.   Use less fat and oil.  Try baking foods instead of frying them. Add less fat, such as margarine, sour cream, regular salad dressing and mayonnaise to foods. Eat fewer high-fat foods. Some examples of high-fat foods include french fries, doughnuts, ice cream, and cakes.  Eat fewer sweets.  Limit foods and drinks that are high in sugar. This includes candy, cookies, regular soda, and sweetened drinks.  Exercise:  Exercise at least 30 minutes per day on most days of the week. Some examples of exercise include walking, biking, dancing, and swimming. You can also fit in more physical activity by taking the stairs instead of the elevator or parking farther away from stores. Ask your healthcare provider about the best exercise plan for you.      © Copyright [employer] 2018 Information is for End User's use only and may not be sold, redistributed or otherwise used for commercial purposes. All illustrations and images included in CareNotes® are the copyrighted property of [employer] or [employer]      Medicare Preventive Visit Patient Instructions  Thank you for completing your Welcome to Medicare Visit or Medicare Annual Wellness Visit today. Your next wellness visit will be due in one year (1/29/2025).  The screening/preventive services that you may require over the next 5-10 years are detailed below. Some tests may not apply to you based off risk factors and/or age. Screening tests ordered at today's visit but not completed yet may show as past due. Also, please note that scanned in results may not display below.  Preventive Screenings:  Service Recommendations Previous Testing/Comments   Colorectal Cancer Screening  * Colonoscopy     * Fecal Occult Blood Test (FOBT)/Fecal Immunochemical Test (FIT)  * Fecal DNA/Cologuard Test  * Flexible Sigmoidoscopy Age: 45-75 years old   Colonoscopy: every 10 years (may be performed more frequently if at higher risk)  OR  FOBT/FIT: every 1 year  OR  Cologuard: every 3 years  OR  Sigmoidoscopy: every 5 years  Screening may be recommended earlier than age 45 if at higher risk for colorectal cancer. Also, an individualized decision between you and your healthcare provider will decide whether screening between the ages of 76-85 would be appropriate. Colonoscopy: 01/13/2021  FOBT/FIT: Not on file  Cologuard: Not on file  Sigmoidoscopy: Not on file    Screening Current     Breast Cancer Screening Age: 40+ years old  Frequency: every 1-2 years  Not required if history of left and right mastectomy Mammogram: 06/02/2023    History Breast Cancer   Cervical Cancer Screening Between the ages of 21-29, pap smear recommended once every 3 years.   Between the ages of 30-65, can perform pap smear with HPV co-testing every 5 years.   Recommendations may differ for women with a history of total hysterectomy, cervical cancer, or abnormal pap smears in past. Pap Smear: Not on file    Screening Not Indicated   Hepatitis C Screening Once for adults born between 1945 and 1965  More frequently in patients at high risk for Hepatitis C Hep C Antibody: Not on file    Screening Current   Diabetes Screening 1-2 times per year if you're at risk for diabetes or have pre-diabetes Fasting glucose: 170 mg/dL (7/6/2023)  A1C: 7.5 % (7/6/2023)  Screening Current   Cholesterol Screening Once every 5 years if you don't have a lipid disorder. May order more often based on risk factors. Lipid panel: 04/07/2022    Screening Not Indicated  History Lipid Disorder     Other Preventive Screenings Covered by Medicare:  Abdominal Aortic Aneurysm (AAA) Screening: covered once if your at risk. You're considered to be at risk if you have a family history  of AAA.  Lung Cancer Screening: covers low dose CT scan once per year if you meet all of the following conditions: (1) Age 55-77; (2) No signs or symptoms of lung cancer; (3) Current smoker or have quit smoking within the last 15 years; (4) You have a tobacco smoking history of at least 20 pack years (packs per day multiplied by number of years you smoked); (5) You get a written order from a healthcare provider.  Glaucoma Screening: covered annually if you're considered high risk: (1) You have diabetes OR (2) Family history of glaucoma OR (3)  aged 50 and older OR (4)  American aged 65 and older  Osteoporosis Screening: covered every 2 years if you meet one of the following conditions: (1) You're estrogen deficient and at risk for osteoporosis based off medical history and other findings; (2) Have a vertebral abnormality; (3) On glucocorticoid therapy for more than 3 months; (4) Have primary hyperparathyroidism; (5) On osteoporosis medications and need to assess response to drug therapy.   Last bone density test (DXA Scan): 10/30/2018.  HIV Screening: covered annually if you're between the age of 15-65. Also covered annually if you are younger than 15 and older than 65 with risk factors for HIV infection. For pregnant patients, it is covered up to 3 times per pregnancy.    Immunizations:  Immunization Recommendations   Influenza Vaccine Annual influenza vaccination during flu season is recommended for all persons aged >= 6 months who do not have contraindications   Pneumococcal Vaccine   * Pneumococcal conjugate vaccine = PCV13 (Prevnar 13), PCV15 (Vaxneuvance), PCV20 (Prevnar 20)  * Pneumococcal polysaccharide vaccine = PPSV23 (Pneumovax) Adults 19-65 yo with certain risk factors or if 65+ yo  If never received any pneumonia vaccine: recommend Prevnar 20 (PCV20)  Give PCV20 if previously received 1 dose of PCV13 or PPSV23   Hepatitis B Vaccine 3 dose series if at intermediate or high risk  (ex: diabetes, end stage renal disease, liver disease)   Respiratory syncytial virus (RSV) Vaccine - COVERED BY MEDICARE PART D  * RSVPreF3 (Arexvy) CDC recommends that adults 60 years of age and older may receive a single dose of RSV vaccine using shared clinical decision-making (SCDM)   Tetanus (Td) Vaccine - COST NOT COVERED BY MEDICARE PART B Following completion of primary series, a booster dose should be given every 10 years to maintain immunity against tetanus. Td may also be given as tetanus wound prophylaxis.   Tdap Vaccine - COST NOT COVERED BY MEDICARE PART B Recommended at least once for all adults. For pregnant patients, recommended with each pregnancy.   Shingles Vaccine (Shingrix) - COST NOT COVERED BY MEDICARE PART B  2 shot series recommended in those 19 years and older who have or will have weakened immune systems or those 50 years and older     Health Maintenance Due:      Topic Date Due   • Colorectal Cancer Screening  01/13/2024   • Breast Cancer Screening: Mammogram  06/02/2024   • Hepatitis C Screening  Completed     Immunizations Due:      Topic Date Due   • Pneumococcal Vaccine: 65+ Years (3 - PPSV23 or PCV20) 10/03/2021   • Influenza Vaccine (1) 09/01/2023   • COVID-19 Vaccine (2 - 2023-24 season) 09/01/2023     Advance Directives   What are advance directives?  Advance directives are legal documents that state your wishes and plans for medical care. These plans are made ahead of time in case you lose your ability to make decisions for yourself. Advance directives can apply to any medical decision, such as the treatments you want, and if you want to donate organs.   What are the types of advance directives?  There are many types of advance directives, and each state has rules about how to use them. You may choose a combination of any of the following:  Living will:  This is a written record of the treatment you want. You can also choose which treatments you do not want, which to limit, and  which to stop at a certain time. This includes surgery, medicine, IV fluid, and tube feedings.   Durable power of  for healthcare (DPAHC):  This is a written record that states who you want to make healthcare choices for you when you are unable to make them for yourself. This person, called a proxy, is usually a family member or a friend. You may choose more than 1 proxy.  Do not resuscitate (DNR) order:  A DNR order is used in case your heart stops beating or you stop breathing. It is a request not to have certain forms of treatment, such as CPR. A DNR order may be included in other types of advance directives.  Medical directive:  This covers the care that you want if you are in a coma, near death, or unable to make decisions for yourself. You can list the treatments you want for each condition. Treatment may include pain medicine, surgery, blood transfusions, dialysis, IV or tube feedings, and a ventilator (breathing machine).  Values history:  This document has questions about your views, beliefs, and how you feel and think about life. This information can help others choose the care that you would choose.  Why are advance directives important?  An advance directive helps you control your care. Although spoken wishes may be used, it is better to have your wishes written down. Spoken wishes can be misunderstood, or not followed. Treatments may be given even if you do not want them. An advance directive may make it easier for your family to make difficult choices about your care.   Weight Management   Why it is important to manage your weight:  Being overweight increases your risk of health conditions such as heart disease, high blood pressure, type 2 diabetes, and certain types of cancer. It can also increase your risk for osteoarthritis, sleep apnea, and other respiratory problems. Aim for a slow, steady weight loss. Even a small amount of weight loss can lower your risk of health problems.  How to lose  weight safely:  A safe and healthy way to lose weight is to eat fewer calories and get regular exercise. You can lose up about 1 pound a week by decreasing the number of calories you eat by 500 calories each day.   Healthy meal plan for weight management:  A healthy meal plan includes a variety of foods, contains fewer calories, and helps you stay healthy. A healthy meal plan includes the following:  Eat whole-grain foods more often.  A healthy meal plan should contain fiber. Fiber is the part of grains, fruits, and vegetables that is not broken down by your body. Whole-grain foods are healthy and provide extra fiber in your diet. Some examples of whole-grain foods are whole-wheat breads and pastas, oatmeal, brown rice, and bulgur.  Eat a variety of vegetables every day.  Include dark, leafy greens such as spinach, kale, jenniffre greens, and mustard greens. Eat yellow and orange vegetables such as carrots, sweet potatoes, and winter squash.   Eat a variety of fruits every day.  Choose fresh or canned fruit (canned in its own juice or light syrup) instead of juice. Fruit juice has very little or no fiber.  Eat low-fat dairy foods.  Drink fat-free (skim) milk or 1% milk. Eat fat-free yogurt and low-fat cottage cheese. Try low-fat cheeses such as mozzarella and other reduced-fat cheeses.  Choose meat and other protein foods that are low in fat.  Choose beans or other legumes such as split peas or lentils. Choose fish, skinless poultry (chicken or turkey), or lean cuts of red meat (beef or pork). Before you cook meat or poultry, cut off any visible fat.   Use less fat and oil.  Try baking foods instead of frying them. Add less fat, such as margarine, sour cream, regular salad dressing and mayonnaise to foods. Eat fewer high-fat foods. Some examples of high-fat foods include french fries, doughnuts, ice cream, and cakes.  Eat fewer sweets.  Limit foods and drinks that are high in sugar. This includes candy, cookies,  regular soda, and sweetened drinks.  Exercise:  Exercise at least 30 minutes per day on most days of the week. Some examples of exercise include walking, biking, dancing, and swimming. You can also fit in more physical activity by taking the stairs instead of the elevator or parking farther away from stores. Ask your healthcare provider about the best exercise plan for you.      © Copyright [employer] 2018 Information is for End User's use only and may not be sold, redistributed or otherwise used for commercial purposes. All illustrations and images included in CareNotes® are the copyrighted property of [employer] or [employer]

## 2024-01-31 ENCOUNTER — OFFICE VISIT (OUTPATIENT)
Dept: MULTI SPECIALTY CLINIC | Facility: CLINIC | Age: 74
End: 2024-01-31

## 2024-01-31 VITALS — BODY MASS INDEX: 33.02 KG/M2 | WEIGHT: 158 LBS

## 2024-01-31 DIAGNOSIS — L40.9 PSORIASIS: ICD-10-CM

## 2024-01-31 DIAGNOSIS — Z71.89 INJECTION EDUCATION, ENCOUNTER FOR: Primary | ICD-10-CM

## 2024-01-31 DIAGNOSIS — D48.5 NEOPLASM OF UNCERTAIN BEHAVIOR OF SKIN: ICD-10-CM

## 2024-01-31 PROCEDURE — 88342 IMHCHEM/IMCYTCHM 1ST ANTB: CPT | Performed by: STUDENT IN AN ORGANIZED HEALTH CARE EDUCATION/TRAINING PROGRAM

## 2024-01-31 PROCEDURE — 88307 TISSUE EXAM BY PATHOLOGIST: CPT | Performed by: STUDENT IN AN ORGANIZED HEALTH CARE EDUCATION/TRAINING PROGRAM

## 2024-01-31 PROCEDURE — 88341 IMHCHEM/IMCYTCHM EA ADD ANTB: CPT | Performed by: STUDENT IN AN ORGANIZED HEALTH CARE EDUCATION/TRAINING PROGRAM

## 2024-01-31 NOTE — PROGRESS NOTES
"[facility] Note     Patient Name: Lindsey Chandra  Encounter Date: 1/31/2024     Have you been cared for by a [facility] in the last 3 years and, if so, which description applies to you?    Yes.  I have been here within the last 3 years, and my medical history has NOT changed since that time.  I am FEMALE/of child-bearing potential.    REVIEW OF SYSTEMS:  Have you recently had or currently have any of the following? No changes in my recent health.   PAST MEDICAL HISTORY:  Have you personally ever had or currently have any of the following?  If \"YES,\" then please provide more detail. No changes in my medical history.   HISTORY OF IMMUNOSUPPRESSION: Do you have a history of any of the following:  Systemic Immunosuppression such as Diabetes, Biologic or Immunotherapy, Chemotherapy, Organ Transplantation, Bone Marrow Transplantation?  No     Answering \"YES\" requires the addition of the dotphrase \"IMMUNOSUPPRESSED\" as the first diagnosis of the patient's visit.   FAMILY HISTORY:  Any \"first degree relatives\" (parent, brother, sister, or child) with the following?    No changes in my family's known health.   PATIENT EXPERIENCE:    Do you want the Dermatologist to perform a COMPLETE skin exam today including a clinical examination under the \"bra and underwear\" areas?  NO  If necessary, do we have your permission to call and leave a detailed message on your Preferred Phone number that includes your specific medical information?  Yes      No Known Allergies   Current Outpatient Medications:     acetaminophen (TYLENOL) 650 mg CR tablet, Take 1 tablet (650 mg total) by mouth every 8 (eight) hours as needed for moderate pain, Disp: 30 tablet, Rfl: 0    amLODIPine (NORVASC) 10 mg tablet, take 1 tablet by mouth once daily, Disp: 90 tablet, Rfl: 3    Apremilast (Otezla) 30 MG TABS, TAKE 2 TABLETS BY MOUTH DAILY, Disp: 90 tablet, Rfl: 0    calcium carbonate-vitamin D (OSCAL-D) 500 mg-200 units " per tablet, Take 2 tablets by mouth daily with breakfast, Disp: 120 tablet, Rfl: 3    clindamycin (CLEOCIN T) 1 % lotion, , Disp: , Rfl: 0    Diclofenac Sodium (VOLTAREN) 1 %, Apply 4 g topically 4 (four) times a day, Disp: 100 g, Rfl: 0    fluocinonide (LIDEX) 0.05 % cream, Apply topically 2 (two) times a day For up to 14 days., Disp: 120 g, Rfl: 1    Guselkumab 100 MG/ML SOSY, Please inject 100mg (1 pen) subcutaneously once on week 0, week 4, then every 8 weeks thereafter., Disp: 1 mL, Rfl: 7    hydrochlorothiazide (HYDRODIURIL) 25 mg tablet, take 1 tablet by mouth once daily, Disp: 90 tablet, Rfl: 3    levothyroxine 50 mcg tablet, Take 1 tablet (50 mcg total) by mouth daily, Disp: 90 tablet, Rfl: 1    lidocaine (XYLOCAINE) 2 % topical gel, Apply topically as needed for mild pain, Disp: 30 mL, Rfl: 0    lisinopril (ZESTRIL) 20 mg tablet, take 1 tablet by mouth once daily, Disp: 90 tablet, Rfl: 0    meclizine (ANTIVERT) 25 mg tablet, Take 0.5 tablets (12.5 mg total) by mouth 3 (three) times a day as needed for dizziness, Disp: 30 tablet, Rfl: 1    Misc. Devices (GetGo Rolling Walker) MISC, Use if needed (ambulatory assistance), Disp: 1 each, Rfl: 0    nystatin (MYCOSTATIN) powder, Apply topically 4 (four) times a day, Disp: 30 g, Rfl: 1    ondansetron (Zofran) 4 mg tablet, Take 2 tablets (8 mg total) by mouth every 8 (eight) hours as needed for nausea or vomiting for up to 10 doses Zofran odt 2 tablets dissolve in the mouth  every 8 hrs as needed for nausea/ vomiting, Disp: 10 tablet, Rfl: 0    simvastatin (ZOCOR) 20 mg tablet, take 1 tablet by mouth once daily at bedtime, Disp: 90 tablet, Rfl: 1    mirtazapine (REMERON) 15 mg tablet, Take 1 tablet (15 mg total) by mouth daily at bedtime Take 15mg (one tablet) at night for three (3) days and then start on 30mg (two tablets) at night onwards., Disp: 63 tablet, Rfl: 0        Whom besides the patient is providing clinical information about today's encounter?   NO  ADDITIONAL HISTORIAN (patient alone provided history)    Physical Exam and Assessment/Plan by Diagnosis:    TREMFYA Biologic Injectable Administration     Additional History of Present Condition:  Patient is present for education and administration of Tremfya.     Assessment and Plan:  Based on a thorough discussion of this condition and the management approach to it (including a comprehensive discussion of the known risks, side effects and potential benefits of treatment), the patient (family) agrees to implement the following specific plan:  Injection # 4  Verbal consent obtained to administer.   Next dose due 3/30/24, then 5/30/24  Patient provided education and training to continue to administer this at home.   Side effects discussed and how to report  Schedule 2 month follow up for re-injection, 6 month follow up with ordering provider.       Biologic Injectable Administration Note  Diagnosis: Psoriasis  This is injection number 4    Informed consent: Discussed risks (Risks of hypersensitivity reaction, injection site reaction, conjunctivitis/keratitis, HSV reactivation, increased susceptibility to parasitic infections, inefficacy were reviewed.) Verbal consent obtained.   Preparation: After discussion potential procedure related risks including pain, bleeding, new infection, reactivation of latent infection, inefficacy, increased risk of malignancy, hypersensitivity reaction, injection site reaction, verbal consent was obtained. The areas were cleansed with alcohol prep pads and allowed to fully air dry for 3 minutes.  Procedure Details:  100 mg was injected subcutaneously in the left thigh  Lot Number: [id number]  Expiration: 05/2025  Total Injected: 100 mg  NDC: 88230-727-64     Patient tolerated procedure well, with minimal pinpoint bleeding that was controlled with pressure. Aftercare was reviewed.       WHAT IS TREMFYA® (guselkumab)?     TREMFYA® is a prescription medicine used to treat adults with moderate  to severe plaque psoriasis who may benefit from taking injections or pills (systemic therapy) or phototherapy (treatment using ultraviolet or UV light).  TREMFYA® is a prescription medicine used to treat adults with active psoriatic arthritis.    IMPORTANT SAFETY INFORMATION     What is the most important information I should know about TREMFYA®?  TREMFYA® is a prescription medicine that may cause serious side effects, including:  Serious Allergic Reactions.   Stop using   TREMFYA®     and get emergency medical help right away if you develop any of the following symptoms of a serious allergic reaction:   fainting, dizziness, feeling lightheaded (low blood pressure)  swelling of your face, eyelids, lips, mouth, tongue or throat  trouble breathing or throat tightness  chest tightness  skin rash, hives  itching    Infections.   TREMFYA®   may lower the ability of your immune system to fight infections and may increase your risk of infections. Your healthcare provider should check you for infections and tuberculosis (TB) before starting treatment with TREMFYA®   and may treat you for TB before you begin treatment with TREMFYA®   if you have a history of TB or have active TB. Your healthcare provider should watch you closely for signs and symptoms of TB during and after treatment with TREMFYA®.   Tell your healthcare provider right away if you have an infection or have symptoms of an infection, including:  fever, sweats, or chills  muscle aches  weight loss  cough  warm, red, or painful skin or sores on your body different from your psoriasis  diarrhea or stomach pain  shortness of breath  blood in your phlegm (mucus)  burning when you urinate or urinating more often than normal    Do not take TREMFYA® if you have had a serious allergic reaction to guselkumab or any of the ingredients in TREMFYA®.  Before using TREMFYA®, tell your healthcare provider about all of your medical conditions, including if you:  have any of  the conditions or symptoms listed in the section “What is the most important information I should know about TREMFYA®?”  have an infection that does not go away or that keeps coming back.  have TB or have been in close contact with someone with TB.  have recently received or are scheduled to receive an immunization (vaccine). You should avoid receiving live vaccines during treatment with TREMFYA®.  are pregnant or plan to become pregnant. It is not known if TREMFYA® can harm your unborn baby.  are breastfeeding or plan to breastfeed. It is not known if TREMFYA® passes into your breast milk.  Tell your healthcare provider about all the medicines you take, including prescription and over-the-counter medicines, vitamins, and herbal supplements.  What are the possible side effects of TREMFYA®?  TREMFYA® may cause serious side effects. See “What is the most important information I should know about TREMFYA®?”  The most common side effects of TREMFYA® include: upper respiratory infections, headache, injection site reactions, joint pain (arthralgia), diarrhea, stomach flu (gastroenteritis), fungal skin infections, herpes simplex infections, and bronchitis.  These are not all the possible side effects of TREMFYA®. Call your doctor for medical advice about side effects.  Use TREMFYA® exactly as your healthcare provider tells you to use it.  Please read the full Prescribing Information, including Medication Guide for TREMFYA®, and discuss any questions that you have with your doctor.  You are encouraged to report negative side effects of prescription drugs to the FDA. Visit www.fda.gov/medwatch, or call 1-804-QQV-3506.     NEOPLASM OF UNCERTAIN BEHAVIOR OF SKIN    Physical Exam:  (Anatomic Location); (Size and Morphological Description); (Differential Diagnosis):  Left mid abdomen; 0.6 x 0.8 cm pink to brown papule; ddx atypical nevus vs dermatofibroma    Pertinent Positives:  Pertinent Negatives:    Additional History of  "Present Condition:  patient reports PCP advised to have biopsied    Assessment and Plan:  I have discussed with the patient that a sample of skin via a \"skin biopsy” would be potentially helpful to further make a specific diagnosis under the microscope.  Based on a thorough discussion of this condition and the management approach to it (including a comprehensive discussion of the known risks, side effects and potential benefits of treatment), the patient (family) agrees to implement the following specific plan:    Procedure:  Skin Biopsy.  After a thorough discussion of treatment options and risk/benefits/alternatives (including but not limited to local pain, scarring, dyspigmentation, blistering, possible superinfection, and inability to confirm a diagnosis via histopathology), verbal and written consent were obtained and portion of the rash was biopsied for tissue sample.  See below for consent that was obtained from patient and subsequent Procedure Note.    PROCEDURE TANGENTIAL (SHAVE) BIOPSY NOTE:    Performing Physician:   Anatomic Location; Clinical Description with size (cm); Pre-Op Diagnosis:   Left mid abdomen; 0.6 x 0.8 cm pink to brown papule; ddx atypical nevus vs dermatofibroma  Post-op diagnosis: Same     Local anesthesia: 1% xylocaine with epi      Topical anesthesia: None    Hemostasis: Aluminum chloride       After obtaining informed consent  at which time there was a discussion about the purpose of biopsy  and low risks of infection and bleeding.  The area was prepped and draped in the usual fashion. Anesthesia was obtained with 1% lidocaine with epinephrine. A shave biopsy to an appropriate sampling depth was obtained by Shave (Dermablade or 15 blade) The resulting wound was covered with surgical ointment and bandaged appropriately.     The patient tolerated the procedure well without complications and was without signs of functional compromise.      Specimen has been sent for review by " "Dermatopathology.    Standard post-procedure care has been explained and has been included in written form within the patient's copy of Informed Consent.    INFORMED CONSENT DISCUSSION AND POST-OPERATIVE INSTRUCTIONS FOR PATIENT    I.  RATIONALE FOR PROCEDURE  I understand that a skin biopsy allows the Dermatologist to test a lesion or rash under the microscope to obtain a diagnosis.  It usually involves numbing the area with numbing medication and removing a small piece of skin; sometimes the area will be closed with sutures. In this specific procedure, sutures are not usually needed.  If any sutures are placed, then they are usually need to be removed in 2 weeks or less.    I understand that my Dermatologist recommends that a skin \"shave\" biopsy be performed today.  A local anesthetic, similar to the kind that a dentist uses when filling a cavity, will be injected with a very small needle into the skin area to be sampled.  The injected skin and tissue underneath \"will go to sleep” and become numb so no pain should be felt afterwards.  An instrument shaped like a tiny \"razor blade\" (shave biopsy instrument) will be used to cut a small piece of tissue and skin from the area so that a sample of tissue can be taken and examined more closely under the microscope.  A slight amount of bleeding will occur, but it will be stopped with direct pressure and a pressure bandage and any other appropriate methods.  I understands that a scar will form where the wound was created.  Surgical ointment will be applied to help protect the wound.  Sutures are not usually needed.    II.  RISKS AND POTENTIAL COMPLICATIONS   I understand the risks and potential complications of a skin biopsy include but are not limited to the following:  Bleeding  Infection  Pain  Scar/keloid  Skin discoloration  Incomplete Removal  Recurrence  Nerve Damage/Numbness/Loss of Function  Allergic Reaction to Anesthesia  Biopsies are diagnostic procedures and " "based on findings additional treatment or evaluation may be required  Loss or destruction of specimen resulting in no additional findings    My Dermatologist has explained to me the nature of the condition, the nature of the procedure, and the benefits to be reasonably expected compared with alternative approaches.  My Dermatologist has discussed the likelihood of major risks or complications of this procedure including the specific risks listed above, such as bleeding, infection, and scarring/keloid.  I understand that a scar is expected after this procedure.  I understand that my physician cannot predict if the scar will form a \"keloid,\" which extends beyond the borders of the wound that is created.  A keloid is a thick, painful, and bumpy scar.  A keloid can be difficult to treat, as it does not always respond well to therapy, which includes injecting cortisone directly into the keloid every few weeks.  While this usually reduces the pain and size of the scar, it does not eliminate it.      I understand that photographs may be taken before and after the procedure.  These will be maintained as part of the medical providers confidential records and may not be made available to me.  I further authorize the medical provider to use the photographs for teaching purposes or to illustrate scientific papers, books, or lectures if in his/her judgment, medical research, education, or science may benefit from its use.    I have had an opportunity to fully inquire about the risks and benefits of this procedure and its alternatives.   I have been given ample time and opportunity to ask questions and to seek a second opinion if I wished to do so.  I acknowledge that there have specifically been no guarantees as to the cosmetic results from the procedure.  I am aware that with any procedure there is always the possibility of an unexpected complication.    III. POST-PROCEDURAL CARE (WHAT YOU WILL NEED TO DO \"AFTER THE BIOPSY\" TO " "OPTIMIZE HEALING)    Keep the area clean and dry.  Try NOT to remove the bandage or get it wet for the first 24 hours.    Gently clean the area and apply surgical ointment (such as Vaseline petrolatum ointment, which is available \"over the counter\" and not a prescription) to the biopsy site for up to 2 weeks straight.  This acts to protect the wound from the outside world.      Sutures are not usually placed in this procedure.  If any sutures were placed, return for suture removal as instructed (generally 1 week for the face, 2 weeks for the body).      Take Acetaminophen (Tylenol) for discomfort, if no contraindications.  Ibuprofen or aspirin could make bleeding worse.    Call our office immediately for signs of infection: fever, chills, increased redness, warmth, tenderness, discomfort/pain, or pus or foul smell coming from the wound.    WHAT TO DO IF THERE IS ANY BLEEDING?  If a small amount of bleeding is noticed, place a clean cloth over the area and apply firm pressure for ten minutes.  Check the wound after 10 minutes of direct pressure.  If bleeding persists, try one more time for an additional 10 minutes of direct pressure on the area.  If the bleeding becomes heavier or does not stop after the second attempt, or if you have any other questions about this procedure, then please call your [facility]'s Dermatologist by calling [phone] (SKIN).     I hereby acknowledge that I have reviewed and verified the site with my Dermatologist and have requested and authorized my Dermatologist to proceed with the procedure.      Cristiane Bellamy MD  Dermatology, PGY-3      "

## 2024-01-31 NOTE — PATIENT INSTRUCTIONS
TREMFYA Biologic Injectable Administration       WHAT IS TREMFYA® (guselkumab)?     TREMFYA® is a prescription medicine used to treat adults with moderate to severe plaque psoriasis who may benefit from taking injections or pills (systemic therapy) or phototherapy (treatment using ultraviolet or UV light).  TREMFYA® is a prescription medicine used to treat adults with active psoriatic arthritis.    IMPORTANT SAFETY INFORMATION     What is the most important information I should know about TREMFYA®?  TREMFYA® is a prescription medicine that may cause serious side effects, including:  Serious Allergic Reactions.   Stop using   TREMFYA®     and get emergency medical help right away if you develop any of the following symptoms of a serious allergic reaction:   fainting, dizziness, feeling lightheaded (low blood pressure)  swelling of your face, eyelids, lips, mouth, tongue or throat  trouble breathing or throat tightness  chest tightness  skin rash, hives  itching    Infections.   TREMFYA®   may lower the ability of your immune system to fight infections and may increase your risk of infections. Your healthcare provider should check you for infections and tuberculosis (TB) before starting treatment with TREMFYA®   and may treat you for TB before you begin treatment with TREMFYA®   if you have a history of TB or have active TB. Your healthcare provider should watch you closely for signs and symptoms of TB during and after treatment with TREMFYA®.   Tell your healthcare provider right away if you have an infection or have symptoms of an infection, including:  fever, sweats, or chills  muscle aches  weight loss  cough  warm, red, or painful skin or sores on your body different from your psoriasis  diarrhea or stomach pain  shortness of breath  blood in your phlegm (mucus)  burning when you urinate or urinating more often than normal    Do not take TREMFYA® if you have had a serious allergic reaction to guselkumab or any of  the ingredients in TREMFYA®.  Before using TREMFYA®, tell your healthcare provider about all of your medical conditions, including if you:  have any of the conditions or symptoms listed in the section “What is the most important information I should know about TREMFYA®?”  have an infection that does not go away or that keeps coming back.  have TB or have been in close contact with someone with TB.  have recently received or are scheduled to receive an immunization (vaccine). You should avoid receiving live vaccines during treatment with TREMFYA®.  are pregnant or plan to become pregnant. It is not known if TREMFYA® can harm your unborn baby.  are breastfeeding or plan to breastfeed. It is not known if TREMFYA® passes into your breast milk.  Tell your healthcare provider about all the medicines you take, including prescription and over-the-counter medicines, vitamins, and herbal supplements.  What are the possible side effects of TREMFYA®?  TREMFYA® may cause serious side effects. See “What is the most important information I should know about TREMFYA®?”  The most common side effects of TREMFYA® include: upper respiratory infections, headache, injection site reactions, joint pain (arthralgia), diarrhea, stomach flu (gastroenteritis), fungal skin infections, herpes simplex infections, and bronchitis.  These are not all the possible side effects of TREMFYA®. Call your doctor for medical advice about side effects.  Use TREMFYA® exactly as your healthcare provider tells you to use it.  Please read the full Prescribing Information, including Medication Guide for TREMFYA®, and discuss any questions that you have with your doctor.  You are encouraged to report negative side effects of prescription drugs to the FDA. Visit www.fda.gov/medwatch, or call 8-998-FDA-7217.     NEOPLASM OF UNCERTAIN BEHAVIOR OF SKIN    INFORMED CONSENT DISCUSSION AND POST-OPERATIVE INSTRUCTIONS FOR PATIENT    I.  RATIONALE FOR PROCEDURE  I  "understand that a skin biopsy allows the Dermatologist to test a lesion or rash under the microscope to obtain a diagnosis.  It usually involves numbing the area with numbing medication and removing a small piece of skin; sometimes the area will be closed with sutures. In this specific procedure, sutures are not usually needed.  If any sutures are placed, then they are usually need to be removed in 2 weeks or less.    I understand that my Dermatologist recommends that a skin \"shave\" biopsy be performed today.  A local anesthetic, similar to the kind that a dentist uses when filling a cavity, will be injected with a very small needle into the skin area to be sampled.  The injected skin and tissue underneath \"will go to sleep” and become numb so no pain should be felt afterwards.  An instrument shaped like a tiny \"razor blade\" (shave biopsy instrument) will be used to cut a small piece of tissue and skin from the area so that a sample of tissue can be taken and examined more closely under the microscope.  A slight amount of bleeding will occur, but it will be stopped with direct pressure and a pressure bandage and any other appropriate methods.  I understands that a scar will form where the wound was created.  Surgical ointment will be applied to help protect the wound.  Sutures are not usually needed.    II.  RISKS AND POTENTIAL COMPLICATIONS   I understand the risks and potential complications of a skin biopsy include but are not limited to the following:  Bleeding  Infection  Pain  Scar/keloid  Skin discoloration  Incomplete Removal  Recurrence  Nerve Damage/Numbness/Loss of Function  Allergic Reaction to Anesthesia  Biopsies are diagnostic procedures and based on findings additional treatment or evaluation may be required  Loss or destruction of specimen resulting in no additional findings    My Dermatologist has explained to me the nature of the condition, the nature of the procedure, and the benefits to be " "reasonably expected compared with alternative approaches.  My Dermatologist has discussed the likelihood of major risks or complications of this procedure including the specific risks listed above, such as bleeding, infection, and scarring/keloid.  I understand that a scar is expected after this procedure.  I understand that my physician cannot predict if the scar will form a \"keloid,\" which extends beyond the borders of the wound that is created.  A keloid is a thick, painful, and bumpy scar.  A keloid can be difficult to treat, as it does not always respond well to therapy, which includes injecting cortisone directly into the keloid every few weeks.  While this usually reduces the pain and size of the scar, it does not eliminate it.      I understand that photographs may be taken before and after the procedure.  These will be maintained as part of the medical providers confidential records and may not be made available to me.  I further authorize the medical provider to use the photographs for teaching purposes or to illustrate scientific papers, books, or lectures if in his/her judgment, medical research, education, or science may benefit from its use.    I have had an opportunity to fully inquire about the risks and benefits of this procedure and its alternatives.   I have been given ample time and opportunity to ask questions and to seek a second opinion if I wished to do so.  I acknowledge that there have specifically been no guarantees as to the cosmetic results from the procedure.  I am aware that with any procedure there is always the possibility of an unexpected complication.    III. POST-PROCEDURAL CARE (WHAT YOU WILL NEED TO DO \"AFTER THE BIOPSY\" TO OPTIMIZE HEALING)    Keep the area clean and dry.  Try NOT to remove the bandage or get it wet for the first 24 hours.    Gently clean the area and apply surgical ointment (such as Vaseline petrolatum ointment, which is available \"over the counter\" and not a " prescription) to the biopsy site for up to 2 weeks straight.  This acts to protect the wound from the outside world.      Sutures are not usually placed in this procedure.  If any sutures were placed, return for suture removal as instructed (generally 1 week for the face, 2 weeks for the body).      Take Acetaminophen (Tylenol) for discomfort, if no contraindications.  Ibuprofen or aspirin could make bleeding worse.    Call our office immediately for signs of infection: fever, chills, increased redness, warmth, tenderness, discomfort/pain, or pus or foul smell coming from the wound.    WHAT TO DO IF THERE IS ANY BLEEDING?  If a small amount of bleeding is noticed, place a clean cloth over the area and apply firm pressure for ten minutes.  Check the wound after 10 minutes of direct pressure.  If bleeding persists, try one more time for an additional 10 minutes of direct pressure on the area.  If the bleeding becomes heavier or does not stop after the second attempt, or if you have any other questions about this procedure, then please call your [facility]'s Dermatologist by calling [phone] (SKIN).     I hereby acknowledge that I have reviewed and verified the site with my Dermatologist and have requested and authorized my Dermatologist to proceed with the procedure.

## 2024-02-05 PROCEDURE — 88307 TISSUE EXAM BY PATHOLOGIST: CPT | Performed by: STUDENT IN AN ORGANIZED HEALTH CARE EDUCATION/TRAINING PROGRAM

## 2024-02-05 PROCEDURE — 88342 IMHCHEM/IMCYTCHM 1ST ANTB: CPT | Performed by: STUDENT IN AN ORGANIZED HEALTH CARE EDUCATION/TRAINING PROGRAM

## 2024-02-05 PROCEDURE — 88341 IMHCHEM/IMCYTCHM EA ADD ANTB: CPT | Performed by: STUDENT IN AN ORGANIZED HEALTH CARE EDUCATION/TRAINING PROGRAM

## 2024-02-06 NOTE — RESULT ENCOUNTER NOTE
DERMATOPATHOLOGY RESULT NOTE    Results reviewed by ordering physician.  Called patient to personally discuss results. Discussed results with patient.       Instructions for Clinical Derm Team:   (remember to route Result Note to appropriate staff):    None    Result & Plan by Specimen:    Specimen A: benign  Plan: reassured, benign    Tissue Exam: [id number]  Order: [id number]  Status: Final result      Visible to patient: Yes (not seen)      Dx: Neoplasm of uncertain behavior of skin    0 Result Notes     Component   Case Report  Surgical Pathology Report                         Case: [id number]                                  Authorizing Provider:  Cristiane Bellamy MD           Collected:           01/31/2024 1134              Ordering Location:     [facility]      Received:            01/31/2024 1135                                     Specialty [place]                                                          Pathologist:           Jacky Miguel MD                                                          Specimen:    Skin, Other, A: left mid abdomen                                                        Final Diagnosis  A. Skin, left mid abdomen, shave biopsy:    Dermal spindle cell proliferation; transected (see note).    Note: The histopathologic findings are suggestive of the superficial portion of a hypertrophic scar. 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 SOX10, desmin, CD31, CK AE1/AE3, SMA, and CD34 immunostains were reviewed and support the diagnosis. SOX10 immunostain highlights scattered dermal cells within the scar, which has been previously reported (Behrens EL et al. J Cutan Pathol. 2019 Aug;46(8):579-585; PMID: [id number]); these cells are most likely of non-melanocytic origin.    Electronically signed by Jacky Miguel MD on 2/5/2024 at  6:10 PM  Additional Information   All reported additional testing was  "performed with appropriately reactive controls.  These tests were developed and their performance characteristics determined by [facility] or appropriate performing facility, though some tests may be performed on tissues which have not been validated for performance characteristics (such as staining performed on alcohol exposed cell blocks and decalcified tissues).  Results should be interpreted with caution and in the context of the patients' clinical condition. These tests may not be cleared or approved by the U.S. Food and Drug Administration, though the FDA has determined that such clearance or approval is not necessary. These tests are used for clinical purposes and they should not be regarded as investigational or for research. This laboratory has been approved by [place] 88, designated as a high-complexity laboratory and is qualified to perform these tests.  .  Gross Description   A. The specimen is received in formalin, labeled with the patient's name and hospital number, and is designated \" left mid abdomen\".  The specimen consists of a 1.0 x 0.8 x 0.1 cm shave biopsy of tan-brown skin.  The presumed epithelial surface appears keratotic and is inked red and the margin of resection is inked green.  The specimen is serially sectioned revealing grossly unremarkable cut surfaces and is entirely submitted between sponges, 1 cassette.    Note: The estimated total formalin fixation time based upon information provided by the submitting clinician and the standard processing schedule is under 72 hours.  -Amrik Ariza  Clinical Information   ATTENTION:  DERMPATH GROUP    SPECIMEN A; Skin; Anatomic Location: left mid abdomen; Procedure/Protocol: Skin Specimen (submit in [facility]):Tangential Biopsy (includes shave, scoop, saucerization, curette) (CPT 97808; each additional tangential biopsy is CPT 10588)  73 y.o. year old  Female with a Morphological Description: 0.6 x 0.8 cm pink to brown " papule  Differential Diagnosis and/or Specific Clinical Question:atypical nevus vs dermatofibroma  Resulting Agency [facility]          Specimen Collected: 01/31/24 11:34 AM Last Resulted: 02/05/24  6:10 PM

## 2024-02-15 DIAGNOSIS — E78.5 HYPERLIPIDEMIA, UNSPECIFIED HYPERLIPIDEMIA TYPE: ICD-10-CM

## 2024-02-15 RX ORDER — SIMVASTATIN 20 MG
20 TABLET ORAL
Qty: 90 TABLET | Refills: 0 | Status: SHIPPED | OUTPATIENT
Start: 2024-02-15

## 2024-02-16 NOTE — TELEPHONE ENCOUNTER
Called informed as per provider, patient requested labs to be mailed to her home, confirmed address and printed labs, placed in the front to mail.  
Simvastatin refilled x90 days.   Needs updated lipid profile > ordered.     Thanks!  
Attending Attestation (For Attendings USE Only)...

## 2024-02-21 DIAGNOSIS — L40.9 PSORIASIS: ICD-10-CM

## 2024-02-21 PROBLEM — Z00.00 MEDICARE ANNUAL WELLNESS VISIT, SUBSEQUENT: Status: RESOLVED | Noted: 2019-01-07 | Resolved: 2024-02-21

## 2024-02-21 RX ORDER — APREMILAST 30 MG/1
2 TABLET, FILM COATED ORAL DAILY
Qty: 90 TABLET | Refills: 1 | Status: SHIPPED | OUTPATIENT
Start: 2024-02-21

## 2024-02-26 LAB — HBA1C MFR BLD HPLC: 9.8 %

## 2024-03-08 ENCOUNTER — APPOINTMENT (EMERGENCY)
Dept: RADIOLOGY | Facility: HOSPITAL | Age: 74
End: 2024-03-08
Payer: COMMERCIAL

## 2024-03-08 ENCOUNTER — HOSPITAL ENCOUNTER (EMERGENCY)
Facility: HOSPITAL | Age: 74
Discharge: HOME | End: 2024-03-08
Attending: EMERGENCY MEDICINE
Payer: COMMERCIAL

## 2024-03-08 VITALS
DIASTOLIC BLOOD PRESSURE: 65 MMHG | HEART RATE: 77 BPM | SYSTOLIC BLOOD PRESSURE: 151 MMHG | BODY MASS INDEX: 32.53 KG/M2 | OXYGEN SATURATION: 98 % | RESPIRATION RATE: 18 BRPM | WEIGHT: 155.65 LBS | TEMPERATURE: 98.5 F

## 2024-03-08 DIAGNOSIS — S63.501A SPRAIN OF RIGHT WRIST, INITIAL ENCOUNTER: Primary | ICD-10-CM

## 2024-03-08 PROCEDURE — 73110 X-RAY EXAM OF WRIST: CPT

## 2024-03-08 PROCEDURE — 99283 EMERGENCY DEPT VISIT LOW MDM: CPT

## 2024-03-08 PROCEDURE — 96372 THER/PROPH/DIAG INJ SC/IM: CPT

## 2024-03-08 PROCEDURE — 29130 APPL FINGER SPLINT STATIC: CPT | Performed by: PHYSICIAN ASSISTANT

## 2024-03-08 PROCEDURE — 73130 X-RAY EXAM OF HAND: CPT

## 2024-03-08 PROCEDURE — 99284 EMERGENCY DEPT VISIT MOD MDM: CPT | Performed by: PHYSICIAN ASSISTANT

## 2024-03-08 RX ORDER — KETOROLAC TROMETHAMINE 30 MG/ML
15 INJECTION, SOLUTION INTRAMUSCULAR; INTRAVENOUS ONCE
Status: COMPLETED | OUTPATIENT
Start: 2024-03-08 | End: 2024-03-08

## 2024-03-08 RX ADMIN — KETOROLAC TROMETHAMINE 15 MG: 30 INJECTION, SOLUTION INTRAMUSCULAR; INTRAVENOUS at 20:28

## 2024-03-09 NOTE — ED PROVIDER NOTES
"History  Chief Complaint   Patient presents with    Fall     Pt arrives to ED via EMS from home. Pt reports at approx 1400, she tripped on uneven sidewalk catching herself w both wrists; -HS, -LOC, -thinners. Pt reports 8/10 bilateral wrist pain. Pt took Tylenol at appox 1730 w no relief. Pt reports no numbness/tingling, full sensation present.      Patient is a 73-year-old female with a history of hypothyroidism, hyperlipidemia, hypertension, and surgical history of total hysterectomy that presents to the emergency department with dull aching nonradiating right wrist pain for approximately 6 hours.  Patient denies associated symptoms.  Patient states that she was walking outside, \"I tripped and I landed with both hands on the ground.\"  Thereby resulting in right wrist pain.  Patient denies history of right wrist injury.  Patient affirms palliative factors of Tylenol, approximately 1 g taken approximately 4 hours prior to ED presentation.  Patient affirms provocative factors of pressure to right wrist.  Patient denies not effective treatment.  Patient denies fevers, chills, nausea, vomiting, diarrhea, constipation and urinary symptoms.  Patient denies numbness, tingling and loss of power of any of all extremities.  Patient denies dizziness or lightheadedness prior to reported recent ground-level fall approximately 1 to 2 feet in height on concrete.  Patient denies head strike.  Patient denies chest pain, shortness of breath, and abdominal pain.      History provided by:  Patient   used: No    Fall  Associated symptoms: no abdominal pain, no back pain, no chest pain, no headaches, no nausea, no neck pain and no vomiting        Prior to Admission Medications   Prescriptions Last Dose Informant Patient Reported? Taking?   Apremilast (Otezla) 30 MG TABS   No No   Sig: TAKE 2 TABLETS BY MOUTH DAILY   Diclofenac Sodium (VOLTAREN) 1 %  Self No No   Sig: Apply 4 g topically 4 (four) times a day "   Guselkumab 100 MG/ML SOSY  Self No No   Sig: Please inject 100mg (1 pen) subcutaneously once on week 0, week 4, then every 8 weeks thereafter.   Misc. Devices (GetGo Rolling Walker) MISC  Self No No   Sig: Use if needed (ambulatory assistance)   acetaminophen (TYLENOL) 650 mg CR tablet  Self No No   Sig: Take 1 tablet (650 mg total) by mouth every 8 (eight) hours as needed for moderate pain   amLODIPine (NORVASC) 10 mg tablet  Self No No   Sig: take 1 tablet by mouth once daily   calcium carbonate-vitamin D (OSCAL-D) 500 mg-200 units per tablet  Self No No   Sig: Take 2 tablets by mouth daily with breakfast   clindamycin (CLEOCIN T) 1 % lotion  Self Yes No   fluocinonide (LIDEX) 0.05 % cream  Self No No   Sig: Apply topically 2 (two) times a day For up to 14 days.   hydrochlorothiazide (HYDRODIURIL) 25 mg tablet  Self No No   Sig: take 1 tablet by mouth once daily   levothyroxine 50 mcg tablet   No No   Sig: Take 1 tablet (50 mcg total) by mouth daily   lidocaine (XYLOCAINE) 2 % topical gel  Self No No   Sig: Apply topically as needed for mild pain   lisinopril (ZESTRIL) 20 mg tablet   No No   Sig: take 1 tablet by mouth once daily   meclizine (ANTIVERT) 25 mg tablet  Self No No   Sig: Take 0.5 tablets (12.5 mg total) by mouth 3 (three) times a day as needed for dizziness   mirtazapine (REMERON) 15 mg tablet  Self No No   Sig: Take 1 tablet (15 mg total) by mouth daily at bedtime Take 15mg (one tablet) at night for three (3) days and then start on 30mg (two tablets) at night onwards.   nystatin (MYCOSTATIN) powder  Self No No   Sig: Apply topically 4 (four) times a day   ondansetron (Zofran) 4 mg tablet  Self No No   Sig: Take 2 tablets (8 mg total) by mouth every 8 (eight) hours as needed for nausea or vomiting for up to 10 doses Zofran odt 2 tablets dissolve in the mouth  every 8 hrs as needed for nausea/ vomiting   simvastatin (ZOCOR) 20 mg tablet   No No   Sig: Take 1 tablet (20 mg total) by mouth daily at  bedtime      Facility-Administered Medications: None       Past Medical History:   Diagnosis Date    Breast cancer (HCC) 1996 lt mastectomy    Disease of thyroid gland     Hemorrhoids     Hyperlipidemia     Hypertension     Obesity (BMI 30.0-34.9) 2021    Psoriasis        Past Surgical History:   Procedure Laterality Date    BREAST CYST EXCISION Right     benign    BREAST LUMPECTOMY Left      SECTION      COLONOSCOPY      Complete    MASTECTOMY Left     Onset: 1996    SALPINGOOPHORECTOMY Bilateral     age 42    TOTAL ABDOMINAL HYSTERECTOMY      age 42       Family History   Problem Relation Age of Onset    Arthritis Mother     Diabetes Mother     Breast cancer Mother 72    Colon cancer Mother     Hypertension Mother     Diabetes Father     Glaucoma Father     Prostate cancer Father     Asthma Brother     Hypertension Brother     Colon cancer Maternal Uncle 84    Breast cancer Maternal Aunt 80    No Known Problems Maternal Grandmother     No Known Problems Paternal Grandmother     No Known Problems Maternal Aunt     No Known Problems Maternal Aunt     No Known Problems Paternal Aunt     No Known Problems Paternal Aunt     No Known Problems Paternal Grandfather     Breast cancer Cousin      I have reviewed and agree with the history as documented.    E-Cigarette/Vaping    E-Cigarette Use Never User      E-Cigarette/Vaping Substances    Nicotine No     THC No     CBD No     Flavoring No     Other No     Unknown No      Social History     Tobacco Use    Smoking status: Never    Smokeless tobacco: Never   Vaping Use    Vaping status: Never Used   Substance Use Topics    Alcohol use: No    Drug use: No       Review of Systems   Constitutional:  Negative for activity change, appetite change, chills and fever.   HENT:  Negative for congestion, postnasal drip, rhinorrhea, sinus pressure, sinus pain, sore throat and tinnitus.    Eyes:  Negative for photophobia and visual disturbance.    Respiratory:  Negative for cough, chest tightness and shortness of breath.    Cardiovascular:  Negative for chest pain and palpitations.   Gastrointestinal:  Negative for abdominal pain, constipation, diarrhea, nausea and vomiting.   Genitourinary:  Negative for difficulty urinating, dysuria, flank pain, frequency and urgency.   Musculoskeletal:  Positive for arthralgias. Negative for back pain, gait problem, neck pain and neck stiffness.   Skin:  Negative for pallor and rash.   Allergic/Immunologic: Negative for environmental allergies and food allergies.   Neurological:  Negative for dizziness, weakness, numbness and headaches.   Psychiatric/Behavioral:  Negative for confusion.    All other systems reviewed and are negative.      Physical Exam  Physical Exam  Vitals and nursing note reviewed.   Constitutional:       General: She is awake.      Appearance: Normal appearance. She is well-developed and normal weight. She is not ill-appearing, toxic-appearing or diaphoretic.   HENT:      Head: Normocephalic and atraumatic.      Jaw: There is normal jaw occlusion.      Right Ear: Hearing, tympanic membrane and external ear normal. No decreased hearing noted. No drainage, swelling or tenderness. No mastoid tenderness.      Left Ear: Hearing, tympanic membrane and external ear normal. No decreased hearing noted. No drainage, swelling or tenderness. No mastoid tenderness.      Nose: Nose normal.      Mouth/Throat:      Lips: Pink.      Mouth: Mucous membranes are moist.      Pharynx: Oropharynx is clear. Uvula midline.   Eyes:      General: Lids are normal. Vision grossly intact. Gaze aligned appropriately.         Right eye: No discharge.         Left eye: No discharge.      Extraocular Movements: Extraocular movements intact.      Conjunctiva/sclera: Conjunctivae normal.      Pupils: Pupils are equal, round, and reactive to light.   Neck:      Vascular: No JVD.      Trachea: Trachea and phonation normal. No tracheal  tenderness or tracheal deviation.      Comments: No midline tenderness, no stepoffs  No tenderness with passive and active cervical ROM with head turning approximately 45 degree angles to the left and right  No cervical tenderness with cranial axial loading    Cardiovascular:      Rate and Rhythm: Normal rate and regular rhythm.      Pulses: Normal pulses.           Radial pulses are 2+ on the right side and 2+ on the left side.        Posterior tibial pulses are 2+ on the right side and 2+ on the left side.      Heart sounds: Normal heart sounds.   Pulmonary:      Effort: Pulmonary effort is normal.      Breath sounds: Normal breath sounds and air entry. No stridor. No decreased breath sounds, wheezing, rhonchi or rales.   Chest:      Chest wall: No tenderness.   Abdominal:      General: Abdomen is flat. Bowel sounds are normal. There is no distension.      Palpations: Abdomen is soft. Abdomen is not rigid.      Tenderness: There is no abdominal tenderness. There is no guarding or rebound.   Musculoskeletal:         General: Normal range of motion.      Right wrist: Tenderness, bony tenderness and snuff box tenderness present.      Cervical back: Full passive range of motion without pain, normal range of motion and neck supple. No rigidity. No spinous process tenderness or muscular tenderness. Normal range of motion.      Comments: Passive ROM intact  lower extremity 5/5 bilaterally  Left upper extremity 5/5; right upper extremity, shoulder 5/5, elbow 5/5, wrist 4/5  Neurovascularly intact  No grinding or clicking of joints       Feet:      Right foot:      Toenail Condition: Right toenails are normal.      Left foot:      Toenail Condition: Left toenails are normal.   Lymphadenopathy:      Head:      Right side of head: No submental, submandibular, tonsillar, preauricular, posterior auricular or occipital adenopathy.      Left side of head: No submental, submandibular, tonsillar, preauricular, posterior auricular  or occipital adenopathy.      Cervical: No cervical adenopathy.      Right cervical: No superficial, deep or posterior cervical adenopathy.     Left cervical: No superficial, deep or posterior cervical adenopathy.   Skin:     General: Skin is warm.      Capillary Refill: Capillary refill takes less than 2 seconds.      Findings: No rash.   Neurological:      General: No focal deficit present.      Mental Status: She is alert and oriented to person, place, and time. Mental status is at baseline.      GCS: GCS eye subscore is 4. GCS verbal subscore is 5. GCS motor subscore is 6.      Cranial Nerves: Cranial nerves 2-12 are intact.   Psychiatric:         Attention and Perception: Attention normal.         Mood and Affect: Mood normal.         Speech: Speech normal.         Behavior: Behavior normal. Behavior is cooperative.         Thought Content: Thought content normal.         Judgment: Judgment normal.         Vital Signs  ED Triage Vitals [03/08/24 1918]   Temperature Pulse Respirations Blood Pressure SpO2   98.5 °F (36.9 °C) 77 18 151/65 98 %      Temp Source Heart Rate Source Patient Position - Orthostatic VS BP Location FiO2 (%)   Oral Monitor Sitting Right arm --      Pain Score       8           Vitals:    03/08/24 1918   BP: 151/65   Pulse: 77   Patient Position - Orthostatic VS: Sitting         Visual Acuity      ED Medications  Medications   ketorolac (TORADOL) injection 15 mg (15 mg Intramuscular Given 3/8/24 2028)       Diagnostic Studies  Results Reviewed       None                   XR wrist 3+ views RIGHT    (Results Pending)   XR hand 3+ views RIGHT    (Results Pending)              Procedures  Splint application    Date/Time: 3/8/2024 9:15 PM    Performed by: Dutch Sahu PA-C  Authorized by: Dutch Sahu PA-C  Universal Protocol:  Procedure performed by:  Consent: Verbal consent obtained.  Risks and benefits: risks, benefits and alternatives were discussed  Consent given by: patient  Time out:  "Immediately prior to procedure a \"time out\" was called to verify the correct patient, procedure, equipment, support staff and site/side marked as required.  Timeout called at: 3/8/2024 9:15 PM.  Patient understanding: patient states understanding of the procedure being performed  Patient identity confirmed: verbally with patient and arm band    Pre-procedure details:     Sensation:  Normal    Skin color:  Pink  Procedure details:     Laterality:  Right    Location:  Wrist    Wrist:  R wrist    Splint type:  Thumb spica    Supplies:  Cotton padding and Ortho-Glass  Post-procedure details:     Pain:  Improved    Sensation:  Normal    Skin color:  Pink    Patient tolerance of procedure:  Tolerated well, no immediate complications           ED Course                               SBIRT 20yo+      Flowsheet Row Most Recent Value   Initial Alcohol Screen: US AUDIT-C     1. How often do you have a drink containing alcohol? 0 Filed at: 03/08/2024 1920   2. How many drinks containing alcohol do you have on a typical day you are drinking?  0 Filed at: 03/08/2024 1920   3a. Male UNDER 65: How often do you have five or more drinks on one occasion? 0 Filed at: 03/08/2024 1920   3b. FEMALE Any Age, or MALE 65+: How often do you have 4 or more drinks on one occassion? 0 Filed at: 03/08/2024 1920   Audit-C Score 0 Filed at: 03/08/2024 1920   MARAL: How many times in the past year have you...    Used an illegal drug or used a prescription medication for non-medical reasons? Never Filed at: 03/08/2024 1920                      Medical Decision Making  Patient is a 73-year-old female with a history of hypothyroidism, hyperlipidemia, hypertension, and surgical history of total hysterectomy that presents to the emergency department with dull aching nonradiating right wrist pain for approximately 6 hours.   Hemodynamically stable and afebrile  Patient hemodynamically stable and afebrile  No sirs  Snuffbox tenderness right wrist  Imaging " right wrist and right hand with no acute osseous abnormality on initial read  Splint; thumb spica, and applied sling  Delivered multimodal pain control in the emergency department; patient demonstrates decrease in presenting right wrist ED symptomatology status post medication delivery  Ddx likely and not limited to scaphoid fracture, wrist sprain versus strain  Follow-up with orthopedics  Follow-up with PCP  Follow up with emergency department if symptoms persist or exacerbate  Patient demonstrates verbal understanding of all clinical laboratory and imaging findings, discharge instructions, follow-up, and verbally agrees with current treatment plan with teach back    *Due to voice recognition software, sound alike and misspelled words may be contained in the documentation*      Amount and/or Complexity of Data Reviewed  Radiology: ordered and independent interpretation performed. Decision-making details documented in ED Course.    Risk  Prescription drug management.             Disposition  Final diagnoses:   Sprain of right wrist, initial encounter     Time reflects when diagnosis was documented in both MDM as applicable and the Disposition within this note       Time User Action Codes Description Comment    3/8/2024  9:16 PM Dutch Sahu Add [S63.501A] Sprain of right wrist, initial encounter           ED Disposition       ED Disposition   Discharge    Condition   Stable    Date/Time   Fri Mar 8, 2024 2115    Comment   Lindsey Chandra discharge to home/self care.                   Follow-up Information       Follow up With Specialties Details Why Contact Info Additional Information    [address] [id number] [phone] [facility], [address] [place] [address], [id number] [phone]    [facility] Emergency Department Emergency Medicine   [address] [facility]  [place] [address]  [phone] [facility] Emergency Department, [address] [facility] [address] [employer], [place], [address]    [facility] [place] [facility]  Snuffbox tenderness status post FOOSH= right wrist [address] [facility]  [address]  [facility] [address] [phone] [facility] [place], [address], [address] [facility], [place], [address] [phone]            Discharge Medication List as of 3/8/2024  9:17 PM        CONTINUE these medications which have NOT CHANGED    Details   acetaminophen (TYLENOL) 650 mg CR tablet Take 1 tablet (650 mg total) by mouth every 8 (eight) hours as needed for moderate pain, Starting Thu 8/10/2023, Normal      amLODIPine (NORVASC) 10 mg tablet take 1 tablet by mouth once daily, Normal      Apremilast (Otezla) 30 MG TABS TAKE 2 TABLETS BY MOUTH DAILY, Starting Wed 2/21/2024, Normal      calcium carbonate-vitamin D (OSCAL-D) 500 mg-200 units per tablet Take 2 tablets by mouth daily with breakfast, Starting Tue 9/22/2020, Normal      clindamycin (CLEOCIN T) 1 % lotion Starting Mon 7/22/2019, Historical Med      Diclofenac Sodium (VOLTAREN) 1 % Apply 4 g topically 4 (four) times a day, Starting Mon 2/21/2022, Normal      fluocinonide (LIDEX) 0.05 % cream Apply topically 2 (two) times a day For up to 14 days., Starting Wed 6/28/2023, Normal      Guselkumab 100 MG/ML SOSY Please inject 100mg (1 pen) subcutaneously once on week 0, week 4, then every 8 weeks thereafter., Normal      hydrochlorothiazide (HYDRODIURIL) 25 mg tablet take 1 tablet by mouth once daily, Normal      levothyroxine 50 mcg tablet Take 1 tablet (50 mcg total) by mouth daily, Starting Tue 10/31/2023, Normal      lidocaine (XYLOCAINE) 2 % topical gel Apply topically as needed for mild pain, Starting Wed 9/29/2021, Normal      lisinopril (ZESTRIL) 20 mg tablet take 1 tablet by mouth once daily, Normal      meclizine (ANTIVERT) 25  mg tablet Take 0.5 tablets (12.5 mg total) by mouth 3 (three) times a day as needed for dizziness, Starting Fri 8/18/2023, Normal      mirtazapine (REMERON) 15 mg tablet Take 1 tablet (15 mg total) by mouth daily at bedtime Take 15mg (one tablet) at night for three (3) days and then start on 30mg (two tablets) at night onwards., Starting Fri 4/8/2022, Until Tue 6/20/2023, Normal      Misc. Devices (GetGo Rolling Walker) MISC Use if needed (ambulatory assistance), Starting Fri 8/18/2023, Normal      nystatin (MYCOSTATIN) powder Apply topically 4 (four) times a day, Starting Fri 9/24/2021, Normal      ondansetron (Zofran) 4 mg tablet Take 2 tablets (8 mg total) by mouth every 8 (eight) hours as needed for nausea or vomiting for up to 10 doses Zofran odt 2 tablets dissolve in the mouth  every 8 hrs as needed for nausea/ vomiting, Starting Mon 8/7/2023, Print      simvastatin (ZOCOR) 20 mg tablet Take 1 tablet (20 mg total) by mouth daily at bedtime, Starting Thu 2/15/2024, Normal             No discharge procedures on file.    PDMP Review         Value Time User    PDMP Reviewed  Yes 4/6/2022 11:23 PM Laly Judd MD            ED Provider  Electronically Signed by             Dutch Sahu PA-C  03/09/24 0054

## 2024-03-12 ENCOUNTER — TELEPHONE (OUTPATIENT)
Dept: FAMILY MEDICINE CLINIC | Facility: CLINIC | Age: 74
End: 2024-03-12

## 2024-03-12 NOTE — TELEPHONE ENCOUNTER
Patient called as she had a hand sprain and was seen at the ED, the tylenol is not helping her she is asking if there is another medication that can be sent in,     [facility].    Lindsey  [phone]

## 2024-03-15 ENCOUNTER — TELEPHONE (OUTPATIENT)
Dept: FAMILY MEDICINE CLINIC | Facility: CLINIC | Age: 74
End: 2024-03-15

## 2024-03-15 NOTE — TELEPHONE ENCOUNTER
Called patient, LVM to inquire about scheduled new patient appointment with a different family practice. HIPOLITO would like to confirm if patient is planning to transfer care to the office where appointment is scheduled on 3/22/24.

## 2024-03-18 NOTE — TELEPHONE ENCOUNTER
Patient called 9:26am. It was confirmed that she has an appointment with another doctor to establish care

## 2024-03-25 ENCOUNTER — HOSPITAL ENCOUNTER (OUTPATIENT)
Dept: RADIOLOGY | Facility: HOSPITAL | Age: 74
Discharge: HOME | End: 2024-03-25
Payer: COMMERCIAL

## 2024-03-25 ENCOUNTER — OFFICE VISIT (OUTPATIENT)
Dept: OBGYN CLINIC | Facility: CLINIC | Age: 74
End: 2024-03-25
Payer: COMMERCIAL

## 2024-03-25 VITALS — HEIGHT: 58 IN | BODY MASS INDEX: 32.75 KG/M2 | OXYGEN SATURATION: 98 % | WEIGHT: 156 LBS | HEART RATE: 93 BPM

## 2024-03-25 DIAGNOSIS — M25.331 SCAPHOLUNATE DISSOCIATION OF RIGHT WRIST: Primary | ICD-10-CM

## 2024-03-25 DIAGNOSIS — M25.531 PAIN IN RIGHT WRIST: ICD-10-CM
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
## 2024-03-25 PROCEDURE — 73110 X-RAY EXAM OF WRIST: CPT

## 2024-03-25 PROCEDURE — 99204 OFFICE O/P NEW MOD 45 MIN: CPT | Performed by: PHYSICIAN ASSISTANT

## 2024-03-25 NOTE — PROGRESS NOTES
Assessment/Plan   Diagnoses and all orders for this visit:    Scapholunate dissociation of right wrist    Pain in right wrist    Fall from standing, initial encounter    - Cockup splint  - Ice as needed  - Follow up with Dr. Robertson      Subjective   Patient ID: Lindsey Chandra is a 73 y.o. female.    Vitals:    24 1534   Pulse: 93   SpO2: 98%     74yo female comes in for an evaluation of her right wrist.   She was injured on 3/8/24 when she tripped and fell onto her outstretched hand.  Xrays in the ED were normal, but there was clinical concern for an occult scaphoid fracture.  She was treated with a splint.  Since then, she continues with generalized right wrist pain.  The pain is dull in character, mild in severity, does not radiate and is not associated with numbness.          The following portions of the patient's history were reviewed and updated as appropriate: allergies, current medications, past family history, past medical history, past social history, past surgical history, and problem list.    Review of Systems  Ortho Exam  Past Medical History:   Diagnosis Date    Breast cancer (HCC) 1996 lt mastectomy    Disease of thyroid gland     Hemorrhoids     Hyperlipidemia     Hypertension     Obesity (BMI 30.0-34.9) 2021    Psoriasis      Past Surgical History:   Procedure Laterality Date    BREAST CYST EXCISION Right     benign    BREAST LUMPECTOMY Left      SECTION      COLONOSCOPY      Complete    MASTECTOMY Left     Onset: 1996    SALPINGOOPHORECTOMY Bilateral     age 42    TOTAL ABDOMINAL HYSTERECTOMY      age 42     Family History   Problem Relation Age of Onset    Arthritis Mother     Diabetes Mother     Breast cancer Mother 72    Colon cancer Mother     Hypertension Mother     Diabetes Father     Glaucoma Father     Prostate cancer Father     Asthma Brother     Hypertension Brother     Colon cancer Maternal Uncle 84    Breast cancer Maternal Aunt 80     No Known Problems Maternal Grandmother     No Known Problems Paternal Grandmother     No Known Problems Maternal Aunt     No Known Problems Maternal Aunt     No Known Problems Paternal Aunt     No Known Problems Paternal Aunt     No Known Problems Paternal Grandfather     Breast cancer Cousin      Social History     Occupational History    Not on file   Tobacco Use    Smoking status: Never    Smokeless tobacco: Never   Vaping Use    Vaping status: Never Used   Substance and Sexual Activity    Alcohol use: No    Drug use: No    Sexual activity: Not Currently     Partners: Male       Review of Systems   Constitutional: Negative.    HENT: Negative.    Eyes: Negative.    Respiratory: Negative.    Cardiovascular: Negative.    Gastrointestinal: Negative.    Endocrine: Negative.    Genitourinary: Negative.    Musculoskeletal: As below..   Allergic/Immunologic: Negative.    Neurological: Negative.    Hematological: Negative.    Psychiatric/Behavioral: Negative.        Objective   Physical Exam    Constitutional: Awake, Alert, Oriented  Eyes: EOMI  Psych: Mood and affect appropriate  Heart: regular rate   Lungs: No audible wheezing  Abdomen: No guarding  Lymph: no lymphedema  right wrist:  Appearance  No swelling, discoloration, deformity, or ecchymosis  Palpation  She does have tenderness over the snuffbox, but it is no more or less then anywhere else about the entire wrist area.  + tenderness distal radius, distal ulna, druj, dorsal carpals, snuffbox, ecu, proximal hand  ROM  palmar flexion 90, dorsiflexion 90, pronation 90, supination 90,  radial deviation 25, ulnar deviation 25  Motor   5/5, wrist extension 5/5, and wrist flexion 5/5, interossi 5/5  Special Tests  Median/ulnar/radial nerve motor intact  NVI distally      Xrays:  Since it has been > 2 weeks, wrist xrays were repeated today with a scaphoid view.  The scapholunate space appears to be wider now than when compared to the previous xray.  No acute  displaced fracture. Radiology reading pending.

## 2024-03-27 ENCOUNTER — OFFICE VISIT (OUTPATIENT)
Dept: MULTI SPECIALTY CLINIC | Facility: CLINIC | Age: 74
End: 2024-03-27

## 2024-03-27 DIAGNOSIS — L40.9 PSORIASIS: Primary | ICD-10-CM

## 2024-03-27 PROCEDURE — 96372 THER/PROPH/DIAG INJ SC/IM: CPT | Performed by: DERMATOLOGY

## 2024-03-27 NOTE — PROGRESS NOTES
"[facility] Note     Patient Name: Lindsey Chandra  Encounter Date: 3/27/24     Have you been cared for by a [facility] Dermatologist in the last 3 years and, if so, which description applies to you?    Yes.  I have been here within the last 3 years, and my medical history has NOT changed since that time.  I am FEMALE/of child-bearing potential.    REVIEW OF SYSTEMS:  Have you recently had or currently have any of the following? No changes in my recent health.   PAST MEDICAL HISTORY:  Have you personally ever had or currently have any of the following?  If \"YES,\" then please provide more detail. No changes in my medical history.   HISTORY OF IMMUNOSUPPRESSION: Do you have a history of any of the following:  Systemic Immunosuppression such as Diabetes, Biologic or Immunotherapy, Chemotherapy, Organ Transplantation, Bone Marrow Transplantation?  No     Answering \"YES\" requires the addition of the dotphrase \"IMMUNOSUPPRESSED\" as the first diagnosis of the patient's visit.   FAMILY HISTORY:  Any \"first degree relatives\" (parent, brother, sister, or child) with the following?    No changes in my family's known health.   PATIENT EXPERIENCE:    Do you want the Dermatologist to perform a COMPLETE skin exam today including a clinical examination under the \"bra and underwear\" areas?  NO  If necessary, do we have your permission to call and leave a detailed message on your Preferred Phone number that includes your specific medical information?  Yes      No Known Allergies   Current Outpatient Medications:     acetaminophen (TYLENOL) 650 mg CR tablet, Take 1 tablet (650 mg total) by mouth every 8 (eight) hours as needed for moderate pain, Disp: 30 tablet, Rfl: 0    amLODIPine (NORVASC) 10 mg tablet, take 1 tablet by mouth once daily, Disp: 90 tablet, Rfl: 3    Apremilast (Otezla) 30 MG TABS, TAKE 2 TABLETS BY MOUTH DAILY, Disp: 90 tablet, Rfl: 1    calcium carbonate-vitamin D (OSCAL-D) 500 mg-200 units " per tablet, Take 2 tablets by mouth daily with breakfast, Disp: 120 tablet, Rfl: 3    clindamycin (CLEOCIN T) 1 % lotion, , Disp: , Rfl: 0    Diclofenac Sodium (VOLTAREN) 1 %, Apply 4 g topically 4 (four) times a day, Disp: 100 g, Rfl: 0    fluocinonide (LIDEX) 0.05 % cream, Apply topically 2 (two) times a day For up to 14 days., Disp: 120 g, Rfl: 1    Guselkumab 100 MG/ML SOSY, Please inject 100mg (1 pen) subcutaneously once on week 0, week 4, then every 8 weeks thereafter., Disp: 1 mL, Rfl: 7    hydrochlorothiazide (HYDRODIURIL) 25 mg tablet, take 1 tablet by mouth once daily, Disp: 90 tablet, Rfl: 3    levothyroxine 50 mcg tablet, Take 1 tablet (50 mcg total) by mouth daily, Disp: 90 tablet, Rfl: 1    lidocaine (XYLOCAINE) 2 % topical gel, Apply topically as needed for mild pain, Disp: 30 mL, Rfl: 0    lisinopril (ZESTRIL) 20 mg tablet, take 1 tablet by mouth once daily, Disp: 90 tablet, Rfl: 0    meclizine (ANTIVERT) 25 mg tablet, Take 0.5 tablets (12.5 mg total) by mouth 3 (three) times a day as needed for dizziness, Disp: 30 tablet, Rfl: 1    mirtazapine (REMERON) 15 mg tablet, Take 1 tablet (15 mg total) by mouth daily at bedtime Take 15mg (one tablet) at night for three (3) days and then start on 30mg (two tablets) at night onwards., Disp: 63 tablet, Rfl: 0    Misc. Devices (GetGo Rolling Walker) MISC, Use if needed (ambulatory assistance), Disp: 1 each, Rfl: 0    nystatin (MYCOSTATIN) powder, Apply topically 4 (four) times a day, Disp: 30 g, Rfl: 1    ondansetron (Zofran) 4 mg tablet, Take 2 tablets (8 mg total) by mouth every 8 (eight) hours as needed for nausea or vomiting for up to 10 doses Zofran odt 2 tablets dissolve in the mouth  every 8 hrs as needed for nausea/ vomiting, Disp: 10 tablet, Rfl: 0    simvastatin (ZOCOR) 20 mg tablet, Take 1 tablet (20 mg total) by mouth daily at bedtime, Disp: 90 tablet, Rfl: 0          Whom besides the patient is providing clinical information about today's  encounter?   NO ADDITIONAL HISTORIAN (patient alone provided history)    Physical Exam and Assessment/Plan by Diagnosis:    TREMFYA Biologic Injectable Administration     Additional History of Present Condition:  Patient is present for education and administration of Tremfya for psoriasis and psoriatic arthritis. Pt is satisfied that Tremfya has cleared her skin and improved her joint pain    Assessment and Plan:  Based on a thorough discussion of this condition and the management approach to it (including a comprehensive discussion of the known risks, side effects and potential benefits of treatment), the patient (family) agrees to implement the following specific plan:  Injection #5  Verbal consent obtained to administer.   Next dose due 8 weeks (5/22), then 16 weeks (7/17)  Last quant gold done 7/23, will repeat this summer for PA renewal       Biologic Injectable Administration Note  Diagnosis: psoriasis  This is injection number 5    Informed consent: Discussed risks (Risks of hypersensitivity reaction, injection site reaction, conjunctivitis/keratitis, HSV reactivation, increased susceptibility to parasitic infections, inefficacy were reviewed.) Verbal consent obtained.   Preparation: After discussion potential procedure related risks including pain, bleeding, new infection, reactivation of latent infection, inefficacy, increased risk of malignancy, hypersensitivity reaction, injection site reaction, verbal consent was obtained. The areas were cleansed with alcohol prep pads and allowed to fully air dry for 3 minutes.  Procedure Details:  100mg was injected subcutaneously in the left arm  Lot Number: [id number]  Expiration: 05/2025  Total Injected: 100mg  NDC: 46541-675-23     Patient tolerated procedure well, with minimal pinpoint bleeding that was controlled with pressure. Aftercare was reviewed.       WHAT IS TREMFYA® (guselkumab)?     TREMFYA® is a prescription medicine used to treat adults with moderate to  severe plaque psoriasis who may benefit from taking injections or pills (systemic therapy) or phototherapy (treatment using ultraviolet or UV light).  TREMFYA® is a prescription medicine used to treat adults with active psoriatic arthritis.    IMPORTANT SAFETY INFORMATION     What is the most important information I should know about TREMFYA®?  TREMFYA® is a prescription medicine that may cause serious side effects, including:  Serious Allergic Reactions.   Stop using   TREMFYA®     and get emergency medical help right away if you develop any of the following symptoms of a serious allergic reaction:   fainting, dizziness, feeling lightheaded (low blood pressure)  swelling of your face, eyelids, lips, mouth, tongue or throat  trouble breathing or throat tightness  chest tightness  skin rash, hives  itching    Infections.   TREMFYA®   may lower the ability of your immune system to fight infections and may increase your risk of infections. Your healthcare provider should check you for infections and tuberculosis (TB) before starting treatment with TREMFYA®   and may treat you for TB before you begin treatment with TREMFYA®   if you have a history of TB or have active TB. Your healthcare provider should watch you closely for signs and symptoms of TB during and after treatment with TREMFYA®.   Tell your healthcare provider right away if you have an infection or have symptoms of an infection, including:  fever, sweats, or chills  muscle aches  weight loss  cough  warm, red, or painful skin or sores on your body different from your psoriasis  diarrhea or stomach pain  shortness of breath  blood in your phlegm (mucus)  burning when you urinate or urinating more often than normal    Do not take TREMFYA® if you have had a serious allergic reaction to guselkumab or any of the ingredients in TREMFYA®.  Before using TREMFYA®, tell your healthcare provider about all of your medical conditions, including if you:  have any of the  conditions or symptoms listed in the section “What is the most important information I should know about TREMFYA®?”  have an infection that does not go away or that keeps coming back.  have TB or have been in close contact with someone with TB.  have recently received or are scheduled to receive an immunization (vaccine). You should avoid receiving live vaccines during treatment with TREMFYA®.  are pregnant or plan to become pregnant. It is not known if TREMFYA® can harm your unborn baby.  are breastfeeding or plan to breastfeed. It is not known if TREMFYA® passes into your breast milk.  Tell your healthcare provider about all the medicines you take, including prescription and over-the-counter medicines, vitamins, and herbal supplements.  What are the possible side effects of TREMFYA®?  TREMFYA® may cause serious side effects. See “What is the most important information I should know about TREMFYA®?”  The most common side effects of TREMFYA® include: upper respiratory infections, headache, injection site reactions, joint pain (arthralgia), diarrhea, stomach flu (gastroenteritis), fungal skin infections, herpes simplex infections, and bronchitis.  These are not all the possible side effects of TREMFYA®. Call your doctor for medical advice about side effects.  Use TREMFYA® exactly as your healthcare provider tells you to use it.  Please read the full Prescribing Information, including Medication Guide for TREMFYA®, and discuss any questions that you have with your doctor.  You are encouraged to report negative side effects of prescription drugs to the FDA. Visit www.fda.gov/medwatch, or call 5-630-ALZ-3492.

## 2024-04-02 ENCOUNTER — OFFICE VISIT (OUTPATIENT)
Dept: FAMILY MEDICINE CLINIC | Facility: CLINIC | Age: 74
End: 2024-04-02

## 2024-04-02 VITALS
OXYGEN SATURATION: 99 % | TEMPERATURE: 98.3 F | RESPIRATION RATE: 18 BRPM | DIASTOLIC BLOOD PRESSURE: 87 MMHG | HEART RATE: 85 BPM | WEIGHT: 157 LBS | SYSTOLIC BLOOD PRESSURE: 142 MMHG | HEIGHT: 58 IN | BODY MASS INDEX: 32.95 KG/M2

## 2024-04-02 DIAGNOSIS — M25.531 PAIN IN RIGHT WRIST: Primary | ICD-10-CM

## 2024-04-02 PROCEDURE — 99213 OFFICE O/P EST LOW 20 MIN: CPT | Performed by: FAMILY MEDICINE

## 2024-04-02 PROCEDURE — G2211 COMPLEX E/M VISIT ADD ON: HCPCS | Performed by: FAMILY MEDICINE

## 2024-04-02 RX ORDER — NAPROXEN 500 MG/1
500 TABLET ORAL 2 TIMES DAILY WITH MEALS
Qty: 14 TABLET | Refills: 0 | Status: SHIPPED | OUTPATIENT
Start: 2024-04-02 | End: 2024-04-09

## 2024-04-02 NOTE — PROGRESS NOTES
Name: Lindsey Chandra      : 1950      MRN: [id number]  Encounter Provider: Charlotte Hernandez DO  Encounter Date: 2024   Encounter department: [facility]    Assessment & Plan     1. Pain in right wrist  Assessment & Plan:  Pt is presenting with pain in the right wrist that radiates to the shoulder due to a fall that occurred on . Seen in [place] ED for evaluation. Pt was provided with a splint and informed to take 1g tylenol every 4 hours for pain control but has only had minimal relief.     Plan:  - Trial of Naproxen (500mg total) BID for 7 days, pharmacy confirmed  - Follow up with Ortho for continued evaluation    Orders:  -     naproxen (Naprosyn) 500 mg tablet; Take 1 tablet (500 mg total) by mouth 2 (two) times a day with meals for 7 days           Subjective     Lindsey Chandra is a 74 yo F with a pmh of hypertension and hypothyroidism presenting to the clinic due to pain in her right wrist. On  while visiting a friend, she stated that she tripped on the sidewalk which resulted in her falling onto her right wrist. She was seen in [place] ED same day where she was given a splint and directed to take 1g of tylenol every 4 hours for pain management. She established with orthopedic surgery 3/25, wearing R wrist brace for suspected R scaphoid fracture. As of today, she states that this has only provided minimal relief and she continues to suffer from this pain. She describes the pain as dull with associated numbness that radiates up her shoulder laterally. Because of this, she is unable to get a proper nights rest and is seeking stronger medication. She denies tingling, dizziness/lightheadedness, nausea/vomiting, and loss of function. Pt states that she will be following up with Ortho as well.    Review of Systems   Constitutional: Negative.    HENT: Negative.     Eyes: Negative.    Respiratory: Negative.     Endocrine: Negative.     Genitourinary: Negative.    Musculoskeletal: Negative.    Skin: Negative.    Allergic/Immunologic: Negative.    Neurological: Negative.    Hematological: Negative.    Psychiatric/Behavioral: Negative.           Past Medical History:   Diagnosis Date    Breast cancer (HCC) 1996 lt mastectomy    Disease of thyroid gland     Hemorrhoids     Hyperlipidemia     Hypertension     Obesity (BMI 30.0-34.9) 2021    Psoriasis      Past Surgical History:   Procedure Laterality Date    BREAST CYST EXCISION Right     benign    BREAST LUMPECTOMY Left      SECTION      COLONOSCOPY      Complete    MASTECTOMY Left     Onset: 1996    SALPINGOOPHORECTOMY Bilateral     age 42    TOTAL ABDOMINAL HYSTERECTOMY      age 42     Family History   Problem Relation Age of Onset    Arthritis Mother     Diabetes Mother     Breast cancer Mother 72    Colon cancer Mother     Hypertension Mother     Diabetes Father     Glaucoma Father     Prostate cancer Father     Asthma Brother     Hypertension Brother     Colon cancer Maternal Uncle 84    Breast cancer Maternal Aunt 80    No Known Problems Maternal Grandmother     No Known Problems Paternal Grandmother     No Known Problems Maternal Aunt     No Known Problems Maternal Aunt     No Known Problems Paternal Aunt     No Known Problems Paternal Aunt     No Known Problems Paternal Grandfather     Breast cancer Cousin      Social History     Socioeconomic History    Marital status:      Spouse name: None    Number of children: None    Years of education: None    Highest education level: None   Occupational History    None   Tobacco Use    Smoking status: Never    Smokeless tobacco: Never   Vaping Use    Vaping status: Never Used   Substance and Sexual Activity    Alcohol use: No    Drug use: No    Sexual activity: Not Currently     Partners: Male   Other Topics Concern    None   Social History Narrative    None     Social Determinants of Health      Financial Resource Strain: Low Risk  (1/29/2024)    Overall Financial Resource Strain (CARDIA)     Difficulty of Paying Living Expenses: Not hard at all   Food Insecurity: No Food Insecurity (1/29/2024)    Hunger Vital Sign     Worried About Running Out of Food in the Last Year: Never true     Ran Out of Food in the Last Year: Never true   Transportation Needs: No Transportation Needs (1/29/2024)    PRAPARE - Transportation     Lack of Transportation (Medical): No     Lack of Transportation (Non-Medical): No   Physical Activity: Insufficiently Active (8/10/2023)    Exercise Vital Sign     Days of Exercise per Week: 4 days     Minutes of Exercise per Session: 30 min   Stress: No Stress Concern Present (1/29/2024)    [employer] [employer] - Occupational Stress Questionnaire     Feeling of Stress : Not at all   Social Connections: Moderately Isolated (8/10/2023)    Social Connection and Isolation Panel [NHANES]     Frequency of Communication with Friends and Family: Twice a week     Frequency of Social Gatherings with Friends and Family: Once a week     Attends Evangelical Services: 1 to 4 times per year     Active Member of Clubs or Organizations: No     Attends Club or Organization Meetings: Never     Marital Status:    Intimate Partner Violence: Not At Risk (1/29/2024)    Humiliation, Afraid, Rape, and Kick questionnaire     Fear of Current or Ex-Partner: No     Emotionally Abused: No     Physically Abused: No     Sexually Abused: No   Housing Stability: Low Risk  (1/29/2024)    Housing Stability Vital Sign     Unable to Pay for Housing in the Last Year: No     Number of Places Lived in the Last Year: 1     Unstable Housing in the Last Year: No     Current Outpatient Medications on File Prior to Visit   Medication Sig    acetaminophen (TYLENOL) 650 mg CR tablet Take 1 tablet (650 mg total) by mouth every 8 (eight) hours as needed for moderate pain    amLODIPine (NORVASC) 10 mg tablet  take 1 tablet by mouth once daily    Apremilast (Otezla) 30 MG TABS TAKE 2 TABLETS BY MOUTH DAILY    calcium carbonate-vitamin D (OSCAL-D) 500 mg-200 units per tablet Take 2 tablets by mouth daily with breakfast    clindamycin (CLEOCIN T) 1 % lotion     Diclofenac Sodium (VOLTAREN) 1 % Apply 4 g topically 4 (four) times a day    Guselkumab 100 MG/ML SOSY Please inject 100mg (1 pen) subcutaneously once on week 0, week 4, then every 8 weeks thereafter.    hydrochlorothiazide (HYDRODIURIL) 25 mg tablet take 1 tablet by mouth once daily    levothyroxine 50 mcg tablet Take 1 tablet (50 mcg total) by mouth daily    lidocaine (XYLOCAINE) 2 % topical gel Apply topically as needed for mild pain    lisinopril (ZESTRIL) 20 mg tablet take 1 tablet by mouth once daily    Misc. Devices (GetGo Rolling Walker) MISC Use if needed (ambulatory assistance)    ondansetron (Zofran) 4 mg tablet Take 2 tablets (8 mg total) by mouth every 8 (eight) hours as needed for nausea or vomiting for up to 10 doses Zofran odt 2 tablets dissolve in the mouth  every 8 hrs as needed for nausea/ vomiting    simvastatin (ZOCOR) 20 mg tablet Take 1 tablet (20 mg total) by mouth daily at bedtime    fluocinonide (LIDEX) 0.05 % cream Apply topically 2 (two) times a day For up to 14 days. (Patient not taking: Reported on 4/2/2024)    meclizine (ANTIVERT) 25 mg tablet Take 0.5 tablets (12.5 mg total) by mouth 3 (three) times a day as needed for dizziness (Patient not taking: Reported on 4/2/2024)    mirtazapine (REMERON) 15 mg tablet Take 1 tablet (15 mg total) by mouth daily at bedtime Take 15mg (one tablet) at night for three (3) days and then start on 30mg (two tablets) at night onwards.    nystatin (MYCOSTATIN) powder Apply topically 4 (four) times a day (Patient not taking: Reported on 4/2/2024)     No Known Allergies  Immunization History   Administered Date(s) Administered    COVID-19 J&J ([employer]) vaccine 0.5 mL 04/10/2021    Influenza, high dose  "seasonal 0.7 mL 12/22/2020, 09/24/2021, 01/17/2023    Influenza, seasonal, injectable 10/27/2011, 10/25/2012, 09/26/2013, 12/14/2016    Pneumococcal Conjugate 13-Valent 10/03/2016    Pneumococcal Polysaccharide PPV23 10/27/2011    Tdap 05/30/2018       Objective     /87 (BP Location: Right arm, Patient Position: Sitting, Cuff Size: Standard)   Pulse 85   Temp 98.3 °F (36.8 °C) (Temporal)   Resp 18   Ht 4' 10\" (1.473 m)   Wt 71.2 kg (157 lb)   SpO2 99%   BMI 32.81 kg/m²     Physical Exam  Vitals reviewed.   Constitutional:       Appearance: Normal appearance.   HENT:      Head: Normocephalic and atraumatic.   Cardiovascular:      Rate and Rhythm: Normal rate and regular rhythm.      Heart sounds: Normal heart sounds.   Pulmonary:      Breath sounds: Normal breath sounds.   Musculoskeletal:         General: Tenderness (Right Wrist Pain & Numbness) present.   Neurological:      Mental Status: She is alert.   Psychiatric:         Mood and Affect: Mood normal.         Behavior: Behavior normal.       Charlotte Hernandez, DO    "

## 2024-04-02 NOTE — ASSESSMENT & PLAN NOTE
Pt is presenting with pain in the right wrist that radiates to the shoulder due to a fall that occurred on March 8th. Seen in [facility] ED for evaluation. Pt was provided with a splint and informed to take 1g tylenol every 4 hours for pain control but has only had minimal relief.     Plan:  - Trial of Naproxen (500mg total) BID for 7 days, pharmacy confirmed  - Follow up with Ortho for continued evaluation

## 2024-04-04 ENCOUNTER — OFFICE VISIT (OUTPATIENT)
Dept: OBGYN CLINIC | Facility: CLINIC | Age: 74
End: 2024-04-04
Payer: COMMERCIAL

## 2024-04-04 VITALS — HEIGHT: 58 IN | WEIGHT: 157 LBS | BODY MASS INDEX: 32.95 KG/M2

## 2024-04-04 DIAGNOSIS — M25.331 SCAPHOLUNATE DISSOCIATION OF RIGHT WRIST: Primary | ICD-10-CM

## 2024-04-04 DIAGNOSIS — R20.2 NUMBNESS AND TINGLING IN RIGHT HAND: ICD-10-CM

## 2024-04-04 DIAGNOSIS — R20.0 NUMBNESS AND TINGLING IN RIGHT HAND: ICD-10-CM

## 2024-04-04 PROCEDURE — 20605 DRAIN/INJ JOINT/BURSA W/O US: CPT | Performed by: STUDENT IN AN ORGANIZED HEALTH CARE EDUCATION/TRAINING PROGRAM

## 2024-04-04 PROCEDURE — 99214 OFFICE O/P EST MOD 30 MIN: CPT | Performed by: STUDENT IN AN ORGANIZED HEALTH CARE EDUCATION/TRAINING PROGRAM

## 2024-04-04 RX ORDER — LIDOCAINE HYDROCHLORIDE 10 MG/ML
3 INJECTION, SOLUTION INFILTRATION; PERINEURAL
Status: COMPLETED | OUTPATIENT
Start: 2024-04-04 | End: 2024-04-04

## 2024-04-04 RX ORDER — BETAMETHASONE SODIUM PHOSPHATE AND BETAMETHASONE ACETATE 3; 3 MG/ML; MG/ML
6 INJECTION, SUSPENSION INTRA-ARTICULAR; INTRALESIONAL; INTRAMUSCULAR; SOFT TISSUE
Status: COMPLETED | OUTPATIENT
Start: 2024-04-04 | End: 2024-04-04

## 2024-04-04 RX ADMIN — BETAMETHASONE SODIUM PHOSPHATE AND BETAMETHASONE ACETATE 6 MG: 3; 3 INJECTION, SUSPENSION INTRA-ARTICULAR; INTRALESIONAL; INTRAMUSCULAR; SOFT TISSUE at 15:00

## 2024-04-04 RX ADMIN — LIDOCAINE HYDROCHLORIDE 3 ML: 10 INJECTION, SOLUTION INFILTRATION; PERINEURAL at 15:00

## 2024-04-04 NOTE — PROGRESS NOTES
ORTHOPAEDIC HAND, WRIST, AND ELBOW OFFICE  VISIT      ASSESSMENT/PLAN:      Diagnoses and all orders for this visit:    Scapholunate dissociation of right wrist  -     Ambulatory Referral to PT/OT Hand Therapy; Future  -     Medium joint arthrocentesis: R radiocarpal  -     lidocaine (XYLOCAINE) 1 % injection 3 mL  -     betamethasone acetate-betamethasone sodium phosphate (CELESTONE) injection 6 mg    Numbness and tingling in right hand          73 y.o. female with Right wrist SL dissociation, 3/8/2024  Treatment options and expected outcomes were discussed.  The patient verbalized understanding of exam findings and treatment plan.   The patient was given the opportunity to ask questions.  Questions were answered to the patient's satisfaction.  The patient decided to move forward with a radiocarpal joint injection for the scapholunate dissociation. She tolerated the injection well.   Instructed to monitor her sugar since it can raise 20-30 points for 5 days. Instructions were provided in the patient's AVS.  Referral to OT to work on motion and strength   She can wear the cock up wrist brace ONLY at night for the numbness and tingling.       Follow Up:  6 weeks       To Do Next Visit:  Re-evaluation of current issue      Discussions:  Osteoarthritis:  The anatomy and physiology of osteoarthritis was discussed with the patient today in the office.  Deterioration of the articular cartilage eventually leads to altered mobility at the joint, resulting in joint subluxation, osteophyte formation, cystic changes, as well as subchondral sclerosis.  Eventually, pain, limited mobility, and compensatory hypermobility at surrounding joints may develop.  While normal activity and usage of the joint may provide a painful experience to the patient, this typically does not result in damage to the limb.  Treatment options include splints to decreased joint edema, pain, and inflammation.  Therapy exercises to strengthen the  surrounding musculature may relieve pain, but do not alter the overall continued development of osteoarthritis.  Oral medications, topical medications, corticosteroid injections may decrease pain and increase overall function.  Eventually, some patients may require surgical intervention.       Tavon Robertson MD  Attending, Orthopaedic Surgery  Hand, Wrist, and Elbow Surgery  [facility]    ______________________________________________________________________________________________    CHIEF COMPLAINT:  Chief Complaint   Patient presents with   • Right Wrist - Pain       SUBJECTIVE:  Patient is a 73 y.o. RHD female who presents today for consultation for her right wrist injury referred by Adriano Millard PA-C.  Patient was seen on 3/25/2024 reporting that she tripped and fell on the right wrist on 3/8/2024.  She has been treating in a cock up wrist brace. She was seen at the ED. Xrays were obtained and she was placed in a splint.   Numbness and tingling into the index through small fingers    Patient is a type II diabetic with last hemoglobin A1c of 9.8 on 2024.  Occupation: Retired     I have personally reviewed all the relevant PMH, PSH, SH, FH, Medications and allergies      PAST MEDICAL HISTORY:  Past Medical History:   Diagnosis Date   • Breast cancer (HCC) 1996 lt mastectomy   • Disease of thyroid gland    • Hemorrhoids    • Hyperlipidemia    • Hypertension    • Obesity (BMI 30.0-34.9) 2021   • Psoriasis        PAST SURGICAL HISTORY:  Past Surgical History:   Procedure Laterality Date   • BREAST CYST EXCISION Right     benign   • BREAST LUMPECTOMY Left    •  SECTION     • COLONOSCOPY      Complete   • MASTECTOMY Left     Onset: 1996   • SALPINGOOPHORECTOMY Bilateral     age 42   • TOTAL ABDOMINAL HYSTERECTOMY      age 42       FAMILY HISTORY:  Family History   Problem Relation Age of Onset   • Arthritis Mother    • Diabetes Mother    • Breast  cancer Mother 72   • Colon cancer Mother    • Hypertension Mother    • Diabetes Father    • Glaucoma Father    • Prostate cancer Father    • Asthma Brother    • Hypertension Brother    • Colon cancer Maternal Uncle 84   • Breast cancer Maternal Aunt 80   • No Known Problems Maternal Grandmother    • No Known Problems Paternal Grandmother    • No Known Problems Maternal Aunt    • No Known Problems Maternal Aunt    • No Known Problems Paternal Aunt    • No Known Problems Paternal Aunt    • No Known Problems Paternal Grandfather    • Breast cancer Cousin        SOCIAL HISTORY:  Social History     Tobacco Use   • Smoking status: Never   • Smokeless tobacco: Never   Vaping Use   • Vaping status: Never Used   Substance Use Topics   • Alcohol use: No   • Drug use: No       MEDICATIONS:    Current Outpatient Medications:   •  acetaminophen (TYLENOL) 650 mg CR tablet, Take 1 tablet (650 mg total) by mouth every 8 (eight) hours as needed for moderate pain, Disp: 30 tablet, Rfl: 0  •  amLODIPine (NORVASC) 10 mg tablet, take 1 tablet by mouth once daily, Disp: 90 tablet, Rfl: 3  •  Apremilast (Otezla) 30 MG TABS, TAKE 2 TABLETS BY MOUTH DAILY, Disp: 90 tablet, Rfl: 1  •  calcium carbonate-vitamin D (OSCAL-D) 500 mg-200 units per tablet, Take 2 tablets by mouth daily with breakfast, Disp: 120 tablet, Rfl: 3  •  clindamycin (CLEOCIN T) 1 % lotion, , Disp: , Rfl: 0  •  Diclofenac Sodium (VOLTAREN) 1 %, Apply 4 g topically 4 (four) times a day, Disp: 100 g, Rfl: 0  •  Guselkumab 100 MG/ML SOSY, Please inject 100mg (1 pen) subcutaneously once on week 0, week 4, then every 8 weeks thereafter., Disp: 1 mL, Rfl: 7  •  hydrochlorothiazide (HYDRODIURIL) 25 mg tablet, take 1 tablet by mouth once daily, Disp: 90 tablet, Rfl: 3  •  levothyroxine 50 mcg tablet, Take 1 tablet (50 mcg total) by mouth daily, Disp: 90 tablet, Rfl: 1  •  lidocaine (XYLOCAINE) 2 % topical gel, Apply topically as needed for mild pain, Disp: 30 mL, Rfl: 0  •   lisinopril (ZESTRIL) 20 mg tablet, take 1 tablet by mouth once daily, Disp: 90 tablet, Rfl: 0  •  Misc. Devices (GetGo Rolling Walker) MISC, Use if needed (ambulatory assistance), Disp: 1 each, Rfl: 0  •  naproxen (Naprosyn) 500 mg tablet, Take 1 tablet (500 mg total) by mouth 2 (two) times a day with meals for 7 days, Disp: 14 tablet, Rfl: 0  •  ondansetron (Zofran) 4 mg tablet, Take 2 tablets (8 mg total) by mouth every 8 (eight) hours as needed for nausea or vomiting for up to 10 doses Zofran odt 2 tablets dissolve in the mouth  every 8 hrs as needed for nausea/ vomiting, Disp: 10 tablet, Rfl: 0  •  simvastatin (ZOCOR) 20 mg tablet, Take 1 tablet (20 mg total) by mouth daily at bedtime, Disp: 90 tablet, Rfl: 0  •  fluocinonide (LIDEX) 0.05 % cream, Apply topically 2 (two) times a day For up to 14 days. (Patient not taking: Reported on 4/2/2024), Disp: 120 g, Rfl: 1  •  meclizine (ANTIVERT) 25 mg tablet, Take 0.5 tablets (12.5 mg total) by mouth 3 (three) times a day as needed for dizziness (Patient not taking: Reported on 4/2/2024), Disp: 30 tablet, Rfl: 1  •  mirtazapine (REMERON) 15 mg tablet, Take 1 tablet (15 mg total) by mouth daily at bedtime Take 15mg (one tablet) at night for three (3) days and then start on 30mg (two tablets) at night onwards., Disp: 63 tablet, Rfl: 0  •  nystatin (MYCOSTATIN) powder, Apply topically 4 (four) times a day (Patient not taking: Reported on 4/2/2024), Disp: 30 g, Rfl: 1  No current facility-administered medications for this visit.    ALLERGIES:  No Known Allergies        REVIEW OF SYSTEMS:  Musculoskeletal:        As noted in HPI.   All other systems reviewed and are negative.    VITALS:  There were no vitals filed for this visit.    LABS:  HgA1c:   Lab Results   Component Value Date    HGBA1C 9.8 02/26/2024     BMP:   Lab Results   Component Value Date    GLUCOSE 114 01/02/2015    CALCIUM 9.4 07/06/2023     05/31/2017    K 3.7 07/06/2023    CO2 28 07/06/2023      "(H) 07/06/2023    BUN 21 07/06/2023    CREATININE 0.92 07/06/2023       _____________________________________________________  PHYSICAL EXAMINATION:  General: Well developed and well nourished, alert & oriented x 3, appears comfortable  Psychiatric: Normal  HEENT: Normocephalic, Atraumatic Trachea Midline, No torticollis  Pulmonary: No audible wheezing or respiratory distress   Abdomen/GI: Non tender, non distended   Cardiovascular: No pitting edema, 2+ radial pulse   Skin: No masses, erythema, lacerations, fluctation, ulcerations  Neurovascular: Sensation Intact to the Radial Nerve, Decreased Sensation to  the Median Nerve, Decreased Sensation to  the Ulnar Nerve, Motor Intact to the Median, Ulnar, Radial Nerve, and Pulses Intact  Musculoskeletal: Normal, except as noted in detailed exam and in HPI.      MUSCULOSKELETAL EXAMINATION:  Right wrist:     Flexion 15 degrees  Extension 40 degrees  45 degrees of supination  Full pronation  Full composite fist  Difficulty with opposition     +tinel's at the wrist   + tinel's at the elbow     ___________________________________________________  STUDIES REVIEWED:  Xrays of the right wrist reviewed from 3/25/2024 were reviewed and independently interpreted in PACS by Dr. Robertson and demonstrate SL widening, mild OA changes of radiocarpal arthritis          PROCEDURES PERFORMED:  Medium joint arthrocentesis: R radiocarpal  Universal Protocol:  Consent: Verbal consent obtained.  Risks and benefits: risks, benefits and alternatives were discussed  Consent given by: patient  Time out: Immediately prior to procedure a \"time out\" was called to verify the correct patient, procedure, equipment, support staff and site/side marked as required.  Timeout called at: 4/4/2024 3:02 PM.  Patient understanding: patient states understanding of the procedure being performed  Site marked: the operative site was marked  Patient identity confirmed: verbally with patient  Supporting " Documentation  Indications: pain   Procedure Details  Location: wrist - R radiocarpal  Preparation: Patient was prepped and draped in the usual sterile fashion  Needle size: 25 G  Ultrasound guidance: no  Approach: dorsal  Medications administered: 3 mL lidocaine 1 %; 6 mg betamethasone acetate-betamethasone sodium phosphate 6 (3-3) mg/mL    Patient tolerance: patient tolerated the procedure well with no immediate complications  Dressing:  Sterile dressing applied        _____________________________________________________      Scribe Attestation    I,:  Gayla Tolentino am acting as a scribe while in the presence of the attending physician.:       I,:  Tavon Robertson MD personally performed the services described in this documentation    as scribed in my presence.:

## 2024-04-04 NOTE — PATIENT INSTRUCTIONS
Patient advised should they develop any increasing pain, redness, drainage, numbness, tingling or swelling surrounding the injection site, they are to contact our office or present to the emergency department.   Your blood sugar will increase between 20-30 pts for the next 5 days.   Wear the cock up wrist brace ONLY at night for the numbness and tingling.     The risks of diabetes were discussed with the patient. These risks include increased risk of infection, delayed wound healing, increased swelling, increased stiffness, and increased scar formation.  While these risks are generally increased in all diabetics, keeping one's blood sugar under strict control can help decrease problems after surgery. If blood sugar levels are too high, or hemoglobin A1c levels are too high, surgery may be delayed or canceled.

## 2024-04-15 DIAGNOSIS — I10 BENIGN ESSENTIAL HYPERTENSION: ICD-10-CM

## 2024-04-15 RX ORDER — LISINOPRIL 20 MG/1
TABLET ORAL
Qty: 90 TABLET | Refills: 0 | Status: SHIPPED | OUTPATIENT
Start: 2024-04-15

## 2024-04-16 ENCOUNTER — HOSPITAL ENCOUNTER (OUTPATIENT)
Dept: GASTROENTEROLOGY | Facility: HOSPITAL | Age: 74
Setting detail: OUTPATIENT SURGERY
Discharge: HOME | End: 2024-04-16
Attending: INTERNAL MEDICINE
Payer: COMMERCIAL

## 2024-04-16 ENCOUNTER — ANESTHESIA EVENT (OUTPATIENT)
Dept: GASTROENTEROLOGY | Facility: HOSPITAL | Age: 74
End: 2024-04-16

## 2024-04-16 ENCOUNTER — ANESTHESIA (OUTPATIENT)
Dept: GASTROENTEROLOGY | Facility: HOSPITAL | Age: 74
End: 2024-04-16

## 2024-04-16 VITALS
HEART RATE: 68 BPM | SYSTOLIC BLOOD PRESSURE: 163 MMHG | RESPIRATION RATE: 14 BRPM | OXYGEN SATURATION: 97 % | DIASTOLIC BLOOD PRESSURE: 74 MMHG | HEIGHT: 58 IN | WEIGHT: 157 LBS | TEMPERATURE: 97.2 F | BODY MASS INDEX: 32.95 KG/M2

## 2024-04-16 DIAGNOSIS — Z86.010 PERSONAL HISTORY OF COLONIC POLYPS: ICD-10-CM

## 2024-04-16 DIAGNOSIS — Z80.0 FAMILY HISTORY OF MALIGNANT NEOPLASM OF DIGESTIVE ORGANS: ICD-10-CM

## 2024-04-16 PROBLEM — K56.609 INTESTINAL OBSTRUCTION (HCC): Status: ACTIVE | Noted: 2024-04-16

## 2024-04-16 LAB — GLUCOSE SERPL-MCNC: 290 MG/DL (ref 65–140)

## 2024-04-16 PROCEDURE — 82948 REAGENT STRIP/BLOOD GLUCOSE: CPT

## 2024-04-16 RX ORDER — SODIUM CHLORIDE 9 MG/ML
INJECTION, SOLUTION INTRAVENOUS CONTINUOUS PRN
Status: DISCONTINUED | OUTPATIENT
Start: 2024-04-16 | End: 2024-04-16

## 2024-04-16 RX ORDER — PROPOFOL 10 MG/ML
INJECTION, EMULSION INTRAVENOUS AS NEEDED
Status: DISCONTINUED | OUTPATIENT
Start: 2024-04-16 | End: 2024-04-16

## 2024-04-16 RX ADMIN — PROPOFOL 20 MG: 10 INJECTION, EMULSION INTRAVENOUS at 11:29

## 2024-04-16 RX ADMIN — PROPOFOL 20 MG: 10 INJECTION, EMULSION INTRAVENOUS at 11:31

## 2024-04-16 RX ADMIN — PROPOFOL 30 MG: 10 INJECTION, EMULSION INTRAVENOUS at 11:33

## 2024-04-16 RX ADMIN — PROPOFOL 20 MG: 10 INJECTION, EMULSION INTRAVENOUS at 11:25

## 2024-04-16 RX ADMIN — SODIUM CHLORIDE: 9 INJECTION, SOLUTION INTRAVENOUS at 11:18

## 2024-04-16 RX ADMIN — PROPOFOL 60 MG: 10 INJECTION, EMULSION INTRAVENOUS at 11:24

## 2024-04-16 RX ADMIN — PROPOFOL 30 MG: 10 INJECTION, EMULSION INTRAVENOUS at 11:30

## 2024-04-16 RX ADMIN — PROPOFOL 20 MG: 10 INJECTION, EMULSION INTRAVENOUS at 11:26

## 2024-04-16 NOTE — ANESTHESIA PREPROCEDURE EVALUATION
Procedure:  COLONOSCOPY    Relevant Problems   CARDIO   (+) Benign essential hypertension   (+) Hyperlipidemia   (+) Muscular chest pain      ENDO   (+) Hypothyroidism      GYN   (+) Cancer of breast, female (HCC)      MUSCULOSKELETAL   (+) Chronic bilateral low back pain without sciatica   (+) Osteoarthritis      NEURO/PSYCH   (+) Chronic bilateral low back pain without sciatica      PULMONARY (within normal limits)      Endocrine   (+) IFG (impaired fasting glucose)      Dermatology   (+) Psoriasis      Other   (+) Obesity (BMI 30.0-34.9)        Physical Exam    Airway    Mallampati score: II  TM Distance: >3 FB  Neck ROM: full     Dental    lower dentures and upper dentures    Cardiovascular  Rhythm: regular, Rate: normal, Cardiovascular exam normal    Pulmonary  Pulmonary exam normal Breath sounds clear to auscultation    Other Findings  post-pubertal.      Anesthesia Plan  ASA Score- 2     Anesthesia Type- IV sedation with anesthesia with ASA Monitors.         Additional Monitors:     Airway Plan:            Plan Factors-    Chart reviewed. EKG reviewed. Imaging results reviewed. Existing labs reviewed. Patient summary reviewed.    Patient is not a current smoker.  Patient did not smoke on day of surgery.            Induction- intravenous.    Postoperative Plan-     Informed Consent- Anesthetic plan and risks discussed with patient.  I personally reviewed this patient with the CRNA. Discussed and agreed on the Anesthesia Plan with the CRNA..              NPO verified.  NKDA.    Plan:  IV sedation, GA backup    Benefits and risks of sedation were discussed with the patient including possibility of recall under sedation and the potential for conversion to general anesthesia if necessary.  All questions were answered.  Anesthesia consent was obtained from the patient.

## 2024-04-16 NOTE — H&P
History and Physical -  [facility]  Lindsey Chandra 73 y.o. female MRN: [id number]                  HPI: Lindsey Chandra is a 73 y.o. year old female who presents for prior colon polyps      REVIEW OF SYSTEMS: Per the HPI, and otherwise unremarkable.    Historical Information   Past Medical History:   Diagnosis Date    Breast cancer (HCC) 1996 lt mastectomy    Disease of thyroid gland     Hemorrhoids     Hyperlipidemia     Hypertension     Obesity (BMI 30.0-34.9) 2021    Psoriasis      Past Surgical History:   Procedure Laterality Date    BREAST CYST EXCISION Right     benign    BREAST LUMPECTOMY Left      SECTION      COLONOSCOPY      Complete    MASTECTOMY Left     Onset: 1996    SALPINGOOPHORECTOMY Bilateral     age 42    TOTAL ABDOMINAL HYSTERECTOMY      age 42     Social History   Social History     Substance and Sexual Activity   Alcohol Use No     Social History     Substance and Sexual Activity   Drug Use No     Social History     Tobacco Use   Smoking Status Never   Smokeless Tobacco Never     Family History   Problem Relation Age of Onset    Arthritis Mother     Diabetes Mother     Breast cancer Mother 72    Colon cancer Mother     Hypertension Mother     Diabetes Father     Glaucoma Father     Prostate cancer Father     Asthma Brother     Hypertension Brother     Colon cancer Maternal Uncle 84    Breast cancer Maternal Aunt 80    No Known Problems Maternal Grandmother     No Known Problems Paternal Grandmother     No Known Problems Maternal Aunt     No Known Problems Maternal Aunt     No Known Problems Paternal Aunt     No Known Problems Paternal Aunt     No Known Problems Paternal Grandfather     Breast cancer Cousin        Meds/Allergies     (Not in a hospital admission)      No Known Allergies    Objective     There were no vitals taken for this visit.      PHYSICAL EXAMINATION:    General Appearance:   Alert, cooperative, no  distress   HEENT:  Normocephalic, atraumatic, anicteric. Neck supple, symmetrical, trachea midline.   Lungs:   Equal chest rise and unlabored breathing, normal effort, no coughing.   Cardiovascular:   No visualized JVD.   Abdomen:   No abdominal distension.   Skin:   No jaundice, rashes, or lesions.    Musculoskeletal:   Normal range of motion visualized.   Psych:  Normal affect and normal insight.   Neuro:  Alert and appropriate.           ASSESSMENT/PLAN:  This is a 73 y.o. year old female here for colonoscopy, and she is stable and optimized for her procedure.

## 2024-04-16 NOTE — ANESTHESIA POSTPROCEDURE EVALUATION
Post-Op Assessment Note    CV Status:  Stable  Pain Score: 0    Pain management: adequate       Mental Status:  Sleepy and arousable   Hydration Status:  Stable   PONV Controlled:  None   Airway Patency:  Patent     Post Op Vitals Reviewed: Yes    No anethesia notable event occurred.    Staff: CRNA               BP  121/59    Temp      Pulse 71   Resp 17   SpO2 97 RA

## 2024-04-17 ENCOUNTER — ANESTHESIA (OUTPATIENT)
Dept: GASTROENTEROLOGY | Facility: HOSPITAL | Age: 74
End: 2024-04-17

## 2024-04-17 ENCOUNTER — ANESTHESIA EVENT (OUTPATIENT)
Dept: GASTROENTEROLOGY | Facility: HOSPITAL | Age: 74
End: 2024-04-17

## 2024-04-17 ENCOUNTER — HOSPITAL ENCOUNTER (OUTPATIENT)
Dept: GASTROENTEROLOGY | Facility: HOSPITAL | Age: 74
Setting detail: OUTPATIENT SURGERY
Discharge: HOME | End: 2024-04-17
Attending: INTERNAL MEDICINE
Payer: COMMERCIAL

## 2024-04-17 VITALS
DIASTOLIC BLOOD PRESSURE: 70 MMHG | SYSTOLIC BLOOD PRESSURE: 144 MMHG | HEART RATE: 66 BPM | OXYGEN SATURATION: 95 % | BODY MASS INDEX: 33.74 KG/M2 | TEMPERATURE: 97.3 F | RESPIRATION RATE: 18 BRPM | WEIGHT: 150 LBS | HEIGHT: 56 IN

## 2024-04-17 DIAGNOSIS — Z86.010 PERSONAL HISTORY OF COLONIC POLYPS: ICD-10-CM

## 2024-04-17 RX ORDER — PROPOFOL 10 MG/ML
INJECTION, EMULSION INTRAVENOUS AS NEEDED
Status: DISCONTINUED | OUTPATIENT
Start: 2024-04-17 | End: 2024-04-17

## 2024-04-17 RX ORDER — SODIUM CHLORIDE 9 MG/ML
INJECTION, SOLUTION INTRAVENOUS CONTINUOUS PRN
Status: DISCONTINUED | OUTPATIENT
Start: 2024-04-17 | End: 2024-04-17

## 2024-04-17 RX ORDER — PROPOFOL 10 MG/ML
INJECTION, EMULSION INTRAVENOUS CONTINUOUS PRN
Status: DISCONTINUED | OUTPATIENT
Start: 2024-04-17 | End: 2024-04-17

## 2024-04-17 RX ORDER — LIDOCAINE HCL/PF 100 MG/5ML
SYRINGE (ML) INJECTION AS NEEDED
Status: DISCONTINUED | OUTPATIENT
Start: 2024-04-17 | End: 2024-04-17

## 2024-04-17 RX ADMIN — PROPOFOL 100 MCG/KG/MIN: 10 INJECTION, EMULSION INTRAVENOUS at 15:38

## 2024-04-17 RX ADMIN — PROPOFOL 50 MG: 10 INJECTION, EMULSION INTRAVENOUS at 15:38

## 2024-04-17 RX ADMIN — SODIUM CHLORIDE: 0.9 INJECTION, SOLUTION INTRAVENOUS at 15:37

## 2024-04-17 RX ADMIN — LIDOCAINE HYDROCHLORIDE 40 MG: 20 INJECTION INTRAVENOUS at 15:38

## 2024-04-17 RX ADMIN — SODIUM CHLORIDE: 0.9 INJECTION, SOLUTION INTRAVENOUS at 15:40

## 2024-04-17 NOTE — H&P
History and Physical -  [facility]  Lindsey Chandra 73 y.o. female MRN: [id number]                  HPI: Lindsey Chandra is a 73 y.o. year old female who presents for personal history of adenomatous colonic polyp.  The patient came for colonoscopy yesterday but the prep was inadequate and the procedure was not completed.          REVIEW OF SYSTEMS: Per the HPI, and otherwise unremarkable.    Historical Information   Past Medical History:   Diagnosis Date    Breast cancer (HCC) 1996 lt mastectomy    Disease of thyroid gland     Hemorrhoids     Hyperlipidemia     Hypertension     Obesity (BMI 30.0-34.9) 2021    Psoriasis      Past Surgical History:   Procedure Laterality Date    BREAST CYST EXCISION Right     benign    BREAST LUMPECTOMY Left      SECTION      COLONOSCOPY      Complete    MASTECTOMY Left     Onset: 1996    SALPINGOOPHORECTOMY Bilateral     age 42    TOTAL ABDOMINAL HYSTERECTOMY      age 42     Social History   Social History     Substance and Sexual Activity   Alcohol Use No     Social History     Substance and Sexual Activity   Drug Use No     Social History     Tobacco Use   Smoking Status Never   Smokeless Tobacco Never     Family History   Problem Relation Age of Onset    Arthritis Mother     Diabetes Mother     Breast cancer Mother 72    Colon cancer Mother     Hypertension Mother     Diabetes Father     Glaucoma Father     Prostate cancer Father     Asthma Brother     Hypertension Brother     Colon cancer Maternal Uncle 84    Breast cancer Maternal Aunt 80    No Known Problems Maternal Grandmother     No Known Problems Paternal Grandmother     No Known Problems Maternal Aunt     No Known Problems Maternal Aunt     No Known Problems Paternal Aunt     No Known Problems Paternal Aunt     No Known Problems Paternal Grandfather     Breast cancer Cousin        Meds/Allergies       Current Outpatient Medications:     acetaminophen  "(TYLENOL) 650 mg CR tablet    amLODIPine (NORVASC) 10 mg tablet    Apremilast (Otezla) 30 MG TABS    calcium carbonate-vitamin D (OSCAL-D) 500 mg-200 units per tablet    clindamycin (CLEOCIN T) 1 % lotion    Diclofenac Sodium (VOLTAREN) 1 %    hydrochlorothiazide (HYDRODIURIL) 25 mg tablet    levothyroxine 50 mcg tablet    lidocaine (XYLOCAINE) 2 % topical gel    lisinopril (ZESTRIL) 20 mg tablet    Misc. Devices (GetGo Rolling Walker) MISC    ondansetron (Zofran) 4 mg tablet    simvastatin (ZOCOR) 20 mg tablet    Guselkumab 100 MG/ML SOSY    mirtazapine (REMERON) 15 mg tablet    naproxen (Naprosyn) 500 mg tablet    polyethylene glycol (Golytely) 4000 mL solution    No Known Allergies    Objective     /81   Pulse 70   Temp (!) 97.3 °F (36.3 °C) (Tympanic)   Resp 17   Ht 4' 8\" (1.422 m)   Wt 68 kg (150 lb)   SpO2 98%   BMI 33.63 kg/m²       PHYSICAL EXAM    Gen: NAD  Head: NCAT  CV: RRR  CHEST: Clear  ABD: soft, NT/ND  EXT: no edema      ASSESSMENT/PLAN:  This is a 73 y.o. year old female here for colonoscopy, and she is stable and optimized for her procedure.        "

## 2024-04-17 NOTE — ANESTHESIA PREPROCEDURE EVALUATION
Procedure:  COLONOSCOPY    Relevant Problems   CARDIO   (+) Benign essential hypertension   (+) Hyperlipidemia   (+) Muscular chest pain      ENDO   (+) Hypothyroidism      GYN   (+) Cancer of breast, female (HCC)      MUSCULOSKELETAL   (+) Chronic bilateral low back pain without sciatica   (+) Osteoarthritis      NEURO/PSYCH   (+) Chronic bilateral low back pain without sciatica      PULMONARY (within normal limits)        Physical Exam    Airway    Mallampati score: II  TM Distance: >3 FB  Neck ROM: full     Dental    lower dentures and upper dentures    Cardiovascular  Rhythm: regular, Rate: normal, Cardiovascular exam normal    Pulmonary  Pulmonary exam normal Breath sounds clear to auscultation    Other Findings  post-pubertal.      Anesthesia Plan  ASA Score- 2     Anesthesia Type- IV sedation with anesthesia with ASA Monitors.         Additional Monitors:     Airway Plan:            Plan Factors-    Chart reviewed. EKG reviewed. Imaging results reviewed. Existing labs reviewed. Patient summary reviewed.    Patient is not a current smoker.  Patient did not smoke on day of surgery.            Induction- intravenous.    Postoperative Plan-     Informed Consent- Anesthetic plan and risks discussed with patient.  I personally reviewed this patient with the CRNA. Discussed and agreed on the Anesthesia Plan with the CRNA..              NPO verified.  NKDA.    Plan:  IV sedation, GA backup    Benefits and risks of sedation were discussed with the patient including possibility of recall under sedation and the potential for conversion to general anesthesia if necessary.  All questions were answered.  Anesthesia consent was obtained from the patient.

## 2024-04-19 DIAGNOSIS — I10 BENIGN ESSENTIAL HYPERTENSION: ICD-10-CM

## 2024-04-19 RX ORDER — AMLODIPINE BESYLATE 10 MG/1
10 TABLET ORAL DAILY
Qty: 90 TABLET | Refills: 3 | Status: SHIPPED | OUTPATIENT
Start: 2024-04-19

## 2024-04-19 NOTE — TELEPHONE ENCOUNTER
Patient called and left message requesting refills on medications for her blood pressure and diabetes. Returned phone call to patient to confirm which medications are needed. She states she will call back with the medication names once she finds out which ones are needed

## 2024-04-19 NOTE — TELEPHONE ENCOUNTER
Patient returned call and states the amlodipine 10 mg tablet needs refills. Also stating that her sugar 203, requesting medication. Please advise

## 2024-04-23 ENCOUNTER — TELEPHONE (OUTPATIENT)
Dept: FAMILY MEDICINE CLINIC | Facility: CLINIC | Age: 74
End: 2024-04-23

## 2024-04-23 NOTE — TELEPHONE ENCOUNTER
Patient is in the office asking about sugar medication not yet called in and also [phone]

## 2024-04-23 NOTE — TELEPHONE ENCOUNTER
Called and spoke with patient, confirmed that she has not been given any medications for this,     She clarified at the hospital she was told to follow up with her pcp about the levels, also clarified with patient that she can not establish at another practice as then they would take over her care, patient will call [facility] and cancel her est care apt,     She will also call with any questions or issues in the time being,

## 2024-04-25 ENCOUNTER — OFFICE VISIT (OUTPATIENT)
Dept: FAMILY MEDICINE CLINIC | Facility: CLINIC | Age: 74
End: 2024-04-25
Payer: COMMERCIAL

## 2024-04-25 VITALS
HEIGHT: 56 IN | WEIGHT: 154.8 LBS | DIASTOLIC BLOOD PRESSURE: 90 MMHG | TEMPERATURE: 98.1 F | HEART RATE: 70 BPM | BODY MASS INDEX: 34.82 KG/M2 | SYSTOLIC BLOOD PRESSURE: 128 MMHG | OXYGEN SATURATION: 98 %

## 2024-04-25 DIAGNOSIS — Z13.220 SCREENING, LIPID: ICD-10-CM

## 2024-04-25 DIAGNOSIS — E11.9 TYPE 2 DIABETES MELLITUS WITHOUT COMPLICATION, WITHOUT LONG-TERM CURRENT USE OF INSULIN (HCC): Primary | ICD-10-CM

## 2024-04-25 DIAGNOSIS — Z76.89 ENCOUNTER TO ESTABLISH CARE: ICD-10-CM

## 2024-04-25 PROCEDURE — 99213 OFFICE O/P EST LOW 20 MIN: CPT

## 2024-04-25 PROCEDURE — G2211 COMPLEX E/M VISIT ADD ON: HCPCS

## 2024-04-25 NOTE — PROGRESS NOTES
Family Medicine Follow-Up Office Visit  Lindsey Chandra 73 y.o. female   MRN: [id number] : 1950  ENCOUNTER: 2024 4:53 PM    Assessment and Plan   Type 2 diabetes mellitus without complication, without long-term current use of insulin ([facility])    Lab Results   Component Value Date    HGBA1C 9.8 2024   Relatively new onset.  Previous A1c's have been in the sevens, however most recent was 9.8.  She does note occasional lightheadedness but denies other associated symptoms.  No current medications.  Most recent GFR 62 on 2023.    Plan  Initiate metformin, starting with 500 twice daily.  She was counseled regarding side effects.  Will increase dose to 1000 twice daily if well-tolerated.  Check CMP and lipids  Will order glucometer so that she may check home BG readings given endorsement of occasional symptomatic hyperglycemia noted on her friend's monitor.  continue lisinopril for blood pressure management as well as renal protection  Follow-up in 1 month for A1c recheck.  will engage in further diet counseling at that time.  Consider potential referral to diabetes education.  Plan to discuss changing simvastatin to high intensity statin per guidelines for diabetics      Chief Complaint     Chief Complaint   Patient presents with    [facility]    Arm Pain     Right arm pain from a fall in         History of Present Illness   Lindsey Chandra is a 73 y.o. female with a history of breast cancer s/p L sided mastectomy (), HLD, HTN, psoriasis, total hysterectomy, who presents today for [facility] and a complaint of R hand pain discomfort and occasional paresthesias. She was recently treated by orthopedics with a right radiocarpal joint injection 2024. She notes that since that time symptoms have improved but have not completely resolved.  She was also referred to hand therapy at that time and notes that she has an upcoming appointment. Of note, recent A1c 2024 was  9.8, increased from 7.5.  She is not currently taking any diabetes medications.  She does note occasionally checking home blood sugars with her friends glucose monitor, and notes that she has seen readings in the 300s.      Review of Systems   Review of Systems   Constitutional:  Negative for chills and fever.   HENT:  Negative for congestion, sore throat, tinnitus and trouble swallowing.    Eyes:  Negative for visual disturbance.   Respiratory:  Negative for chest tightness and shortness of breath.    Cardiovascular:  Negative for chest pain and leg swelling.   Gastrointestinal:  Negative for abdominal pain, constipation and diarrhea.   Musculoskeletal:         Occasional R hand pain and parasthesias   Neurological:  Positive for light-headedness (occasional). Negative for weakness.   Psychiatric/Behavioral:  Negative for agitation.        Active Problem List     Patient Active Problem List   Diagnosis    Benign essential hypertension    Hyperlipidemia    Hypothyroidism    Lymphedema    Primary insomnia    IFG (impaired fasting glucose)    Cancer of breast, female (HCC)    Osteoarthritis    Age-related osteoporosis without current pathological fracture    Peripheral neuropathy    Psoriasis    Edema of left lower eyelid    Chronic bilateral low back pain without sciatica    Obesity (BMI 30.0-34.9)    Polyp of colon    Encounter for immunization    Yeast infection of the skin    Muscular chest pain    Encounter for hepatitis C screening test for low risk patient    Hyperglycemia    Acute neck sprain, initial encounter    Altered mental status    Fear for personal safety    Concerned about having social problem    Behavioral change    Encounter for screening mammogram for malignant neoplasm of breast    Right upper quadrant abdominal pain    Myalgia    Dizziness    Ambulatory dysfunction    Right leg pain    Skin lesion    Encounter for screening mammogram for breast cancer    Personal history of colonic polyps     "Asymptomatic postmenopausal state    Pain in right wrist    Fall from standing, initial encounter    Scapholunate dissociation of right wrist    Type 2 diabetes mellitus without complication, without long-term current use of insulin (HCC)       Past Medical History, Past Surgical History, Family History, and Social History were reviewed and updated today as appropriate.    Objective   /90 (BP Location: Right arm, Patient Position: Sitting, Cuff Size: Large)   Pulse 70   Temp 98.1 °F (36.7 °C) (Tympanic)   Ht 4' 8\" (1.422 m)   Wt 70.2 kg (154 lb 12.8 oz)   SpO2 98%   BMI 34.71 kg/m²     Physical Exam  Constitutional:       General: She is not in acute distress.     Appearance: She is not ill-appearing.   HENT:      Head: Normocephalic and atraumatic.      Mouth/Throat:      Mouth: Mucous membranes are moist.      Pharynx: Oropharynx is clear.   Eyes:      Extraocular Movements: Extraocular movements intact.      Conjunctiva/sclera: Conjunctivae normal.      Pupils: Pupils are equal, round, and reactive to light.   Cardiovascular:      Rate and Rhythm: Normal rate and regular rhythm.      Heart sounds: Normal heart sounds.   Pulmonary:      Effort: Pulmonary effort is normal.      Breath sounds: Normal breath sounds.   Abdominal:      Palpations: Abdomen is soft.      Tenderness: There is no abdominal tenderness. There is no guarding.   Musculoskeletal:      Right lower leg: No edema.      Left lower leg: No edema.   Skin:     General: Skin is warm and dry.   Neurological:      Mental Status: She is alert and oriented to person, place, and time.      Comments: Sensation and strength intact R hand, no deficits noted   Psychiatric:         Mood and Affect: Mood normal.         Behavior: Behavior normal.       Diabetic Foot Exam    Pertinent Laboratory/Diagnostic Studies:  Lab Results   Component Value Date    GLUCOSE 114 01/02/2015    BUN 21 07/06/2023    CREATININE 0.92 07/06/2023    CALCIUM 9.4 07/06/2023 "     05/31/2017    K 3.7 07/06/2023    CO2 28 07/06/2023     (H) 07/06/2023     Lab Results   Component Value Date    ALT 16 07/06/2023    AST 16 07/06/2023    ALKPHOS 80 07/06/2023    BILITOT 0.4 05/31/2017       Lab Results   Component Value Date    WBC 8.61 07/06/2023    HGB 13.7 07/06/2023    HCT 42.7 07/06/2023    MCV 89 07/06/2023     07/06/2023       Lab Results   Component Value Date    TSH 2.60 07/06/2020       Lab Results   Component Value Date    CHOL 168 05/31/2017     Lab Results   Component Value Date    TRIG 160 (H) 04/07/2022     Lab Results   Component Value Date    HDL 49 (L) 04/07/2022     Lab Results   Component Value Date    LDLCALC 90 04/07/2022     Lab Results   Component Value Date    HGBA1C 9.8 02/26/2024       Results for orders placed or performed during the hospital encounter of 04/16/24   Fingerstick Glucose (POCT)   Result Value Ref Range    POC Glucose 290 (H) 65 - 140 mg/dl       Orders Placed This Encounter   Procedures    Glucometer    Glucometer test strips    Lancets    Albumin / creatinine urine ratio    Comprehensive metabolic panel    Lipid panel    Ambulatory Referral to Ophthalmology         Current Medications     Current Outpatient Medications   Medication Sig Dispense Refill    acetaminophen (TYLENOL) 650 mg CR tablet Take 1 tablet (650 mg total) by mouth every 8 (eight) hours as needed for moderate pain 30 tablet 0    amLODIPine (NORVASC) 10 mg tablet Take 1 tablet (10 mg total) by mouth daily 90 tablet 3    Apremilast (Otezla) 30 MG TABS TAKE 2 TABLETS BY MOUTH DAILY 90 tablet 1    calcium carbonate-vitamin D (OSCAL-D) 500 mg-200 units per tablet Take 2 tablets by mouth daily with breakfast 120 tablet 3    clindamycin (CLEOCIN T) 1 % lotion   0    Diclofenac Sodium (VOLTAREN) 1 % Apply 4 g topically 4 (four) times a day 100 g 0    Guselkumab 100 MG/ML SOSY Please inject 100mg (1 pen) subcutaneously once on week 0, week 4, then every 8 weeks thereafter.  1 mL 7    hydrochlorothiazide (HYDRODIURIL) 25 mg tablet take 1 tablet by mouth once daily 90 tablet 3    levothyroxine 50 mcg tablet Take 1 tablet (50 mcg total) by mouth daily 90 tablet 1    lidocaine (XYLOCAINE) 2 % topical gel Apply topically as needed for mild pain 30 mL 0    lisinopril (ZESTRIL) 20 mg tablet take 1 tablet by mouth once daily 90 tablet 0    metFORMIN (GLUCOPHAGE) 500 mg tablet Take 2 tablets (1,000 mg total) by mouth 2 (two) times a day with meals 120 tablet 1    Misc. Devices (GetGo Rolling Walker) MISC Use if needed (ambulatory assistance) 1 each 0    ondansetron (Zofran) 4 mg tablet Take 2 tablets (8 mg total) by mouth every 8 (eight) hours as needed for nausea or vomiting for up to 10 doses Zofran odt 2 tablets dissolve in the mouth  every 8 hrs as needed for nausea/ vomiting 10 tablet 0    simvastatin (ZOCOR) 20 mg tablet Take 1 tablet (20 mg total) by mouth daily at bedtime 90 tablet 0    mirtazapine (REMERON) 15 mg tablet Take 1 tablet (15 mg total) by mouth daily at bedtime Take 15mg (one tablet) at night for three (3) days and then start on 30mg (two tablets) at night onwards. 63 tablet 0    naproxen (Naprosyn) 500 mg tablet Take 1 tablet (500 mg total) by mouth 2 (two) times a day with meals for 7 days 14 tablet 0    polyethylene glycol (Golytely) 4000 mL solution Take 4,000 mL by mouth once for 1 dose Take 4000 mL by mouth once for 1 dose. Use as directed 4000 mL 0     No current facility-administered medications for this visit.       ALLERGIES:  No Known Allergies    Health Maintenance     Health Maintenance   Topic Date Due    Diabetic Foot Exam  Never done    DM Eye Exam  Never done    Zoster Vaccine (1 of 2) Never done    Pneumococcal Vaccine: 65+ Years (3 of 3 - PPSV23 or PCV20) 10/03/2021    Kidney Health Evaluation: Albumin/Creatinine Ratio  02/01/2023    Influenza Vaccine (1) 09/01/2023    COVID-19 Vaccine (2 - 2023-24 season) 09/01/2023    Breast Cancer Screening: Mammogram   06/02/2024    Kidney Health Evaluation: GFR  07/06/2024    HEMOGLOBIN A1C  08/26/2024    Fall Risk  01/29/2025    Urinary Incontinence Screening  01/29/2025    Medicare Annual Wellness Visit (AWV)  01/29/2025    Depression Screening  04/02/2025    Colorectal Cancer Screening  04/16/2029    Hepatitis C Screening  Completed    Osteoporosis Screening  Completed    HIB Vaccine  Aged Out    IPV Vaccine  Aged Out    Hepatitis A Vaccine  Aged Out    Meningococcal ACWY Vaccine  Aged Out    HPV Vaccine  Aged Out     Immunization History   Administered Date(s) Administered    COVID-19 J&J ([employer]) vaccine 0.5 mL 04/10/2021    Influenza, high dose seasonal 0.7 mL 12/22/2020, 09/24/2021, 01/17/2023    Influenza, seasonal, injectable 10/27/2011, 10/25/2012, 09/26/2013, 12/14/2016    Pneumococcal Conjugate 13-Valent 10/03/2016    Pneumococcal Polysaccharide PPV23 10/27/2011    Tdap 05/30/2018         Malcolm Zaman DO   [facility]  4/25/2024  4:53 PM    Parts of this note were dictated using [employer] dictation software and may have sounds-like errors due to variation in pronunciation.

## 2024-04-25 NOTE — ASSESSMENT & PLAN NOTE
Lab Results   Component Value Date    HGBA1C 9.8 02/26/2024   Relatively new onset.  Previous A1c's have been in the sevens, however most recent was 9.8.  She does note occasional lightheadedness but denies other associated symptoms.  No current medications.  Most recent GFR 62 on 7/2023.    Plan  Initiate metformin, starting with 500 twice daily.  She was counseled regarding side effects.  Will increase dose to 1000 twice daily if well-tolerated.  Check CMP and lipids  Will order glucometer so that she may check home BG readings given endorsement of occasional symptomatic hyperglycemia noted on her friend's monitor.  continue lisinopril for blood pressure management as well as renal protection  Follow-up in 1 month for A1c recheck.  will engage in further diet counseling at that time.  Consider potential referral to diabetes education.  Plan to discuss changing simvastatin to high intensity statin per guidelines for diabetics

## 2024-04-25 NOTE — PATIENT INSTRUCTIONS
For one week take 500 mg (1 pill) of metformin with breakfast and one pill with dinner. If you do not have any side effects from this, start taking 1000 mg (2 pills) with breakfast and 1000 mg with dinner.

## 2024-04-26 NOTE — TELEPHONE ENCOUNTER
Patient calling about Glucometer from yesterday.  She doesn't use [facility] anymore since they are closing.  She wanted to see where the Glucometer went.

## 2024-05-02 DIAGNOSIS — E11.9 TYPE 2 DIABETES MELLITUS WITHOUT COMPLICATION, WITHOUT LONG-TERM CURRENT USE OF INSULIN (HCC): Primary | ICD-10-CM

## 2024-05-02 DIAGNOSIS — E03.9 ACQUIRED HYPOTHYROIDISM: ICD-10-CM

## 2024-05-02 RX ORDER — BLOOD-GLUCOSE METER
KIT MISCELLANEOUS
Qty: 1 KIT | Refills: 0 | Status: SHIPPED | OUTPATIENT
Start: 2024-05-02

## 2024-05-02 RX ORDER — LANCETS 33 GAUGE
EACH MISCELLANEOUS
Qty: 100 EACH | Refills: 3 | Status: SHIPPED | OUTPATIENT
Start: 2024-05-02

## 2024-05-02 RX ORDER — PEN NEEDLE, DIABETIC 31 GX5/16"
NEEDLE, DISPOSABLE MISCELLANEOUS DAILY
Qty: 100 EACH | Refills: 2 | Status: SHIPPED | OUTPATIENT
Start: 2024-05-02

## 2024-05-02 RX ORDER — BLOOD SUGAR DIAGNOSTIC
STRIP MISCELLANEOUS
Qty: 100 EACH | Refills: 3 | Status: SHIPPED | OUTPATIENT
Start: 2024-05-02

## 2024-05-02 RX ORDER — LEVOTHYROXINE SODIUM 0.05 MG/1
50 TABLET ORAL DAILY
Qty: 90 TABLET | Refills: 3 | Status: SHIPPED | OUTPATIENT
Start: 2024-05-02 | End: 2024-05-07 | Stop reason: SDUPTHER

## 2024-05-02 NOTE — TELEPHONE ENCOUNTER
"Patient stated she still has not received her glucose monitor testing kit . If it can please be sent to [facility] as per last OVS .   \"Will order glucometer so that she may check home BG readings given endorsement of occasional symptomatic hyperglycemia noted on her friend's monitor. \"  "

## 2024-05-02 NOTE — TELEPHONE ENCOUNTER
Please let her know that glucometer and supplies have been sent to the pharmacy on [address]. Please advise her to call if she has any issues picking it up. I also put in a prescription for alcohol pads she can use to clean her skin with before checking blood glucose. These may not be covered by insurance but should be affordable OTC. Thank you.

## 2024-05-02 NOTE — TELEPHONE ENCOUNTER
Medication: Levothyroxine        Pharmacy: [facility]    Office:   [x] PCP/Provider - [facility]  [] Speciality/Provider -     Does the patient have enough for 3 days?   [] Yes   [x] No - Send as HP to POD

## 2024-05-07 ENCOUNTER — OFFICE VISIT (OUTPATIENT)
Dept: FAMILY MEDICINE CLINIC | Facility: CLINIC | Age: 74
End: 2024-05-07

## 2024-05-07 VITALS
HEIGHT: 56 IN | RESPIRATION RATE: 18 BRPM | DIASTOLIC BLOOD PRESSURE: 77 MMHG | SYSTOLIC BLOOD PRESSURE: 151 MMHG | HEART RATE: 87 BPM | WEIGHT: 157 LBS | OXYGEN SATURATION: 98 % | BODY MASS INDEX: 35.32 KG/M2 | TEMPERATURE: 98.4 F

## 2024-05-07 DIAGNOSIS — M25.511 RIGHT SHOULDER PAIN, UNSPECIFIED CHRONICITY: ICD-10-CM

## 2024-05-07 DIAGNOSIS — E11.9 TYPE 2 DIABETES MELLITUS WITHOUT COMPLICATION, WITHOUT LONG-TERM CURRENT USE OF INSULIN (HCC): Primary | ICD-10-CM

## 2024-05-07 DIAGNOSIS — E03.9 ACQUIRED HYPOTHYROIDISM: ICD-10-CM

## 2024-05-07 PROCEDURE — 99213 OFFICE O/P EST LOW 20 MIN: CPT | Performed by: FAMILY MEDICINE

## 2024-05-07 PROCEDURE — 3077F SYST BP >= 140 MM HG: CPT | Performed by: FAMILY MEDICINE

## 2024-05-07 PROCEDURE — G2211 COMPLEX E/M VISIT ADD ON: HCPCS | Performed by: FAMILY MEDICINE

## 2024-05-07 PROCEDURE — 3078F DIAST BP <80 MM HG: CPT | Performed by: FAMILY MEDICINE

## 2024-05-07 RX ORDER — LEVOTHYROXINE SODIUM 0.05 MG/1
50 TABLET ORAL DAILY
Qty: 90 TABLET | Refills: 3 | Status: SHIPPED | OUTPATIENT
Start: 2024-05-07

## 2024-05-07 NOTE — ASSESSMENT & PLAN NOTE
Last TSH on 07/06/23 was 2.562.   - Ordered TSH with upcoming lab work  - Continue on levothyroxine 50 mcg.  - Follow up in 3 weeks after labs.

## 2024-05-07 NOTE — ASSESSMENT & PLAN NOTE
Pt has persisting right shoulder and wrist pain following a fall on 03/08/24. She has weakness and numbness in her hand. She had xrays of her wrist that showed no fracture or dislocation.   She has tried tylenol with little relief  - Recommended advil as needed with food for symptom relief  - Ordered xray of shoulder due to prolonged symptoms and new complaints of numbness and tingling  - Follow up if symptoms persist

## 2024-05-07 NOTE — ASSESSMENT & PLAN NOTE
Pt presents with fasting blood glucose around 200. She began metformin 500 mg two weeks ago and has mild symptoms of diarrhea. She also has morning symptoms of seeing white lights. She does not see them in office today.  Lab Results   Component Value Date    HGBA1C 9.8 02/26/2024     - Patient is willing to continue on metformin.  - Encouraged lifestyle changes on diet and exercise.  - Continue metformin 500 mg once a day for one week.  - Increase to metformin 500 mg twice a day in one week if side effects allow.   - Continue monitoring fasting BG readings. Pt has orders placed for glucometer materials -to  from pharmacy, patient to ask pharmacist team to show her how to use a glucometer or return for nursing visit for teaching.  - Consider SGLT-2 inhibitors if unable to tolerate metformin; would have to consider insulin if A1c worsens  - HA1c ordered for 3 weeks  - Referral placed or eye appt. Last eye appt was 3 years. Pt instructed to call insurance to determine coverage if needed.  - Follow up in three weeks after HA1c.

## 2024-05-07 NOTE — PROGRESS NOTES
Name: Lindsey Chandra      : 1950      MRN: [id number]  Encounter Provider: Sy Ventura DO  Encounter Date: 2024   Encounter department: [facility]    Assessment & Plan     1. Type 2 diabetes mellitus without complication, without long-term current use of insulin ([facility])  Assessment & Plan:  Pt presents with fasting blood glucose around 200. She began metformin 500 mg two weeks ago and has mild symptoms of diarrhea. She also has morning symptoms of seeing white lights. She does not see them in office today.  Lab Results   Component Value Date    HGBA1C 9.8 2024     - Patient is willing to continue on metformin.  - Encouraged lifestyle changes on diet and exercise.  - Continue metformin 500 mg once a day for one week.  - Increase to metformin 500 mg twice a day in one week if side effects allow.   - Continue monitoring fasting BG readings. Pt has orders placed for glucometer materials -to  from pharmacy, patient to ask pharmacist team to show her how to use a glucometer or return for nursing visit for teaching.  - Consider SGLT-2 inhibitors if unable to tolerate metformin; would have to consider insulin if A1c worsens  - HA1c ordered for 3 weeks  - Referral placed or eye appt. Last eye appt was 3 years. Pt instructed to call insurance to determine coverage if needed.  - Follow up in three weeks after HA1c.    Orders:  -     Ambulatory Referral to Ophthalmology; Future  -     Hemoglobin A1C; Future    2. Right shoulder pain, unspecified chronicity  Assessment & Plan:  Pt has persisting right shoulder and wrist pain following a fall on 24. She has weakness and numbness in her hand. She had xrays of her wrist that showed no fracture or dislocation.   She has tried tylenol with little relief  - Recommended advil as needed with food for symptom relief  - Ordered xray of shoulder due to prolonged symptoms and new complaints of numbness and  tingling  - Follow up if symptoms persist     Orders:  -     XR shoulder 2+ vw right; Future; Expected date: 05/07/2024    3. Acquired hypothyroidism  Assessment & Plan:  Last TSH on 07/06/23 was 2.562.   - Ordered TSH with upcoming lab work  - Continue on levothyroxine 50 mcg.  - Follow up in 3 weeks after labs.     Orders:  -     levothyroxine 50 mcg tablet; Take 1 tablet (50 mcg total) by mouth daily  -     TSH, 3rd generation with Free T4 reflex; Future           Subjective      72 yo female presents as a follow up for diabetes and refill on hypothyroidism medications. She started metformin 500 mg two weeks ago and has symptoms of mild diarrhea and nausea.  Reports she has 2 bouts of loose stool daily, some days is better some days and might be little bit worse.  Her morning and afternoon sugars have been all been around 200. She also has complaints of seeing multiple white lights in the mornings. She has not seen an eye doctor in three years and requests a referral. She does not have any visual disturbances in office today.     She also has complaints of shoulder pain lasting from a fall on 03/08/24. She visited the ED and had xrays of her wrist which were normal. She has pain in her shoulder and wrist. She has weakness and numbness of her wrist and is unable to sign her name due to pain and weakness. She has tried tylenol with little relief.        Review of Systems   Constitutional:  Negative for chills and fever.   HENT:  Negative for congestion.    Eyes:  Positive for itching and visual disturbance.   Respiratory:  Negative for cough, chest tightness and shortness of breath.    Cardiovascular:  Negative for chest pain.   Gastrointestinal:  Positive for diarrhea and nausea. Negative for blood in stool and vomiting.   Genitourinary:  Negative for difficulty urinating and frequency.   Musculoskeletal:  Positive for arthralgias (shoulder and wrist). Negative for myalgias.   Neurological:  Negative for dizziness  and headaches.       Current Outpatient Medications on File Prior to Visit   Medication Sig    acetaminophen (TYLENOL) 650 mg CR tablet Take 1 tablet (650 mg total) by mouth every 8 (eight) hours as needed for moderate pain    Alcohol Swabs (Alcohol Prep) PADS Use in the morning    amLODIPine (NORVASC) 10 mg tablet Take 1 tablet (10 mg total) by mouth daily    Apremilast (Otezla) 30 MG TABS TAKE 2 TABLETS BY MOUTH DAILY    Blood Glucose Monitoring Suppl (OneTouch Verio Reflect) w/Device KIT Check blood sugars once daily. Please substitute with appropriate alternative as covered by patient's insurance. Dx: E11.65    calcium carbonate-vitamin D (OSCAL-D) 500 mg-200 units per tablet Take 2 tablets by mouth daily with breakfast    Guselkumab 100 MG/ML SOSY Please inject 100mg (1 pen) subcutaneously once on week 0, week 4, then every 8 weeks thereafter.    hydrochlorothiazide (HYDRODIURIL) 25 mg tablet take 1 tablet by mouth once daily    lisinopril (ZESTRIL) 20 mg tablet take 1 tablet by mouth once daily    ondansetron (Zofran) 4 mg tablet Take 2 tablets (8 mg total) by mouth every 8 (eight) hours as needed for nausea or vomiting for up to 10 doses Zofran odt 2 tablets dissolve in the mouth  every 8 hrs as needed for nausea/ vomiting    OneTouch Delica Lancets 33G MISC Check blood sugars once daily. Please substitute with appropriate alternative as covered by patient's insurance. Dx: E11.65    simvastatin (ZOCOR) 20 mg tablet Take 1 tablet (20 mg total) by mouth daily at bedtime    [DISCONTINUED] levothyroxine 50 mcg tablet Take 1 tablet (50 mcg total) by mouth daily    clindamycin (CLEOCIN T) 1 % lotion  (Patient not taking: Reported on 5/7/2024)    Diclofenac Sodium (VOLTAREN) 1 % Apply 4 g topically 4 (four) times a day (Patient not taking: Reported on 5/7/2024)    glucose blood (OneTouch Verio) test strip Check blood sugars once daily. Please substitute with appropriate alternative as covered by patient's insurance.  "Dx: E11.65    lidocaine (XYLOCAINE) 2 % topical gel Apply topically as needed for mild pain (Patient not taking: Reported on 5/7/2024)    metFORMIN (GLUCOPHAGE) 500 mg tablet Take 2 tablets (1,000 mg total) by mouth 2 (two) times a day with meals (Patient not taking: Reported on 5/7/2024)    mirtazapine (REMERON) 15 mg tablet Take 1 tablet (15 mg total) by mouth daily at bedtime Take 15mg (one tablet) at night for three (3) days and then start on 30mg (two tablets) at night onwards. (Patient not taking: Reported on 5/7/2024)    Misc. Devices (GetGo Rolling Walker) MISC Use if needed (ambulatory assistance)    naproxen (Naprosyn) 500 mg tablet Take 1 tablet (500 mg total) by mouth 2 (two) times a day with meals for 7 days (Patient not taking: Reported on 5/7/2024)    polyethylene glycol (Golytely) 4000 mL solution Take 4,000 mL by mouth once for 1 dose Take 4000 mL by mouth once for 1 dose. Use as directed       Objective     /77   Pulse 87   Temp 98.4 °F (36.9 °C) (Temporal)   Resp 18   Ht 4' 8\" (1.422 m)   Wt 71.2 kg (157 lb)   SpO2 98%   BMI 35.20 kg/m²     Physical Exam  Constitutional:       Appearance: Normal appearance.   HENT:      Head: Normocephalic and atraumatic.      Right Ear: External ear normal.      Left Ear: External ear normal.   Eyes:      Conjunctiva/sclera: Conjunctivae normal.   Cardiovascular:      Pulses: Normal pulses.   Pulmonary:      Effort: Pulmonary effort is normal. No respiratory distress.   Musculoskeletal:         General: Normal range of motion.      Right shoulder: Tenderness present. No deformity. Normal range of motion.      Left shoulder: Normal.      Right hand: Tenderness present. No swelling or deformity.      Left hand: Normal.      Comments: Shoulder range of motion intact,  strength is weak bilaterally but symmetric.  Patient reports some pain in the wrist and intermittent episodes of numbness and tingling.  Point tenderness over AC joint and deltoid "   Neurological:      Mental Status: She is alert and oriented to person, place, and time.   Psychiatric:         Mood and Affect: Mood normal.         Behavior: Behavior normal.       Sy Ventura, DO

## 2024-05-07 NOTE — PATIENT INSTRUCTIONS
Continue metformin 500 mg daily. Increase to 1000 mg daily if improvement in stomach upset (one tab in the morning and one in the evening).    Complete labwork after May 27th!      RETURN ON 5/28 FOR A1C CHECK.

## 2024-05-08 ENCOUNTER — EVALUATION (OUTPATIENT)
Dept: OCCUPATIONAL THERAPY | Facility: CLINIC | Age: 74
End: 2024-05-08
Payer: COMMERCIAL

## 2024-05-08 DIAGNOSIS — M25.331 SCAPHO-LUNATE DISSOCIATION, RIGHT: Primary | ICD-10-CM

## 2024-05-08 PROCEDURE — 97166 OT EVAL MOD COMPLEX 45 MIN: CPT | Performed by: OCCUPATIONAL THERAPIST

## 2024-05-08 NOTE — LETTER
May 8, 2024    Tavon Robertson MD  [address]  [address]  [facility] [place] [address]    Patient: Lindsey Chandra   YOB: 1950   Date of Visit: 2024     Encounter Diagnosis     ICD-10-CM    1. 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 Robertson:    Thank you for your recent referral of Lindsey Chandra. Please review the attached evaluation summary from Lindsey's recent visit.     Please verify that you agree with the plan of care by signing the attached order.     If you have any questions or concerns, please do not hesitate to call.     I sincerely appreciate the opportunity to share in the care of one of your patients and hope to have another opportunity to work with you in the near future.     Sincerely,    Marga Chavez, OT      Referring Provider:     I certify that I have read the below Plan of Care and certify the need for these services furnished under this plan of treatment while under my care.                    Tavon Robertson MD  [address]  [address]  [facility] [place] [address]  Via In Basket        OT Evaluation     Today's date: 2024  Patient name: Lindsey Chandra  : 1950  MRN: [id number]  Referring provider: Tavon Robertson MD  Dx:   Encounter Diagnosis     ICD-10-CM    1. 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 Pt noted with inc pain with palpation to scaphoid and lunate of carpal bones. MD dx pt with scaphoid lunate dissociation. Pt edu in isometric strengthening exercises to secondary dynamic stabilizers- ECRL, FCU, FCR and APL to bring into supination. Pt also provided with KT for support and pain mgmt. Pt would benefit from continued skilled OT tx to dec pain, inc  ROM, inc strength, and inc functional use of hand.   Impairments: abnormal coordination, abnormal or restricted ROM, activity intolerance, impaired physical strength, lacks appropriate home exercise program and pain with function    Symptom irritability: lowUnderstanding of Dx/Px/POC: good   Prognosis: good    Goals  1. Pt will report dec pain by 50%  2. Pt will be independent with HEP  3. Pt will inc wrist flex/ext ROM + 10 degrees    LTGs:  1. Pt will report dec pain and n/t by 75%  2. Pt will demo WNLs ROM R wrist/hand  3. Pt will inc R  strength to hand +8#  4. Pt will inc functional use of R hand via improved FOTO score.    Plan  Patient would benefit from: skilled occupational therapy  Planned modality interventions: cryotherapy, contrast bath immersion, thermotherapy: hydrocollator packs, unattended electrical stimulation and thermotherapy: paraffin bath  Planned therapy interventions: IASTM, joint mobilization, kinesiology taping, manual therapy, strengthening, stretching, therapeutic activities, therapeutic exercise, home exercise program and functional ROM exercises  Frequency: 2x week  Duration in weeks: 8  Treatment plan discussed with: patient    Subjective Evaluation    History of Present Illness  Date of onset: 3/8/2024  Mechanism of injury: trauma  Mechanism of injury: Patient is a 73 y.o. RHD female who presents today for consultation for her right wrist injury referred by Adriano Millard PA-C.  Patient was seen on 3/25/2024 reporting that she tripped and fell on the right wrist on 3/8/2024.  She has been treating in a cock up wrist brace. C/o Numbness and tingling into the index through small fingers found with Right wrist SL dissociation, 3/8/2024. The patient decided to move forward with a radiocarpal joint injection for the scapholunate dissociation. She tolerated the injection well.  Referral to OT to work on motion and strength. She can wear the cock up wrist brace ONLY at night for the  numbness and tingling.               Not a recurrent problem   Quality of life: fair    Patient Goals  Patient goals for therapy: increased strength, return to sport/leisure activities, increased motion and decreased pain  Patient goal: to not be in pain  Pain  Current pain ratin  At best pain ratin  At worst pain ratin  Quality: burning, dull ache and needle-like  Relieving factors: rest, heat and support  Progression: no change    Treatments  No previous or current treatments  Current treatment: occupational therapy      Objective     Tenderness     Right Wrist/Hand   Tenderness in the scaphoid and lunate.     Neurological Testing     Sensation     Wrist/Hand     Right   Intact: light touch, pin prick and sharp/dull discrimination    Additional Neurological Details  C/o n/t to R hand and digits constant    Active Range of Motion     Right Elbow   Forearm supination: 60 degrees with pain  Forearm pronation: 90 degrees     Left Wrist   Wrist flexion: 60 degrees   Wrist extension: 20 degrees   Radial deviation: 35 degrees   Ulnar deviation: 30 degrees     Right Wrist   Wrist flexion: 45 degrees   Wrist extension: 50 degrees   Radial deviation: 30 degrees   Ulnar deviation: 15 degrees     Strength/Myotome Testing     Left Wrist/Hand   Wrist extension: 3+  Wrist flexion: 3-  Radial deviation: 3+  Ulnar deviation: 3+     (2nd hand position)     Trial 1: 25.1    Thumb Strength  Key/Lateral Pinch     Trial 1: 7.3  Tip/Two-Point Pinch     Trial 1: 6.6  Palmar/Three-Point Pinch     Trial 1: 6.3    Right Wrist/Hand   Wrist extension: 3  Wrist flexion: 3-  Radial deviation: 3  Ulnar deviation: 3     (2nd hand position)     Trial 1: 22    Thumb Strength   Key/Lateral Pinch     Trial 1: 8.2  Tip/Two-Point Pinch     Trial 1: 5.7  Palmar/Three-Point Pinch     Trial 1: 6.3    Tests     Right Wrist/Hand   Positive scapholunate shear.            Precautions: Universal; Wrist cock up ONLY at nights  OT order- ROM  exercise and strengthening exercise 2x/week      Manuals 5/8            STM                                       U/S             KT                                                                                                        Ther Ex             Thumb ABD isometrics             FCU isometrics             FCR isometrics             ECRL isometrics             Gentle ROM                                                    Ther Activity                                       Gait Training                                       Modalities             MP

## 2024-05-08 NOTE — PROGRESS NOTES
OT Evaluation     Today's date: 2024  Patient name: Lindsey Chandra  : 1950  MRN: [id number]  Referring provider: Tavon Robertson MD  Dx:   Encounter Diagnosis     ICD-10-CM    1. 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 Pt noted with inc pain with palpation to scaphoid and lunate of carpal bones. MD dx pt with scaphoid lunate dissociation. Pt edu in isometric strengthening exercises to secondary dynamic stabilizers- ECRL, FCU, FCR and APL to bring into supination. Pt also provided with KT for support and pain mgmt. Pt would benefit from continued skilled OT tx to dec pain, inc ROM, inc strength, and inc functional use of hand.   Impairments: abnormal coordination, abnormal or restricted ROM, activity intolerance, impaired physical strength, lacks appropriate home exercise program and pain with function    Symptom irritability: lowUnderstanding of Dx/Px/POC: good   Prognosis: good    Goals  1. Pt will report dec pain by 50%  2. Pt will be independent with HEP  3. Pt will inc wrist flex/ext ROM + 10 degrees    LTGs:  1. Pt will report dec pain and n/t by 75%  2. Pt will demo WNLs ROM R wrist/hand  3. Pt will inc R  strength to hand +8#  4. Pt will inc functional use of R hand via improved FOTO score.    Plan  Patient would benefit from: skilled occupational therapy  Planned modality interventions: cryotherapy, contrast bath immersion, thermotherapy: hydrocollator packs, unattended electrical stimulation and thermotherapy: paraffin bath  Planned therapy interventions: IASTM, joint mobilization, kinesiology taping, manual therapy, strengthening, stretching, therapeutic activities, therapeutic exercise, home exercise program and functional ROM  exercises  Frequency: 2x week  Duration in weeks: 8  Treatment plan discussed with: patient    Subjective Evaluation    History of Present Illness  Date of onset: 3/8/2024  Mechanism of injury: trauma  Mechanism of injury: Patient is a 73 y.o. RHD female who presents today for consultation for her right wrist injury referred by Adriano Millard PA-C.  Patient was seen on 3/25/2024 reporting that she tripped and fell on the right wrist on 3/8/2024.  She has been treating in a cock up wrist brace. C/o Numbness and tingling into the index through small fingers found with Right wrist SL dissociation, 3/8/2024. The patient decided to move forward with a radiocarpal joint injection for the scapholunate dissociation. She tolerated the injection well.  Referral to OT to work on motion and strength. She can wear the cock up wrist brace ONLY at night for the numbness and tingling.               Not a recurrent problem   Quality of life: fair    Patient Goals  Patient goals for therapy: increased strength, return to sport/leisure activities, increased motion and decreased pain  Patient goal: to not be in pain  Pain  Current pain ratin  At best pain ratin  At worst pain ratin  Quality: burning, dull ache and needle-like  Relieving factors: rest, heat and support  Progression: no change    Treatments  No previous or current treatments  Current treatment: occupational therapy      Objective     Tenderness     Right Wrist/Hand   Tenderness in the scaphoid and lunate.     Neurological Testing     Sensation     Wrist/Hand     Right   Intact: light touch, pin prick and sharp/dull discrimination    Additional Neurological Details  C/o n/t to R hand and digits constant    Active Range of Motion     Right Elbow   Forearm supination: 60 degrees with pain  Forearm pronation: 90 degrees     Left Wrist   Wrist flexion: 60 degrees   Wrist extension: 20 degrees   Radial deviation: 35 degrees   Ulnar deviation: 30 degrees     Right  Wrist   Wrist flexion: 45 degrees   Wrist extension: 50 degrees   Radial deviation: 30 degrees   Ulnar deviation: 15 degrees     Strength/Myotome Testing     Left Wrist/Hand   Wrist extension: 3+  Wrist flexion: 3-  Radial deviation: 3+  Ulnar deviation: 3+     (2nd hand position)     Trial 1: 25.1    Thumb Strength  Key/Lateral Pinch     Trial 1: 7.3  Tip/Two-Point Pinch     Trial 1: 6.6  Palmar/Three-Point Pinch     Trial 1: 6.3    Right Wrist/Hand   Wrist extension: 3  Wrist flexion: 3-  Radial deviation: 3  Ulnar deviation: 3     (2nd hand position)     Trial 1: 22    Thumb Strength   Key/Lateral Pinch     Trial 1: 8.2  Tip/Two-Point Pinch     Trial 1: 5.7  Palmar/Three-Point Pinch     Trial 1: 6.3    Tests     Right Wrist/Hand   Positive scapholunate shear.            Precautions: Universal; Wrist cock up ONLY at nights  OT order- ROM exercise and strengthening exercise 2x/week      Manuals 5/8            STM                                       U/S             KT                                                                                                        Ther Ex             Thumb ABD isometrics             FCU isometrics             FCR isometrics             ECRL isometrics             Gentle ROM                                                    Ther Activity                                       Gait Training                                       Modalities             MP

## 2024-05-13 ENCOUNTER — APPOINTMENT (OUTPATIENT)
Dept: OCCUPATIONAL THERAPY | Facility: CLINIC | Age: 74
End: 2024-05-13
Payer: COMMERCIAL

## 2024-05-14 ENCOUNTER — OFFICE VISIT (OUTPATIENT)
Dept: OCCUPATIONAL THERAPY | Facility: CLINIC | Age: 74
End: 2024-05-14
Payer: COMMERCIAL

## 2024-05-14 DIAGNOSIS — M25.331 SCAPHO-LUNATE DISSOCIATION, RIGHT: Primary | ICD-10-CM

## 2024-05-14 PROCEDURE — 97530 THERAPEUTIC ACTIVITIES: CPT

## 2024-05-14 PROCEDURE — 97110 THERAPEUTIC EXERCISES: CPT

## 2024-05-14 NOTE — PROGRESS NOTES
Daily Note     Today's date: 2024  Patient name: Lindsey Chandra  : 1950  MRN: [id number]  Referring provider: Tavon Robertson MD  Dx:   Encounter Diagnosis     ICD-10-CM    1. 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      Objective: See treatment diary below      Assessment: Tolerated treatment well. Pt denied pain with ROM and light isometric resistive ex. She reported no pain after tx today. Patient would benefit from continued OT      Plan: Continue per plan of care.  Progress treatment as tolerated.       Precautions: Universal; Wrist cock up ONLY at nights  OT order- ROM exercise and strengthening exercise 2x/week      Manuals            STM  5'                                     U/S             KT                                                                                                        Ther Ex             Thumb ABD isometrics             FCU isometrics             FCR isometrics             ECRL isometrics             Gentle ROM  5'           Wrist ex  5x           Wrist isometrics  YFB on table 10x each                        Ther Activity             Wall walking  TB 5x             translation  coins                                                  Modalities             MP  5'

## 2024-05-15 ENCOUNTER — APPOINTMENT (OUTPATIENT)
Dept: OCCUPATIONAL THERAPY | Facility: CLINIC | Age: 74
End: 2024-05-15
Payer: COMMERCIAL

## 2024-05-16 ENCOUNTER — OFFICE VISIT (OUTPATIENT)
Dept: OCCUPATIONAL THERAPY | Facility: CLINIC | Age: 74
End: 2024-05-16
Payer: COMMERCIAL

## 2024-05-16 DIAGNOSIS — M25.331 SCAPHO-LUNATE DISSOCIATION, RIGHT: Primary | ICD-10-CM

## 2024-05-16 DIAGNOSIS — L40.9 PSORIASIS: ICD-10-CM

## 2024-05-16 PROCEDURE — 97530 THERAPEUTIC ACTIVITIES: CPT

## 2024-05-16 PROCEDURE — 97110 THERAPEUTIC EXERCISES: CPT

## 2024-05-16 NOTE — PROGRESS NOTES
Daily Note     Today's date: 2024  Patient name: Lindsey Chandra  : 1950  MRN: [id number]  Referring provider: Tavon Robertson MD  Dx:   Encounter Diagnosis     ICD-10-CM    1. 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      Objective: See treatment diary below  Sup: 65 (no pain)  WF/E: 50/50  : 22    Assessment: Tolerated treatment well. Pt reports no pain with functional use. The highest it gets is a 3/10. Good progress with ROM. Reviewed HEP: stretches and isometrics - no increased pain with them. Patient would benefit from continued OT      Plan: Potential discharge next visit.     Precautions: Universal; Wrist cock up ONLY at nights  OT order- ROM exercise and strengthening exercise 2x/week      Manuals           STM  5' 5'                                    U/S             KT                                                                                                        Ther Ex             Thumb ABD isometrics             FCU isometrics             FCR isometrics             ECRL isometrics             Gentle ROM  5' 5'          Wrist ex  5x 5x          Wrist isometrics  YFB on table 10x each YFB on table 10x each          digiflex   Red isol 10x    G comp 15x          Ther Activity             Wall walking  TB 5x   TB 5x          translation  coins           /pinch   RPW 20x    RR clips 1x                                    Modalities             MP  5'

## 2024-05-20 ENCOUNTER — APPOINTMENT (OUTPATIENT)
Dept: OCCUPATIONAL THERAPY | Facility: CLINIC | Age: 74
End: 2024-05-20
Payer: COMMERCIAL

## 2024-05-20 ENCOUNTER — TELEPHONE (OUTPATIENT)
Dept: FAMILY MEDICINE CLINIC | Facility: CLINIC | Age: 74
End: 2024-05-20

## 2024-05-22 ENCOUNTER — APPOINTMENT (OUTPATIENT)
Dept: OCCUPATIONAL THERAPY | Facility: CLINIC | Age: 74
End: 2024-05-22
Payer: COMMERCIAL

## 2024-05-23 ENCOUNTER — TELEPHONE (OUTPATIENT)
Dept: MULTI SPECIALTY CLINIC | Facility: CLINIC | Age: 74
End: 2024-05-23

## 2024-05-28 LAB
HBA1C MFR BLD: 9.4 % OF TOTAL HGB
T4 FREE SERPL-MCNC: 1.1 NG/DL (ref 0.8–1.8)
TSH SERPL-ACNC: 5.46 MIU/L (ref 0.4–4.5)

## 2024-05-30 ENCOUNTER — OFFICE VISIT (OUTPATIENT)
Dept: FAMILY MEDICINE CLINIC | Facility: CLINIC | Age: 74
End: 2024-05-30

## 2024-05-30 VITALS
HEART RATE: 73 BPM | OXYGEN SATURATION: 99 % | SYSTOLIC BLOOD PRESSURE: 149 MMHG | RESPIRATION RATE: 18 BRPM | WEIGHT: 160.6 LBS | TEMPERATURE: 98 F | DIASTOLIC BLOOD PRESSURE: 83 MMHG | BODY MASS INDEX: 36.01 KG/M2

## 2024-05-30 DIAGNOSIS — E11.9 TYPE 2 DIABETES MELLITUS WITHOUT COMPLICATION, WITHOUT LONG-TERM CURRENT USE OF INSULIN (HCC): Primary | ICD-10-CM
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
## 2024-05-30 DIAGNOSIS — I10 BENIGN ESSENTIAL HYPERTENSION: ICD-10-CM

## 2024-05-30 DIAGNOSIS — M25.511 RIGHT SHOULDER PAIN, UNSPECIFIED CHRONICITY: ICD-10-CM

## 2024-05-30 DIAGNOSIS — E03.9 ACQUIRED HYPOTHYROIDISM: ICD-10-CM

## 2024-05-30 DIAGNOSIS — M79.641 RIGHT HAND PAIN: ICD-10-CM

## 2024-05-30 PROCEDURE — 99213 OFFICE O/P EST LOW 20 MIN: CPT | Performed by: FAMILY MEDICINE

## 2024-05-30 PROCEDURE — G2211 COMPLEX E/M VISIT ADD ON: HCPCS | Performed by: FAMILY MEDICINE

## 2024-05-30 PROCEDURE — 3077F SYST BP >= 140 MM HG: CPT | Performed by: FAMILY MEDICINE

## 2024-05-30 PROCEDURE — 3079F DIAST BP 80-89 MM HG: CPT | Performed by: FAMILY MEDICINE

## 2024-05-30 RX ORDER — METFORMIN HYDROCHLORIDE 750 MG/1
1500 TABLET, EXTENDED RELEASE ORAL
Qty: 200 TABLET | Refills: 1 | Status: SHIPPED | OUTPATIENT
Start: 2024-05-30 | End: 2024-05-30

## 2024-05-30 RX ORDER — LEVOTHYROXINE SODIUM 0.05 MG/1
50 TABLET ORAL DAILY
Qty: 90 TABLET | Refills: 3 | Status: SHIPPED | OUTPATIENT
Start: 2024-05-30 | End: 2024-05-30

## 2024-05-30 RX ORDER — METFORMIN HYDROCHLORIDE 750 MG/1
1500 TABLET, EXTENDED RELEASE ORAL
Qty: 200 TABLET | Refills: 1 | Status: SHIPPED | OUTPATIENT
Start: 2024-05-30

## 2024-05-30 RX ORDER — LEVOTHYROXINE SODIUM 0.05 MG/1
50 TABLET ORAL DAILY
Qty: 90 TABLET | Refills: 3 | Status: SHIPPED | OUTPATIENT
Start: 2024-05-30

## 2024-05-30 NOTE — ASSESSMENT & PLAN NOTE
Patient seen for R shoulder pain 3 weeks ago. XR shoulder was ordered at that visit, but patient did not get imaging since she states that she lost her order form from the visit. Patient told that she does not need the paper form as long as she is getting imaging within [facility]. Shoulder XR ordered, patient advised that she can get this at the same visit for wrist XR.

## 2024-05-30 NOTE — ASSESSMENT & PLAN NOTE
75 yo female with PMH including T2DM, HTN, and hypothyroidism presents for follow-up of DM and HTN. Patient reports that she has not been able to check blood glucose consistently due to weakness affecting her ability to use the lancet. Patient was recently started on metformin 6 weeks ago, reports mild watery stool associated with medication but states its only 1 episode a day. Hb A1C is still elevated at 9.4 (from 9.8 from 2/2024). Discussed with patient about increasing metformin dose from 500 mg BID to 750 mg BID and about adding SGLT2-inhibitor. Patient is agreeable - switched to metformin  mg BID for less GI side effects, and start jardiance 10 mg daily, side effects reviewed. Also advised patient about adequate exercise and dietary modifications. Follow-up in 3 months with A1c. Asked patient to bring in her medications at next visit to assess for proper medication use.  - continue on lisinopril 20 mg daily for HTN/kidney protection  - last labs 5/2024: no evidence of microalbuminuria, GFR 92

## 2024-05-30 NOTE — ASSESSMENT & PLAN NOTE
Patient reports fall on her R wrist yesterday. Patient denies dizziness or lightheadedess preceding the fall. Patient also fell in March, which was seen by orthopedics. On physical exam, there is tenderness to palpation in the R snuffbox and thenar area. Mild R thenar atrophy. Symmetric  strength bilaterally, no sensory loss of R hand. Concern for metacarpal dislocation or fracture. XR of R wrist and R hand ordered. Continue tylenol for pain and can use ibuprofen TID prn.

## 2024-05-30 NOTE — ASSESSMENT & PLAN NOTE
Current regimen: amlodipine 10 mg daily, HCTZ 25 mg daily, lisinopril 20 mg daily  BP elevated in office today 149/83. Elevated on recheck, most likely secondary to R wrist pain. Pt to check BP at home with goal < 140/90. Pt to bring in medications to next visit. Will continue to monitor.

## 2024-05-30 NOTE — PROGRESS NOTES
Ambulatory Visit  Name: Lindsey Chandra      : 1950      MRN: [id number]  Encounter Provider: Sy Ventura DO  Encounter Date: 2024   Encounter department: [facility]    Assessment & Plan   1. Type 2 diabetes mellitus without complication, without long-term current use of insulin (HCC)  Assessment & Plan:  75 yo female with PMH including T2DM, HTN, and hypothyroidism presents for follow-up of DM and HTN. Patient reports that she has not been able to check blood glucose consistently due to weakness affecting her ability to use the lancet. Patient was recently started on metformin 6 weeks ago, reports mild watery stool associated with medication but states its only 1 episode a day. Hb A1C is still elevated at 9.4 (from 9.8 from 2024). Discussed with patient about increasing metformin dose from 500 mg BID to 750 mg BID and about adding SGLT2-inhibitor. Patient is agreeable - switched to metformin  mg BID for less GI side effects, and start jardiance 10 mg daily, side effects reviewed. Also advised patient about adequate exercise and dietary modifications. Follow-up in 3 months with A1c. Asked patient to bring in her medications at next visit to assess for proper medication use.  - continue on lisinopril 20 mg daily for HTN/kidney protection  - last labs 2024: no evidence of microalbuminuria, GFR 92    Orders:  -     Hemoglobin A1c (w/out EAG) ([facility] ONLY); Future; Expected date: 2024  -     Hemoglobin A1c (w/out EAG) ([facility] ONLY)  -     Empagliflozin (Jardiance) 10 MG TABS tablet; Take 1 tablet (10 mg total) by mouth in the morning  -     metFORMIN (GLUCOPHAGE-XR) 750 mg 24 hr tablet; Take 2 tablets (1,500 mg total) by mouth daily with breakfast  2. Fall, initial encounter  Assessment & Plan:  Patient reports fall on her R wrist yesterday. Patient denies dizziness or lightheadedess preceding the fall. Patient also fell in March, which was  seen by orthopedics. On physical exam, there is tenderness to palpation in the R snuffbox and thenar area. Mild R thenar atrophy. Symmetric  strength bilaterally, no sensory loss of R hand. Concern for metacarpal dislocation or fracture. XR of R wrist and R hand ordered. Continue tylenol for pain and can use ibuprofen TID prn.   Orders:  -     XR hand 3+ vw right; Future; Expected date: 05/30/2024  -     XR wrist 3+ vw right; Future; Expected date: 05/30/2024  3. Right hand pain  -     XR hand 3+ vw right; Future; Expected date: 05/30/2024  -     XR wrist 3+ vw right; Future; Expected date: 05/30/2024  4. Right shoulder pain, unspecified chronicity  Assessment & Plan:  Patient seen for R shoulder pain 3 weeks ago. XR shoulder was ordered at that visit, but patient did not get imaging since she states that she lost her order form from the visit. Patient told that she does not need the paper form as long as she is getting imaging within [facility]. Shoulder XR ordered, patient advised that she can get this at the same visit for wrist XR.  5. Benign essential hypertension  Assessment & Plan:  Current regimen: amlodipine 10 mg daily, HCTZ 25 mg daily, lisinopril 20 mg daily  BP elevated in office today 149/83. Elevated on recheck, most likely secondary to R wrist pain. Pt to check BP at home with goal < 140/90. Pt to bring in medications to next visit. Will continue to monitor.  6. Acquired hypothyroidism  Assessment & Plan:  Thyroid function stable. Refilled levothyroxine  Orders:  -     levothyroxine 50 mcg tablet; Take 1 tablet (50 mcg total) by mouth daily       History of Present Illness     73 yo female with PMH including T2DM, HTN, and hypothyroidism presents for follow-up of DM and HTN. Patient states that she has been taking all her medications consistently. She reports that she is getting some watery stool with her metformin but only having 1 BM daily. Denies nausea, vomiting, foul-smelling diarrhea, or  changes in frequency of defecation. Patient reports that she is unable to check BG due to inability to use the lancet. She reports that she cooks for herself and tries to avoid salt. Patient states that her exercise consists of walking her dog for 10 min/day. Denies palpitations, headache, and vision changes. Reports occasional mild chest pain lasting <1 min which resolves on its own. Patient also reports that she has been taking her levothyroxine every morning before eating and would like a refill.    Patient also reports R wrist pain. States that she fell yesterday on R hand. She states that she lost balance while pushing her shopping cart. Denies dizziness or lightheadness preceding the fall. Fell in march on R hand as well and was seen by ortho. Fell at 10 am, had not eaten or drank water before that.         Review of Systems   Constitutional:  Negative for chills and fever.   HENT:  Negative for ear pain and sore throat.    Eyes:  Negative for pain and visual disturbance.   Respiratory:  Positive for chest tightness. Negative for cough and shortness of breath.    Cardiovascular:  Negative for chest pain and palpitations.   Gastrointestinal:  Positive for diarrhea. Negative for abdominal pain, nausea and vomiting.   Genitourinary:  Negative for dysuria and hematuria.   Musculoskeletal:  Negative for back pain.        R wrist pain   Skin:  Negative for color change and rash.   Neurological:  Negative for dizziness, seizures, syncope, light-headedness and headaches.   All other systems reviewed and are negative.    Current Outpatient Medications on File Prior to Visit   Medication Sig Dispense Refill    acetaminophen (TYLENOL) 650 mg CR tablet Take 1 tablet (650 mg total) by mouth every 8 (eight) hours as needed for moderate pain 30 tablet 0    Alcohol Swabs (Alcohol Prep) PADS Use in the morning 100 each 2    amLODIPine (NORVASC) 10 mg tablet Take 1 tablet (10 mg total) by mouth daily 90 tablet 3    Apremilast  (Otezla) 30 MG TABS TAKE 2 TABLETS BY MOUTH DAILY 90 tablet 1    Blood Glucose Monitoring Suppl (OneTouch Verio Reflect) w/Device KIT Check blood sugars once daily. Please substitute with appropriate alternative as covered by patient's insurance. Dx: E11.65 1 kit 0    calcium carbonate-vitamin D (OSCAL-D) 500 mg-200 units per tablet Take 2 tablets by mouth daily with breakfast 120 tablet 3    glucose blood (OneTouch Verio) test strip Check blood sugars once daily. Please substitute with appropriate alternative as covered by patient's insurance. Dx: E11.65 100 each 3    Guselkumab 100 MG/ML SOSY Please inject 100mg (1 pen) subcutaneously once on week 0, week 4, then every 8 weeks thereafter. 1 mL 7    hydrochlorothiazide (HYDRODIURIL) 25 mg tablet take 1 tablet by mouth once daily 90 tablet 3    lisinopril (ZESTRIL) 20 mg tablet take 1 tablet by mouth once daily 90 tablet 0    Misc. Devices (ZipZap Rolling Walker) MISC Use if needed (ambulatory assistance) 1 each 0    ondansetron (Zofran) 4 mg tablet Take 2 tablets (8 mg total) by mouth every 8 (eight) hours as needed for nausea or vomiting for up to 10 doses Zofran odt 2 tablets dissolve in the mouth  every 8 hrs as needed for nausea/ vomiting 10 tablet 0    OneTouch Delica Lancets 33G MISC Check blood sugars once daily. Please substitute with appropriate alternative as covered by patient's insurance. Dx: E11.65 100 each 3    simvastatin (ZOCOR) 20 mg tablet Take 1 tablet (20 mg total) by mouth daily at bedtime 90 tablet 0    [DISCONTINUED] levothyroxine 50 mcg tablet Take 1 tablet (50 mcg total) by mouth daily 90 tablet 3    clindamycin (CLEOCIN T) 1 % lotion  (Patient not taking: Reported on 5/7/2024)  0    Diclofenac Sodium (VOLTAREN) 1 % Apply 4 g topically 4 (four) times a day (Patient not taking: Reported on 5/7/2024) 100 g 0    lidocaine (XYLOCAINE) 2 % topical gel Apply topically as needed for mild pain (Patient not taking: Reported on 5/7/2024) 30 mL 0     mirtazapine (REMERON) 15 mg tablet Take 1 tablet (15 mg total) by mouth daily at bedtime Take 15mg (one tablet) at night for three (3) days and then start on 30mg (two tablets) at night onwards. (Patient not taking: Reported on 5/7/2024) 63 tablet 0    naproxen (Naprosyn) 500 mg tablet Take 1 tablet (500 mg total) by mouth 2 (two) times a day with meals for 7 days (Patient not taking: Reported on 5/7/2024) 14 tablet 0    polyethylene glycol (Golytely) 4000 mL solution Take 4,000 mL by mouth once for 1 dose Take 4000 mL by mouth once for 1 dose. Use as directed 4000 mL 0    [DISCONTINUED] metFORMIN (GLUCOPHAGE) 500 mg tablet Take 2 tablets (1,000 mg total) by mouth 2 (two) times a day with meals (Patient not taking: Reported on 5/7/2024) 120 tablet 1     No current facility-administered medications on file prior to visit.      Objective     /83 (BP Location: Right arm, Patient Position: Sitting, Cuff Size: Standard)   Pulse 73   Temp 98 °F (36.7 °C) (Temporal)   Resp 18   Wt 72.8 kg (160 lb 9.6 oz)   SpO2 99%   BMI 36.01 kg/m²     Physical Exam  Vitals and nursing note reviewed.   Constitutional:       General: She is not in acute distress.     Appearance: She is well-developed.   HENT:      Head: Normocephalic and atraumatic.   Eyes:      Conjunctiva/sclera: Conjunctivae normal.   Cardiovascular:      Rate and Rhythm: Normal rate and regular rhythm.      Pulses: Normal pulses.      Heart sounds: Normal heart sounds. No murmur heard.  Pulmonary:      Effort: Pulmonary effort is normal. No respiratory distress.      Breath sounds: Normal breath sounds.   Abdominal:      Palpations: Abdomen is soft.      Tenderness: There is no abdominal tenderness.   Musculoskeletal:         General: Tenderness (R snuffbox, R thenar aea) present. No swelling.      Cervical back: Neck supple.      Comments: Mild R thenar atrophy, R wrist ROM limited, no loss of sensation in R wrist   Skin:     General: Skin is warm and dry.       Capillary Refill: Capillary refill takes less than 2 seconds.   Neurological:      Mental Status: She is alert.      Motor: Weakness (symmetric weakness of both hands) present.   Psychiatric:         Mood and Affect: Mood normal.       Administrative Statements   I have spent a total time of 30 minutes on 05/30/24 In caring for this patient including Risks and benefits of tx options, Instructions for management, Patient and family education, Importance of tx compliance, Counseling / Coordination of care, and Documenting in the medical record.

## 2024-05-31 ENCOUNTER — APPOINTMENT (OUTPATIENT)
Dept: OCCUPATIONAL THERAPY | Facility: CLINIC | Age: 74
End: 2024-05-31
Payer: COMMERCIAL

## 2024-06-18 ENCOUNTER — HOSPITAL ENCOUNTER (OUTPATIENT)
Dept: RADIOLOGY | Age: 74
Discharge: HOME | End: 2024-06-18
Payer: COMMERCIAL

## 2024-06-18 VITALS — HEIGHT: 56 IN | BODY MASS INDEX: 35.99 KG/M2 | WEIGHT: 160 LBS

## 2024-06-18 DIAGNOSIS — Z78.0 ASYMPTOMATIC POSTMENOPAUSAL STATE: ICD-10-CM

## 2024-06-18 DIAGNOSIS — Z12.31 ENCOUNTER FOR SCREENING MAMMOGRAM FOR BREAST CANCER: ICD-10-CM

## 2024-06-18 PROCEDURE — 77063 BREAST TOMOSYNTHESIS BI: CPT

## 2024-06-18 PROCEDURE — 77080 DXA BONE DENSITY AXIAL: CPT

## 2024-06-18 PROCEDURE — 77067 SCR MAMMO BI INCL CAD: CPT

## 2024-06-26 ENCOUNTER — OFFICE VISIT (OUTPATIENT)
Dept: MULTI SPECIALTY CLINIC | Facility: CLINIC | Age: 74
End: 2024-06-26

## 2024-06-26 DIAGNOSIS — L40.9 PSORIASIS: Primary | ICD-10-CM

## 2024-06-26 DIAGNOSIS — Z51.81 MEDICATION MONITORING ENCOUNTER: ICD-10-CM

## 2024-06-26 NOTE — PROGRESS NOTES
"[facility] Note     Patient Name: Lindsey Chandra  Encounter Date: 6/26/24    Have you been cared for by a [facility] Dermatologist in the last 3 years and, if so, which description applies to you?    Yes.  I have been here within the last 3 years, and my medical history has NOT changed since that time.  I am FEMALE/of child-bearing potential.    REVIEW OF SYSTEMS:  Have you recently had or currently have any of the following? No changes in my recent health.   PAST MEDICAL HISTORY:  Have you personally ever had or currently have any of the following?  If \"YES,\" then please provide more detail. No changes in my medical history.   HISTORY OF IMMUNOSUPPRESSION: Do you have a history of any of the following:  Systemic Immunosuppression such as Diabetes, Biologic or Immunotherapy, Chemotherapy, Organ Transplantation, Bone Marrow Transplantation?  No     Answering \"YES\" requires the addition of the dotphrase \"IMMUNOSUPPRESSED\" as the first diagnosis of the patient's visit.   FAMILY HISTORY:  Any \"first degree relatives\" (parent, brother, sister, or child) with the following?    No changes in my family's known health.   PATIENT EXPERIENCE:    Do you want the Dermatologist to perform a COMPLETE skin exam today including a clinical examination under the \"bra and underwear\" areas?  NO  If necessary, do we have your permission to call and leave a detailed message on your Preferred Phone number that includes your specific medical information?  Yes      No Known Allergies   Current Outpatient Medications:   •  acetaminophen (TYLENOL) 650 mg CR tablet, Take 1 tablet (650 mg total) by mouth every 8 (eight) hours as needed for moderate pain, Disp: 30 tablet, Rfl: 0  •  Alcohol Swabs (Alcohol Prep) PADS, Use in the morning, Disp: 100 each, Rfl: 2  •  amLODIPine (NORVASC) 10 mg tablet, Take 1 tablet (10 mg total) by mouth daily, Disp: 90 tablet, Rfl: 3  •  Blood Glucose Monitoring Suppl (OneTouch Verio " Reflect) w/Device KIT, Check blood sugars once daily. Please substitute with appropriate alternative as covered by patient's insurance. Dx: E11.65, Disp: 1 kit, Rfl: 0  •  calcium carbonate-vitamin D (OSCAL-D) 500 mg-200 units per tablet, Take 2 tablets by mouth daily with breakfast, Disp: 120 tablet, Rfl: 3  •  clindamycin (CLEOCIN T) 1 % lotion, , Disp: , Rfl: 0  •  Diclofenac Sodium (VOLTAREN) 1 %, Apply 4 g topically 4 (four) times a day (Patient not taking: Reported on 5/7/2024), Disp: 100 g, Rfl: 0  •  Empagliflozin (Jardiance) 10 MG TABS tablet, Take 1 tablet (10 mg total) by mouth in the morning, Disp: 90 tablet, Rfl: 1  •  glucose blood (OneTouch Verio) test strip, Check blood sugars once daily. Please substitute with appropriate alternative as covered by patient's insurance. Dx: E11.65, Disp: 100 each, Rfl: 3  •  Guselkumab 100 MG/ML SOSY, Please inject 100mg (1 pen) subcutaneously once on week 0, week 4, then every 8 weeks thereafter., Disp: 1 mL, Rfl: 7  •  hydrochlorothiazide (HYDRODIURIL) 25 mg tablet, take 1 tablet by mouth once daily, Disp: 90 tablet, Rfl: 3  •  levothyroxine 50 mcg tablet, Take 1 tablet (50 mcg total) by mouth daily, Disp: 90 tablet, Rfl: 3  •  lidocaine (XYLOCAINE) 2 % topical gel, Apply topically as needed for mild pain (Patient not taking: Reported on 5/7/2024), Disp: 30 mL, Rfl: 0  •  lisinopril (ZESTRIL) 20 mg tablet, take 1 tablet by mouth once daily, Disp: 90 tablet, Rfl: 0  •  metFORMIN (GLUCOPHAGE-XR) 750 mg 24 hr tablet, Take 2 tablets (1,500 mg total) by mouth daily with breakfast, Disp: 200 tablet, Rfl: 1  •  mirtazapine (REMERON) 15 mg tablet, Take 1 tablet (15 mg total) by mouth daily at bedtime Take 15mg (one tablet) at night for three (3) days and then start on 30mg (two tablets) at night onwards. (Patient not taking: Reported on 5/7/2024), Disp: 63 tablet, Rfl: 0  •  Misc. Devices (GetGo Rolling Walker) MISC, Use if needed (ambulatory assistance), Disp: 1 each, Rfl:  0  •  naproxen (Naprosyn) 500 mg tablet, Take 1 tablet (500 mg total) by mouth 2 (two) times a day with meals for 7 days (Patient not taking: Reported on 5/7/2024), Disp: 14 tablet, Rfl: 0  •  ondansetron (Zofran) 4 mg tablet, Take 2 tablets (8 mg total) by mouth every 8 (eight) hours as needed for nausea or vomiting for up to 10 doses Zofran odt 2 tablets dissolve in the mouth  every 8 hrs as needed for nausea/ vomiting, Disp: 10 tablet, Rfl: 0  •  OneTouch Delica Lancets 33G MISC, Check blood sugars once daily. Please substitute with appropriate alternative as covered by patient's insurance. Dx: E11.65, Disp: 100 each, Rfl: 3  •  polyethylene glycol (Golytely) 4000 mL solution, Take 4,000 mL by mouth once for 1 dose Take 4000 mL by mouth once for 1 dose. Use as directed, Disp: 4000 mL, Rfl: 0  •  simvastatin (ZOCOR) 20 mg tablet, Take 1 tablet (20 mg total) by mouth daily at bedtime, Disp: 90 tablet, Rfl: 0          Whom besides the patient is providing clinical information about today's encounter?   NO ADDITIONAL HISTORIAN (patient alone provided history)    Physical Exam and Assessment/Plan by Diagnosis:    PSORIASIS  2. TREMFYA Biologic Injectable Administration      Additional History of Present Condition:  Patient is present for education and administration of Tremfya for psoriasis and psoriatic arthritis. Pt is satisfied that Tremfya has cleared her skin and improved her joint pain  Lot: [id number]  EXP: 07/2025     Assessment and Plan:  Based on a thorough discussion of this condition and the management approach to it (including a comprehensive discussion of the known risks, side effects and potential benefits of treatment), the patient (family) agrees to implement the following specific plan:  Injection #6  Written consent obtained to administer.   Next dose due 8 weeks.   Repeat quant gold. Will need new prior authorization at next visit.         Biologic Injectable Administration Note  Diagnosis:  psoriasis  This is injection number 5     Informed consent: Discussed risks (Risks of hypersensitivity reaction, injection site reaction, conjunctivitis/keratitis, HSV reactivation, increased susceptibility to parasitic infections, inefficacy were reviewed.) Verbal consent obtained.   Preparation: After discussion potential procedure related risks including pain, bleeding, new infection, reactivation of latent infection, inefficacy, increased risk of malignancy, hypersensitivity reaction, injection site reaction, verbal consent was obtained. The areas were cleansed with alcohol prep pads and allowed to fully air dry for 3 minutes.  Procedure Details:  100mg was injected subcutaneously in the left arm  Lot Number: [id number]  Expiration: 05/2025  Total Injected: 100mg  NDC: 01368-064-78      Patient tolerated procedure well, with minimal pinpoint bleeding that was controlled with pressure. Aftercare was reviewed.         WHAT IS TREMFYA® (guselkumab)?      TREMFYA® is a prescription medicine used to treat adults with moderate to severe plaque psoriasis who may benefit from taking injections or pills (systemic therapy) or phototherapy (treatment using ultraviolet or UV light).  TREMFYA® is a prescription medicine used to treat adults with active psoriatic arthritis.     IMPORTANT SAFETY INFORMATION      What is the most important information I should know about TREMFYA®?  TREMFYA® is a prescription medicine that may cause serious side effects, including:  Serious Allergic Reactions.   Stop using   TREMFYA®     and get emergency medical help right away if you develop any of the following symptoms of a serious allergic reaction:   fainting, dizziness, feeling lightheaded (low blood pressure)  swelling of your face, eyelids, lips, mouth, tongue or throat  trouble breathing or throat tightness  chest tightness  skin rash, hives  itching     Infections.   TREMFYA®   may lower the ability of your immune system to fight  infections and may increase your risk of infections. Your healthcare provider should check you for infections and tuberculosis (TB) before starting treatment with TREMFYA®   and may treat you for TB before you begin treatment with TREMFYA®   if you have a history of TB or have active TB. Your healthcare provider should watch you closely for signs and symptoms of TB during and after treatment with TREMFYA®.   Tell your healthcare provider right away if you have an infection or have symptoms of an infection, including:  fever, sweats, or chills  muscle aches  weight loss  cough  warm, red, or painful skin or sores on your body different from your psoriasis  diarrhea or stomach pain  shortness of breath  blood in your phlegm (mucus)  burning when you urinate or urinating more often than normal     Do not take TREMFYA® if you have had a serious allergic reaction to guselkumab or any of the ingredients in TREMFYA®.  Before using TREMFYA®, tell your healthcare provider about all of your medical conditions, including if you:  have any of the conditions or symptoms listed in the section “What is the most important information I should know about TREMFYA®?”  have an infection that does not go away or that keeps coming back.  have TB or have been in close contact with someone with TB.  have recently received or are scheduled to receive an immunization (vaccine). You should avoid receiving live vaccines during treatment with TREMFYA®.  are pregnant or plan to become pregnant. It is not known if TREMFYA® can harm your unborn baby.  are breastfeeding or plan to breastfeed. It is not known if TREMFYA® passes into your breast milk.  Tell your healthcare provider about all the medicines you take, including prescription and over-the-counter medicines, vitamins, and herbal supplements.  What are the possible side effects of TREMFYA®?  TREMFYA® may cause serious side effects. See “What is the most important information I should know  about TREMFYA®?”  The most common side effects of TREMFYA® include: upper respiratory infections, headache, injection site reactions, joint pain (arthralgia), diarrhea, stomach flu (gastroenteritis), fungal skin infections, herpes simplex infections, and bronchitis.  These are not all the possible side effects of TREMFYA®. Call your doctor for medical advice about side effects.  Use TREMFYA® exactly as your healthcare provider tells you to use it.  Please read the full Prescribing Information, including Medication Guide for TREMFYA®, and discuss any questions that you have with your doctor.  You are encouraged to report negative side effects of prescription drugs to the FDA. Visit www.fda.gov/medwatch, or call 3-448-ACQ-4813.

## 2024-07-09 ENCOUNTER — DOCUMENTATION (OUTPATIENT)
Dept: DERMATOLOGY | Facility: CLINIC | Age: 74
End: 2024-07-09

## 2024-07-17 ENCOUNTER — OFFICE VISIT (OUTPATIENT)
Dept: FAMILY MEDICINE CLINIC | Facility: CLINIC | Age: 74
End: 2024-07-17

## 2024-07-17 ENCOUNTER — PATIENT OUTREACH (OUTPATIENT)
Dept: FAMILY MEDICINE CLINIC | Facility: CLINIC | Age: 74
End: 2024-07-17

## 2024-07-17 VITALS
TEMPERATURE: 98.3 F | DIASTOLIC BLOOD PRESSURE: 79 MMHG | RESPIRATION RATE: 18 BRPM | SYSTOLIC BLOOD PRESSURE: 160 MMHG | HEART RATE: 91 BPM | OXYGEN SATURATION: 98 % | BODY MASS INDEX: 33.1 KG/M2 | HEIGHT: 59 IN | WEIGHT: 164.2 LBS

## 2024-07-17 DIAGNOSIS — E11.9 TYPE 2 DIABETES MELLITUS WITHOUT COMPLICATION, WITHOUT LONG-TERM CURRENT USE OF INSULIN (HCC): Primary | ICD-10-CM

## 2024-07-17 DIAGNOSIS — R29.898 HAND WEAKNESS: ICD-10-CM

## 2024-07-17 DIAGNOSIS — Z74.2 NEED FOR HOME HEALTH CARE: ICD-10-CM

## 2024-07-17 DIAGNOSIS — G62.9 PERIPHERAL POLYNEUROPATHY: ICD-10-CM

## 2024-07-17 LAB
LEFT EYE DIABETIC RETINOPATHY: NORMAL
LEFT EYE IMAGE QUALITY: NORMAL
LEFT EYE MACULAR EDEMA: NORMAL
LEFT EYE OTHER RETINOPATHY: NORMAL
RIGHT EYE DIABETIC RETINOPATHY: NORMAL
RIGHT EYE IMAGE QUALITY: NORMAL
RIGHT EYE MACULAR EDEMA: NORMAL
RIGHT EYE OTHER RETINOPATHY: NORMAL
SEVERITY (EYE EXAM): NORMAL

## 2024-07-17 NOTE — ASSESSMENT & PLAN NOTE
74 y.o9. F with uncontrolled DM  Lab Results   Component Value Date    HGBA1C 9.4 (H) 05/28/2024   Plan:  As patient has trouble obtaining home glucose levels due to hand weakness, I recommend the patient receive home health aid.   HBA1C ordered  Metformin 1000mg BID prescribed  Referral for social work care management

## 2024-07-17 NOTE — ASSESSMENT & PLAN NOTE
74 y.o. F with polyneuropathy  Plan:  As patient has trouble with ADL's due to neuropathy, I recommend the patient receive home health aid    Referral for social work care management

## 2024-07-17 NOTE — PROGRESS NOTES
Outpatient RN Care Manager Note    Re: Consult with PCP for home care    I was consulted by PCP today regarding patient need for home care in the home.  During office visit the patient provided PCP with a contact number of [phone]. I called the number which is the [place] [facility] contact number to apply for waiver services.  Chart notes indicate patient was diagnoses with Type 2 DM with an A1C of 9.4. Diabetic regimen of Jardiance 10 mg and metformin increased to 1000 mg BID. Patient is unable to obtain glucose levels due to hand weakness. Patient has history of falls and right arm fracture.  Patient has difficulty with ADL's such as cleaning, cooking and dressing herself.  Referral was placed for a MSW CM.  Will place a referral for complex care management for patient outreach due to diagnosis of diabetes.    Called patient with no answer. Left message, this is Jerri the Outpatient Nurse Care Manager at [facility] office calling to follow up with you. Requested return phone call at [phone].

## 2024-07-17 NOTE — PROGRESS NOTES
Ambulatory Visit  Name: Lindsey Chandra      : 1950      MRN: [id number]  Encounter Provider: Audra Hernadez MD  Encounter Date: 2024   Encounter department: [facility]    Assessment & Plan   1. Type 2 diabetes mellitus without complication, without long-term current use of insulin (HCC)  Assessment & Plan:  74 y.o9. F with uncontrolled DM  Lab Results   Component Value Date    HGBA1C 9.4 (H) 2024   Plan:  As patient has trouble obtaining home glucose levels due to hand weakness, I recommend the patient receive home health aid.   HBA1C ordered  Metformin 1000mg BID prescribed  Referral for social work care management   Orders:  -     IRIS Diabetic eye exam  -     Hemoglobin A1C; Future  -     metFORMIN (GLUCOPHAGE) 1000 MG tablet; Take 1 tablet (1,000 mg total) by mouth 2 (two) times a day with meals  2. Hand weakness  Assessment & Plan:  74 y.o. F with polyneuropathy and B/L hand weakness  As patient has trouble with ADL's, I recommend the patient receive home health aid  Orders:  -     Ambulatory Referral to Social Work Care Management Program; Future  3. Need for home health care  Assessment & Plan:  74 y.o. F with polyneuropathy, uncontrolled type II DM, hx of fall with use of walker at home   Referral for social work case management  Orders:  -     Ambulatory Referral to Social Work Care Management Program; Future  4. Peripheral polyneuropathy  Assessment & Plan:  74 y.o. F with polyneuropathy  Plan:  As patient has trouble with ADL's due to neuropathy, I recommend the patient receive home health aid    Referral for social work care management        History of Present Illness     The patient is a 74 y.o. female with PMH of uncontrolled type II DM, polyneuropathy, HTN, and HLD who presents to the clinic for a referral for home health aid. The patient complains of weakness associated with numbness of B/L hands and forearms ongoing for several months.  "She reports falling in 2021, 2022, and 2023. The patient reports breaking her right arm in two places after her last fall. She has completed occupational therapy. Due to neuropathy and weakness of B/L hands, the patient has trouble with ADL's such as cleaning, cooking, and dressing herself. She also reports difficulty obtaining blood glucose levels due to inability to stick her finger. The patient also complains of numbness in B/L feet. She occasionally uses a walker at home. She is able to walk a block before getting short of breath. She lives alone and does not have family members nearby to help her. Patient has applied for a home health aid but is unsure of the company name and who she spoke to. She provided me with the tel [phone].        Review of Systems   Constitutional:  Negative for fever.   HENT:  Negative for congestion and sore throat.    Eyes:  Negative for visual disturbance.   Respiratory:  Positive for shortness of breath.    Cardiovascular:  Negative for chest pain.   Gastrointestinal:  Negative for abdominal pain, constipation, diarrhea and nausea.   Endocrine: Negative for polyphagia.   Genitourinary:  Negative for difficulty urinating and dysuria.   Neurological:  Positive for weakness and numbness. Negative for headaches.       Objective     /79 (BP Location: Right arm, Patient Position: Sitting, Cuff Size: Large)   Pulse 91   Temp 98.3 °F (36.8 °C) (Temporal)   Resp 18   Ht 4' 11\" (1.499 m)   Wt 74.5 kg (164 lb 3.2 oz)   SpO2 98%   BMI 33.16 kg/m²     Physical Exam  Constitutional:       Appearance: Normal appearance.   HENT:      Right Ear: Tympanic membrane, ear canal and external ear normal.      Left Ear: Tympanic membrane, ear canal and external ear normal.      Mouth/Throat:      Mouth: Mucous membranes are dry.      Pharynx: Oropharynx is clear.   Eyes:      Extraocular Movements: Extraocular movements intact.      Conjunctiva/sclera: Conjunctivae normal.      Pupils: " Pupils are equal, round, and reactive to light.   Cardiovascular:      Rate and Rhythm: Normal rate and regular rhythm.      Pulses: Normal pulses.      Heart sounds: Normal heart sounds.   Pulmonary:      Effort: Pulmonary effort is normal.      Breath sounds: Normal breath sounds.   Skin:     General: Skin is warm and dry.   Neurological:      Mental Status: She is alert.   Psychiatric:         Mood and Affect: Mood normal.         Behavior: Behavior normal.         Thought Content: Thought content normal.         Judgment: Judgment normal.       Administrative Statements

## 2024-07-17 NOTE — ASSESSMENT & PLAN NOTE
74 y.o. F with polyneuropathy and B/L hand weakness  As patient has trouble with ADL's, I recommend the patient receive home health aid

## 2024-07-17 NOTE — ASSESSMENT & PLAN NOTE
74 y.o. F with polyneuropathy, uncontrolled type II DM, hx of fall with use of walker at home   Referral for social work case management

## 2024-07-18 ENCOUNTER — PATIENT OUTREACH (OUTPATIENT)
Dept: FAMILY MEDICINE CLINIC | Facility: CLINIC | Age: 74
End: 2024-07-18

## 2024-07-18 NOTE — PROGRESS NOTES
Outpatient RN Care Manager Note    Re: Home health care needs/ Type 2 DM    Received message from the clerical staff the patient returned my call. I returned  the call and spoke to the patient regarding her home health care needs.  The patient states she is in the process of applying for waiver services.  She states the provider has to complete information and a home evaluation has not been performed.  A referral to the medical social worker has been placed.  I advised the patient the MSW will be reaching out to her in the near future.    We discussed her diabetic regimen.  She is unable to performed glucose testing due to hand weakness. Medication change reviewed with the patient, metformin 1000 mg twice daily with meals.    Patient has not further needs at this time.  Will close to complex care management.

## 2024-07-19 ENCOUNTER — PATIENT OUTREACH (OUTPATIENT)
Dept: FAMILY MEDICINE CLINIC | Facility: CLINIC | Age: 74
End: 2024-07-19

## 2024-07-19 ENCOUNTER — TELEPHONE (OUTPATIENT)
Dept: FAMILY MEDICINE CLINIC | Facility: CLINIC | Age: 74
End: 2024-07-19

## 2024-07-19 NOTE — PROGRESS NOTES
OP SWCM referral received from Dr. Satya MD r/t pt home health aid needs. Chart review completed. Per chart review, pt with several issues caring for herself independently at home. Per chart review, pt reported to PCP that she applied for a home health aid and provided the phone number for [facility]. Per chart review, pt reported to RNCM that she was in the process of applying for an aid through the waiver services. Per chart, pt reported needing the provider form and in home evaluation completed. Per chart review, pt w/ previous CM involvement. Per chart review, pt w/ involvement with Protective Services in 2022 due to concern of pt's capacity. Per chart review, pt reported no social support and minimal local family in the area. Per chart review, pt had not applied for an aid at that time. Per chart review, neuropsych eval was ordered in 2022 and pt declined wanting to complete this eval and refused any OP MH resources.    OP SWCM placed call to pt and received pt's VM. OP SWCM left VM for pt introducing self, role and reason for calling. OP SWCM asked for pt to return call to discuss referral needs. SW will await a call back and f/u as needed.

## 2024-07-22 ENCOUNTER — PATIENT OUTREACH (OUTPATIENT)
Dept: FAMILY MEDICINE CLINIC | Facility: CLINIC | Age: 74
End: 2024-07-22

## 2024-07-22 NOTE — PROGRESS NOTES
Pt called office to speak with SW. Pt stated that SW had called pt and pt was returning the call. IB message received from [facility] Clerical Staff that pt had called the office to speak w/ SW.    SW placed call to pt and received pt's VM box. OP SWCM left VM introducing self, role and reason for calling. OP SWCM advised pt to call SW back at SW direct number and provided pt with SW number that pt can call. SW will await a call back.

## 2024-07-22 NOTE — TELEPHONE ENCOUNTER
Patient is calling stating  called her   And she missed call.    Patient can be reached at [phone]

## 2024-07-23 ENCOUNTER — PATIENT OUTREACH (OUTPATIENT)
Dept: FAMILY MEDICINE CLINIC | Facility: CLINIC | Age: 74
End: 2024-07-23

## 2024-07-23 NOTE — PROGRESS NOTES
SW received a call back from pt.  SW informed pt that SW received a referral to assist pt w/ home health aides. Pt states she had an initial call and interview with [employer] for waiver services. Pt is not interested in private pay care. Pt states she has not heard back from [employer]. SW asked pt if she had the phone number for [employer]. Pt confirmed she did. SW advised her to contact the [facility] to see if she was established with a  yet. Pt agreeable. Sw also advised pt to see if there was anything that the PCP needed to complete for pt to get approved.    Pt declined any other needs at this time. Pt is in the process of getting a HHA with [employer]. SW provided contact information for SW should pt have any questions.

## 2024-07-24 ENCOUNTER — APPOINTMENT (OUTPATIENT)
Dept: LAB | Facility: CLINIC | Age: 74
End: 2024-07-24
Payer: COMMERCIAL

## 2024-07-24 DIAGNOSIS — E11.9 TYPE 2 DIABETES MELLITUS WITHOUT COMPLICATION, WITHOUT LONG-TERM CURRENT USE OF INSULIN (HCC): ICD-10-CM

## 2024-07-24 DIAGNOSIS — E03.9 ACQUIRED HYPOTHYROIDISM: ICD-10-CM

## 2024-07-24 DIAGNOSIS — L40.9 PSORIASIS: ICD-10-CM

## 2024-07-24 DIAGNOSIS — E78.5 HYPERLIPIDEMIA, UNSPECIFIED HYPERLIPIDEMIA TYPE: ICD-10-CM

## 2024-07-24 DIAGNOSIS — Z51.81 MEDICATION MONITORING ENCOUNTER: ICD-10-CM

## 2024-07-24 DIAGNOSIS — R73.9 HYPERGLYCEMIA: ICD-10-CM

## 2024-07-24 LAB
ALBUMIN SERPL BCG-MCNC: 3.9 G/DL (ref 3.5–5)
ALP SERPL-CCNC: 72 U/L (ref 34–104)
ALT SERPL W P-5'-P-CCNC: 13 U/L (ref 7–52)
ANION GAP SERPL CALCULATED.3IONS-SCNC: 13 MMOL/L (ref 4–13)
AST SERPL W P-5'-P-CCNC: 19 U/L (ref 13–39)
BILIRUB SERPL-MCNC: 0.48 MG/DL (ref 0.2–1)
BUN SERPL-MCNC: 15 MG/DL (ref 5–25)
CALCIUM SERPL-MCNC: 9.3 MG/DL (ref 8.4–10.2)
CHLORIDE SERPL-SCNC: 102 MMOL/L (ref 96–108)
CHOLEST SERPL-MCNC: 173 MG/DL
CO2 SERPL-SCNC: 27 MMOL/L (ref 21–32)
CREAT SERPL-MCNC: 0.61 MG/DL (ref 0.6–1.3)
CREAT UR-MCNC: 127.3 MG/DL
EST. AVERAGE GLUCOSE BLD GHB EST-MCNC: 177 MG/DL
GFR SERPL CREATININE-BSD FRML MDRD: 90 ML/MIN/1.73SQ M
GLUCOSE P FAST SERPL-MCNC: 127 MG/DL (ref 65–99)
HBA1C MFR BLD: 7.8 %
HDLC SERPL-MCNC: 57 MG/DL
LDLC SERPL CALC-MCNC: 90 MG/DL (ref 0–100)
MICROALBUMIN UR-MCNC: 30.2 MG/L
MICROALBUMIN/CREAT 24H UR: 24 MG/G CREATININE (ref 0–30)
NONHDLC SERPL-MCNC: 116 MG/DL
POTASSIUM SERPL-SCNC: 3.7 MMOL/L (ref 3.5–5.3)
PROT SERPL-MCNC: 7.2 G/DL (ref 6.4–8.4)
SODIUM SERPL-SCNC: 142 MMOL/L (ref 135–147)
TRIGL SERPL-MCNC: 131 MG/DL
TSH SERPL DL<=0.05 MIU/L-ACNC: 3.56 UIU/ML (ref 0.45–4.5)

## 2024-07-24 PROCEDURE — 80053 COMPREHEN METABOLIC PANEL: CPT

## 2024-07-24 PROCEDURE — 80061 LIPID PANEL: CPT

## 2024-07-24 PROCEDURE — 83036 HEMOGLOBIN GLYCOSYLATED A1C: CPT

## 2024-07-24 PROCEDURE — 86480 TB TEST CELL IMMUN MEASURE: CPT

## 2024-07-24 PROCEDURE — 84443 ASSAY THYROID STIM HORMONE: CPT

## 2024-07-24 PROCEDURE — 82570 ASSAY OF URINE CREATININE: CPT

## 2024-07-24 PROCEDURE — 82043 UR ALBUMIN QUANTITATIVE: CPT

## 2024-07-24 PROCEDURE — 36415 COLL VENOUS BLD VENIPUNCTURE: CPT

## 2024-07-25 LAB
GAMMA INTERFERON BACKGROUND BLD IA-ACNC: 0.02 IU/ML
M TB IFN-G BLD-IMP: NEGATIVE
M TB IFN-G CD4+ BCKGRND COR BLD-ACNC: 0.04 IU/ML
M TB IFN-G CD4+ BCKGRND COR BLD-ACNC: 0.05 IU/ML
MITOGEN IGNF BCKGRD COR BLD-ACNC: 9.98 IU/ML

## 2024-08-12 ENCOUNTER — OFFICE VISIT (OUTPATIENT)
Dept: FAMILY MEDICINE CLINIC | Facility: CLINIC | Age: 74
End: 2024-08-12

## 2024-08-12 VITALS
OXYGEN SATURATION: 97 % | BODY MASS INDEX: 32.62 KG/M2 | SYSTOLIC BLOOD PRESSURE: 174 MMHG | DIASTOLIC BLOOD PRESSURE: 109 MMHG | TEMPERATURE: 98.2 F | WEIGHT: 161.8 LBS | HEIGHT: 59 IN | HEART RATE: 64 BPM | RESPIRATION RATE: 18 BRPM

## 2024-08-12 DIAGNOSIS — E78.5 HYPERLIPIDEMIA, UNSPECIFIED HYPERLIPIDEMIA TYPE: ICD-10-CM

## 2024-08-12 DIAGNOSIS — Z85.3 HISTORY OF BREAST CANCER: ICD-10-CM

## 2024-08-12 DIAGNOSIS — N64.4 BREAST PAIN: Primary | ICD-10-CM

## 2024-08-12 DIAGNOSIS — I10 BENIGN ESSENTIAL HYPERTENSION: ICD-10-CM

## 2024-08-12 RX ORDER — SIMVASTATIN 20 MG
20 TABLET ORAL
Qty: 90 TABLET | Refills: 0 | Status: SHIPPED | OUTPATIENT
Start: 2024-08-12

## 2024-08-12 RX ORDER — LISINOPRIL 20 MG/1
20 TABLET ORAL DAILY
Qty: 90 TABLET | Refills: 0 | Status: SHIPPED | OUTPATIENT
Start: 2024-08-12

## 2024-08-12 NOTE — ASSESSMENT & PLAN NOTE
Distant history of breast cancer in the 1990s. Received a left mastectomy in 1996  Most recent ultrasound in June 2024 showed no evidence of malignancy

## 2024-08-12 NOTE — PROGRESS NOTES
Ambulatory Visit  Name: Lindsey Chandra      : 1950      MRN: [id number]  Encounter Provider: Thi Stringer MD  Encounter Date: 2024   Encounter department: [facility]    Assessment & Plan   1. Breast pain  Assessment & Plan:  Patient either has a three day or one month history of breast pain, story is inconsistent   Patient reports a tender breast which she states felt she felt a lump. + history of breast cancer as such breast ultrasound was ordered.  I will also talk to patient about possibly ordering mammogram.  Last 1 done in 2024 which showed B RADS 2.  Patient would like to defer mammogram for now and would just like to go with diagnostic ultrasounds.  Patient also reporting itchiness.  Keloid present on left chest wall that reaches across the sternum; talk to patient about giving topical steroid versus trying Vaseline-based cream as maybe some of the symptoms could be due to her dry skin.  Patient does states that she does not believe this can be contributing and would just like to only do imaging  Left breast absent, right breast exam no lumps felt on my exam however exam was not performed laying down as patient did not want to lay down.  No nipple indentations noted.  Patient was noted to have dry skin.    Orders:  -     US breast right limited (diagnostic); Future; Expected date: 2024  -     US breast left limited (diagnostic); Future; Expected date: 2024  2. Benign essential hypertension  Assessment & Plan:  194/79 initially, 174/109 on repeat. She does not take her blood pressure at home.  On exam today patient denies any headaches, vision changes, leg swelling, shortness of breath, or chest pain  Patient has been out of lisinopril for two weeks.  Unsure if she is actually taking her amlodipine every day but does states she took it today and yesterday.  Lisinopril 20 mg refilled and patient instructed to take it and  amlodipine 10 mg every single day  Patient instructed to go to the ER if she starts to have headaches, vision changes, leg swelling, shortness of breath, or chest pain  Follow up in 3 days, patient instructed to monitor blood pressures at home  Orders:  -     lisinopril (ZESTRIL) 20 mg tablet; Take 1 tablet (20 mg total) by mouth daily  3. History of breast cancer  Assessment & Plan:  Distant history of breast cancer in the 1990s. Received a left mastectomy in 1996  Most recent ultrasound in June 2024 showed no evidence of malignancy   Orders:  -     US breast right limited (diagnostic); Future; Expected date: 08/12/2024  -     US breast left limited (diagnostic); Future; Expected date: 08/12/2024  4. Hyperlipidemia, unspecified hyperlipidemia type  Assessment & Plan:  Stable, controlled hyperlipidemia. All results of most recent lipid panel within normal limits  Refill Simvastatin 20 mg ordered  Orders:  -     simvastatin (ZOCOR) 20 mg tablet; Take 1 tablet (20 mg total) by mouth daily at bedtime       History of Present Illness     Patient's story inconsistently between providers. Originally stated a 4 day history of breast pain, burning, and itchiness. However, when asked again she said it has been one month of breast pain without burning and itchiness. She has had no changes in clothing, laundry detergents, soaps, or lotions. She has not been vaccinated for shingles. She reports no nipple discharge and goes back and forth on if she has felt any masses. She has a history of left mastectomy due to breast cancer in 1996. She has had no fevers, chills, weight changes, headaches, nausea, or vomiting. She does report urinary frequency and difficulty initiating voiding. She reports taking all of her medications everyday but has been out of her lisinopril for two weeks. When asked again she says she only took her medication last night and this morning. She lives alone and is requesting paperwork to get at home care.          Review of Systems   Constitutional:  Negative for chills, fatigue, fever and unexpected weight change.   Eyes:  Negative for visual disturbance.   Respiratory:  Negative for cough and shortness of breath.    Cardiovascular:  Negative for chest pain.   Gastrointestinal:  Negative for abdominal pain, constipation, diarrhea, nausea and vomiting.   Neurological:  Negative for dizziness and headaches.     Current Outpatient Medications on File Prior to Visit   Medication Sig Dispense Refill    acetaminophen (TYLENOL) 650 mg CR tablet Take 1 tablet (650 mg total) by mouth every 8 (eight) hours as needed for moderate pain 30 tablet 0    amLODIPine (NORVASC) 10 mg tablet Take 1 tablet (10 mg total) by mouth daily 90 tablet 3    Blood Glucose Monitoring Suppl (OneTouch Verio Reflect) w/Device KIT Check blood sugars once daily. Please substitute with appropriate alternative as covered by patient's insurance. Dx: E11.65 1 kit 0    calcium carbonate-vitamin D (OSCAL-D) 500 mg-200 units per tablet Take 2 tablets by mouth daily with breakfast 120 tablet 3    Empagliflozin (Jardiance) 10 MG TABS tablet Take 1 tablet (10 mg total) by mouth in the morning 90 tablet 1    Guselkumab 100 MG/ML SOSY Please inject 100mg (1 pen) subcutaneously once on week 0, week 4, then every 8 weeks thereafter. 1 mL 7    hydrochlorothiazide (HYDRODIURIL) 25 mg tablet take 1 tablet by mouth once daily 90 tablet 3    levothyroxine 50 mcg tablet Take 1 tablet (50 mcg total) by mouth daily 90 tablet 3    metFORMIN (GLUCOPHAGE) 1000 MG tablet Take 1 tablet (1,000 mg total) by mouth 2 (two) times a day with meals 60 tablet 1    Misc. Devices (GetGo Rolling Walker) MISC Use if needed (ambulatory assistance) 1 each 0    ondansetron (Zofran) 4 mg tablet Take 2 tablets (8 mg total) by mouth every 8 (eight) hours as needed for nausea or vomiting for up to 10 doses Zofran odt 2 tablets dissolve in the mouth  every 8 hrs as needed for nausea/ vomiting 10  tablet 0    [DISCONTINUED] lisinopril (ZESTRIL) 20 mg tablet take 1 tablet by mouth once daily 90 tablet 0    [DISCONTINUED] simvastatin (ZOCOR) 20 mg tablet Take 1 tablet (20 mg total) by mouth daily at bedtime 90 tablet 0    Alcohol Swabs (Alcohol Prep) PADS Use in the morning (Patient not taking: Reported on 7/18/2024) 100 each 2    clindamycin (CLEOCIN T) 1 % lotion  (Patient not taking: Reported on 5/7/2024)  0    Diclofenac Sodium (VOLTAREN) 1 % Apply 4 g topically 4 (four) times a day (Patient not taking: Reported on 5/7/2024) 100 g 0    glucose blood (OneTouch Verio) test strip Check blood sugars once daily. Please substitute with appropriate alternative as covered by patient's insurance. Dx: E11.65 (Patient not taking: Reported on 7/18/2024) 100 each 3    lidocaine (XYLOCAINE) 2 % topical gel Apply topically as needed for mild pain (Patient not taking: Reported on 5/7/2024) 30 mL 0    mirtazapine (REMERON) 15 mg tablet Take 1 tablet (15 mg total) by mouth daily at bedtime Take 15mg (one tablet) at night for three (3) days and then start on 30mg (two tablets) at night onwards. (Patient not taking: Reported on 5/7/2024) 63 tablet 0    OneTouch Delica Lancets 33G MISC Check blood sugars once daily. Please substitute with appropriate alternative as covered by patient's insurance. Dx: E11.65 (Patient not taking: Reported on 7/18/2024) 100 each 3    polyethylene glycol (Golytely) 4000 mL solution Take 4,000 mL by mouth once for 1 dose Take 4000 mL by mouth once for 1 dose. Use as directed 4000 mL 0    [DISCONTINUED] naproxen (Naprosyn) 500 mg tablet Take 1 tablet (500 mg total) by mouth 2 (two) times a day with meals for 7 days (Patient not taking: Reported on 5/7/2024) 14 tablet 0     No current facility-administered medications on file prior to visit.      Social History     Tobacco Use    Smoking status: Never    Smokeless tobacco: Never   Vaping Use    Vaping status: Never Used   Substance and Sexual Activity  "   Alcohol use: No    Drug use: No    Sexual activity: Not Currently     Partners: Male     Objective     BP (!) 174/109   Pulse 64   Temp 98.2 °F (36.8 °C) (Temporal)   Resp 18   Ht 4' 11\" (1.499 m)   Wt 73.4 kg (161 lb 12.8 oz)   SpO2 97%   BMI 32.68 kg/m²     Physical Exam  Exam conducted with a chaperone present.   Constitutional:       General: She is not in acute distress.     Appearance: Normal appearance.   HENT:      Head: Normocephalic and atraumatic.   Cardiovascular:      Rate and Rhythm: Normal rate and regular rhythm.      Heart sounds: Normal heart sounds.   Pulmonary:      Effort: Pulmonary effort is normal. No respiratory distress.      Breath sounds: Normal breath sounds.   Chest:   Breasts:     Right: Tenderness present. No inverted nipple, mass, nipple discharge or skin change.      Left: Absent.      Comments: Keloid with minimal crusting on left chest wall that spans over the sternum  Abdominal:      General: Bowel sounds are normal.      Palpations: Abdomen is soft.   Skin:     General: Skin is dry.   Neurological:      Mental Status: She is alert.         "

## 2024-08-12 NOTE — ASSESSMENT & PLAN NOTE
Patient either has a three day or one month history of breast pain, story is inconsistent   Patient reports a tender breast which she states felt she felt a lump. + history of breast cancer as such breast ultrasound was ordered.  I will also talk to patient about possibly ordering mammogram.  Last 1 done in June 2024 which showed B RADS 2.  Patient would like to defer mammogram for now and would just like to go with diagnostic ultrasounds.  Patient also reporting itchiness.  Keloid present on left chest wall that reaches across the sternum; talk to patient about giving topical steroid versus trying Vaseline-based cream as maybe some of the symptoms could be due to her dry skin.  Patient does states that she does not believe this can be contributing and would just like to only do imaging  Left breast absent, right breast exam no lumps felt on my exam however exam was not performed laying down as patient did not want to lay down.  No nipple indentations noted.  Patient was noted to have dry skin.

## 2024-08-12 NOTE — ASSESSMENT & PLAN NOTE
194/79 initially, 174/109 on repeat. She does not take her blood pressure at home.  On exam today patient denies any headaches, vision changes, leg swelling, shortness of breath, or chest pain  Patient has been out of lisinopril for two weeks.  Unsure if she is actually taking her amlodipine every day but does states she took it today and yesterday.  Lisinopril 20 mg refilled and patient instructed to take it and amlodipine 10 mg every single day  Patient instructed to go to the ER if she starts to have headaches, vision changes, leg swelling, shortness of breath, or chest pain  Follow up in 3 days, patient instructed to monitor blood pressures at home

## 2024-08-12 NOTE — ASSESSMENT & PLAN NOTE
Stable, controlled hyperlipidemia. All results of most recent lipid panel within normal limits  Refill Simvastatin 20 mg ordered

## 2024-08-13 ENCOUNTER — HOSPITAL ENCOUNTER (OUTPATIENT)
Dept: RADIOLOGY | Facility: HOSPITAL | Age: 74
Discharge: HOME | End: 2024-08-13
Payer: COMMERCIAL

## 2024-08-13 DIAGNOSIS — M79.641 RIGHT HAND PAIN: ICD-10-CM
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
## 2024-08-13 DIAGNOSIS — M25.511 RIGHT SHOULDER PAIN, UNSPECIFIED CHRONICITY: ICD-10-CM

## 2024-08-13 PROCEDURE — 73030 X-RAY EXAM OF SHOULDER: CPT

## 2024-08-13 PROCEDURE — 73110 X-RAY EXAM OF WRIST: CPT

## 2024-08-13 PROCEDURE — 73130 X-RAY EXAM OF HAND: CPT

## 2024-08-15 ENCOUNTER — OFFICE VISIT (OUTPATIENT)
Dept: FAMILY MEDICINE CLINIC | Facility: CLINIC | Age: 74
End: 2024-08-15

## 2024-08-15 VITALS
WEIGHT: 163.2 LBS | TEMPERATURE: 97.9 F | OXYGEN SATURATION: 98 % | RESPIRATION RATE: 18 BRPM | HEIGHT: 59 IN | DIASTOLIC BLOOD PRESSURE: 82 MMHG | SYSTOLIC BLOOD PRESSURE: 139 MMHG | HEART RATE: 86 BPM | BODY MASS INDEX: 32.9 KG/M2

## 2024-08-15 DIAGNOSIS — R26.2 AMBULATORY DYSFUNCTION: ICD-10-CM

## 2024-08-15 DIAGNOSIS — R33.9 URINARY RETENTION: Primary | ICD-10-CM

## 2024-08-15 DIAGNOSIS — L29.9 ITCHING: ICD-10-CM

## 2024-08-15 DIAGNOSIS — M15.9 PRIMARY OSTEOARTHRITIS INVOLVING MULTIPLE JOINTS: ICD-10-CM

## 2024-08-15 RX ORDER — CETIRIZINE HYDROCHLORIDE 10 MG/1
10 TABLET ORAL DAILY
Qty: 30 TABLET | Refills: 0 | Status: SHIPPED | OUTPATIENT
Start: 2024-08-15

## 2024-08-15 RX ORDER — SENNOSIDES 8.6 MG
1300 CAPSULE ORAL EVERY 8 HOURS PRN
Qty: 30 TABLET | Refills: 0 | Status: SHIPPED | OUTPATIENT
Start: 2024-08-15

## 2024-08-15 NOTE — PROGRESS NOTES
Ambulatory Visit  Name: Lindsey Chandra      : 1950      MRN: [id number]  Encounter Provider: Mauricio Birch MD  Encounter Date: 8/15/2024   Encounter department: [facility]    Assessment & Plan   1. Urinary retention  Assessment & Plan:  Concern for painful urinary retention and incomplete bladder emptying at night. No apparent signs of infection. Patient refuses UA in office. 3x Prior deliveries by , no vaginal deliveries. Appears to be overflow incontinence, will obtain PVR and evaluate and treat based on results.   Orders:  -     US bladder with post void residual; Future; Expected date: 08/15/2024  2. Ambulatory dysfunction  Assessment & Plan:  Patient with history of falls, dizziness, ambulatory difficulties, currently being evaluated for home health care and requesting assistance with processing paperwork. Will place referral to social work for assistance with same.   Orders:  -     Ambulatory Referral to Social Work Care Management Program; Future  -     Ambulatory Referral to Physical Therapy; Future  3. Itching  -     cetirizine (ZyrTEC) 10 mg tablet; Take 1 tablet (10 mg total) by mouth daily  4. Primary osteoarthritis involving multiple joints  Assessment & Plan:  Patient with diffuse complaints of bodyaches, wrists, elbows, knees, has tried OTC tylenol without relief. Reviewed symptoms with patient, can trial topical ointment, higher doses of tylenol. Suspect joint stiffness due to fear of pain with movements, diffusely tender to palpation at joint lines. Believe patient would benefit from physical therapy with focus on functional mobility of knees, elbows, ambulatory walking exercises. Referral to physical therapy placed.   Orders:  -     acetaminophen (TYLENOL) 650 mg CR tablet; Take 2 tablets (1,300 mg total) by mouth every 8 (eight) hours as needed for moderate pain  -     Ambulatory Referral to Physical Therapy; Future  -      Diclofenac Sodium (VOLTAREN) 1 %; Apply 2 g topically 4 (four) times a day       History of Present Illness     Patient presenting for concern of increasing urination at night and associated urinary retention. Several other concerns today as above. Focusing on urinary concerns, patient has noted over last month increasing incidence of having to wake up to urinate during the night. Feels like she is unable to empty her bladder completely and has to continue to get up to urinate. Associated with some pain as below. Not associated with change in character, color, or odor of urine.     Urinary Frequency   This is a new problem. The current episode started more than 1 month ago. The problem occurs every urination. The problem has been gradually worsening. The quality of the pain is described as aching. The pain is at a severity of 7/10. The pain is moderate. There has been no fever. She is Not sexually active. There is No history of pyelonephritis. Associated symptoms include frequency and urgency. Pertinent negatives include no chills, flank pain, hematuria, hesitancy, nausea or possible pregnancy.       Review of Systems   Constitutional:  Negative for chills and unexpected weight change.   HENT:  Negative for congestion.    Eyes:  Negative for visual disturbance.   Respiratory:  Negative for chest tightness and shortness of breath.    Cardiovascular:  Negative for chest pain and palpitations.   Gastrointestinal:  Negative for abdominal pain and nausea.   Endocrine: Negative for polyuria.   Genitourinary:  Positive for difficulty urinating, dysuria, frequency and urgency. Negative for decreased urine volume, enuresis, flank pain, hematuria, hesitancy and pelvic pain.   Musculoskeletal:  Positive for arthralgias, gait problem and myalgias. Negative for joint swelling.   Skin:         Pruritus     Neurological:  Positive for dizziness. Negative for weakness, light-headedness and headaches.       Objective     /82  "(BP Location: Right arm, Patient Position: Sitting, Cuff Size: Standard)   Pulse 86   Temp 97.9 °F (36.6 °C) (Temporal)   Resp 18   Ht 4' 11\" (1.499 m)   Wt 74 kg (163 lb 3.2 oz)   SpO2 98%   BMI 32.96 kg/m²     Physical Exam  Vitals and nursing note reviewed.   Constitutional:       General: She is not in acute distress.     Appearance: She is well-developed.   HENT:      Head: Normocephalic and atraumatic.   Eyes:      Conjunctiva/sclera: Conjunctivae normal.   Cardiovascular:      Rate and Rhythm: Normal rate and regular rhythm.      Heart sounds: No murmur heard.  Pulmonary:      Effort: Pulmonary effort is normal. No respiratory distress.      Breath sounds: Normal breath sounds.   Chest:      Comments: Keloid crossing chest wall medial to left breast. No overlying rash or erythema  Abdominal:      Palpations: Abdomen is soft.      Tenderness: There is no abdominal tenderness.   Musculoskeletal:         General: No swelling.      Right shoulder: Bony tenderness present. Decreased range of motion.      Left shoulder: Bony tenderness present. Decreased range of motion.      Right elbow: Tenderness present in medial epicondyle, lateral epicondyle and olecranon process.      Left elbow: Tenderness present in medial epicondyle, lateral epicondyle and olecranon process.      Right hand: Bony tenderness present.      Left hand: Bony tenderness present.      Cervical back: Neck supple.      Right knee: Bony tenderness present. No swelling. Decreased range of motion. Tenderness present over the medial joint line.      Left knee: Bony tenderness present. No swelling. Decreased range of motion. Tenderness present over the medial joint line.   Skin:     General: Skin is warm and dry.      Capillary Refill: Capillary refill takes less than 2 seconds.   Neurological:      Mental Status: She is alert.   Psychiatric:         Mood and Affect: Mood normal.       Administrative Statements     "

## 2024-08-15 NOTE — ASSESSMENT & PLAN NOTE
Patient with diffuse complaints of bodyaches, wrists, elbows, knees, has tried OTC tylenol without relief. Reviewed symptoms with patient, can trial topical ointment, higher doses of tylenol. Suspect joint stiffness due to fear of pain with movements, diffusely tender to palpation at joint lines. Believe patient would benefit from physical therapy with focus on functional mobility of knees, elbows, ambulatory walking exercises. Referral to physical therapy placed.

## 2024-08-15 NOTE — ASSESSMENT & PLAN NOTE
Concern for painful urinary retention and incomplete bladder emptying at night. No apparent signs of infection. Patient refuses UA in office. 3x Prior deliveries by , no vaginal deliveries. Appears to be overflow incontinence, will obtain PVR and evaluate and treat based on results.

## 2024-08-15 NOTE — ASSESSMENT & PLAN NOTE
Patient with history of falls, dizziness, ambulatory difficulties, currently being evaluated for home health care and requesting assistance with processing paperwork. Will place referral to social work for assistance with same.

## 2024-08-19 ENCOUNTER — HOSPITAL ENCOUNTER (OUTPATIENT)
Dept: ULTRASOUND IMAGING | Facility: HOSPITAL | Age: 74
Discharge: HOME | End: 2024-08-19
Payer: COMMERCIAL

## 2024-08-19 DIAGNOSIS — R33.9 URINARY RETENTION: ICD-10-CM

## 2024-08-19 PROCEDURE — 51798 US URINE CAPACITY MEASURE: CPT

## 2024-08-20 ENCOUNTER — PATIENT OUTREACH (OUTPATIENT)
Dept: FAMILY MEDICINE CLINIC | Facility: CLINIC | Age: 74
End: 2024-08-20

## 2024-08-20 NOTE — PROGRESS NOTES
SW referral received from Dr. Queta MD regarding HHA paperwork.  Chart review completed. Per chart review, SW previously assisted pt in getting connected w/ the [facility]/waiver services. Per chart, SW provided number for pt to contact and schedule an assessment. Per chart, pt requesting more assistance at this time.    SW placed call to pt with success. SW introduced self, role and reason for calling. SW asked pt if she had started the process through [facility]. Pt stated not yet. She stated nobody has been out to assess pt for waiver services. Pt stated she is needing more assistance. SEVEN advised that SW will place a referral through [facility] for someone to reach out to her.    SW placed call to the [facility] and spoke w/ representative named Irena. SW explained that pt is wanting to get set up w/ a HHA through Waiver. SW provided information on pt and pt is already in their system. Pt's case was closed due to the physician certification form not being returned back. She noted that one of pt's documents is  and pt will need a re-evaluation. This re-evaluation is over the phone and RICARDO was able to schedule it for  at 10:00am.  They are going to resend the fax over with the Physician Certification form for Dr. Kendall to complete.     SW placed call back to pt to inform of the re-scheduled evaluation. Pt appreciative of the assistance. SEVEN advised that SW will f/u with pt after the scheduled phone evaluation.    Patient was referred on [employer] to [facility]  for health.

## 2024-08-21 ENCOUNTER — OFFICE VISIT (OUTPATIENT)
Dept: MULTI SPECIALTY CLINIC | Facility: CLINIC | Age: 74
End: 2024-08-21

## 2024-08-21 VITALS — TEMPERATURE: 97.7 F | BODY MASS INDEX: 33.18 KG/M2 | WEIGHT: 164.6 LBS | HEIGHT: 59 IN

## 2024-08-21 DIAGNOSIS — L40.9 PSORIASIS: Primary | ICD-10-CM

## 2024-08-21 DIAGNOSIS — Z71.89 INJECTION EDUCATION, ENCOUNTER FOR: ICD-10-CM

## 2024-08-21 NOTE — Clinical Note
Hello, unsure if we need to message for PA renewals. If so, please renew PA for this pt Tremfya for her psoriasis and psoriatic arthritis, her quant gold was done in [place], thank you!

## 2024-08-21 NOTE — PROGRESS NOTES
"[facility] Note     Patient Name: Lindsey Chandra  Encounter Date: 08/21/2024     Have you been cared for by a [facility] Dermatologist in the last 3 years and, if so, which description applies to you?    Yes.  I have been here within the last 3 years, and my medical history has NOT changed since that time.  I am FEMALE/of child-bearing potential.    REVIEW OF SYSTEMS:  Have you recently had or currently have any of the following? No changes in my recent health.   PAST MEDICAL HISTORY:  Have you personally ever had or currently have any of the following?  If \"YES,\" then please provide more detail. No changes in my medical history.   HISTORY OF IMMUNOSUPPRESSION: Do you have a history of any of the following:  Systemic Immunosuppression such as Diabetes, Biologic or Immunotherapy, Chemotherapy, Organ Transplantation, Bone Marrow Transplantation?  YES, Diabetes     Answering \"YES\" requires the addition of the dotphrase \"IMMUNOSUPPRESSED\" as the first diagnosis of the patient's visit.   FAMILY HISTORY:  Any \"first degree relatives\" (parent, brother, sister, or child) with the following?    No changes in my family's known health.   PATIENT EXPERIENCE:    Do you want the Dermatologist to perform a COMPLETE skin exam today including a clinical examination under the \"bra and underwear\" areas?  NO  If necessary, do we have your permission to call and leave a detailed message on your Preferred Phone number that includes your specific medical information?  Yes      No Known Allergies   Current Outpatient Medications:     acetaminophen (TYLENOL) 650 mg CR tablet, Take 2 tablets (1,300 mg total) by mouth every 8 (eight) hours as needed for moderate pain, Disp: 30 tablet, Rfl: 0    amLODIPine (NORVASC) 10 mg tablet, Take 1 tablet (10 mg total) by mouth daily, Disp: 90 tablet, Rfl: 3    Blood Glucose Monitoring Suppl (OneTouch Verio Reflect) w/Device KIT, Check blood sugars once daily. Please substitute " with appropriate alternative as covered by patient's insurance. Dx: E11.65, Disp: 1 kit, Rfl: 0    calcium carbonate-vitamin D (OSCAL-D) 500 mg-200 units per tablet, Take 2 tablets by mouth daily with breakfast, Disp: 120 tablet, Rfl: 3    cetirizine (ZyrTEC) 10 mg tablet, Take 1 tablet (10 mg total) by mouth daily, Disp: 30 tablet, Rfl: 0    Diclofenac Sodium (VOLTAREN) 1 %, Apply 2 g topically 4 (four) times a day, Disp: 50 g, Rfl: 0    Empagliflozin (Jardiance) 10 MG TABS tablet, Take 1 tablet (10 mg total) by mouth in the morning, Disp: 90 tablet, Rfl: 1    Guselkumab 100 MG/ML SOSY, Please inject 100mg (1 pen) subcutaneously once on week 0, week 4, then every 8 weeks thereafter., Disp: 1 mL, Rfl: 7    hydrochlorothiazide (HYDRODIURIL) 25 mg tablet, take 1 tablet by mouth once daily, Disp: 90 tablet, Rfl: 3    levothyroxine 50 mcg tablet, Take 1 tablet (50 mcg total) by mouth daily, Disp: 90 tablet, Rfl: 3    lisinopril (ZESTRIL) 20 mg tablet, Take 1 tablet (20 mg total) by mouth daily, Disp: 90 tablet, Rfl: 0    metFORMIN (GLUCOPHAGE) 1000 MG tablet, Take 1 tablet (1,000 mg total) by mouth 2 (two) times a day with meals, Disp: 60 tablet, Rfl: 1    Misc. Devices (GetGo Rolling Walker) MISC, Use if needed (ambulatory assistance), Disp: 1 each, Rfl: 0    ondansetron (Zofran) 4 mg tablet, Take 2 tablets (8 mg total) by mouth every 8 (eight) hours as needed for nausea or vomiting for up to 10 doses Zofran odt 2 tablets dissolve in the mouth  every 8 hrs as needed for nausea/ vomiting, Disp: 10 tablet, Rfl: 0    polyethylene glycol (Golytely) 4000 mL solution, Take 4,000 mL by mouth once for 1 dose Take 4000 mL by mouth once for 1 dose. Use as directed, Disp: 4000 mL, Rfl: 0    simvastatin (ZOCOR) 20 mg tablet, Take 1 tablet (20 mg total) by mouth daily at bedtime, Disp: 90 tablet, Rfl: 0    Alcohol Swabs (Alcohol Prep) PADS, Use in the morning (Patient not taking: Reported on 7/18/2024), Disp: 100 each, Rfl: 2     clindamycin (CLEOCIN T) 1 % lotion, , Disp: , Rfl: 0    glucose blood (OneTouch Verio) test strip, Check blood sugars once daily. Please substitute with appropriate alternative as covered by patient's insurance. Dx: E11.65 (Patient not taking: Reported on 7/18/2024), Disp: 100 each, Rfl: 3    lidocaine (XYLOCAINE) 2 % topical gel, Apply topically as needed for mild pain (Patient not taking: Reported on 5/7/2024), Disp: 30 mL, Rfl: 0    mirtazapine (REMERON) 15 mg tablet, Take 1 tablet (15 mg total) by mouth daily at bedtime Take 15mg (one tablet) at night for three (3) days and then start on 30mg (two tablets) at night onwards. (Patient not taking: Reported on 5/7/2024), Disp: 63 tablet, Rfl: 0    OneTouch Delica Lancets 33G MISC, Check blood sugars once daily. Please substitute with appropriate alternative as covered by patient's insurance. Dx: E11.65 (Patient not taking: Reported on 7/18/2024), Disp: 100 each, Rfl: 3          Whom besides the patient is providing clinical information about today's encounter?   NO ADDITIONAL HISTORIAN (patient alone provided history)    Physical Exam and Assessment/Plan by Diagnosis:  PSORIASIS  TREMFYA Biologic Injectable Administration      Additional History of Present Condition:  Patient tolerating Tremfya well, no injection site reactions. Pt is satisfied that Tremfya has cleared her skin and improved her joint pain. Has some breakthrough lesions that resolve quickly with injection  Lot: [id number]  EXP: 09/2025     Assessment and Plan:  Based on a thorough discussion of this condition and the management approach to it (including a comprehensive discussion of the known risks, side effects and potential benefits of treatment), the patient (family) agrees to implement the following specific plan:  Injection #7  Next dose due 8 weeks   Quant gold done 7/24/25     Biologic Injectable Administration Note  Diagnosis: psoriasis  This is injection number 7     Informed consent:  Discussed risks (Risks of hypersensitivity reaction, injection site reaction, conjunctivitis/keratitis, HSV reactivation, increased susceptibility to parasitic infections, inefficacy were reviewed.) Verbal consent obtained.   Preparation: After discussion potential procedure related risks including pain, bleeding, new infection, reactivation of latent infection, inefficacy, increased risk of malignancy, hypersensitivity reaction, injection site reaction, verbal consent was obtained. The areas were cleansed with alcohol prep pads and allowed to fully air dry for 3 minutes.  Procedure Details:  100mg was injected subcutaneously in the right arm  Lot Number: [id number]  Expiration: 09/2025  Total Injected: 100mg  NDC: 23211-230-34      Patient tolerated procedure well, with minimal pinpoint bleeding that was controlled with pressure. Aftercare was reviewed.

## 2024-08-26 ENCOUNTER — OFFICE VISIT (OUTPATIENT)
Dept: FAMILY MEDICINE CLINIC | Facility: CLINIC | Age: 74
End: 2024-08-26

## 2024-08-26 VITALS
RESPIRATION RATE: 18 BRPM | WEIGHT: 163.8 LBS | OXYGEN SATURATION: 98 % | BODY MASS INDEX: 33.02 KG/M2 | TEMPERATURE: 98.7 F | HEIGHT: 59 IN | HEART RATE: 61 BPM | SYSTOLIC BLOOD PRESSURE: 169 MMHG | DIASTOLIC BLOOD PRESSURE: 82 MMHG

## 2024-08-26 DIAGNOSIS — R21 RASH: ICD-10-CM

## 2024-08-26 DIAGNOSIS — E11.9 TYPE 2 DIABETES MELLITUS WITHOUT COMPLICATION, WITHOUT LONG-TERM CURRENT USE OF INSULIN (HCC): ICD-10-CM

## 2024-08-26 DIAGNOSIS — R39.9 LOWER URINARY TRACT SYMPTOMS: Primary | ICD-10-CM

## 2024-08-26 DIAGNOSIS — R10.13 EPIGASTRIC PAIN: ICD-10-CM

## 2024-08-26 PROBLEM — N32.81 OAB (OVERACTIVE BLADDER): Status: ACTIVE | Noted: 2024-08-26

## 2024-08-26 LAB
SL AMB  POCT GLUCOSE, UA: ABNORMAL
SL AMB LEUKOCYTE ESTERASE,UA: NEGATIVE
SL AMB POCT BILIRUBIN,UA: NEGATIVE
SL AMB POCT BLOOD,UA: NEGATIVE
SL AMB POCT CLARITY,UA: CLEAR
SL AMB POCT COLOR,UA: YELLOW
SL AMB POCT KETONES,UA: NEGATIVE
SL AMB POCT NITRITE,UA: NEGATIVE
SL AMB POCT PH,UA: 6
SL AMB POCT SPECIFIC GRAVITY,UA: 1.02
SL AMB POCT URINE PROTEIN: NEGATIVE
SL AMB POCT UROBILINOGEN: ABNORMAL

## 2024-08-26 PROCEDURE — 3079F DIAST BP 80-89 MM HG: CPT | Performed by: FAMILY MEDICINE

## 2024-08-26 PROCEDURE — 3077F SYST BP >= 140 MM HG: CPT | Performed by: FAMILY MEDICINE

## 2024-08-26 PROCEDURE — 99213 OFFICE O/P EST LOW 20 MIN: CPT | Performed by: FAMILY MEDICINE

## 2024-08-26 PROCEDURE — G2211 COMPLEX E/M VISIT ADD ON: HCPCS | Performed by: FAMILY MEDICINE

## 2024-08-26 PROCEDURE — 81002 URINALYSIS NONAUTO W/O SCOPE: CPT | Performed by: FAMILY MEDICINE

## 2024-08-26 RX ORDER — CLOTRIMAZOLE 1 %
CREAM (GRAM) TOPICAL 2 TIMES DAILY
Qty: 45 G | Refills: 0 | Status: SHIPPED | OUTPATIENT
Start: 2024-08-26

## 2024-08-26 RX ORDER — MIRABEGRON 25 MG/1
25 TABLET, FILM COATED, EXTENDED RELEASE ORAL DAILY
Qty: 30 TABLET | Refills: 0 | Status: SHIPPED | OUTPATIENT
Start: 2024-08-26 | End: 2024-08-26 | Stop reason: CLARIF

## 2024-08-26 RX ORDER — FAMOTIDINE 20 MG/1
20 TABLET, FILM COATED ORAL
Qty: 14 TABLET | Refills: 0 | Status: SHIPPED | OUTPATIENT
Start: 2024-08-26

## 2024-08-26 NOTE — PROGRESS NOTES
Ambulatory Visit  Name: Lindsey Chandra      : 1950      MRN: [id number]  Encounter Provider: Rome Kendall MD  Encounter Date: 2024   Encounter department: [facility]    Assessment & Plan   1. Lower urinary tract symptoms  Assessment & Plan:  The pt reports having overactive bladder symptoms over the past few months around the time she started empagliflozin. Her UA showed elevated glucose, which is expected from using empagliflozin. It appears that the empagliflozin may be causing her increased urinary frequency and symptoms, will switch her to sitagliptin to see if changing the medication class will resolve her symptoms. Will follow-up in 2 wks to see if her symptoms improve.   Orders:  -     POCT urine dip  2. Type 2 diabetes mellitus without complication, without long-term current use of insulin ([facility])  Assessment & Plan:  Assessment: Type 2 diabetes with improving glucose control based on reported blood sugars and most recent HgbA1c. Home blood sugars: does not measure at home  Most recent A1c improved from 9.4% to 7.8%    Plan:  - Goal HgbA1c < 7%  - Blood sugar goals: preprandial: 80-130mg/dL, postprandial: <180mg/dL  - Blood pressure goal: <130/80  - Continue lifestyle modifications including: weight loss, diabetic diet, and regular physical activity.  - Continue strict medication compliance. Call into the clinic with any questions or for refills prior to running out of medication.     Current diabetes medication(s):  Oral hypoglycemics: metformin 1g BID  Insulin: none  GLP1-RA/SGLT2i: Jardiance - discontinuing 2/2 urinary symptoms  ACE-i/ARB: lisinopril 20mg qd  Statin: simvastatin 20mg qd  Other: Starting Januvia 100mg qd    - Switching jardiance for januvia. Suspect urinary symptoms are due to glycosuric effects of jardiance.    Screening (most recent values):  HgbA1c: 7.8%  Renal function  sCr/BUN: 0.61 / 15  eGFR: 90  Proteinuria: urine  alb / cr : 24   Peripheral nerves  Denies presence of any non-healing wounds or ulcers  Diabetic foot exam: Will complete at next visit  Complete foot exam at least yearly  Neuropathic pain medication  None  Eye exam  Up to date  Complete eye exam at least yearly    Follow-up in 2 weeks for symptom recheck    Orders:  -     sitaGLIPtin (JANUVIA) 100 mg tablet; Take 1 tablet (100 mg total) by mouth daily  3. Rash  Assessment & Plan:  The rash was examined and appears to be fungal. Likely secondary to wearing bra for long, extended hours  - stat clortrimazole 1% cream on the rash area.   Orders:  -     clotrimazole (LOTRIMIN) 1 % cream; Apply topically 2 (two) times a day  4. Epigastric pain  Assessment & Plan:  The pt reported having chest tightness that occurred only at night when lying on her stomach. Denied having any chest pain, tightness, shortness of breath with physical activity. Denied needing incline when sleeping. On exam lungs where clear to auscultation, and cardiovascular exam had no murmurs, rubs, gallops. The pt did not have pitting edema. Prior ECGs and stress exam where normal. Symptoms inconsistent with stable angina, CHF, pericarditis. The chest pain could be related to GERD.   - The pt will trial famotidine 20 mg tablet for 2 weeks and will follow-up to see if the symptoms have improved.   Orders:  -     famotidine (PEPCID) 20 mg tablet; Take 1 tablet (20 mg total) by mouth daily at bedtime       History of Present Illness     Lindsey Chandra is a 75 yo female patient that presents for overactive bladder and itchiness on right breast. PMHx significant for benign essential HTN, cancer of breast, psoriasis, peripheral neuropathy, T2DM. She reports having to urinate every 30 mins at night time, and feels as if she is not emptying completely. Reports having these symptoms for about 7 months. Has not been taking any medication for these symptoms. She reports pain and pressure prior to urination, that  "continues even after. Reports sometimes urinating of herself, but no urination with coughing/sneezing. Denies burning, hematuria, chills/fever, flank pain, nausea/vomiting. No chance of pregnancy since last menstrual cycle about 22 years ago. Not sexually active. In regards to her breast itchiness, she report for months. Has tried using the cetirizine (ZyrTEC) 10 mg tablet, but reports no improvement in symptoms. Has not tried any other OTCs for the itchiness. Denies hx of eczema.      Review of Systems   Constitutional:  Negative for chills and fever.   HENT:  Negative for ear pain and sore throat.    Respiratory:  Negative for cough and shortness of breath.    Cardiovascular:  Negative for chest pain and palpitations.   Gastrointestinal:  Positive for abdominal pain. Negative for abdominal distention, constipation, diarrhea, nausea and vomiting.   Genitourinary:  Positive for enuresis, frequency and urgency. Negative for dysuria, genital sores, hematuria, pelvic pain and vaginal discharge.   Musculoskeletal:  Negative for arthralgias and back pain.   Skin:  Negative for color change and rash.   Neurological: Negative.    All other systems reviewed and are negative.      Objective     /82 (BP Location: Right arm, Patient Position: Sitting, Cuff Size: Large)   Pulse 61   Temp 98.7 °F (37.1 °C) (Temporal)   Resp 18   Ht 4' 11\" (1.499 m)   Wt 74.3 kg (163 lb 12.8 oz)   SpO2 98%   BMI 33.08 kg/m²     Physical Exam  Vitals reviewed.   Constitutional:       General: She is not in acute distress.     Appearance: Normal appearance. She is well-developed. She is not ill-appearing.   HENT:      Head: Normocephalic and atraumatic.      Right Ear: External ear normal.      Left Ear: External ear normal.      Nose: Nose normal.      Mouth/Throat:      Mouth: Mucous membranes are moist.      Pharynx: Oropharynx is clear.   Eyes:      Extraocular Movements: Extraocular movements intact.      Conjunctiva/sclera: " Conjunctivae normal.   Cardiovascular:      Rate and Rhythm: Normal rate and regular rhythm.      Heart sounds: No murmur heard.  Pulmonary:      Effort: Pulmonary effort is normal. No respiratory distress.      Breath sounds: Normal breath sounds.   Abdominal:      General: There is no distension.      Palpations: Abdomen is soft.      Tenderness: There is no abdominal tenderness. There is no right CVA tenderness or left CVA tenderness.   Musculoskeletal:         General: No swelling.      Cervical back: Neck supple.   Skin:     General: Skin is warm and dry.      Capillary Refill: Capillary refill takes less than 2 seconds.   Neurological:      Mental Status: She is alert and oriented to person, place, and time. Mental status is at baseline.   Psychiatric:         Mood and Affect: Mood normal.         Behavior: Behavior normal.     Administrative Statements     Venkat Blair, MS3  Rome Kendall MD

## 2024-08-26 NOTE — ASSESSMENT & PLAN NOTE
Assessment: Type 2 diabetes with improving glucose control based on reported blood sugars and most recent HgbA1c. Home blood sugars: does not measure at home  Most recent A1c improved from 9.4% to 7.8%    Plan:  - Goal HgbA1c < 7%  - Blood sugar goals: preprandial: 80-130mg/dL, postprandial: <180mg/dL  - Blood pressure goal: <130/80  - Continue lifestyle modifications including: weight loss, diabetic diet, and regular physical activity.  - Continue strict medication compliance. Call into the clinic with any questions or for refills prior to running out of medication.     Current diabetes medication(s):  Oral hypoglycemics: metformin 1g BID  Insulin: none  GLP1-RA/SGLT2i: Jardiance - discontinuing 2/2 urinary symptoms  ACE-i/ARB: lisinopril 20mg qd  Statin: simvastatin 20mg qd  Other: Starting Januvia 100mg qd    - Switching jardiance for januvia. Suspect urinary symptoms are due to glycosuric effects of jardiance.    Screening (most recent values):  HgbA1c: 7.8%  Renal function  sCr/BUN: 0.61 / 15  eGFR: 90  Proteinuria: urine alb / cr : 24   Peripheral nerves  Denies presence of any non-healing wounds or ulcers  Diabetic foot exam: Will complete at next visit  Complete foot exam at least yearly  Neuropathic pain medication  None  Eye exam  Up to date  Complete eye exam at least yearly    Follow-up in 2 weeks for symptom recheck

## 2024-08-26 NOTE — ASSESSMENT & PLAN NOTE
The pt reported having chest tightness that occurred only at night when lying on her stomach. Denied having any chest pain, tightness, shortness of breath with physical activity. Denied needing incline when sleeping. On exam lungs where clear to auscultation, and cardiovascular exam had no murmurs, rubs, gallops. The pt did not have pitting edema. Prior ECGs and stress exam where normal. Symptoms inconsistent with stable angina, CHF, pericarditis. The chest pain could be related to GERD.   - The pt will trial famotidine 20 mg tablet for 2 weeks and will follow-up to see if the symptoms have improved.

## 2024-08-26 NOTE — ASSESSMENT & PLAN NOTE
The rash was examined and appears to be fungal. Likely secondary to wearing bra for long, extended hours  - stat clortrimazole 1% cream on the rash area.

## 2024-08-26 NOTE — ASSESSMENT & PLAN NOTE
The pt reports having overactive bladder symptoms over the past few months around the time she started empagliflozin. Her UA showed elevated glucose, which is expected from using empagliflozin. It appears that the empagliflozin may be causing her increased urinary frequency and symptoms, will switch her to sitagliptin to see if changing the medication class will resolve her symptoms. Will follow-up in 2 wks to see if her symptoms improve.

## 2024-08-27 ENCOUNTER — PATIENT OUTREACH (OUTPATIENT)
Dept: FAMILY MEDICINE CLINIC | Facility: CLINIC | Age: 74
End: 2024-08-27

## 2024-08-27 PROBLEM — R39.9 LOWER URINARY TRACT SYMPTOMS: Status: ACTIVE | Noted: 2024-08-26

## 2024-08-27 NOTE — PROGRESS NOTES
SW placed call to pt. She confirmed she had the phone interview last week w/ a person from the Waiver program. She said they just asked her several questions and that was it. [facility] is awaiting fax from [facility] for the updated Physician Certification Form that needs to be completed by Dr. Kendall. SW will check on this and update pt as needed. Pt expressed appreciation and agreeable to further outreach.

## 2024-08-28 ENCOUNTER — TELEPHONE (OUTPATIENT)
Age: 74
End: 2024-08-28

## 2024-08-28 NOTE — TELEPHONE ENCOUNTER
PA for Tremfya 100mg auto injector SUBMITTED     via    []CMM-KEY:   [][employer]-Case ID #   [x]Faxed to plan - scanned into chart.  []Other website   []Phone call Case ID #     Office notes sent, clinical questions answered. Awaiting determination    Turnaround time for your insurance to make a decision on your Prior Authorization can take 7-21 business days.

## 2024-08-30 ENCOUNTER — APPOINTMENT (OUTPATIENT)
Dept: LAB | Facility: CLINIC | Age: 74
End: 2024-08-30
Payer: COMMERCIAL

## 2024-08-30 DIAGNOSIS — E11.9 TYPE 2 DIABETES MELLITUS WITHOUT COMPLICATION, WITHOUT LONG-TERM CURRENT USE OF INSULIN (HCC): ICD-10-CM

## 2024-08-30 LAB
EST. AVERAGE GLUCOSE BLD GHB EST-MCNC: 163 MG/DL
HBA1C MFR BLD: 7.3 %

## 2024-08-30 PROCEDURE — 36415 COLL VENOUS BLD VENIPUNCTURE: CPT

## 2024-08-30 PROCEDURE — 83036 HEMOGLOBIN GLYCOSYLATED A1C: CPT

## 2024-09-04 ENCOUNTER — EVALUATION (OUTPATIENT)
Dept: PHYSICAL THERAPY | Facility: CLINIC | Age: 74
End: 2024-09-04
Payer: COMMERCIAL

## 2024-09-04 DIAGNOSIS — R42 VERTIGO: ICD-10-CM

## 2024-09-04 DIAGNOSIS — R26.2 AMBULATORY DYSFUNCTION: Primary | ICD-10-CM

## 2024-09-04 DIAGNOSIS — M15.9 PRIMARY OSTEOARTHRITIS INVOLVING MULTIPLE JOINTS: ICD-10-CM

## 2024-09-04 PROCEDURE — 97110 THERAPEUTIC EXERCISES: CPT | Performed by: PHYSICAL THERAPIST

## 2024-09-04 PROCEDURE — 97112 NEUROMUSCULAR REEDUCATION: CPT | Performed by: PHYSICAL THERAPIST

## 2024-09-04 PROCEDURE — 97162 PT EVAL MOD COMPLEX 30 MIN: CPT | Performed by: PHYSICAL THERAPIST

## 2024-09-04 NOTE — PROGRESS NOTES
PT Evaluation     Today's date: 2024  Patient name: Lindsey Chandra  : 1950  MRN: [id number]  Referring provider: Mauricio Birch,*  Dx:   Encounter Diagnosis     ICD-10-CM    1. Ambulatory dysfunction  R26.2 Ambulatory Referral to Physical Therapy      2. Primary osteoarthritis involving multiple joints  M15.9 Ambulatory Referral to Physical Therapy      3. Vertigo  R42                      Assessment  Impairments: abnormal gait, abnormal or restricted ROM, abnormal movement, activity intolerance, impaired balance, impaired physical strength, lacks appropriate home exercise program, pain with function, poor posture , poor body mechanics, activity limitations and endurance    Assessment details: Pt presents with signs and symptoms synonymous of admitting diagnosis of ambulatory dysfunction, episodic dizziness that appears to be mechanical in nature and responds to CS activities.  Pt presents with dizziness, decreased strength, decreased range, decreased endurance, flexibility, as well as tolerance to activity and is a fall risk per subjective and objective assessment.  Pt would benefit skilled PT intervention in order to address these impairments in order to be able to perform all desired activities with minimal to nil symptom exacerbation.  If she fails to find improvement over the next 4 weeks, compensation intervention will be proceeded or appropriate referral will be performed.  Thank you very much for this kind and familiar referral.     Understanding of Dx/Px/POC: good     Prognosis: good    Goals  STG 4 Weeks:  Improve Dizziness to 2-4x's a week.  Improve VOR speed to 1.5 bps  Improve range to CS to min  Improve strength to 4/5 b/l UE/LE  Improve endurance to 20 mins within session to tolerance  Independent with HEP  LTG 8 Weeks:  Improve dizziness to minimal experience a week  Improve VOR speed to 2  Improve range to CS to nil  Improve strength to 5/5 b/l UE/LE  Improve endurance to  30 mins or greater within session to tolerance.    Able to perform all desired activities with minimal to nil symptom exacerbation      Plan  Patient would benefit from: skilled physical therapy  Planned modality interventions: cryotherapy and thermotherapy: hydrocollator packs    Planned therapy interventions: joint mobilization, manual therapy, neuromuscular re-education, patient/caregiver education, postural training, self care, strengthening, stretching, therapeutic activities, therapeutic exercise, therapeutic training, transfer training, IADL retraining, home exercise program, graded motor, graded exercise, graded activity, gait training, functional ROM exercises, flexibility, abdominal trunk stabilization, activity modification, balance, behavior modification and body mechanics training    Frequency: 2x week  Duration in weeks: 10  Treatment plan discussed with: patient        Subjective Evaluation    History of Present Illness  Date of onset: 9/4/2024  Mechanism of injury: Pt is a 74 yofemale who is RHD and presents today stating that she has had a few falls in the last year, one resulting in an injury for her R wrist, has seen hand specialist in regards of this, received an injection which helped for a while, states that she symptoms have returned, she wears wrist brace, will follow up with the hand specialist on 10/4/24.  Most recent injury occurred on 3/8/24 which resulted in said R UE injury.  States that this occurred when she tripped on a curb.  Pt presents today primarily for balance and falls, occasionally having dizziness with sit to stand, rarely with walking, denies with getting in and out of bed, denies symptoms with rolling.  Pt reports this began out of now where a few months ago, this occurred post most recent fall.  Pt reports that there is no other pain with her, has occasional numbness and tingling in her legs, but not frequent, states that there is no recent change in going to the  "bathroom.  Pt reports that she does have a walker, she uses it when she takes her dog for a walk, otherwise she does not use it.  Pt reports her endurance limited to 5-10 mins at a time before requiring rest has been present since the last year or so.  Pt reports that there is occasional chest pain which she has mentioned to her family physician.  Pt reports goals are to improve endurance, strength, be able to be more mobile and if possible get rid of dizziness.  Pt reports back to her family physician with in the next month.  Reports dizziness with sit to stand I usually 2x's a day.    Does wear glasses for reading.   Quality of life: good    Patient Goals  Patient goals for therapy: increased strength, return to sport/leisure activities, increased motion, improved balance and decreased pain    Pain  No pain reported      Diagnostic Tests  No diagnostic tests performed        Objective     Active Range of Motion   Cervical/Thoracic Spine       Cervical  Subcranial protraction:  WFL   Subcranial retraction:   Restriction level: minimal  Flexion:  WFL  Extension:  with pain Restriction level: minimal  Left lateral flexion:  WFL  Right lateral flexion:  with pain Restriction level minimal  Left rotation:  WFL  Right rotation:  WFL    Additional Active Range of Motion Details  Forward head, rounded shoulders  B/L Sensation intact to light touch C3,4,5,6,7,8,T1,T2  BP Sit 142/82, Stand 140/80  HR sit 58, stand 65  O2 Sat 98% to 95%  UE screen flex 4 abd 4- er 4 ir 4 b/l  LE Screen flex 3+  abd 4 add 5 ext 5  CS Screen pain with Ext and R SB.    Palpation  Ocular ROM WFL difficulty with looking L >R   Convergence/Divergence WFL  VOR to L .5, to R 1, sup/inf .5, fatigues very quickly.    STs Head Shake + for symptoms, Head Thrust + for symptoms, DHP to R and L + Silent symptoms, no nystagmus, present for 20\" before pt request to sit up.  NE with supine laying with Epley performance.     Repeated CS Retraction improved CS " "Ext and R rotation.  Mild improvement with supine position dizziness.    TUG:  15.67\" no AD short quick steps, increased trunk sway.              Precautions: Falls, HTN, DM2, Hx of L BC      Manuals 9/4/24            DHP Assessment  + Epley trial TAS                                                   Neuro Re-Ed             Education and progression 10 Min            VOR x 1 2 targets all planes             VOR x 1, 1 target all planes             NBOS EC             NBOS EO Foam             Walking slow/fast             Walking with head moving             Side Steps/AP PA walking                          Ther Ex             CS Ret 4 x 5            Progression?             Scap Add 3\" x 10            Scaption 2 x10                                      Ther Activity             Mini Squat             Sit<>Stand             Laying down dizziness reproduction? Mild improvement            CS Rotation R/Ext Mild pain at end range:Better post exercise                          Modalities                                            "

## 2024-09-05 ENCOUNTER — HOSPITAL ENCOUNTER (OUTPATIENT)
Dept: ULTRASOUND IMAGING | Facility: CLINIC | Age: 74
Discharge: HOME | End: 2024-09-05
Payer: COMMERCIAL

## 2024-09-05 DIAGNOSIS — Z85.3 HISTORY OF BREAST CANCER: ICD-10-CM

## 2024-09-05 DIAGNOSIS — N64.4 BREAST PAIN: ICD-10-CM

## 2024-09-05 PROCEDURE — 76642 ULTRASOUND BREAST LIMITED: CPT

## 2024-09-09 ENCOUNTER — OFFICE VISIT (OUTPATIENT)
Dept: FAMILY MEDICINE CLINIC | Facility: CLINIC | Age: 74
End: 2024-09-09

## 2024-09-09 DIAGNOSIS — N32.81 OVERACTIVE BLADDER: Primary | ICD-10-CM

## 2024-09-09 PROCEDURE — 3077F SYST BP >= 140 MM HG: CPT | Performed by: FAMILY MEDICINE

## 2024-09-09 PROCEDURE — 3079F DIAST BP 80-89 MM HG: CPT | Performed by: FAMILY MEDICINE

## 2024-09-09 PROCEDURE — 99213 OFFICE O/P EST LOW 20 MIN: CPT | Performed by: FAMILY MEDICINE

## 2024-09-09 PROCEDURE — G2211 COMPLEX E/M VISIT ADD ON: HCPCS | Performed by: FAMILY MEDICINE

## 2024-09-09 RX ORDER — MIRABEGRON 25 MG/1
25 TABLET, FILM COATED, EXTENDED RELEASE ORAL DAILY
Qty: 30 TABLET | Refills: 0 | Status: SHIPPED | OUTPATIENT
Start: 2024-09-09

## 2024-09-10 VITALS
TEMPERATURE: 97.9 F | BODY MASS INDEX: 33.2 KG/M2 | RESPIRATION RATE: 18 BRPM | WEIGHT: 164.4 LBS | HEART RATE: 69 BPM | DIASTOLIC BLOOD PRESSURE: 87 MMHG | OXYGEN SATURATION: 98 % | SYSTOLIC BLOOD PRESSURE: 162 MMHG

## 2024-09-10 PROBLEM — N39.46 MIXED STRESS AND URGE URINARY INCONTINENCE: Status: ACTIVE | Noted: 2024-09-10

## 2024-09-10 PROBLEM — N32.81 OVERACTIVE BLADDER: Status: ACTIVE | Noted: 2024-09-10

## 2024-09-10 NOTE — ASSESSMENT & PLAN NOTE
Patient presenting for follow up of urinary symptoms after initial visit 2 weeks ago. She had developed urinary urgency and frequency after starting Jardiance last year. On a detailed review of her symptoms, the underlying cause was not clear. We ruled out a UTI based on POC urine dip and agreed to switch her jardiance for januvia. I suspected that the glycosuric effect of jardiance could be contributing to her symptoms as her biggest complaint was increased urinary frequency. In addition, she reported the symptoms began in conjunction with starting jardiance. Unfortunately, removing jardiance did not improve her symptoms at all. She continues to complain of urinary frequency and occasional urgency. Still denies dysuria, burning with urination, or changes to the appearance of her urine.  # At this point UTI and medication side effect from jardiance are unlikely. Her symptoms are most consistent with OAB. However, today she mentioned some occasional leakage with coughing, sneezing, laughing. During our last visit she denied all stress incontinence symptoms. She has three children, but they were all delivered via . She has had multiple pelvic surgeries in the distant past for reasons she could not clearly convey. With this new information, a mixed stress-urge incontinence could also be the cause. I reviewed all of this with her and discussed the different treatment options for each diagnosis. After shared decision making, we will proceed with a trial of mybetriq.  - Start myrbetriq 25mg qd  - If there is improvement in symptoms, we will increase the dose to 50mg qd next month

## 2024-09-10 NOTE — PROGRESS NOTES
Ambulatory Visit  Name: Lindsey Chandra      : 1950      MRN: [id number]  Encounter Provider: Rome Kendall MD  Encounter Date: 2024   Encounter department: [facility]    Assessment & Plan   1. Overactive bladder  Assessment & Plan:  Patient presenting for follow up of urinary symptoms after initial visit 2 weeks ago. She had developed urinary urgency and frequency after starting Jardiance last year. On a detailed review of her symptoms, the underlying cause was not clear. We ruled out a UTI based on POC urine dip and agreed to switch her jardiance for januvia. I suspected that the glycosuric effect of jardiance could be contributing to her symptoms as her biggest complaint was increased urinary frequency. In addition, she reported the symptoms began in conjunction with starting jardiance. Unfortunately, removing jardiance did not improve her symptoms at all. She continues to complain of urinary frequency and occasional urgency. Still denies dysuria, burning with urination, or changes to the appearance of her urine.  # At this point UTI and medication side effect from jardiance are unlikely. Her symptoms are most consistent with OAB. However, today she mentioned some occasional leakage with coughing, sneezing, laughing. During our last visit she denied all stress incontinence symptoms. She has three children, but they were all delivered via . She has had multiple pelvic surgeries in the distant past for reasons she could not clearly convey. With this new information, a mixed stress-urge incontinence could also be the cause. I reviewed all of this with her and discussed the different treatment options for each diagnosis. After shared decision making, we will proceed with a trial of mybetriq.  - Start myrbetriq 25mg qd  - If there is improvement in symptoms, we will increase the dose to 50mg qd next month  Orders:  -     Mirabegron ER 25 MG  TB24; Take 25 mg by mouth in the morning       History of Present Illness     73 yo F presenting for follow up of her urinary frequency and urgency problems. Last visit we decided to stop her jardiance and see if there was an change. She has experience no improvement off jardiance. She does not think her symptoms are getting any worse, but she is anxious to find a solution. Otherwise, she has been doing well.        Review of Systems   Constitutional: Negative.  Negative for fever.   Gastrointestinal:  Negative for abdominal pain.   Genitourinary:  Positive for difficulty urinating, frequency and urgency. Negative for decreased urine volume, dysuria and hematuria.   Neurological: Negative.        Objective     /87   Pulse 69   Temp 97.9 °F (36.6 °C)   Resp 18   Wt 74.6 kg (164 lb 6.4 oz)   SpO2 98%   BMI 33.20 kg/m²     Physical Exam  Vitals and nursing note reviewed.   Constitutional:       General: She is not in acute distress.     Appearance: She is well-developed.   HENT:      Head: Normocephalic and atraumatic.      Right Ear: External ear normal.      Left Ear: External ear normal.      Nose: Nose normal.      Mouth/Throat:      Mouth: Mucous membranes are moist.      Pharynx: Oropharynx is clear. No oropharyngeal exudate or posterior oropharyngeal erythema.   Eyes:      Extraocular Movements: Extraocular movements intact.      Conjunctiva/sclera: Conjunctivae normal.   Cardiovascular:      Rate and Rhythm: Normal rate and regular rhythm.      Heart sounds: No murmur heard.  Pulmonary:      Effort: Pulmonary effort is normal. No respiratory distress.      Breath sounds: Normal breath sounds.   Abdominal:      General: Bowel sounds are normal. There is no distension.      Palpations: Abdomen is soft. There is no mass.      Tenderness: There is no abdominal tenderness.      Hernia: No hernia is present.   Musculoskeletal:         General: No swelling.   Lymphadenopathy:      Cervical: No cervical  adenopathy.   Skin:     General: Skin is warm.      Capillary Refill: Capillary refill takes less than 2 seconds.      Findings: No rash.   Neurological:      Mental Status: She is alert and oriented to person, place, and time.      Sensory: No sensory deficit.      Motor: No weakness.   Psychiatric:         Mood and Affect: Mood normal.         Behavior: Behavior normal.       Administrative Statements     Rome Kendall MD

## 2024-09-11 ENCOUNTER — OFFICE VISIT (OUTPATIENT)
Dept: PHYSICAL THERAPY | Facility: CLINIC | Age: 74
End: 2024-09-11
Payer: COMMERCIAL

## 2024-09-11 DIAGNOSIS — M15.9 PRIMARY OSTEOARTHRITIS INVOLVING MULTIPLE JOINTS: ICD-10-CM

## 2024-09-11 DIAGNOSIS — R42 VERTIGO: ICD-10-CM

## 2024-09-11 DIAGNOSIS — R26.2 AMBULATORY DYSFUNCTION: Primary | ICD-10-CM

## 2024-09-11 PROCEDURE — 97110 THERAPEUTIC EXERCISES: CPT | Performed by: PHYSICAL THERAPIST

## 2024-09-11 PROCEDURE — 97112 NEUROMUSCULAR REEDUCATION: CPT | Performed by: PHYSICAL THERAPIST

## 2024-09-11 NOTE — PROGRESS NOTES
"Daily Note     Today's date: 2024  Patient name: Lindsey Chandra  : 1950  MRN: [id number]  Referring provider: Rome Kendall*  Dx:   Encounter Diagnosis     ICD-10-CM    1. Ambulatory dysfunction  R26.2       2. Primary osteoarthritis involving multiple joints  M15.9       3. Vertigo  R42                      Subjective: Pt presents today stating that she is feeling okay thus far, still having some dizziness mild off sensation in head right now.  Reports having lost HEP, will require new one.       Objective: See treatment diary below      Assessment: Printed out new HEP form.  Proceeded with activities with mild fatigue, but no exacerbation of symptoms.  P did not offer change in base line presentation.       Precautions: Falls, HTN, DM2, Hx of L BC      Manuals 24           [facility] Assessment  + Epley trial TAS NE                                                  Neuro Re-Ed             Education and progression 10 Min            VOR x 1 2 targets all planes  1 min           VOR x 1, 1 target all planes  1 min           NBOS EC  1 min           NBOS EO Foam  1 min           Walking slow/fast             Walking with head moving             Side Steps/AP PA walking  4 laps                        Ther Ex             CS Ret 4 x 5 4 x 5           Progression?             Scap Add 3\" x 10 3\" x 10           Scaption 2 x10 2 x 10           Hip Abd + Ext  2 x 10                        Ther Activity             Mini Squat  nv           Sit<>Stand             Laying down dizziness reproduction? Mild improvement            CS Rotation R/Ext Mild pain at end range:Better post exercise                          Modalities                                            "

## 2024-09-17 ENCOUNTER — OFFICE VISIT (OUTPATIENT)
Dept: PHYSICAL THERAPY | Facility: CLINIC | Age: 74
End: 2024-09-17
Payer: COMMERCIAL

## 2024-09-17 DIAGNOSIS — M15.9 PRIMARY OSTEOARTHRITIS INVOLVING MULTIPLE JOINTS: ICD-10-CM

## 2024-09-17 DIAGNOSIS — R26.2 AMBULATORY DYSFUNCTION: Primary | ICD-10-CM

## 2024-09-17 DIAGNOSIS — R42 VERTIGO: ICD-10-CM

## 2024-09-17 PROCEDURE — 97110 THERAPEUTIC EXERCISES: CPT

## 2024-09-17 PROCEDURE — 97112 NEUROMUSCULAR REEDUCATION: CPT

## 2024-09-17 NOTE — PROGRESS NOTES
"Daily Note     Today's date: 2024  Patient name: Lindsey Chandra  : 1950  MRN: [id number]  Referring provider: Mauricio Birch,*  Dx:   Encounter Diagnosis     ICD-10-CM    1. Ambulatory dysfunction  R26.2       2. Primary osteoarthritis involving multiple joints  M15.9       3. Vertigo  R42                      Subjective: Patient reports mild improvement since LV.       Objective: See treatment diary below      Assessment: Tolerated treatment fair. VOR training was difficult today, reporting dizziness and blurry vision. Patient also reports dizziness with static balance tasks. Her dizziness does not linger, resolving with 15-20 of rest. She is able to leave safely on her own after the session. Continued PT would be beneficial to improve function.          Plan: Continue per plan of care.       Precautions: Falls, HTN, DM2, Hx of L BC      Manuals 24          DHP Assessment  + Epley trial TAS NE                                                  Neuro Re-Ed             Education and progression 10 Min            VOR x 1 2 targets all planes  1 min 3x5 sec. 20 sec recovery time          VOR x 1, 1 target all planes  1 min 3x10 sec ea. ~15-20 sec recovery time          NBOS EC  1 min 2x30 sec          NBOS EO Foam  1 min 2x30          Walking slow/fast             Walking with head moving             Side Steps/AP PA walking  4 laps 4 laps                       Ther Ex             CS Ret 4 x 5 4 x 5  4 x 5          Progression?             Scap Add 3\" x 10 3\" x 10 3\"x10          Scaption 2 x10 2 x 10 2x10          Hip Abd + Ext  2 x 10 2x10                       Ther Activity             Mini Squat  nv 2x10          Sit<>Stand             Laying down dizziness reproduction? Mild improvement            CS Rotation R/Ext Mild pain at end range:Better post exercise                          Modalities                                              "

## 2024-09-18 ENCOUNTER — OFFICE VISIT (OUTPATIENT)
Dept: PHYSICAL THERAPY | Facility: CLINIC | Age: 74
End: 2024-09-18
Payer: COMMERCIAL

## 2024-09-18 DIAGNOSIS — R26.2 AMBULATORY DYSFUNCTION: Primary | ICD-10-CM

## 2024-09-18 DIAGNOSIS — M15.9 PRIMARY OSTEOARTHRITIS INVOLVING MULTIPLE JOINTS: ICD-10-CM

## 2024-09-18 DIAGNOSIS — R42 VERTIGO: ICD-10-CM

## 2024-09-18 PROCEDURE — 97112 NEUROMUSCULAR REEDUCATION: CPT

## 2024-09-18 PROCEDURE — 97110 THERAPEUTIC EXERCISES: CPT

## 2024-09-18 NOTE — PROGRESS NOTES
"Daily Note     Today's date: 2024  Patient name: Lindsey Chandra  : 1950  MRN: [id number]  Referring provider: Mauricio Birch,*  Dx:   Encounter Diagnosis     ICD-10-CM    1. Ambulatory dysfunction  R26.2       2. Primary osteoarthritis involving multiple joints  M15.9       3. Vertigo  R42                      Subjective: Lindsey report minimal dizziness to start the session, felt better after LV.       Objective: See treatment diary below      Assessment: Tolerated treatment well. Patient is able to increase time with VOR training, relatively same recovery time required to return to baseline. Static balance was performed without symptoms. BLE muscle fatigue noted with standing tasks. Continued PT would be beneficial to improve function.          Plan: Continue per plan of care.       Precautions: Falls, HTN, DM2, Hx of L BC      Manuals 24         DHP Assessment  + Epley trial TAS NE                                                  Neuro Re-Ed             Education and progression 10 Min            VOR x 1 2 targets all planes  1 min 3x5 sec. 20 sec recovery time 1 min. 15 sec recovery time, difficulty with transition between targets.          VOR x 1, 1 target all planes  1 min 3x10 sec ea. ~15-20 sec recovery time 45 sec x2. 5 sec recovery time.          NBOS EC  1 min 2x30 sec 1 min         NBOS EO Foam  1 min 2x30 1 min         Walking slow/fast             Walking with head moving             Side Steps/AP PA walking  4 laps 4 laps 4 laps                      Ther Ex             CS Ret 4 x 5 4 x 5  4 x 5 4x5          Progression?             Scap Add 3\" x 10 3\" x 10 3\"x10 3\"x20         Scaption 2 x10 2 x 10 2x10 2x10         Hip Abd + Ext  2 x 10 2x10 2x10                      Ther Activity             Mini Squat  nv 2x10 2x10         Sit<>Stand             Laying down dizziness reproduction? Mild improvement            CS Rotation R/Ext Mild pain at end range:Better " post exercise                          Modalities

## 2024-09-24 ENCOUNTER — PATIENT OUTREACH (OUTPATIENT)
Dept: FAMILY MEDICINE CLINIC | Facility: CLINIC | Age: 74
End: 2024-09-24

## 2024-09-24 ENCOUNTER — OFFICE VISIT (OUTPATIENT)
Dept: PHYSICAL THERAPY | Facility: CLINIC | Age: 74
End: 2024-09-24
Payer: COMMERCIAL

## 2024-09-24 DIAGNOSIS — M15.0 PRIMARY OSTEOARTHRITIS INVOLVING MULTIPLE JOINTS: ICD-10-CM

## 2024-09-24 DIAGNOSIS — R26.2 AMBULATORY DYSFUNCTION: Primary | ICD-10-CM

## 2024-09-24 DIAGNOSIS — M15.9 PRIMARY OSTEOARTHRITIS INVOLVING MULTIPLE JOINTS: ICD-10-CM

## 2024-09-24 DIAGNOSIS — R42 VERTIGO: ICD-10-CM

## 2024-09-24 PROCEDURE — 97110 THERAPEUTIC EXERCISES: CPT

## 2024-09-24 PROCEDURE — 97112 NEUROMUSCULAR REEDUCATION: CPT

## 2024-09-24 NOTE — PROGRESS NOTES
[facility] [employer] Waiver Physician Certification Form to be completed by Dr. Kendall.     Form placed in Blue Folder for Dr. Kendall to complete.    Once completed, [employer] will Fax back to [facility].

## 2024-09-24 NOTE — PROGRESS NOTES
"Daily Note     Today's date: 2024  Patient name: Lindsey Chandra  : 1950  MRN: [id number]  Referring provider: Mauricio Birch,*  Dx:   Encounter Diagnosis     ICD-10-CM    1. Ambulatory dysfunction  R26.2       2. Primary osteoarthritis involving multiple joints  M15.9       3. Vertigo  R42                      Subjective: Lindsey reports that she gets dizziness \"every other week\".       Objective: See treatment diary below      Assessment: Tolerated treatment well. Production of dizziness with saccades         Plan: Continue per plan of care.       Precautions: Falls, HTN, DM2, Hx of L BC      Manuals 24        DHP Assessment  + Epley trial TAS NE                                                  Neuro Re-Ed             Education and progression 10 Min            VOR x 1 2 targets all planes  1 min 3x5 sec. 20 sec recovery time 1 min. 15 sec recovery time, difficulty with transition between targets.  30\"x2        VOR x 1, 1 target all planes  1 min 3x10 sec ea. ~15-20 sec recovery time 45 sec x2. 5 sec recovery time.  30\"x2         NBOS EC  1 min 2x30 sec 1 min 1 min         NBOS EO Foam  1 min 2x30 1 min 1 min         Walking slow/fast             Walking with head moving     3 laps         Side Steps/AP PA walking  4 laps 4 laps 4 laps 5 laps                     Ther Ex             CS Ret 4 x 5 4 x 5  4 x 5 4x5  4x5         Progression?             Scap Add 3\" x 10 3\" x 10 3\"x10 3\"x20 3\"x20         Scaption 2 x10 2 x 10 2x10 2x10 2x10         Hip Abd + Ext  2 x 10 2x10 2x10 2x10                     Ther Activity             Mini Squat  nv 2x10 2x10 2x10        Sit<>Stand             Laying down dizziness reproduction? Mild improvement            CS Rotation R/Ext Mild pain at end range:Better post exercise                          Modalities                                                "

## 2024-09-25 ENCOUNTER — OFFICE VISIT (OUTPATIENT)
Dept: PHYSICAL THERAPY | Facility: CLINIC | Age: 74
End: 2024-09-25
Payer: COMMERCIAL

## 2024-09-25 DIAGNOSIS — R26.2 AMBULATORY DYSFUNCTION: Primary | ICD-10-CM

## 2024-09-25 DIAGNOSIS — M15.0 PRIMARY OSTEOARTHRITIS INVOLVING MULTIPLE JOINTS: ICD-10-CM

## 2024-09-25 DIAGNOSIS — M15.9 PRIMARY OSTEOARTHRITIS INVOLVING MULTIPLE JOINTS: ICD-10-CM

## 2024-09-25 DIAGNOSIS — R42 VERTIGO: ICD-10-CM

## 2024-09-25 PROCEDURE — 97112 NEUROMUSCULAR REEDUCATION: CPT

## 2024-09-25 PROCEDURE — 97110 THERAPEUTIC EXERCISES: CPT

## 2024-09-25 NOTE — PROGRESS NOTES
"Daily Note     Today's date: 2024  Patient name: Lindsey Chandra  : 1950  MRN: [id number]  Referring provider: Mauricio Birch,*  Dx:   Encounter Diagnosis     ICD-10-CM    1. Ambulatory dysfunction  R26.2       2. Primary osteoarthritis involving multiple joints  M15.9       3. Vertigo  R42                      Subjective: Pt reports that she feels \"just a little dizzy\" today.       Objective: See treatment diary below      Assessment: Tolerated treatment well. No complaints of dizziness during interventions.         Plan: Continue per plan of care.       Precautions: Falls, HTN, DM2, Hx of L BC      Manuals 24       DHP Assessment  + Epley trial [facility]                                                  Neuro Re-Ed             Education and progression 10 Min            VOR x 1 2 targets all planes  1 min 3x5 sec. 20 sec recovery time 1 min. 15 sec recovery time, difficulty with transition between targets.  30\"x2 1 min       VOR x 1, 1 target all planes  1 min 3x10 sec ea. ~15-20 sec recovery time 45 sec x2. 5 sec recovery time.  30\"x2  1 min       NBOS EC  1 min 2x30 sec 1 min 1 min  1 min       NBOS EO Foam  1 min 2x30 1 min 1 min  1 min        Walking slow/fast             Walking with head moving     3 laps  3 laps       Side Steps/AP PA walking  4 laps 4 laps 4 laps 5 laps  5 laps                     Ther Ex             CS Ret 4 x 5 4 x 5  4 x 5 4x5  4x5  4x5       Progression?             Scap Add 3\" x 10 3\" x 10 3\"x10 3\"x20 3\"x20  3\"x20        Scaption 2 x10 2 x 10 2x10 2x10 2x10  2x10       Hip Abd + Ext  2 x 10 2x10 2x10 2x10 2x10                     Ther Activity             Mini Squat  nv 2x10 2x10 2x10 2x10       Sit<>Stand      10x       Laying down dizziness reproduction? Mild improvement            CS Rotation R/Ext Mild pain at end range:Better post exercise                          Modalities                                                "

## 2024-09-26 ENCOUNTER — PATIENT OUTREACH (OUTPATIENT)
Dept: FAMILY MEDICINE CLINIC | Facility: CLINIC | Age: 74
End: 2024-09-26

## 2024-10-02 ENCOUNTER — EVALUATION (OUTPATIENT)
Dept: PHYSICAL THERAPY | Facility: CLINIC | Age: 74
End: 2024-10-02
Payer: COMMERCIAL

## 2024-10-02 DIAGNOSIS — M15.0 PRIMARY OSTEOARTHRITIS INVOLVING MULTIPLE JOINTS: ICD-10-CM

## 2024-10-02 DIAGNOSIS — R26.2 AMBULATORY DYSFUNCTION: Primary | ICD-10-CM

## 2024-10-02 DIAGNOSIS — R42 VERTIGO: ICD-10-CM

## 2024-10-02 PROCEDURE — 97110 THERAPEUTIC EXERCISES: CPT | Performed by: PHYSICAL THERAPIST

## 2024-10-02 PROCEDURE — 97112 NEUROMUSCULAR REEDUCATION: CPT | Performed by: PHYSICAL THERAPIST

## 2024-10-02 NOTE — PROGRESS NOTES
PT Evaluation     Today's date: 10/2/2024  Patient name: Lindsey Chandra  : 1950  MRN: [id number]  Referring provider: Rome Kendall*  Dx:   Encounter Diagnosis     ICD-10-CM    1. Ambulatory dysfunction  R26.2       2. Primary osteoarthritis involving multiple joints  M15.0       3. Vertigo  R42                        Assessment  Impairments: abnormal gait, abnormal or restricted ROM, abnormal movement, activity intolerance, impaired balance, impaired physical strength, lacks appropriate home exercise program, pain with function, poor posture , poor body mechanics, activity limitations and endurance    Assessment details: Pt at this time demonstrates improved VOR speed, episodic experience, strength endurance and at this time has achieved all goals sought out for her as well as by her.  If she is to have a decline in any form she is welcome to return as needed but otherwise is likely safe to DC to HEP.  Thank you very much for this kind referral.      Understanding of Dx/Px/POC: good     Prognosis: good    Goals  STG 4 Weeks:  Improve Dizziness to 2-4x's a week. -met  Improve VOR speed to 1.5 bps -met  Improve range to CS to min -met  Improve strength to 4/5 b/l UE/LE -met  Improve endurance to 20 mins within session to tolerance -met  Independent with HEP -met  LTG 8 Weeks:  Improve dizziness to minimal experience a week -met  Improve VOR speed to 2 -met  Improve range to CS to nil -met  Improve strength to 5/5 b/l UE/LE -met  Improve endurance to 30 mins or greater within session to tolerance.   -met  Able to perform all desired activities with minimal to nil symptom exacerbation -met      Plan  Patient would benefit from: skilled physical therapy  Planned modality interventions: cryotherapy and thermotherapy: hydrocollator packs    Planned therapy interventions: joint mobilization, manual therapy, neuromuscular re-education, patient/caregiver education, postural training, self care,  strengthening, stretching, therapeutic activities, therapeutic exercise, therapeutic training, transfer training, IADL retraining, home exercise program, graded motor, graded exercise, graded activity, gait training, functional ROM exercises, flexibility, abdominal trunk stabilization, activity modification, balance, behavior modification and body mechanics training    Frequency: 2x week  Duration in weeks: 10  Treatment plan discussed with: patient        Subjective Evaluation    History of Present Illness  Date of onset: 9/4/2024  Mechanism of injury: Pt presents today stating that she is feeling overall much better.  Reports that dizziness is very infrequent maybe a few times a week.  Pt reports that she feels more secure with her walking, has not had any falls.  Pt reports that she has been compliant with HEP, and doing lots of recreational walking with her dog at home.  Pt reports that she is feeling as though she is 95% of where she wants to be but would like to make today her last visit because she is feeling quite confident with her current presentation.    Quality of life: good    Patient Goals  Patient goals for therapy: increased strength, return to sport/leisure activities, increased motion, improved balance and decreased pain    Pain  No pain reported      Diagnostic Tests  No diagnostic tests performed        Objective     Active Range of Motion   Cervical/Thoracic Spine       Cervical  Subcranial protraction:  WFL   Subcranial retraction:  WFL   Flexion:  WFL  Extension:  WFL  Left lateral flexion:  WFL  Right lateral flexion:  WFL  Left rotation:  WFL  Right rotation:  WFL    Additional Active Range of Motion Details  Forward head, rounded shoulders  B/L Sensation intact to light touch C3,4,5,6,7,8,T1,T2  BP Sit 142/82, Stand 140/80  HR sit 58, stand 65  O2 Sat 98% to 95%  UE screen flex 4 abd 4- er 4 ir 4 b/l  // all 5/5.   LE Screen flex 3+  abd 4 add 5 ext 5 // all 5/5  CS Screen pain with Ext and  "R SB.   No pain today.   Palpation  Ocular ROM WFL difficulty with looking L >R   Convergence/Divergence WFL  VOR to L .5, to R 1, sup/inf .5, fatigues very quickly.  // all 2  STs Head Shake + for symptoms, Head Thrust + for symptoms, DHP to R and L + Silent symptoms, no nystagmus, present for 20\" before pt request to sit up.  NE with supine laying with Epley performance. // nil    Repeated CS Retraction improved CS Ext and R rotation.  Mild improvement with supine position dizziness.    TUG:  15.67\" no AD short quick steps, increased trunk sway. // 10.5\"              Precautions: Falls, HTN, DM2, Hx of L BC      Manuals 9/4/24 9/11 9/17 9/18 09/24 09/25 10/2      DHP Assessment  + Epley trial [facility]                                                  Neuro Re-Ed             Education and progression 10 Min            VOR x 1 2 targets all planes  1 min 3x5 sec. 20 sec recovery time 1 min. 15 sec recovery time, difficulty with transition between targets.  30\"x2 1 min 1 min      VOR x 1, 1 target all planes  1 min 3x10 sec ea. ~15-20 sec recovery time 45 sec x2. 5 sec recovery time.  30\"x2  1 min 1 min      NBOS EC  1 min 2x30 sec 1 min 1 min  1 min 1 min      NBOS EO Foam  1 min 2x30 1 min 1 min  1 min  1 min      Walking slow/fast             Walking with head moving     3 laps  3 laps 4 laps      Side Steps/AP PA walking  4 laps 4 laps 4 laps 5 laps  5 laps  5 laps                   Ther Ex             CS Ret 4 x 5 4 x 5  4 x 5 4x5  4x5  4x5 4 x 5      Progression?             Scap Add 3\" x 10 3\" x 10 3\"x10 3\"x20 3\"x20  3\"x20  3\" x 20      Scaption 2 x10 2 x 10 2x10 2x10 2x10  2x10 2 x 10      Hip Abd + Ext  2 x 10 2x10 2x10 2x10 2x10  2 x 10                   Ther Activity             Mini Squat  nv 2x10 2x10 2x10 2x10 2 x 10      Sit<>Stand      10x 10x       Laying down dizziness reproduction? Mild improvement            CS Rotation R/Ext Mild pain at end range:Better post exercise                        "   Modalities

## 2024-10-07 DIAGNOSIS — L40.9 PSORIASIS: ICD-10-CM

## 2024-10-14 DIAGNOSIS — E78.5 HYPERLIPIDEMIA, UNSPECIFIED HYPERLIPIDEMIA TYPE: ICD-10-CM

## 2024-10-14 DIAGNOSIS — I10 BENIGN ESSENTIAL HYPERTENSION: ICD-10-CM

## 2024-10-14 RX ORDER — LISINOPRIL 20 MG/1
20 TABLET ORAL DAILY
Qty: 90 TABLET | Refills: 0 | Status: SHIPPED | OUTPATIENT
Start: 2024-10-14

## 2024-10-14 RX ORDER — SIMVASTATIN 20 MG
20 TABLET ORAL
Qty: 90 TABLET | Refills: 0 | Status: SHIPPED | OUTPATIENT
Start: 2024-10-14 | End: 2024-10-25 | Stop reason: SDUPTHER

## 2024-10-23 ENCOUNTER — OFFICE VISIT (OUTPATIENT)
Dept: MULTI SPECIALTY CLINIC | Facility: CLINIC | Age: 74
End: 2024-10-23

## 2024-10-23 VITALS — BODY MASS INDEX: 32.25 KG/M2 | WEIGHT: 160 LBS | HEIGHT: 59 IN

## 2024-10-23 DIAGNOSIS — L40.9 PSORIASIS: Primary | ICD-10-CM

## 2024-10-23 DIAGNOSIS — Z51.81 MEDICATION MONITORING ENCOUNTER: ICD-10-CM

## 2024-10-23 NOTE — PROGRESS NOTES
"[facility] Note     Patient Name: Lindsey Chandra  Encounter Date: 10/23/24     Have you been cared for by a [facility] Dermatologist in the last 3 years and, if so, which description applies to you?    Yes.  I have been here within the last 3 years, and my medical history has NOT changed since that time.  I am FEMALE/of child-bearing potential.    REVIEW OF SYSTEMS:  Have you recently had or currently have any of the following? No changes in my recent health.   PAST MEDICAL HISTORY:  Have you personally ever had or currently have any of the following?  If \"YES,\" then please provide more detail. No changes in my medical history.   HISTORY OF IMMUNOSUPPRESSION: Do you have a history of any of the following:  Systemic Immunosuppression such as Diabetes, Biologic or Immunotherapy, Chemotherapy, Organ Transplantation, Bone Marrow Transplantation or Prednisone?  YES, diabetes     Answering \"YES\" requires the addition of the dotphrase \"IMMUNOSUPPRESSED\" as the first diagnosis of the patient's visit.   FAMILY HISTORY:  Any \"first degree relatives\" (parent, brother, sister, or child) with the following?    No changes in my family's known health.   PATIENT EXPERIENCE:    Do you want the Dermatologist to perform a COMPLETE skin exam today including a clinical examination under the \"bra and underwear\" areas?  NO  If necessary, do we have your permission to call and leave a detailed message on your Preferred Phone number that includes your specific medical information?  Yes      No Known Allergies   Current Outpatient Medications:     acetaminophen (TYLENOL) 650 mg CR tablet, Take 2 tablets (1,300 mg total) by mouth every 8 (eight) hours as needed for moderate pain, Disp: 30 tablet, Rfl: 0    Alcohol Swabs (Alcohol Prep) PADS, Use in the morning (Patient not taking: Reported on 7/18/2024), Disp: 100 each, Rfl: 2    amLODIPine (NORVASC) 10 mg tablet, Take 1 tablet (10 mg total) by mouth daily, Disp: 90 " tablet, Rfl: 3    Blood Glucose Monitoring Suppl (OneTouch Verio Reflect) w/Device KIT, Check blood sugars once daily. Please substitute with appropriate alternative as covered by patient's insurance. Dx: E11.65, Disp: 1 kit, Rfl: 0    calcium carbonate-vitamin D (OSCAL-D) 500 mg-200 units per tablet, Take 2 tablets by mouth daily with breakfast, Disp: 120 tablet, Rfl: 3    cetirizine (ZyrTEC) 10 mg tablet, Take 1 tablet (10 mg total) by mouth daily (Patient not taking: Reported on 9/9/2024), Disp: 30 tablet, Rfl: 0    clindamycin (CLEOCIN T) 1 % lotion, , Disp: , Rfl: 0    clotrimazole (LOTRIMIN) 1 % cream, Apply topically 2 (two) times a day (Patient not taking: Reported on 9/9/2024), Disp: 45 g, Rfl: 0    Diclofenac Sodium (VOLTAREN) 1 %, Apply 2 g topically 4 (four) times a day (Patient not taking: Reported on 9/9/2024), Disp: 50 g, Rfl: 0    famotidine (PEPCID) 20 mg tablet, Take 1 tablet (20 mg total) by mouth daily at bedtime (Patient not taking: Reported on 9/9/2024), Disp: 14 tablet, Rfl: 0    glucose blood (OneTouch Verio) test strip, Check blood sugars once daily. Please substitute with appropriate alternative as covered by patient's insurance. Dx: E11.65 (Patient not taking: Reported on 7/18/2024), Disp: 100 each, Rfl: 3    Guselkumab 100 MG/ML SOSY, Please inject 100mg (1 syringe) subcutaneously every 8 weeks, Disp: 1 mL, Rfl: 6    hydrochlorothiazide (HYDRODIURIL) 25 mg tablet, take 1 tablet by mouth once daily, Disp: 90 tablet, Rfl: 3    levothyroxine 50 mcg tablet, Take 1 tablet (50 mcg total) by mouth daily, Disp: 90 tablet, Rfl: 3    lidocaine (XYLOCAINE) 2 % topical gel, Apply topically as needed for mild pain (Patient not taking: Reported on 5/7/2024), Disp: 30 mL, Rfl: 0    lisinopril (ZESTRIL) 20 mg tablet, Take 1 tablet (20 mg total) by mouth daily, Disp: 90 tablet, Rfl: 0    metFORMIN (GLUCOPHAGE) 1000 MG tablet, Take 1 tablet (1,000 mg total) by mouth 2 (two) times a day with meals, Disp: 60  tablet, Rfl: 1    Mirabegron ER 25 MG TB24, Take 25 mg by mouth in the morning, Disp: 30 tablet, Rfl: 0    mirtazapine (REMERON) 15 mg tablet, Take 1 tablet (15 mg total) by mouth daily at bedtime Take 15mg (one tablet) at night for three (3) days and then start on 30mg (two tablets) at night onwards. (Patient not taking: Reported on 5/7/2024), Disp: 63 tablet, Rfl: 0    Misc. Devices (GetGo Rolling Walker) MISC, Use if needed (ambulatory assistance) (Patient not taking: Reported on 9/9/2024), Disp: 1 each, Rfl: 0    ondansetron (Zofran) 4 mg tablet, Take 2 tablets (8 mg total) by mouth every 8 (eight) hours as needed for nausea or vomiting for up to 10 doses Zofran odt 2 tablets dissolve in the mouth  every 8 hrs as needed for nausea/ vomiting (Patient not taking: Reported on 9/9/2024), Disp: 10 tablet, Rfl: 0    OneTouch Delica Lancets 33G MISC, Check blood sugars once daily. Please substitute with appropriate alternative as covered by patient's insurance. Dx: E11.65 (Patient not taking: Reported on 7/18/2024), Disp: 100 each, Rfl: 3    polyethylene glycol (Golytely) 4000 mL solution, Take 4,000 mL by mouth once for 1 dose Take 4000 mL by mouth once for 1 dose. Use as directed, Disp: 4000 mL, Rfl: 0    simvastatin (ZOCOR) 20 mg tablet, Take 1 tablet (20 mg total) by mouth daily at bedtime, Disp: 90 tablet, Rfl: 0    sitaGLIPtin (JANUVIA) 100 mg tablet, Take 1 tablet (100 mg total) by mouth daily (Patient not taking: Reported on 9/9/2024), Disp: 30 tablet, Rfl: 0          Whom besides the patient is providing clinical information about today's encounter?   NO ADDITIONAL HISTORIAN (patient alone provided history)    Physical Exam and Assessment/Plan by Diagnosis:  TREMFYA Biologic Injectable Administration for plaque psoriasis and psoriatic arthritis     Additional History of Present Condition:  Patient is present for education and administration of Tremfya. Patient brought her own, delivered by [employer]. Happy w  control of psoriasis, no joint pain  Assessment and Plan:  Based on a thorough discussion of this condition and the management approach to it (including a comprehensive discussion of the known risks, side effects and potential benefits of treatment), the patient (family) agrees to implement the following specific plan:  Tremfya injection administered today in the R arm   Verbal consent obtained to administer.   Next dose due 8 weeks  Side effects discussed and how to report    Biologic Injectable Administration Note  Diagnosis: psoriasis  This is injection number #8    Informed consent: Discussed risks (Risks of hypersensitivity reaction, injection site reaction, conjunctivitis/keratitis, HSV reactivation, increased susceptibility to parasitic infections, inefficacy were reviewed.) Verbal consent obtained.   Preparation: After discussion potential procedure related risks including pain, bleeding, new infection, reactivation of latent infection, inefficacy, increased risk of malignancy, hypersensitivity reaction, injection site reaction, verbal consent was obtained. The areas were cleansed with alcohol prep pads and allowed to fully air dry for 3 minutes.  Procedure Details:  100 mg was injected subcutaneously in the R arm  Lot Number: [id number]  Expiration: 08/20/2025  Total Injected: 100mg  NDC: 77328-395-04     Patient tolerated procedure well, with minimal pinpoint bleeding that was controlled with pressure. Aftercare was reviewed.       WHAT IS TREMFYA® (guselkumab)?     TREMFYA® is a prescription medicine used to treat adults with moderate to severe plaque psoriasis who may benefit from taking injections or pills (systemic therapy) or phototherapy (treatment using ultraviolet or UV light).  TREMFYA® is a prescription medicine used to treat adults with active psoriatic arthritis.    IMPORTANT SAFETY INFORMATION     What is the most important information I should know about TREMFYA®?  TREMFYA® is a prescription  medicine that may cause serious side effects, including:  Serious Allergic Reactions.   Stop using   TREMFYA®     and get emergency medical help right away if you develop any of the following symptoms of a serious allergic reaction:   fainting, dizziness, feeling lightheaded (low blood pressure)  swelling of your face, eyelids, lips, mouth, tongue or throat  trouble breathing or throat tightness  chest tightness  skin rash, hives  itching    Infections.   TREMFYA®   may lower the ability of your immune system to fight infections and may increase your risk of infections. Your healthcare provider should check you for infections and tuberculosis (TB) before starting treatment with TREMFYA®   and may treat you for TB before you begin treatment with TREMFYA®   if you have a history of TB or have active TB. Your healthcare provider should watch you closely for signs and symptoms of TB during and after treatment with TREMFYA®.   Tell your healthcare provider right away if you have an infection or have symptoms of an infection, including:  fever, sweats, or chills  muscle aches  weight loss  cough  warm, red, or painful skin or sores on your body different from your psoriasis  diarrhea or stomach pain  shortness of breath  blood in your phlegm (mucus)  burning when you urinate or urinating more often than normal    Do not take TREMFYA® if you have had a serious allergic reaction to guselkumab or any of the ingredients in TREMFYA®.  Before using TREMFYA®, tell your healthcare provider about all of your medical conditions, including if you:  have any of the conditions or symptoms listed in the section “What is the most important information I should know about TREMFYA®?”  have an infection that does not go away or that keeps coming back.  have TB or have been in close contact with someone with TB.  have recently received or are scheduled to receive an immunization (vaccine). You should avoid receiving live vaccines during  treatment with TREMFYA®.  are pregnant or plan to become pregnant. It is not known if TREMFYA® can harm your unborn baby.  are breastfeeding or plan to breastfeed. It is not known if TREMFYA® passes into your breast milk.  Tell your healthcare provider about all the medicines you take, including prescription and over-the-counter medicines, vitamins, and herbal supplements.  What are the possible side effects of TREMFYA®?  TREMFYA® may cause serious side effects. See “What is the most important information I should know about TREMFYA®?”  The most common side effects of TREMFYA® include: upper respiratory infections, headache, injection site reactions, joint pain (arthralgia), diarrhea, stomach flu (gastroenteritis), fungal skin infections, herpes simplex infections, and bronchitis.  These are not all the possible side effects of TREMFYA®. Call your doctor for medical advice about side effects.  Use TREMFYA® exactly as your healthcare provider tells you to use it.  Please read the full Prescribing Information, including Medication Guide for TREMFYA®, and discuss any questions that you have with your doctor.  You are encouraged to report negative side effects of prescription drugs to the FDA. Visit www.fda.gov/medwatch, or call 6-933-PPW-9817.

## 2024-10-25 DIAGNOSIS — E78.5 HYPERLIPIDEMIA, UNSPECIFIED HYPERLIPIDEMIA TYPE: ICD-10-CM

## 2024-10-25 DIAGNOSIS — I10 BENIGN ESSENTIAL HYPERTENSION: ICD-10-CM

## 2024-10-25 RX ORDER — AMLODIPINE BESYLATE 10 MG/1
10 TABLET ORAL DAILY
Qty: 90 TABLET | Refills: 3 | Status: SHIPPED | OUTPATIENT
Start: 2024-10-25

## 2024-10-25 RX ORDER — SIMVASTATIN 20 MG
20 TABLET ORAL
Qty: 90 TABLET | Refills: 0 | Status: SHIPPED | OUTPATIENT
Start: 2024-10-25

## 2024-12-23 LAB
LEFT EYE DIABETIC RETINOPATHY: NORMAL
RIGHT EYE DIABETIC RETINOPATHY: NORMAL

## 2024-12-30 ENCOUNTER — OFFICE VISIT (OUTPATIENT)
Dept: FAMILY MEDICINE CLINIC | Facility: CLINIC | Age: 74
End: 2024-12-30

## 2024-12-30 VITALS
HEART RATE: 60 BPM | DIASTOLIC BLOOD PRESSURE: 85 MMHG | SYSTOLIC BLOOD PRESSURE: 168 MMHG | RESPIRATION RATE: 18 BRPM | OXYGEN SATURATION: 98 % | BODY MASS INDEX: 32.15 KG/M2 | WEIGHT: 159.2 LBS | TEMPERATURE: 98.2 F

## 2024-12-30 DIAGNOSIS — M25.511 CHRONIC RIGHT SHOULDER PAIN: ICD-10-CM

## 2024-12-30 DIAGNOSIS — E11.9 TYPE 2 DIABETES MELLITUS WITHOUT COMPLICATION, WITHOUT LONG-TERM CURRENT USE OF INSULIN (HCC): Primary | ICD-10-CM

## 2024-12-30 DIAGNOSIS — G89.29 CHRONIC RIGHT SHOULDER PAIN: ICD-10-CM

## 2024-12-30 DIAGNOSIS — R10.13 EPIGASTRIC PAIN: ICD-10-CM

## 2024-12-30 DIAGNOSIS — R26.2 AMBULATORY DYSFUNCTION: ICD-10-CM

## 2024-12-30 DIAGNOSIS — N32.81 OVERACTIVE BLADDER: ICD-10-CM

## 2024-12-30 DIAGNOSIS — I10 BENIGN ESSENTIAL HYPERTENSION: ICD-10-CM

## 2024-12-30 LAB — SL AMB POCT HEMOGLOBIN AIC: 9.6 (ref ?–6.5)

## 2024-12-30 PROCEDURE — 3077F SYST BP >= 140 MM HG: CPT | Performed by: FAMILY MEDICINE

## 2024-12-30 PROCEDURE — 3079F DIAST BP 80-89 MM HG: CPT | Performed by: FAMILY MEDICINE

## 2024-12-30 PROCEDURE — 83036 HEMOGLOBIN GLYCOSYLATED A1C: CPT | Performed by: FAMILY MEDICINE

## 2024-12-30 PROCEDURE — 99213 OFFICE O/P EST LOW 20 MIN: CPT | Performed by: FAMILY MEDICINE

## 2024-12-30 RX ORDER — FAMOTIDINE 20 MG/1
20 TABLET, FILM COATED ORAL
Qty: 30 TABLET | Refills: 0 | Status: SHIPPED | OUTPATIENT
Start: 2024-12-30 | End: 2025-01-29

## 2024-12-30 RX ORDER — HYDROCHLOROTHIAZIDE 25 MG/1
25 TABLET ORAL DAILY
Qty: 90 TABLET | Refills: 3 | Status: SHIPPED | OUTPATIENT
Start: 2024-12-30

## 2024-12-30 RX ORDER — MIRABEGRON 25 MG/1
25 TABLET, FILM COATED, EXTENDED RELEASE ORAL DAILY
Qty: 30 TABLET | Refills: 1 | Status: SHIPPED | OUTPATIENT
Start: 2024-12-30

## 2024-12-30 NOTE — LETTER
To whom it may concern,    Please provide comprehensive snow removal services for her unit, any stairs, and any connecting side walk. Comprehensive snow removal is necessary to ensure safety and health in the winter months.    Please call our office with any questions or concerns,      Thank you                Rome Kendall MD

## 2024-12-31 NOTE — PROGRESS NOTES
Name: Lindsey Chandra      : 1950      MRN: [id number]  Encounter Provider: Rome Kendall MD  Encounter Date: 2024   Encounter department: [facility] [place]  :  Assessment & Plan  Type 2 diabetes mellitus without complication, without long-term current use of insulin (HCC)  A1c increased significantly today from 7.3% at last visit to >9% today. Currently prescribed januvia and metformin. Previously on jardiance, but switched to januvia after recurrent urinary issues. She does recall taking metformin for an extended period of time and did not appear to fully understand her diagnosis of diabetes. No adherent to diabetic diet at home. Denies d/c'ing metformin 2/2 adverse effects  - Continue januvia at current dose  - As patient has not taken metformin for weeks to months, will restart at lower dose to avoid any potential adverse GI effects  - Return in 1 week for med rec    Orders:    POCT hemoglobin A1c    sitaGLIPtin (JANUVIA) 100 mg tablet; Take 1 tablet (100 mg total) by mouth daily    metFORMIN (GLUCOPHAGE) 500 mg tablet; Take 1 tablet (500 mg total) by mouth 2 (two) times a day with meals    Overactive bladder  Previously prescribed mybetriq for OAB symptoms. Medication was filled, but she cannot recall if she took the medication or if it was helpful. Still having similar symptoms currently, but not as severe.   - Agreeable to restart myrbetriq with goal to increase dose from 25mg to 50mg at appropriate interval  - No symptoms consistent with UTI today    Orders:    Mirabegron ER 25 MG TB24; Take 25 mg by mouth in the morning    Epigastric pain  Uses pepcid occasionally for reflux symptoms, infrequent.   - Requesting refill    Orders:    famotidine (PEPCID) 20 mg tablet; Take 1 tablet (20 mg total) by mouth daily at bedtime    Benign essential hypertension  History of HTN on 3 medications: lisinopril, HCTZ, and amlodipine. BP elevated in office today  "initially and on recheck. After detailed discussion with patient, unclear if she is taking any of her medication other than januvia.   She could only recall 1 BP medication, HCTZ, and she has been out of that for some time.   - Discussed \"pill pack\" options as she is a customer of [facility]. Discussed likely delay of prescriptions ordered today because this will be her first set. She was agreeable to the plan  - Called [facility] and requested pill packs on patient's behalf. Also confirmed a slight delay for the first set of meds shortly before a holiday  - Discussed importance of medication adherence and complications associated with comorbid conditions including DM2  - Return in 1 week for med rec    Orders:    hydroCHLOROthiazide 25 mg tablet; Take 1 tablet (25 mg total) by mouth daily    Ambulatory dysfunction    Orders:    Referral to Home [facility]; Future    Chronic right shoulder pain  chronic R shoulder pain with some radiation the pain down into her right arm. Has not has any recent formal treatment or PT. Exam unremarkable, no concern for acute, likely chronic wear and tear.  - Rec'd PT. However patient has difficulty with transporatation and mobility.   - Ordered home VNA services for possible PT  - Advised patient she may not meet criteria for home PT, agreeable.     Orders:    Referral to [facility]; Future           History of Present Illness     Diabetes  She presents for her follow-up diabetic visit. She has type 2 diabetes mellitus. No MedicAlert identification noted. The initial diagnosis of diabetes was made 5 years ago. Her disease course has been worsening. There are no hypoglycemic associated symptoms. Pertinent negatives for hypoglycemia include no seizures. There are no diabetic associated symptoms. Pertinent negatives for diabetes include no chest pain. Symptoms are worsening. Risk factors for coronary artery disease include hypertension and diabetes " mellitus. Current diabetic treatment includes oral agent (dual therapy). She is compliant with treatment some of the time. Her weight is stable. She is following a generally healthy diet. When asked about meal planning, she reported none. She has not had a previous visit with a dietitian. She rarely participates in exercise. An ACE inhibitor/angiotensin II receptor blocker is not being taken (ordered, not taking). She does not see a podiatrist.Eye exam is current.     Review of Systems   Constitutional:  Negative for chills and fever.   HENT:  Negative for ear pain and sore throat.    Eyes:  Negative for pain and visual disturbance.   Respiratory:  Negative for cough and shortness of breath.    Cardiovascular:  Negative for chest pain and palpitations.   Gastrointestinal:  Negative for abdominal pain and vomiting.   Genitourinary:  Negative for dysuria and hematuria.   Musculoskeletal:  Positive for arthralgias and gait problem.   Skin:  Negative for color change and rash.   Neurological:  Negative for seizures and syncope.   All other systems reviewed and are negative.      Objective   /85   Pulse 60   Temp 98.2 °F (36.8 °C)   Resp 18   Wt 72.2 kg (159 lb 3.2 oz)   SpO2 98%   BMI 32.15 kg/m²      Physical Exam  Vitals reviewed.   Constitutional:       General: She is not in acute distress.     Appearance: Normal appearance. She is well-developed. She is not ill-appearing.   HENT:      Head: Normocephalic and atraumatic.      Right Ear: External ear normal.      Left Ear: External ear normal.      Nose: Nose normal.      Mouth/Throat:      Mouth: Mucous membranes are moist.      Pharynx: Oropharynx is clear.   Eyes:      General: No scleral icterus.     Extraocular Movements: Extraocular movements intact.   Cardiovascular:      Rate and Rhythm: Normal rate and regular rhythm.      Heart sounds: Normal heart sounds. No murmur heard.  Pulmonary:      Effort: Pulmonary effort is normal. No respiratory  distress.      Breath sounds: Normal breath sounds.   Abdominal:      General: There is no distension.   Musculoskeletal:         General: No signs of injury.      Cervical back: Normal range of motion.   Lymphadenopathy:      Cervical: No cervical adenopathy.   Skin:     Capillary Refill: Capillary refill takes less than 2 seconds.      Findings: No rash.   Neurological:      Mental Status: She is alert and oriented to person, place, and time.      Sensory: No sensory deficit.      Motor: No weakness.      Gait: Gait normal.   Psychiatric:         Mood and Affect: Mood normal.         Behavior: Behavior normal.       Rome Kendall MD

## 2024-12-31 NOTE — ASSESSMENT & PLAN NOTE
"History of HTN on 3 medications: lisinopril, HCTZ, and amlodipine. BP elevated in office today initially and on recheck. After detailed discussion with patient, unclear if she is taking any of her medication other than januvia.   She could only recall 1 BP medication, HCTZ, and she has been out of that for some time.   - Discussed \"pill pack\" options as she is a customer of [facility]. Discussed likely delay of prescriptions ordered today because this will be her first set. She was agreeable to the plan  - [facility] and requested pill packs on patient's behalf. Also confirmed a slight delay for the first set of meds shortly before a holiday  - Discussed importance of medication adherence and complications associated with comorbid conditions including DM2  - Return in 1 week for med rec    Orders:    hydroCHLOROthiazide 25 mg tablet; Take 1 tablet (25 mg total) by mouth daily    "

## 2024-12-31 NOTE — ASSESSMENT & PLAN NOTE
Uses pepcid occasionally for reflux symptoms, infrequent.   - Requesting refill    Orders:    famotidine (PEPCID) 20 mg tablet; Take 1 tablet (20 mg total) by mouth daily at bedtime

## 2024-12-31 NOTE — ASSESSMENT & PLAN NOTE
Previously prescribed mybetriq for OAB symptoms. Medication was filled, but she cannot recall if she took the medication or if it was helpful. Still having similar symptoms currently, but not as severe.   - Agreeable to restart myrbetriq with goal to increase dose from 25mg to 50mg at appropriate interval  - No symptoms consistent with UTI today    Orders:    Mirabegron ER 25 MG TB24; Take 25 mg by mouth in the morning

## 2024-12-31 NOTE — ASSESSMENT & PLAN NOTE
chronic R shoulder pain with some radiation the pain down into her right arm. Has not has any recent formal treatment or PT. Exam unremarkable, no concern for acute, likely chronic wear and tear.  - Rec'd PT. However patient has difficulty with transporatation and mobility.   - Ordered home VNA services for possible PT  - Advised patient she may not meet criteria for home PT, agreeable.     Orders:    [facility]; [facility]

## 2024-12-31 NOTE — ASSESSMENT & PLAN NOTE
A1c increased significantly today from 7.3% at last visit to >9% today. Currently prescribed januvia and metformin. Previously on jardiance, but switched to januvia after recurrent urinary issues. She does recall taking metformin for an extended period of time and did not appear to fully understand her diagnosis of diabetes. No adherent to diabetic diet at home. Denies d/c'ing metformin 2/2 adverse effects  - Continue januvia at current dose  - As patient has not taken metformin for weeks to months, will restart at lower dose to avoid any potential adverse GI effects  - Return in 1 week for med rec    Orders:    POCT hemoglobin A1c    sitaGLIPtin (JANUVIA) 100 mg tablet; Take 1 tablet (100 mg total) by mouth daily    metFORMIN (GLUCOPHAGE) 500 mg tablet; Take 1 tablet (500 mg total) by mouth 2 (two) times a day with meals

## 2025-01-06 ENCOUNTER — OFFICE VISIT (OUTPATIENT)
Dept: FAMILY MEDICINE CLINIC | Facility: CLINIC | Age: 75
End: 2025-01-06

## 2025-01-06 VITALS
DIASTOLIC BLOOD PRESSURE: 80 MMHG | SYSTOLIC BLOOD PRESSURE: 143 MMHG | HEART RATE: 80 BPM | BODY MASS INDEX: 31.97 KG/M2 | RESPIRATION RATE: 18 BRPM | OXYGEN SATURATION: 99 % | TEMPERATURE: 98.3 F | HEIGHT: 59 IN | WEIGHT: 158.6 LBS

## 2025-01-06 DIAGNOSIS — M25.511 RIGHT SHOULDER PAIN, UNSPECIFIED CHRONICITY: ICD-10-CM

## 2025-01-06 DIAGNOSIS — E11.9 TYPE 2 DIABETES MELLITUS WITHOUT COMPLICATION, WITHOUT LONG-TERM CURRENT USE OF INSULIN (HCC): ICD-10-CM

## 2025-01-06 DIAGNOSIS — I89.0 LYMPHEDEMA: ICD-10-CM

## 2025-01-06 DIAGNOSIS — E03.9 ACQUIRED HYPOTHYROIDISM: ICD-10-CM

## 2025-01-06 DIAGNOSIS — I10 BENIGN ESSENTIAL HYPERTENSION: Primary | ICD-10-CM

## 2025-01-06 DIAGNOSIS — E78.5 HYPERLIPIDEMIA, UNSPECIFIED HYPERLIPIDEMIA TYPE: ICD-10-CM

## 2025-01-06 PROCEDURE — G2211 COMPLEX E/M VISIT ADD ON: HCPCS | Performed by: FAMILY MEDICINE

## 2025-01-06 PROCEDURE — 99213 OFFICE O/P EST LOW 20 MIN: CPT | Performed by: FAMILY MEDICINE

## 2025-01-06 PROCEDURE — 3077F SYST BP >= 140 MM HG: CPT | Performed by: FAMILY MEDICINE

## 2025-01-06 PROCEDURE — 3079F DIAST BP 80-89 MM HG: CPT | Performed by: FAMILY MEDICINE

## 2025-01-06 RX ORDER — LISINOPRIL 20 MG/1
20 TABLET ORAL DAILY
Qty: 90 TABLET | Refills: 0 | Status: SHIPPED | OUTPATIENT
Start: 2025-01-06

## 2025-01-06 RX ORDER — AMLODIPINE BESYLATE 10 MG/1
10 TABLET ORAL DAILY
Qty: 90 TABLET | Refills: 3 | Status: SHIPPED | OUTPATIENT
Start: 2025-01-06

## 2025-01-06 RX ORDER — SIMVASTATIN 20 MG
20 TABLET ORAL
Qty: 90 TABLET | Refills: 0 | Status: SHIPPED | OUTPATIENT
Start: 2025-01-06

## 2025-01-06 RX ORDER — LEVOTHYROXINE SODIUM 50 UG/1
50 TABLET ORAL DAILY
Qty: 90 TABLET | Refills: 3 | Status: SHIPPED | OUTPATIENT
Start: 2025-01-06

## 2025-01-06 NOTE — ASSESSMENT & PLAN NOTE
Continues to report soreness of the right shoulder. Pain started after her fall in March 2024 and has persisted despite conservative treatments and PT.   - Reordered PT last week. Denied from [facility] due to staffing constraints.   - Will send additional referrals to [facility] and [facility] per [facility] suggestion  - Avoid PO NSAIDs, start voltaren  - Continue tylenol prn    Orders:    Diclofenac Sodium (VOLTAREN) 1 %; Apply 2 g topically 3 (three) times a day as needed (right shoulder pain)

## 2025-01-06 NOTE — PROGRESS NOTES
"Name: Lindsey Chandra      : 1950      MRN: [id number]  Encounter Provider: Rome Kendall MD  Encounter Date: 2025   Encounter department: [facility] [place]  :  Assessment & Plan  Benign essential hypertension  Patient's BP improved today compared to last visit (143/80 down from 168/85). She reports better adherence to all of her medications, including her anti-hypertensive regimen. She is currently taking amlodipine, lisinopril, and HCTZ. She has a history of falls, one recently in 2024 that caused a right scaphoid dislocation.  - Continue current BP regimen  - Hesitant to increase dosages at today's BP; nearly at goal given her age  - The benefit of treating her BP more aggressively likely does not outweigh the risk of another fall especially in the winter months  - She will be receiving her medications through \"pill packs\" going forward and I expect her med adherence to improve further  - Additional refills sent today to ensure pharmacy has adequate supply for packs      Orders:    lisinopril (ZESTRIL) 20 mg tablet; Take 1 tablet (20 mg total) by mouth daily    amLODIPine (NORVASC) 10 mg tablet; Take 1 tablet (10 mg total) by mouth daily    Type 2 diabetes mellitus without complication, without long-term current use of insulin ([facility])  Completed med reconciliation today in office.  Patient had all prescribed diabetes medications with the recent refill of her metformin.  I reviewed her medication regimen and reemphasized the importance of strict adherence.  Until last week, she had only been taking her Januvia daily.  She had likely not been taking her metformin regularly for weeks to months. We re- established her daily metformin last week at a lower dose to avoid any adverse effects.    Today, she reports good adherence to her medications and expressed understanding of her regimen going forward.   - Continue Januvia at current dose  - Patient has " "been tolerating metformin over the last week at reduced dose  - Increase metformin by 1 tablet/day until she is taking 2 tablets in the morning and 2 tablets in the evening. Full dose of 1g BID expected by 1/8/25  - Effectiveness of previous regimen unclear. Prefer to re-assess A1c after better compliance is established and adjust based on more accurate information.     Orders:    metFORMIN (GLUCOPHAGE) 500 mg tablet; Increase mealtime dose by (1) tablet per day until you are taking (2) tablets with breakfast and dinner. Final dose will be (4) total tablets per day.    Hyperlipidemia, unspecified hyperlipidemia type  Refills of zocor ordered today  Orders:    simvastatin (ZOCOR) 20 mg tablet; Take 1 tablet (20 mg total) by mouth daily at bedtime    Acquired hypothyroidism  Refills of levothyroxine ordered today  Orders:    levothyroxine 50 mcg tablet; Take 1 tablet (50 mcg total) by mouth daily    Right shoulder pain, unspecified chronicity  Continues to report soreness of the right shoulder. Pain started after her fall in March 2024 and has persisted despite conservative treatments and PT.   - Reordered PT last week. Denied from [facility] due to staffing constraints.   - Will send additional referrals to [facility] and [facility] per [facility] suggestion  - Avoid PO NSAIDs, start voltaren  - Continue tylenol prn    Orders:    Diclofenac Sodium (VOLTAREN) 1 %; Apply 2 g topically 3 (three) times a day as needed (right shoulder pain)    Lymphedema  Patient has h/o LUE lymphedema s/p mastectomy. Her previous lymphedema pneumatic sleeve/pump has not been working.  - Attempted to order new pump through [facility] portal; none available.   - Reordered pump via \" DVT / Lymphedema / Edema \" smart set  Orders:    Pneumatic compression pumps           History of Present Illness     Hypertension  This is a chronic problem. The current episode started more than 1 year ago. The problem has been gradually worsening (now improving over last " "week) since onset. Condition status: improving. Pertinent negatives include no blurred vision, chest pain, palpitations or shortness of breath. Agents associated with hypertension include thyroid hormones. Risk factors for coronary artery disease include diabetes mellitus, dyslipidemia and post-menopausal state. Past treatments include ACE inhibitors, calcium channel blockers and diuretics. Improvement on treatment: improvement after improved med adherence. Compliance problems: language barrier, medical literacy.      Review of Systems   Constitutional: Negative.    Eyes: Negative.  Negative for blurred vision.   Respiratory:  Negative for shortness of breath.    Cardiovascular:  Negative for chest pain and palpitations.   Gastrointestinal: Negative.    Genitourinary: Negative.    Musculoskeletal:  Positive for gait problem.   Skin: Negative.    Neurological:  Negative for dizziness, weakness and light-headedness.       Objective   /80 (BP Location: Right arm, Patient Position: Sitting, Cuff Size: Large)   Pulse 80   Temp 98.3 °F (36.8 °C)   Resp 18   Ht 4' 11\" (1.499 m)   Wt 71.9 kg (158 lb 9.6 oz)   SpO2 99%   BMI 32.03 kg/m²      Physical Exam  Vitals reviewed.   Constitutional:       General: She is not in acute distress.     Appearance: Normal appearance. She is well-developed. She is not ill-appearing.   HENT:      Head: Normocephalic and atraumatic.      Right Ear: External ear normal.      Left Ear: External ear normal.      Nose: Nose normal.      Mouth/Throat:      Mouth: Mucous membranes are moist.      Pharynx: Oropharynx is clear.   Eyes:      General: No scleral icterus.     Extraocular Movements: Extraocular movements intact.   Cardiovascular:      Rate and Rhythm: Normal rate and regular rhythm.      Heart sounds: Normal heart sounds. No murmur heard.  Pulmonary:      Effort: Pulmonary effort is normal. No respiratory distress.      Breath sounds: Normal breath sounds.   Abdominal:      " General: There is no distension.   Musculoskeletal:         General: No signs of injury.      Cervical back: Normal range of motion.   Lymphadenopathy:      Cervical: No cervical adenopathy.   Skin:     Capillary Refill: Capillary refill takes less than 2 seconds.      Findings: No rash.   Neurological:      Mental Status: She is alert and oriented to person, place, and time.      Sensory: No sensory deficit.      Motor: No weakness.   Psychiatric:         Mood and Affect: Mood normal.         Behavior: Behavior normal.       Rome Kendall MD

## 2025-01-06 NOTE — ASSESSMENT & PLAN NOTE
"Patient's BP improved today compared to last visit (143/80 down from 168/85). She reports better adherence to all of her medications, including her anti-hypertensive regimen. She is currently taking amlodipine, lisinopril, and HCTZ. She has a history of falls, one recently in March 2024 that caused a right scaphoid dislocation.  - Continue current BP regimen  - Hesitant to increase dosages at today's BP; nearly at goal given her age  - The benefit of treating her BP more aggressively likely does not outweigh the risk of another fall especially in the winter months  - She will be receiving her medications through \"pill packs\" going forward and I expect her med adherence to improve further  - Additional refills sent today to ensure pharmacy has adequate supply for packs      Orders:    lisinopril (ZESTRIL) 20 mg tablet; Take 1 tablet (20 mg total) by mouth daily    amLODIPine (NORVASC) 10 mg tablet; Take 1 tablet (10 mg total) by mouth daily    "

## 2025-01-06 NOTE — ASSESSMENT & PLAN NOTE
Refills of levothyroxine ordered today  Orders:    levothyroxine 50 mcg tablet; Take 1 tablet (50 mcg total) by mouth daily

## 2025-01-06 NOTE — LETTER
January 6, 2025     Patient: Lindsey Chandra  YOB: 1950  Date of Visit: 1/6/2025      To Whom it May Concern:    Lindsey Chandra is under my professional care. Lindsey was seen in my office on 1/6/2025.   Please provide comprehensive snow removal services for her unit, any stairs, and any connecting side walk. Comprehensive snow removal is necessary to ensure safety and health in the winter months.     If you have any questions or concerns, please don't hesitate to call.         Sincerely,          Rome Kendall MD        CC: No Recipients

## 2025-01-06 NOTE — ASSESSMENT & PLAN NOTE
Refills of zocor ordered today  Orders:    simvastatin (ZOCOR) 20 mg tablet; Take 1 tablet (20 mg total) by mouth daily at bedtime

## 2025-01-07 NOTE — ASSESSMENT & PLAN NOTE
"Patient has h/o LUE lymphedema s/p mastectomy. Her previous lymphedema pneumatic sleeve/pump has not been working.  - Attempted to order new pump through DME portal; none available.   - Reordered pump via \" DVT / Lymphedema / Edema \" smart set  Orders:    Pneumatic compression pumps    "

## 2025-01-07 NOTE — ASSESSMENT & PLAN NOTE
Completed med reconciliation today in office.  Patient had all prescribed diabetes medications with the recent refill of her metformin.  I reviewed her medication regimen and reemphasized the importance of strict adherence.  Until last week, she had only been taking her Januvia daily.  She had likely not been taking her metformin regularly for weeks to months. We re- established her daily metformin last week at a lower dose to avoid any adverse effects.    Today, she reports good adherence to her medications and expressed understanding of her regimen going forward.   - Continue Januvia at current dose  - Patient has been tolerating metformin over the last week at reduced dose  - Increase metformin by 1 tablet/day until she is taking 2 tablets in the morning and 2 tablets in the evening. Full dose of 1g BID expected by 1/8/25  - Effectiveness of previous regimen unclear. Prefer to re-assess A1c after better compliance is established and adjust based on more accurate information.     Orders:    metFORMIN (GLUCOPHAGE) 500 mg tablet; Increase mealtime dose by (1) tablet per day until you are taking (2) tablets with breakfast and dinner. Final dose will be (4) total tablets per day.

## 2025-01-08 ENCOUNTER — CLINICAL SUPPORT (OUTPATIENT)
Dept: MULTI SPECIALTY CLINIC | Facility: CLINIC | Age: 75
End: 2025-01-08

## 2025-01-08 DIAGNOSIS — L40.9 PSORIASIS: Primary | ICD-10-CM

## 2025-01-08 NOTE — PROGRESS NOTES
TREMFYA Biologic Injectable Administration     Additional History of Present Condition:  Patient is present for administration of Tremfya. Medication administration order placed yes. Provider order matches prescription orderyes.     Assessment and Plan:  Based on a thorough discussion of this condition and the management approach to it (including a comprehensive discussion of the known risks, side effects and potential benefits of treatment), the patient (family) agrees to implement the following specific plan:  Tremfya injection administered today in the right abdomen  Verbal consent obtained to administer.   Next dose due 8 weeks.  Side effects discussed and how to report  Side effects discussed and how to report         Biologic Injectable Administration Note  Diagnosis: Psoriasis  This is injection number #9    Informed consent: Discussed risks (Risks of hypersensitivity reaction, injection site reaction, conjunctivitis/keratitis, HSV reactivation, increased susceptibility to parasitic infections, inefficacy were reviewed.) Verbal consent obtained.   Preparation: After discussion potential procedure related risks including pain, bleeding, new infection, reactivation of latent infection, inefficacy, increased risk of malignancy, hypersensitivity reaction, injection site reaction, verbal consent was obtained. The areas were cleansed with alcohol prep pads and allowed to fully air dry for 3 minutes.  Procedure Details:  100 MG was injected subcutaneously in the Right Abdomen  Lot Number: [id number]  Expiration: 12/2025  Total Injected: 100 mg  NDC: 01437-883-50     Patient tolerated procedure well, with minimal pinpoint bleeding that was controlled with pressure. Aftercare was reviewed.       WHAT IS TREMFYA® (guselkumab)?     TREMFYA® is a prescription medicine used to treat adults with moderate to severe plaque psoriasis who may benefit from taking injections or pills (systemic therapy) or phototherapy (treatment  using ultraviolet or UV light).  TREMFYA® is a prescription medicine used to treat adults with active psoriatic arthritis.    IMPORTANT SAFETY INFORMATION     What is the most important information I should know about TREMFYA®?  TREMFYA® is a prescription medicine that may cause serious side effects, including:  Serious Allergic Reactions.   Stop using   TREMFYA®     and get emergency medical help right away if you develop any of the following symptoms of a serious allergic reaction:   fainting, dizziness, feeling lightheaded (low blood pressure)  swelling of your face, eyelids, lips, mouth, tongue or throat  trouble breathing or throat tightness  chest tightness  skin rash, hives  itching    Infections.   TREMFYA®   may lower the ability of your immune system to fight infections and may increase your risk of infections. Your healthcare provider should check you for infections and tuberculosis (TB) before starting treatment with TREMFYA®   and may treat you for TB before you begin treatment with TREMFYA®   if you have a history of TB or have active TB. Your healthcare provider should watch you closely for signs and symptoms of TB during and after treatment with TREMFYA®.   Tell your healthcare provider right away if you have an infection or have symptoms of an infection, including:  fever, sweats, or chills  muscle aches  weight loss  cough  warm, red, or painful skin or sores on your body different from your psoriasis  diarrhea or stomach pain  shortness of breath  blood in your phlegm (mucus)  burning when you urinate or urinating more often than normal    Do not take TREMFYA® if you have had a serious allergic reaction to guselkumab or any of the ingredients in TREMFYA®.  Before using TREMFYA®, tell your healthcare provider about all of your medical conditions, including if you:  have any of the conditions or symptoms listed in the section “What is the most important information I should know about  TREMFYA®?”  have an infection that does not go away or that keeps coming back.  have TB or have been in close contact with someone with TB.  have recently received or are scheduled to receive an immunization (vaccine). You should avoid receiving live vaccines during treatment with TREMFYA®.  are pregnant or plan to become pregnant. It is not known if TREMFYA® can harm your unborn baby.  are breastfeeding or plan to breastfeed. It is not known if TREMFYA® passes into your breast milk.  Tell your healthcare provider about all the medicines you take, including prescription and over-the-counter medicines, vitamins, and herbal supplements.  What are the possible side effects of TREMFYA®?  TREMFYA® may cause serious side effects. See “What is the most important information I should know about TREMFYA®?”  The most common side effects of TREMFYA® include: upper respiratory infections, headache, injection site reactions, joint pain (arthralgia), diarrhea, stomach flu (gastroenteritis), fungal skin infections, herpes simplex infections, and bronchitis.  These are not all the possible side effects of TREMFYA®. Call your doctor for medical advice about side effects.  Use TREMFYA® exactly as your healthcare provider tells you to use it.  Please read the full Prescribing Information, including Medication Guide for TREMFYA®, and discuss any questions that you have with your doctor.  You are encouraged to report negative side effects of prescription drugs to the FDA. Visit www.fda.gov/medwatch, or call 4-459-QCP-7335.       PATIENT SUPPLY THEIR OWN MEDICATION.

## 2025-02-08 DIAGNOSIS — N32.81 OVERACTIVE BLADDER: ICD-10-CM

## 2025-02-10 DIAGNOSIS — R10.13 EPIGASTRIC PAIN: ICD-10-CM

## 2025-02-12 RX ORDER — MIRABEGRON 25 MG/1
25 TABLET, FILM COATED, EXTENDED RELEASE ORAL EVERY MORNING
Qty: 30 TABLET | Refills: 0 | Status: SHIPPED | OUTPATIENT
Start: 2025-02-12

## 2025-02-12 RX ORDER — FAMOTIDINE 20 MG/1
20 TABLET, FILM COATED ORAL
Qty: 30 TABLET | Refills: 0 | Status: SHIPPED | OUTPATIENT
Start: 2025-02-12

## 2025-03-14 DIAGNOSIS — R10.13 EPIGASTRIC PAIN: ICD-10-CM

## 2025-03-16 RX ORDER — FAMOTIDINE 20 MG/1
20 TABLET, FILM COATED ORAL
Qty: 30 TABLET | Refills: 4 | Status: SHIPPED | OUTPATIENT
Start: 2025-03-16

## 2025-03-24 DIAGNOSIS — N32.81 OVERACTIVE BLADDER: ICD-10-CM

## 2025-03-25 DIAGNOSIS — I10 BENIGN ESSENTIAL HYPERTENSION: ICD-10-CM

## 2025-03-25 RX ORDER — MIRABEGRON 25 MG/1
25 TABLET, FILM COATED, EXTENDED RELEASE ORAL EVERY MORNING
Qty: 30 TABLET | Refills: 0 | Status: SHIPPED | OUTPATIENT
Start: 2025-03-25

## 2025-03-25 NOTE — ASSESSMENT & PLAN NOTE
Continue levothyroxine 50 mcg daily Pt admitted from the [facility].  Per care coordination document, pt bedbound/dependent at baseline.  Pt has aide service 8am-8pm & days a week.  Pt owns /hospital bed

## 2025-03-25 NOTE — TELEPHONE ENCOUNTER
Patient left message on rx line requesting refill on Lisinopril. Please review this request. Upcoming appointment on 4/7/25. Thank you.

## 2025-03-28 RX ORDER — LISINOPRIL 20 MG/1
20 TABLET ORAL DAILY
Qty: 90 TABLET | Refills: 0 | Status: SHIPPED | OUTPATIENT
Start: 2025-03-28

## 2025-04-07 ENCOUNTER — OFFICE VISIT (OUTPATIENT)
Dept: FAMILY MEDICINE CLINIC | Facility: CLINIC | Age: 75
End: 2025-04-07

## 2025-04-07 VITALS
DIASTOLIC BLOOD PRESSURE: 75 MMHG | WEIGHT: 152 LBS | BODY MASS INDEX: 30.64 KG/M2 | SYSTOLIC BLOOD PRESSURE: 131 MMHG | HEIGHT: 59 IN | OXYGEN SATURATION: 97 % | TEMPERATURE: 98 F | HEART RATE: 71 BPM

## 2025-04-07 DIAGNOSIS — L85.3 DRY SKIN: ICD-10-CM

## 2025-04-07 DIAGNOSIS — Z12.31 ENCOUNTER FOR SCREENING MAMMOGRAM FOR BREAST CANCER: ICD-10-CM

## 2025-04-07 DIAGNOSIS — M25.511 CHRONIC RIGHT SHOULDER PAIN: ICD-10-CM

## 2025-04-07 DIAGNOSIS — E11.9 TYPE 2 DIABETES MELLITUS WITHOUT COMPLICATION, WITHOUT LONG-TERM CURRENT USE OF INSULIN (HCC): Primary | ICD-10-CM

## 2025-04-07 DIAGNOSIS — G89.29 CHRONIC RIGHT SHOULDER PAIN: ICD-10-CM

## 2025-04-07 DIAGNOSIS — Z23 ENCOUNTER FOR IMMUNIZATION: ICD-10-CM

## 2025-04-07 DIAGNOSIS — I10 BENIGN ESSENTIAL HYPERTENSION: ICD-10-CM

## 2025-04-07 LAB — SL AMB POCT HEMOGLOBIN AIC: 9.9 (ref ?–6.5)

## 2025-04-07 PROCEDURE — G0009 ADMIN PNEUMOCOCCAL VACCINE: HCPCS | Performed by: FAMILY MEDICINE

## 2025-04-07 PROCEDURE — 99213 OFFICE O/P EST LOW 20 MIN: CPT | Performed by: FAMILY MEDICINE

## 2025-04-07 PROCEDURE — 90677 PCV20 VACCINE IM: CPT | Performed by: FAMILY MEDICINE

## 2025-04-07 PROCEDURE — 83036 HEMOGLOBIN GLYCOSYLATED A1C: CPT | Performed by: FAMILY MEDICINE

## 2025-04-07 PROCEDURE — 3075F SYST BP GE 130 - 139MM HG: CPT | Performed by: FAMILY MEDICINE

## 2025-04-07 PROCEDURE — 3078F DIAST BP <80 MM HG: CPT | Performed by: FAMILY MEDICINE

## 2025-04-07 PROCEDURE — G2211 COMPLEX E/M VISIT ADD ON: HCPCS | Performed by: FAMILY MEDICINE

## 2025-04-07 RX ORDER — ACYCLOVIR 400 MG/1
1 TABLET ORAL ONCE
Qty: 1 EACH | Refills: 0 | Status: SHIPPED | OUTPATIENT
Start: 2025-04-07 | End: 2025-04-07

## 2025-04-07 RX ORDER — PETROLATUM 0.61 G/G
CREAM TOPICAL AS NEEDED
Qty: 99 G | Refills: 2 | Status: SHIPPED | OUTPATIENT
Start: 2025-04-07

## 2025-04-07 RX ORDER — GLIPIZIDE 2.5 MG/1
2.5 TABLET, EXTENDED RELEASE ORAL DAILY
Qty: 30 TABLET | Refills: 2 | Status: SHIPPED | OUTPATIENT
Start: 2025-04-07

## 2025-04-07 RX ORDER — ACYCLOVIR 400 MG/1
1 TABLET ORAL
Qty: 9 EACH | Refills: 3 | Status: SHIPPED | OUTPATIENT
Start: 2025-04-07

## 2025-04-07 NOTE — PATIENT INSTRUCTIONS
Patient Education     Conteo de carbohidratos para adultos con diabetes   Conceptos Básicos   Redactado por los médicos y editores de [employer]   ¿Qué es el conteo de carbohidratos? -- El conteo de carbohidratos es un tipo de planificación de comidas que usan muchas personas con diabetes. Es patricia forma de calcular cuántos carbohidratos consumen.  Nuestro organismo transforma los alimentos que comemos en kimi tipos principales de nutrientes: carbohidratos, proteínas y grasas. Los carbohidratos son azúcares y almidones que provienen de los alimentos. El cuerpo usa los carbohidratos renee cedric de energía.  ¿Por qué cat contar los carbohidratos? -- Las personas que tienen diabetes deben prestar atención a la cantidad de carbohidratos que consumen, ya que los carbohidratos elevan el nivel de azúcar en darshan.  El conteo de carbohidratos le ayuda para lo siguiente:   Elegir la cantidad de insulina que usará antes de las comidas y meriendas - Si usa insulina antes de las comidas, la dosis depende de varias cosas, y patricia de ellas es la cantidad de carbohidratos que planea consumir. (También depende de cuánto ejercicio tenga pensado hacer y de francisco nivel de azúcar en darshan).   Planificar las comidas y meriendas del día - Puede utilizar el conteo de carbohidratos para calcular cuántos carbohidratos debe consumir en cada comida y merienda. [place] le ayudará a asegurarse de consumir la cantidad correcta todo el día.   Mantener controlado el nivel de azúcar en darshan - Distribuir los carbohidratos que consume a lo clara del día puede ayudar a que francisco nivel de azúcar en darshan no suba demasiado. Si usa insulina u otra medicina para la diabetes que puede causar un nivel bajo de azúcar en darshan, consumir aproximadamente la misma cantidad de carbohidratos en cada comida todos los sebastian también ayuda a impedir que el nivel de azúcar en darshan baje demasiado. Reducir la cantidad de carbohidratos que consume puede ayudarle a controlar mejor  la diabetes y evitar problemas médicos que esta enfermedad puede producir.  Francisco médico, enfermero o nutricionista (experto en alimentos) puede ayudarle a determinar cuántos carbohidratos debe tratar de consumir cada día. La cantidad dependerá de viktor hábitos de alimentación, francisco peso, francisco nivel de actividad y las medicinas que use para la diabetes.  Las personas que usan insulina antes de las comidas deben poner mucho cuidado al contar los carbohidratos de cada comida y merienda, ya que esto les permite usar la cantidad correcta de insulina. Si la dosis de insulina no es adecuada para la cantidad de carbohidratos, francisco nivel de azúcar en darshan podría bajar demasiado. Otras personas podrían ser un poco más flexibles, siempre y cuando consuman aproximadamente la misma cantidad de carbohidratos por día.  ¿Qué alimentos tienen carbohidratos? -- Los alimentos con muchos carbohidratos incluyen:   Granos - Entre ellos se cuentan el pan, las pastas, el arroz y los cereales.   Frutas y vegetales con almidón - Algunos vegetales con almidón son la papa, el maíz y la calabaza.   Leche y otros productos lácteos - Entre los productos lácteos se encuentran el queso y el yogur.   Alimentos con azúcar agregada - Entre ellos se cuentan los dulces y los alimentos horneados (renee las galletas y las tortas), y también las bebidas azucaradas renee los jugos y las gaseosas.  Lo mejor es que la mayor parte de los carbohidratos provengan de frutas, verduras, granos integrales (renee el pan, los cereales y el arroz integrales), y de leche y productos lácteos bajos en grasa.  ¿Cómo se cuentan los carbohidratos? -- Para contar los carbohidratos de los alimentos envasados, debe revisar los datos nutricionales en las etiquetas (si tienen etiqueta).  En la etiqueta (figura 1), revise la siguiente información:   Cantidad de “carbohidratos totales” - Indica cuántos carbohidratos contiene patricia porción del alimento. Si come patricia porción, entonces la cantidad  "de carbohidratos que come es la misma cantidad que los carbohidratos totales.   “Tamaño de la porción” - Indica la cantidad de alimento en patricia porción. Si come 2 porciones, la cantidad de carbohidratos será dos veces la cantidad de carbohidratos mencionada.   “Fibra dietaria” - La fibra es un carbohidrato que no se digiere, esto significa que no eleva el azúcar en darshan. Los alimentos con mucha fibra pueden ayudar a controlar el azúcar en darshan. Si un alimento tiene más de 5 gramos (g) de fibra, necesita menos insulina si consume zulma alimento. Por eso, para calcular patricia dosis de insulina, solo debe contar los carbohidratos que no provengan de la fibra (figura 1).  ¿Qué es el reemplazo de carbohidratos? -- Reemplazar los carbohidratos o \"sistema de reemplazos\", es patricia manera de planificar las comidas sin leer las etiquetas. [place] puede ser útil, ya que muchos alimentos no vienen con patricia etiqueta de datos nutricionales.  El sistema de reemplazos consiste en saber qué cantidad de distintos alimentos contiene aproximadamente 15 gramos de carbohidratos (tabla 1 y tabla 2 y tabla 3). Francisco médico, enfermero o nutricionista le da patricia cierta cantidad de opciones de carbohidratos para consumir en cada comida y merienda (tabla 4). Cada opción es patricia porción de comida que contiene alrededor de 15 gramos de carbohidratos. [place] viktor opciones hará que le resulte más fácil reemplazar patricia opción de carbohidrato por otra a la hora de planear viktor comidas y meriendas. Por ejemplo, podría reemplazar patricia manzana savage por 1/3 de taza de pasta.  ¿Cómo puedo planificar mis comidas? -- [place], asegúrese de saber cuántos carbohidratos debe consumir por día. Si no está seguro, pregunte a francisco médico, enfermero o nutricionista.  Estas sugerencias podrían ser de ayuda:   Distribuya viktor carbohidratos en 4 a 6 comidas pequeñas todos los días, en lugar de 3 grandes   Coma patricia cantidad similar de carbohidratos en cada comida, por ejemplo, en cada " "carisa   Coma viktor comidas a patricia hora similar todos los días   Planifique viktor comidas con tiempo   Use el \"método del plato\", patricia manera más simple de asegurarse de tener un buen equilibrio de carbohidratos y otros nutrientes en cada comida. Por lo general no es tan exacto renee contar todos los carbohidratos, puede ser útil para quienes tienen dificultades con el conteo. Si usa insulina antes de las comidas, lo mejor es ajustar la dosis de insulina contando cuántos carbohidratos planea consumir en lugar de usar el método del plato.  En el método del plato, comienza con un plato de alrededor de 9 pulgadas (23 cm) de diámetro. Luego, llena (figura 2):   1/2 plato con verduras sin almidón   1/4 de plato con proteína   1/4 de plato con carbohidratos   Siga las instrucciones de francisco médico acerca de cómo y cuándo revisarse el nivel de azúcar en darshan. [place] puede ayudarle a conocer la manera en que ciertos alimentos afectan francisco azúcar en darshan.   Lleve un registro de viktor comidas y los niveles de azúcar en darshan. Muéstreselo al médico o enfermero para que pueda ajustarle el plan de tratamiento, si es necesario. Si usa insulina, también tendrá que llevar un registro de viktor rutinas de ejercicio y cuánta insulina recibe con cada dosis.   Si usa insulina, asegúrese de saber cómo utilizarla; por ejemplo, debe saber cómo ajustar la dosis según francisco nivel de azúcar en darshan y la cantidad de carbohidratos que planea consumir.   Recuerde que otras cosas, además de los carbohidratos, pueden elevar o disminuir el nivel de azúcar en darshan, por ejemplo realizar ejercicio físico, enfermarse, beber alcohol, viajar y tener estrés. Si usa insulina, asegúrese de saber cuándo y cómo ajustar la dosis en esas situaciones.  Si tiene dificultad para contar los carbohidratos o mantener francisco nivel de azúcar en darshan bajo control, hable con francisco médico o enfermero, Puede brindarle ayuda. El nutricionista también puede ayudarle a planificar menús " específicos para consumir la cantidad correcta de carbohidratos por día.  Para obtener más información, también puede conseguir un libro sobre el conteo de carbohidratos o visitar el sitio web de la Asociación [place] para la Diabetes (American Diabetes Association) (www.diabetes.org).  Todos los artículos se actualizan a medida que se descubre nueva evidencia y culmina nuestro proceso de evaluación por homólogos   [place] artículo se recuperó de UpToDate el: Mar 27, 2024.  Artículo 31788 Versión 10.0.es-419.1  Release: 32.2.4 - C32.85  © 2024 [employer] Todos los derechos reservados.  figura 1: Contar los carbohidratos     Para determinar el conteo de carbohidratos netos en 1 porción, comience con el número de gramos de carbohidratos totales (46 gramos) y luego reste el número de gramos de fibra dietaria (7 gramos). También es importante observar el tamaño de la porción. En keiry ejemplo, el conteo de carbohidratos es de 39 gramos. Puede usar keiry número a la hora de contar los carbohidratos para determinar francisco dosis de insulina.  Gráfico [id number] Versión 8.0  tabla 1: Panes y cereales con 15 gramos de carbohidratos*  Pan    Alimento  Tamaño de la porción    Bagel 1/4 de bagel luis angel (1 oz)   Galleta 1 galleta (2.5 pulgadas de diámetro)   Pan, reducido en calorías, dietético 2 rebanadas (1.5 oz)   Corral de harina maíz 1 cubo de 1.75 pulgadas (1.5 oz)   Panecillo inglés 1/2 panecillo   Pan para hot dog o hamburguesa 1/2 pan (3/4 de oz)   Naan, chapati o roti 1 oz   Panqueque 1 panqueque (4 pulgadas de diámetro, 1/4 de pulgada de grosor)   Fede (6 pulgadas de diámetro) 1/2 fede   Tortilla de harina de maíz 1 tortilla pequeña (6 pulgadas de diámetro)   Tortilla de harina de shanna (crystal o integral) 1 tortilla pequeña (6 pulgadas de diámetro) o 1/3 de tortilla luis angel (10 pulgadas de diámetro)   Gofre 1 gofre (elton de 4 pulgadas o 4 pulgadas de diámetro)   Cereales y granos (incluidas las pastas y el arroz)   "  Alimento  Tamaño de la porción (alimento cocido)    Cebada, cuscús, mijo, pasta (harina crystal o integral, todas las formas y tamaños), polenta, quinoa (todos los colores) o arroz (mancia, integral y otros colores y variedades) 1/3 de taza   Cereal de salvado (ramitas, bolitas o copos), almohadillas de shanna (sabor natural) o cereal azucarado 1/2 taza   Bulgur, kasha, tabule o arroz larry 1/2 taza   Granola 1/4 de taza   Cereal caliente (dejan, cereal de dejan, sémola) 1/2 taza   Cereal listo para consumir, sin endulzar 3/4 de taza   * En el lucrecia de los panes y cereales, 15 gramos de carbohidratos se consideran 1 porción o patricia opción para las personas que deben contar los carbohidratos.  [facility] [id number] Versión 1.0  tabla 2: Frutas con 15 gramos de carbohidratos*  Alimento  Tamaño de la porción    Puré de manzana, sin endulzar 1/2 taza   Plátano 1 plátano (banana) muy pequeño, de alrededor de 4 pulgadas de clara (4 oz)   Arándanos azules 3/4 de taza   Frutas deshidratadas (arándanos azules, cerezas, arándanos rojos, frutas mixtas, uvas pasas) 2 cdas.   Fruta, enlatada 1/2 taza   Fruta, entera, pequeña (manzana) 1 fruta pequeña (4 oz)   Fruta, entera, mediana (nectarina, naranja, jeny, mandarina) 1 fruta mediana (6 oz)   Jugo de fruta, sin endulzar 1/2 taza   Uvas 17 uvas pequeñas (3 oz)   Melón, en cubos 1 taza   Fresas, enteras 1 taza y 1/4   Donde se indica, el peso (onzas) incluye la cáscara o la piel y las semillas. Si tiene dudas acerca de si la fruta es del tamaño correcto para 1 porción, puede usar patricia balanza de cocina para shabnam el peso de la fruta.  * En el lucrecia de las frutas, 15 gramos de carbohidratos se consideran 1 porción o patricia \"opción\" para las personas que deben contar los carbohidratos.  Gráfico [id number] Versión 1.0  tabla 3: Verduras con almidón con 15 gramos de carbohidratos*  Alimento  Tamaño de la porción (alimento cocido)    Yuca, pituca o plátano (macho) 1/3 de taza   Maíz, arvejas, verduras " "mixtas o chirivías 1/2 taza   Salsa marinara, salsa para pasta o khoi para espagueti 1/2 taza   Verduras mixtas (con maíz o arvejas) 1 taza   Torri, al horno sin pelar 1/4 luis angel (3 onzas)   Torri, a la francesa (horneadas) 1 taza (2 onzas)   Torri, en puré con leche y grasa 1/2 taza   Zapallo (anco, calabaza) 1 taza   Ñame o batata, común 1/2 taza (3 1/2 onzas)   Si tiene dudas acerca de si la verdura es del tamaño correcto para 1 porción, puede usar patricia balanza de cocina para shabnam el peso de la verdura.  * En el lucrecia de las verduras con almidón, 15 gramos de carbohidratos se consideran 1 porción o patricia \"opción\" para las personas que deben contar los carbohidratos.  Gráfico [id number] Versión 1.0  tabla 4: Ejemplo de plan de comidas del sistema de reemplazos  Hora  Patrón de reemplazos  Ejemplo de menú  Cantidad de carbohidratos (g)    8 am 3 grupos de carbohidratos    2 almidones 1 panecillo inglés 30    1 fruta 1 1/4 tazas de fresas 15    1 kaden de proteínas 1/4 de taza de requesón -    1 kaden de grasas 1 cdta. de margarina -      Total: 45    Mediodía 4 grupos de carbohidratos    2 almidones 2 rebanadas de pan 30    1 fruta 1 naranja 15    1 verdura 1 taza de ensalada -    1 lácteo 8 oz de leche descremada 12    3 grupos de proteínas 3 oz de travis -    1 kaden de grasas 1 cda. de mayonesa baja en grasa -      Total: 57    3 pm 1 kaden de carbohidratos    1 fruta o 1 almidón 1 manzana o 6 galletas de sal 15      Total: 15    6 pm 4 grupos de carbohidratos    2 almidones 1 taza de torri 30    1 fruta 1/2 taza de ensalada de frutas 15    1 verdura 1 taza de ensalada -    1 lácteo 8 oz de leche descremada 12    6 grupos de proteínas 6 oz de pescado -    1 kaden de grasas 2 cdas. de aderezo para ensalada bajo en grasa -      Total: 57    9 pm 1 kaden de carbohidratos    1 almidón 6 galletas de sal 15    1 proteína 2 cdas. de mantequilla de maní -      Total: 15    [facility] [id number] Versión 3.0  figura 2: El \"método del " "plato\"     En el método del plato, comienza con un plato de alrededor de 9 pulgadas (23 cm) de diámetro. Luego, llena 1/2 plato con verduras sin almidón, 1/4 de plato con proteína y 1/4 de plato con carbohidratos.  Gráfico [id number] Versión 2.0  Exención de responsabilidad y uso de la información del consumidor   Descargo de responsabilidad: esta información generalizada es un resumen limitado de información sobre el diagnóstico, el tratamiento y/o los medicamentos. No pretende ser exhaustiva y se debe utilizar renee herramienta para ayudar al usuario a comprender y/o evaluar las posibles opciones de diagnóstico y tratamiento. No incluye toda la información sobre afecciones, tratamientos, medicamentos, efectos secundarios o riesgos puedan ser aplicables a un paciente específico. No tiene el propósito de servir renee recomendación médica ni de sustituir la recomendación médica, el diagnóstico o el tratamiento de un profesional de atención médica que se base en el examen y la evaluación de kathy profesional de la molly respecto a las circunstancias específicas y únicas del paciente. Los pacientes deben hablar con un profesional de atención médica para obtener información completa sobre francisco molly, cuestiones médicas y opciones de tratamiento, incluidos los riesgos o los beneficios relacionados con el uso de medicamentos. Esta información no certifica que los tratamientos o medicamentos nicole seguros, eficaces o estén aprobados para tratar a un paciente específico. [employer] y viktor afiliados renuncian a cualquier garantía o responsabilidad relacionada con esta información o el uso de la misma.El uso de esta información está sujeto a las Condiciones de uso, disponibles en https://www.[employer].com/en/know/clinical-effectiveness-terms. 2024© [employer] y viktor afiliados y/o licenciantes. Todos los derechos reservados.  Copyright   © 2024 [employer] Todos los derechos reservados.    Patient Education     Diabetes " tipo 2   Conceptos Básicos   Redactado por los médicos y editores de UpToDate   ¿Qué es la diabetes tipo 2? -- La diabetes tipo 2 es un trastorno que afecta la forma en que el cuerpo utiliza el azúcar. A veces se lo llama diabetes mellitus tipo 2.  Todas las células del cuerpo necesitan azúcar para funcionar normalmente. El azúcar entra en las células con la ayuda de patricia hormona llamada insulina. La insulina se produce en el páncreas, un órgano ubicado en el área del estómago. Si no hay suficiente insulina o si el cuerpo travis de responder a la insulina, el azúcar se acumula en la darshan. [place] es lo que les sucede a las personas con diabetes.  Existen dos tipos de diabetes:   Diabetes tipo 1 - En la diabetes tipo 1, el páncreas no produce insulina o produce muy poca.   Diabetes tipo 2 - En la mayoría de los casos de diabetes tipo 2, el cuerpo travis de responder a la insulina normalmente. Con el tiempo, el páncreas travis de producir suficiente insulina.  Tener exceso de peso corporal u obesidad aumenta el riesgo de desarrollar diabetes tipo 2. Sin embargo, las personas que no tienen exceso de peso corporal también pueden desarrollar diabetes.  ¿Cuáles son los síntomas de la diabetes tipo 2? -- En general, la diabetes tipo 2 no provoca síntomas. Cuando aparecen síntomas, son los siguientes, entre otros:   Necesidad de orinar con frecuencia   Sed intensa   Visión borrosa  ¿La diabetes puede ocasionar otros problemas de molly? -- Sí. Es posible que la diabetes tipo 2 no le cause malestar, silvia si no la controla, con el tiempo puede ocasionar problemas graves, por ejemplo:   Infartos   Accidentes cerebrovasculares (derrames)   Enfermedad renal   Problemas de visión (o incluso ceguera)   Dolor o pérdida de sensibilidad en las savannah y los pies   Necesidad de cortar (amputar) los dedos de las savannah, los dedos de los pies u otras partes del cuerpo  ¿Cómo puedo saber si tengo diabetes tipo 2? -- Para averiguar si tiene diabetes  "tipo 2, el médico o enfermero puede realizar patricia prueba de darshan. Existen dos pruebas que pueden usarse con kathy fin. Ambas consisten en medir la cantidad de azúcar en la darshan, llamada \"azúcar en darshan\" o \"glucosa en darshan\":   Patricia de las pruebas mide el azúcar en darshan en el momento en que se extrae la muestra. Esta prueba se realiza por la mañana. No podrá comer ni beber nada lisa agua andres un mínimo de 8 horas antes de la prueba.   La otra prueba indica el promedio de azúcar en darshan de los últimos 2 a 3 meses. Esta prueba de darshan se llama \"hemoglobina A1C\" o simplemente \"A1C\". Se puede revisar en cualquier momento del día, incluso si ha comido recientemente.  ¿Cómo se trata la diabetes tipo 2? -- Las metas del tratamiento son manejar el azúcar en darshan y reducir el riesgo de problemas futuros que pueden desarrollar las personas que tienen diabetes.  El tratamiento podría incluir:   Cambios en el estilo de lei - Es un aspecto importante del manejo de la diabetes. Incluye comer alimentos saludables y hacer suficiente actividad física.   Medicinas - Existen algunas medicinas que ayudan a disminuir el azúcar en darshan. Algunas personas deben jose píldoras que permiten que el organismo genere más insulina o que posibilitan que la insulina cumpla con francisco función; otras deben aplicarse inyecciones de insulina.  Según las medicinas que tome, es posible que tenga que revisarse el azúcar en darshan periódicamente en francisco hogar, silvia no todas las personas que tienen diabetes tipo 2 necesitan hacerlo. El médico o enfermero le dirá si debe revisarse el azúcar en darshan, y cuándo y cómo hacerlo.  A veces, las personas con diabetes tipo 2 también deben jose medicinas para ayudar a prevenir los problemas que causa la enfermedad. Por ejemplo, las medicinas que se usan para bajar la presión arterial pueden disminuir las posibilidades de sufrir un infarto o un accidente cerebrovascular (derrame).   Atención médica " general - También es importante cuidar otros aspectos de francisco molly. [place] incluye vigilar francisco presión arterial y viktor niveles de colesterol. También debe recibir ciertas vacunas, renee las vacunas para protegerse de la gripe y de la enfermedad por coronavirus 2019 (“COVID-19”). Algunas personas también necesitan patricia vacuna para prevenir la neumonía.  ¿La diabetes tipo 2 se puede prevenir? -- Sí. Para reducir viktor posibilidades de desarrollar diabetes tipo 2, lo más importante que puede hacer es tener patricia alimentación saludable y realizar mucha actividad física. [place] puede ayudarlo a bajar de peso, si tiene sobrepeso. Sin embargo, comer jessica y hacer actividad también es rojas para francisco molly general. Incluso la actividad leve, renee caminar, tiene beneficios.  Si fuma, dejar de fumar también puede reducir francisco riesgo de desarrollar diabetes tipo 2. Dejar de fumar puede ser difícil, silvia francisco médico o enfermero puede ayudar.  Todos los artículos se actualizan a medida que se descubre nueva evidencia y culmina nuestro proceso de evaluación por homólogos   [place] artículo se recuperó de UpToDate el: Apr 24, 2024.  Artículo [id number] Versión 19.0.es-419.1  Release: 32.3.2 - C32.113  © 2024 [employer] Inc. Todos los derechos reservados.  Exención de responsabilidad y uso de la información del consumidor   Descargo de responsabilidad: esta información generalizada es un resumen limitado de información sobre el diagnóstico, el tratamiento y/o los medicamentos. No pretende ser exhaustiva y se debe utilizar renee herramienta para ayudar al usuario a comprender y/o evaluar las posibles opciones de diagnóstico y tratamiento. No incluye toda la información sobre afecciones, tratamientos, medicamentos, efectos secundarios o riesgos puedan ser aplicables a un paciente específico. No tiene el propósito de servir renee recomendación médica ni de sustituir la recomendación médica, el diagnóstico o el tratamiento de un profesional de atención médica que se  base en el examen y la evaluación de kathy profesional de la molly respecto a las circunstancias específicas y únicas del paciente. Los pacientes deben hablar con un profesional de atención médica para obtener información completa sobre francisco molly, cuestiones médicas y opciones de tratamiento, incluidos los riesgos o los beneficios relacionados con el uso de medicamentos. Esta información no certifica que los tratamientos o medicamentos nicole seguros, eficaces o estén aprobados para tratar a un paciente específico. [employer] y viktor afiliados renuncian a cualquier garantía o responsabilidad relacionada con esta información o el uso de la misma.El uso de esta información está sujeto a las Condiciones de uso, disponibles en https://www.[employer].com/en/know/clinical-effectiveness-terms. 2024© [employer] y viktor afiliados y/o licenciantes. Todos los derechos reservados.  Copyright   © 2024 [employer] Todos los derechos reservados.

## 2025-04-08 PROBLEM — L85.3 DRY SKIN: Status: ACTIVE | Noted: 2025-04-08

## 2025-04-08 NOTE — ASSESSMENT & PLAN NOTE
Requesting updated mammogram order. Reviewed imaging, mammogram ordered in June 2024. Currently scheduled for routine screening mammogram in June 2025  - provided print-out with date of appt and location    appointment information:  Name: Lindsey Chandra  Date: 6/23/2025  Time: 3:00 PM  Visit Type: SANTI SCREEN RIGHT W DBT & CAD [[id number]]  Provider: RICK BOWIE SLN 1

## 2025-04-08 NOTE — ASSESSMENT & PLAN NOTE
Discussed with patient the benefits, contraindications and side effects of the following vaccines: PCV-20   PCV-20 given in office today    Orders:    Pneumococcal Conjugate Vaccine 20-valent (Pcv20)

## 2025-04-08 NOTE — PROGRESS NOTES
Name: Lindsey Chandra      : 1950      MRN: [id number]  Encounter Provider: Rome Kendall MD  Encounter Date: 2025   Encounter department: [facility] [place]  :  Assessment & Plan  Type 2 diabetes mellitus without complication, without long-term current use of insulin ([facility])  Assessment: Type 2 diabetes with worsening glucose control based on reported blood sugars and most recent HgbA1c.   Home blood sugars: does not check sugars at home -- does not like to stick fingers  A1c slightly worse today, up to 9.9% from 9.6% at last visit  Acceleration of A1c increase has slowed   Last three A1c readings: 9.9% < 9.6% < 7.3%  She did not increase her metformin as instructed at last visit and has been taking only 500mg BID  Offered nutrition referral, deferred    Plan:  - Goal HgbA1c < 7%  - Blood sugar goals: preprandial: 80-130mg/dL, postprandial: <180mg/dL  - Blood pressure goal: <130/80  - Continue lifestyle modifications including: weight loss, diabetic diet, and regular physical activity.  - Continue strict medication compliance. Call into the clinic with any questions or for refills prior to running out of medication.       Current diabetes medication(s):  Oral hypoglycemics:   Metformin 500mg daily -- increasing to 1000mg tablets to ensure better adherence  Start glipizide 2.5mg daily  Insulin: none  GLP1-RA/SGLT2i: none  ACE-i/ARB: lisinopril 20mg daily  Statin: simvastatin 20mg daily  Other: januvia 100mg daily    Screening (most recent values):  HgbA1c: 9.9%  Renal function  sCr/BUN: 0.61/15  eGFR: 90  Proteinuria: urine (alb:cr) ratio: 24   Peripheral nerves  Denies presence of any non-healing wounds or ulcers  Diabetic foot exam: to be completed at next visit  Complete foot exam at least yearly  Neuropathic pain medication  none  Eye exam  up to date (completed 2024  Complete eye exam at least yearly    Summary of changes:  - Increase metformin to 1g  "BID  - Start glipizide 2.5mg daily  - Discussed CGM with patient as she is unlikely to begin regular accuchecks  - If approved, will schedule visit to set-up device. Patient has smart phone    Follow-up in 3 months for HgbA1c recheck        Orders:    POCT hemoglobin A1c    glipiZIDE (GLUCOTROL XL) 2.5 mg 24 hr tablet; Take 1 tablet (2.5 mg total) by mouth daily    Continuous Glucose Sensor (Dexcom G7 Sensor); Use 1 Device every 10 days    Continuous Glucose  (Dexcom G7 ) CHARLES; Use 1 each 1 (one) time for 1 dose    metFORMIN (GLUCOPHAGE) 1000 MG tablet; Take 1 tablet (1,000 mg total) by mouth 2 (two) times a day with meals    Benign essential hypertension  Patient's blood pressure improved today. Medication compliance has improved after starting \"pill packs\". Asymptomatic, no med adverse effects.   Current regimen:   amlodipine 10mg daily  hctz 25mg daily  lisinopril 20mg daily    - Continue current meds at current doses  - Follow up in 3-months or sooner if needed         Dry skin    Orders:    Skin Protectants, Misc. (eucerin) cream; Apply topically as needed for wound care    Chronic right shoulder pain  continues to have R shoulder pain after fall last year. No weakness or paresthesias.   - Referral to PT provided    Orders:    Ambulatory Referral to Physical Therapy; Future    Encounter for immunization  Discussed with patient the benefits, contraindications and side effects of the following vaccines:  PCV-20    PCV-20 given in office today    Orders:    Pneumococcal Conjugate Vaccine 20-valent (Pcv20)    Encounter for screening mammogram for breast cancer  Requesting updated mammogram order. Reviewed imaging, mammogram ordered in June 2024. Currently scheduled for routine screening mammogram in June 2025  - provided print-out with date of appt and location    appointment information:  Name: Lindsey Chandra  Date: 6/23/2025  Time: 3:00 PM  Visit Type: SANTI SCREEN RIGHT W DBT & CAD " "[[id number]]  Provider: RICK BOWIE SLN 1                  History of Present Illness   Diabetes  She presents for her follow-up diabetic visit. She has type 2 diabetes mellitus. No MedicAlert identification noted. The initial diagnosis of diabetes was made 3 years ago. Her disease course has been worsening. There are no hypoglycemic associated symptoms. Pertinent negatives for diabetes include no blurred vision, no chest pain, no foot paresthesias, no visual change, no weakness and no weight loss. There are no hypoglycemic complications. Risk factors for coronary artery disease include diabetes mellitus and hypertension. Current diabetic treatment includes oral agent (triple therapy). She is compliant with treatment most of the time. She has not had a previous visit with a dietitian. She rarely participates in exercise. An ACE inhibitor/angiotensin II receptor blocker is being taken. She does not see a podiatrist.Eye exam is current.     Review of Systems   Constitutional:  Negative for weight loss.   Eyes:  Negative for blurred vision.   Cardiovascular:  Negative for chest pain.   Skin:         dry skin   Neurological:  Negative for weakness.       Objective   /75 (BP Location: Right arm, Patient Position: Sitting, Cuff Size: Large)   Pulse 71   Temp 98 °F (36.7 °C) (Temporal)   Ht 4' 11\" (1.499 m)   Wt 68.9 kg (152 lb)   SpO2 97%   BMI 30.70 kg/m²      Physical Exam  Vitals reviewed.   Constitutional:       General: She is not in acute distress.     Appearance: Normal appearance. She is well-developed. She is not ill-appearing.   HENT:      Head: Normocephalic and atraumatic.      Right Ear: External ear normal.      Left Ear: External ear normal.      Nose: Nose normal.      Mouth/Throat:      Mouth: Mucous membranes are moist.      Pharynx: Oropharynx is clear.   Eyes:      General: No scleral icterus.     Extraocular Movements: Extraocular movements intact.   Cardiovascular:      Rate and Rhythm: " Normal rate and regular rhythm.      Heart sounds: Normal heart sounds. No murmur heard.  Pulmonary:      Effort: Pulmonary effort is normal. No respiratory distress.      Breath sounds: Normal breath sounds.   Abdominal:      General: There is no distension.   Musculoskeletal:         General: No signs of injury.      Cervical back: Normal range of motion.   Lymphadenopathy:      Cervical: No cervical adenopathy.   Skin:     Findings: No rash.   Neurological:      Mental Status: She is alert and oriented to person, place, and time.      Sensory: No sensory deficit.      Motor: No weakness.      Gait: Gait normal.   Psychiatric:         Mood and Affect: Mood normal.         Behavior: Behavior normal.       Rome Kendall MD

## 2025-04-08 NOTE — ASSESSMENT & PLAN NOTE
continues to have R shoulder pain after fall last year. No weakness or paresthesias.   - Referral to PT provided    Orders:    Ambulatory Referral to Physical Therapy; Future

## 2025-04-08 NOTE — ASSESSMENT & PLAN NOTE
Assessment: Type 2 diabetes with worsening glucose control based on reported blood sugars and most recent HgbA1c.   Home blood sugars: does not check sugars at home -- does not like to stick fingers  A1c slightly worse today, up to 9.9% from 9.6% at last visit  Acceleration of A1c increase has slowed   Last three A1c readings: 9.9% < 9.6% < 7.3%  She did not increase her metformin as instructed at last visit and has been taking only 500mg BID  Offered nutrition referral, deferred    Plan:  - Goal HgbA1c < 7%  - Blood sugar goals: preprandial: 80-130mg/dL, postprandial: <180mg/dL  - Blood pressure goal: <130/80  - Continue lifestyle modifications including: weight loss, diabetic diet, and regular physical activity.  - Continue strict medication compliance. Call into the clinic with any questions or for refills prior to running out of medication.       Current diabetes medication(s):  Oral hypoglycemics:   Metformin 500mg daily -- increasing to 1000mg tablets to ensure better adherence  Start glipizide 2.5mg daily  Insulin: none  GLP1-RA/SGLT2i: none  ACE-i/ARB: lisinopril 20mg daily  Statin: simvastatin 20mg daily  Other: januvia 100mg daily    Screening (most recent values):  HgbA1c: 9.9%  Renal function  sCr/BUN: 0.61/15  eGFR: 90  Proteinuria: urine (alb:cr) ratio: 24   Peripheral nerves  Denies presence of any non-healing wounds or ulcers  Diabetic foot exam: to be completed at next visit  Complete foot exam at least yearly  Neuropathic pain medication  none  Eye exam  up to date (completed 12/2024  Complete eye exam at least yearly    Summary of changes:  - Increase metformin to 1g BID  - Start glipizide 2.5mg daily  - Discussed CGM with patient as she is unlikely to begin regular accuchecks  - If approved, will schedule visit to set-up device. Patient has smart phone    Follow-up in 3 months for HgbA1c recheck        Orders:    POCT hemoglobin A1c    glipiZIDE (GLUCOTROL XL) 2.5 mg 24 hr tablet; Take 1 tablet  (2.5 mg total) by mouth daily    Continuous Glucose Sensor (Dexcom G7 Sensor); Use 1 Device every 10 days    Continuous Glucose  (Dexcom G7 ) CHARLES; Use 1 each 1 (one) time for 1 dose    metFORMIN (GLUCOPHAGE) 1000 MG tablet; Take 1 tablet (1,000 mg total) by mouth 2 (two) times a day with meals

## 2025-04-08 NOTE — ASSESSMENT & PLAN NOTE
"Patient's blood pressure improved today. Medication compliance has improved after starting \"pill packs\". Asymptomatic, no med adverse effects.   Current regimen:   amlodipine 10mg daily  hctz 25mg daily  lisinopril 20mg daily    - Continue current meds at current doses  - Follow up in 3-months or sooner if needed         "

## 2025-04-16 ENCOUNTER — EVALUATION (OUTPATIENT)
Dept: PHYSICAL THERAPY | Facility: CLINIC | Age: 75
End: 2025-04-16
Payer: COMMERCIAL

## 2025-04-16 DIAGNOSIS — M25.511 CHRONIC RIGHT SHOULDER PAIN: Primary | ICD-10-CM

## 2025-04-16 DIAGNOSIS — G89.29 CHRONIC RIGHT SHOULDER PAIN: Primary | ICD-10-CM

## 2025-04-16 DIAGNOSIS — M25.531 CHRONIC PAIN OF RIGHT WRIST: ICD-10-CM

## 2025-04-16 DIAGNOSIS — G89.29 CHRONIC PAIN OF RIGHT WRIST: ICD-10-CM

## 2025-04-16 PROCEDURE — 97162 PT EVAL MOD COMPLEX 30 MIN: CPT | Performed by: PHYSICAL THERAPIST

## 2025-04-16 NOTE — PROGRESS NOTES
PT Evaluation     Today's date: 2025  Patient name: Lindsey Chandra  : 1950  MRN: [id number]  Referring provider: Rome Kendall*  Dx:   Encounter Diagnosis     ICD-10-CM    1. Chronic right shoulder pain  M25.511     G89.29       2. Chronic pain of right wrist  M25.531     G89.29           Start Time: 1440  Stop Time: 1510  Total time in clinic (min): 30 minutes  Assessment/Plan  Lindsey Chandra is a 74 y.o. female who was referred to physical therapy for management of R shoulder/wrist pain s/p fall that occurred 1+ year ago.  Primary impairments include decreased UE ROM and strength and report of pain.  Consequently, patient has difficulty completing ADLs including retrieving objects from cabinet/refrigerator, opening jars/twist caps, all overhead activity.  Lindsey would benefit from skilled intervention to address all deficits and improve functional capability.  Patient is a good candidate for therapy, pending compliance with HEP and consistent participation in physical therapy.  Thank you for the referral and please do not hesitate to contact me with any questions or concerns regarding Lindsey’s care!      Plan  Frequency:2x per week   Duration in weeks: 12  POC start date: 25  POC end date: 25  Therapeutic exercise/activity, neuromuscular reeducation, manual therapy, and modalities.   Patient may benefit from additional referral to orthopedics and/or hand PT/OT if sx do not improve with physical therapy.   Patient understands and agrees to plan of care.    Goals  Short Term--4 weeks  1. Patient will demonstrate at least 10 deg improvement in active shoulder flexion and abduction ROM.   2. Patient will demonstrate 1/2 point increase in all deficient MMT scores.  3. Patient will be adherent/independent with basic HEP.    Long Term--By Discharge  1. Patient will achieve expected FOTO score.  2. Patient will have enough strength to twist open lids on jars, bottles, etc.   3.  Patient will be able to retrieve light object from high shelf/cabinet with her R UE.   4. Patient will be able to manage all self-care tasks and household ADLs with minimal to no onset of shoulder pain.  Patient's Goal: Decrease pain. Get more use out of her dominant arm.       Subjective  History   Date of Onset: Approx End of March 2024 Description: Patient tripped on a curb and fell forward with her arms outstretched. Pain was primarily located in her R wrist at the time, so she participated in OT to address sx where she did gain strength and also experienced good relief in pain. She also participated in PT for balance/lower extremity strength but did not have any formal therapy for her R shoulder. Patient mentioned shoulder pain to PCP at last visit, and he recommended that she start PT.     Symptoms  Constant anterior shoulder pain/generalized wrist pain in dominant upper extremity with intermittent paresthesia in thenar region. Patient states that wrist is worse than the shoulder currently, and she wears an OTC wrist brace PRN when wrist is really flared up. In addition to pain, she also states that strength is very limited. She had to call her neighbor yesterday to open a jar and has difficulty opening heavy doors. For her shoulder, overhead motion is the most painful/difficult with accompanied loss of motion.     Pain at best: 6  Pain at worst: 10      Social History  Lindsey lives independently.    Objective             MMT         AROM          PROM   Shoulder       R       L        R          L        R   Flex. 3- P!  106 150 TBA   Extn.        Abd. 3- P!  87 145 TBA   IR. 4       ER. 4- P!  37 68 TBA       Wrist AROM: significant limitation in extension, radial deviation, and pronation with P! Reported in all planes         Precautions: DM type II--not well controlled. Hx breast CA.    4/16       Manuals        Shoulder PROM        Wrist PROM/mobs                                There Ex        Pulleys         Table slides                Shoulder iso                Wrist AROM                                Neuro Re-Ed        Scap retract                                                                                                                                                Ther Act/Gait                          Modalities

## 2025-04-21 DIAGNOSIS — E78.5 HYPERLIPIDEMIA, UNSPECIFIED HYPERLIPIDEMIA TYPE: ICD-10-CM

## 2025-04-21 DIAGNOSIS — N32.81 OVERACTIVE BLADDER: ICD-10-CM

## 2025-04-22 DIAGNOSIS — E11.9 TYPE 2 DIABETES MELLITUS WITHOUT COMPLICATION, WITHOUT LONG-TERM CURRENT USE OF INSULIN (HCC): ICD-10-CM

## 2025-04-23 ENCOUNTER — APPOINTMENT (OUTPATIENT)
Dept: PHYSICAL THERAPY | Facility: CLINIC | Age: 75
End: 2025-04-23
Payer: COMMERCIAL

## 2025-04-25 RX ORDER — MIRABEGRON 25 MG/1
25 TABLET, FILM COATED, EXTENDED RELEASE ORAL EVERY MORNING
Qty: 30 TABLET | Refills: 0 | Status: SHIPPED | OUTPATIENT
Start: 2025-04-25

## 2025-04-25 RX ORDER — SIMVASTATIN 20 MG
20 TABLET ORAL
Qty: 90 TABLET | Refills: 1 | Status: SHIPPED | OUTPATIENT
Start: 2025-04-25

## 2025-04-29 ENCOUNTER — OFFICE VISIT (OUTPATIENT)
Dept: PHYSICAL THERAPY | Facility: CLINIC | Age: 75
End: 2025-04-29
Payer: COMMERCIAL

## 2025-04-29 DIAGNOSIS — G89.29 CHRONIC RIGHT SHOULDER PAIN: Primary | ICD-10-CM

## 2025-04-29 DIAGNOSIS — M25.531 CHRONIC PAIN OF RIGHT WRIST: ICD-10-CM

## 2025-04-29 DIAGNOSIS — M25.511 CHRONIC RIGHT SHOULDER PAIN: Primary | ICD-10-CM

## 2025-04-29 DIAGNOSIS — G89.29 CHRONIC PAIN OF RIGHT WRIST: ICD-10-CM

## 2025-04-29 PROCEDURE — 97140 MANUAL THERAPY 1/> REGIONS: CPT

## 2025-04-29 PROCEDURE — 97110 THERAPEUTIC EXERCISES: CPT

## 2025-04-29 NOTE — PROGRESS NOTES
"Daily Note     Today's date: 2025  Patient name: Lindsey Chandra  : 1950  MRN: [id number]  Referring provider: Rome Kendall*  Dx:   Encounter Diagnosis     ICD-10-CM    1. Chronic right shoulder pain  M25.511     G89.29       2. Chronic pain of right wrist  M25.531     G89.29           Start Time: 1010  Stop Time: 1048  Total time in clinic (min): 38 minutes    Subjective: Pt noted that her R shoulder was bothering her the most today and also noted occasional R finger/hand tingling. Pt denied dizziness.       Objective: See treatment diary below      Assessment: Tolerated treatment well. Program started with the pulleys. Program focused on increasing RUE strength, improving posture, and increasing RUE ROM. Manual therapy focused on increasing R shoulder and wrist PROM. Repeated C/S ret in supine decreased RUE sx. Pt was educated on updated written HEP. MHP was utilized post-treatment. Patient would benefit from continued PT      Plan: Continue per plan of care.    HEP: SAFIA  Precautions: DM type II--not well controlled. Hx breast CA.          Manuals        Shoulder PROM  MS      Wrist PROM/mobs  MS                              There Ex        Pulleys  5'      Table slides  10x each (flex/scap)              Shoulder iso                Wrist AROM  10x each (Flex/Ext, Sup/Pro)      Seated C/S ret  10x DC      Supine C/S ret  10x HEP              Neuro Re-Ed        Scap retract  5\"x10                                                                                                                                              Ther Act/Gait                          Modalities        MHP  10' R shoulder                                 "